# Patient Record
Sex: FEMALE | Race: WHITE | NOT HISPANIC OR LATINO | Employment: FULL TIME | URBAN - NONMETROPOLITAN AREA
[De-identification: names, ages, dates, MRNs, and addresses within clinical notes are randomized per-mention and may not be internally consistent; named-entity substitution may affect disease eponyms.]

---

## 2017-01-13 ENCOUNTER — OFFICE VISIT (OUTPATIENT)
Dept: FAMILY MEDICINE CLINIC | Facility: CLINIC | Age: 52
End: 2017-01-13

## 2017-01-13 VITALS
HEIGHT: 64 IN | WEIGHT: 165 LBS | SYSTOLIC BLOOD PRESSURE: 122 MMHG | TEMPERATURE: 98.5 F | OXYGEN SATURATION: 100 % | BODY MASS INDEX: 28.17 KG/M2 | DIASTOLIC BLOOD PRESSURE: 66 MMHG | HEART RATE: 67 BPM

## 2017-01-13 DIAGNOSIS — E53.8 VITAMIN B12 DEFICIENCY (NON ANEMIC): ICD-10-CM

## 2017-01-13 DIAGNOSIS — R53.82 CHRONIC FATIGUE: ICD-10-CM

## 2017-01-13 DIAGNOSIS — F33.42 RECURRENT MAJOR DEPRESSIVE DISORDER, IN FULL REMISSION (HCC): ICD-10-CM

## 2017-01-13 DIAGNOSIS — E03.8 SUBCLINICAL HYPOTHYROIDISM: ICD-10-CM

## 2017-01-13 DIAGNOSIS — E80.6 HYPERBILIRUBINEMIA: Primary | ICD-10-CM

## 2017-01-13 DIAGNOSIS — I10 ESSENTIAL HYPERTENSION: ICD-10-CM

## 2017-01-13 PROCEDURE — 99213 OFFICE O/P EST LOW 20 MIN: CPT | Performed by: INTERNAL MEDICINE

## 2017-01-13 RX ORDER — CARVEDILOL 12.5 MG/1
12.5 TABLET ORAL 2 TIMES DAILY WITH MEALS
Qty: 180 TABLET | Refills: 3 | Status: SHIPPED | OUTPATIENT
Start: 2017-01-13 | End: 2018-01-05 | Stop reason: SDUPTHER

## 2017-01-13 RX ORDER — LOSARTAN POTASSIUM 50 MG/1
50 TABLET ORAL DAILY
Qty: 90 TABLET | Refills: 3 | Status: SHIPPED | OUTPATIENT
Start: 2017-01-13 | End: 2017-08-21

## 2017-01-13 RX ORDER — LEVOTHYROXINE SODIUM 0.05 MG/1
50 TABLET ORAL DAILY
Qty: 30 TABLET | Refills: 5 | Status: SHIPPED | OUTPATIENT
Start: 2017-01-13 | End: 2017-01-13 | Stop reason: SDUPTHER

## 2017-01-13 RX ORDER — CHLORTHALIDONE 25 MG/1
25 TABLET ORAL DAILY
Qty: 90 TABLET | Refills: 3 | Status: SHIPPED | OUTPATIENT
Start: 2017-01-13 | End: 2018-01-05 | Stop reason: SDUPTHER

## 2017-01-13 RX ORDER — LEVOTHYROXINE SODIUM 0.05 MG/1
50 TABLET ORAL DAILY
Qty: 90 TABLET | Refills: 3 | Status: SHIPPED | OUTPATIENT
Start: 2017-01-13 | End: 2018-01-05 | Stop reason: SDUPTHER

## 2017-01-13 RX ORDER — BUPROPION HYDROCHLORIDE 100 MG/1
100 TABLET ORAL 3 TIMES DAILY
Qty: 270 TABLET | Refills: 3 | Status: SHIPPED | OUTPATIENT
Start: 2017-01-13 | End: 2017-08-21

## 2017-01-13 NOTE — MR AVS SNAPSHOT
Gaby Rasheed   1/13/2017 11:00 AM   Office Visit    Dept Phone:  [phone]   Encounter #:  [id number]    Provider:  Silverio Perez MD   Department:  [facility] PRIMARY CARE                Your Full Care Plan              Today's Medication Changes          These changes are accurate as of: 1/13/17 12:02 PM.  If you have any questions, ask your nurse or doctor.               Medication(s)that have changed:     losartan 50 MG tablet   Commonly known as:  COZAAR   Take 1 tablet by mouth Daily.   What changed:  Another medication with the same name was removed. Continue taking this medication, and follow the directions you see here.   Changed by:  Silverio Perez MD         Stop taking medication(s)listed here:     lamoTRIgine 100 MG tablet   Commonly known as:  LaMICtal   Stopped by:  Silverio Perez MD           PREMPRO 0.625-2.5 MG per tablet   Generic drug:  estrogen (conjugated)-medroxyprogesterone   Stopped by:  Silverio Perez MD                Where to Get Your Medications      These medications were sent to [facility] [place] [address] - [address] [place] [address] [phone]  - [phone] [address] [address]     Phone:  [phone]     carvedilol 12.5 MG tablet    chlorthalidone 25 MG tablet    levothyroxine 50 MCG tablet    losartan 50 MG tablet         You can get these medications from any pharmacy     Bring a paper prescription for each of these medications     buPROPion 100 MG tablet                  Your Updated Medication List          This list is accurate as of: 1/13/17 12:02 PM.  Always use your most recent med list.                BIOTIN PO       buPROPion 100 MG tablet   Commonly known as:  WELLBUTRIN   Take 1 tablet by mouth 3 (Three) Times a Day.       carvedilol 12.5 MG tablet   Commonly known as:  COREG   Take 1 tablet by mouth 2 (Two) Times a Day With Meals.       CENTRUM ADULTS tablet       chlorthalidone 25 MG tablet   Commonly known as:  HYGROTON   Take 1 tablet by mouth Daily.       citalopram 40 MG tablet   Commonly known as:  CELEXA   Take 1 tablet by mouth Daily.       CloNIDine 0.1 MG tablet   Commonly known as:  CATAPRES   Take 1 tablet by mouth at night as needed for high blood pressure.       Iron tablet       levothyroxine 50 MCG tablet   Commonly known as:  SYNTHROID   Take 1 tablet by mouth Daily.       losartan 50 MG tablet   Commonly known as:  COZAAR   Take 1 tablet by mouth Daily.       Vitamin B-12 1000 MCG sublingual tablet               You Were Diagnosed With        Codes Comments    Hyperbilirubinemia    -  Primary ICD-10-CM: E80.6  ICD-9-CM: 782.4     Essential hypertension     ICD-10-CM: I10  ICD-9-CM: 401.9     Recurrent major depressive disorder, in full remission     ICD-10-CM: F33.42  ICD-9-CM: 296.36     Subclinical hypothyroidism     ICD-10-CM: E03.9  ICD-9-CM: 244.8     Chronic fatigue     ICD-10-CM: R53.82  ICD-9-CM: 780.79     Vitamin B12 deficiency (non anemic)     ICD-10-CM: E53.8  ICD-9-CM: 266.2       Medications to be Given to You by a Medical Professional     Due       Frequency    8/8/2016 cyanocobalamin injection 1,000 mcg  Every 28 Days      Instructions     None    Patient Instructions History      Upcoming Appointments     Visit Type Date Time Department    SAME DAY 1/13/2017 11:00 AM [facility]    OFFICE VISIT 7/14/2017  8:00 AM [facility] GRETTA [facility]    PAP SMEAR/PELVIC EXAM 10/16/2017  3:00 PM [facility] ONC GYN [facility]      [employer] Signup     Our records indicate that you have an active [employer] account.    You can view your After Visit Summary by going to [employer] and logging in with your [employer] username and password.  If you don't have a [employer] username and password but a parent or guardian has access to your record, the parent or guardian should login with their own [employer] username and password and access your record to  "view the After Visit Summary.    If you have questions, you can email [email or web address] or call [phone] to talk to our MyChart staff.  Remember, [employer] is NOT to be used for urgent needs.  For medical emergencies, dial 911.               Other Info from Your Visit           Your Appointments     Jul 14, 2017  8:00 AM EDT   Office Visit with Silverio Perez MD   [facility] PRIMARY CARE (--)    [address]  [facility] [address] [phone]           Arrive 15 minutes prior to appointment.            Oct 16, 2017  3:00 PM EDT   Pap Smear/Pelvic Exam with JEN Gutierrez   [facility] GYNECOLOGIC ONCOLOGY (--)    [address]  [facility] [address] [phone]              Allergies     No Known Allergies      Reason for Visit     Follow-up Lab results      Vital Signs     Blood Pressure Pulse Temperature Height Weight Oxygen Saturation    122/66 (BP Location: Left arm, Patient Position: Sitting) 67 98.5 °F (36.9 °C) 63.5\" (161.3 cm) 165 lb (74.8 kg) 100%    Body Mass Index Smoking Status                28.77 kg/m2 Never Smoker          Problems and Diagnoses Noted     Chronic fatigue    Depression    High blood pressure    Hyperbilirubinemia    -  Primary    Underactive thyroid        Vitamin B12 deficiency (non anemic)            "

## 2017-01-13 NOTE — PROGRESS NOTES
"Subjective   Patient ID: Gaby Rasheed is a 51 y.o. female Pt request medical management.     History of Present Illness   Pt request medical management.     Feels well. Breathing well. Lost job yesterday.       The following portions of the patient's history were reviewed and updated as appropriate: allergies, current medications, past family history, past medical history, past social history, past surgical history and problem list.  Review of Systems   Constitutional: Positive for fatigue.   All other systems reviewed and are negative.      Visit Vitals   • /66 (BP Location: Left arm, Patient Position: Sitting)   • Pulse 67   • Temp 98.5 °F (36.9 °C)   • Ht 63.5\" (161.3 cm)   • Wt 165 lb (74.8 kg)   • SpO2 100%   • BMI 28.77 kg/m2       Objective   Physical Exam  General Appearance:    Alert, cooperative, no distress, appears stated age   Head:    Normocephalic, without obvious abnormality, atraumatic   Eyes:    PERRL, conjunctiva/corneas clear, EOM's intact, fundi     benign, both eyes        Ears:    Normal TM's and external ear canals, both ears   Nose:   Nares normal, septum midline, mucosa normal, no drainage    or sinus tenderness   Throat:   Lips, mucosa, and tongue normal; teeth and gums normal   Neck:   Supple, symmetrical, trachea midline, no adenopathy;        thyroid:  No enlargement/tenderness/nodules; no carotid    bruit or JVD   Back:     Symmetric, no curvature, ROM normal, no CVA tenderness   Lungs:     Clear to auscultation bilaterally, respirations unlabored   Chest wall:    No tenderness or deformity   Heart:    Regular rate and rhythm, S1 and S2 normal, no murmur, rub   or gallop   Abdomen:     Soft, non-tender, bowel sounds active all four quadrants,     no masses, no organomegaly           Extremities:   Extremities normal, atraumatic, no cyanosis or edema   Pulses:   2+ and symmetric all extremities   Skin:   Skin color, texture, turgor normal, no rashes or lesions   Lymph " nodes:   Cervical, supraclavicular, and axillary nodes normal   Neurologic:   CNII-XII intact. Normal strength, sensation and reflexes       throughout     Assessment/Plan   Labs and liver u/s reviewed to day. Pt will decrease etoh intake and repeat labs.      Gaby was seen today for follow-up.    Diagnoses and all orders for this visit:    Hyperbilirubinemia  -     Comprehensive Metabolic Panel; Future    Essential hypertension  -     losartan (COZAAR) 50 MG tablet; Take 1 tablet by mouth Daily.  -     chlorthalidone (HYGROTON) 25 MG tablet; Take 1 tablet by mouth Daily.  -     Comprehensive Metabolic Panel; Future    Recurrent major depressive disorder, in full remission  -     buPROPion (WELLBUTRIN) 100 MG tablet; Take 1 tablet by mouth 3 (Three) Times a Day.    Subclinical hypothyroidism  -     Discontinue: levothyroxine (SYNTHROID) 50 MCG tablet; Take 1 tablet by mouth Daily.  -     TSH; Future  -     T4, Free; Future  -     T3, Free; Future  -     levothyroxine (SYNTHROID) 50 MCG tablet; Take 1 tablet by mouth Daily.    Chronic fatigue  -     Discontinue: levothyroxine (SYNTHROID) 50 MCG tablet; Take 1 tablet by mouth Daily.  -     TSH; Future  -     T4, Free; Future  -     T3, Free; Future  -     levothyroxine (SYNTHROID) 50 MCG tablet; Take 1 tablet by mouth Daily.    Vitamin B12 deficiency (non anemic)  -     CBC & Differential; Future  -     Vitamin B12; Future    Other orders  -     carvedilol (COREG) 12.5 MG tablet; Take 1 tablet by mouth 2 (Two) Times a Day With Meals.      Return in about 6 months (around 7/13/2017).           Immunization History   Administered Date(s) Administered   • Influenza (IM) Preservative Free 12/16/2016           There are no Patient Instructions on file for this visit.

## 2017-03-10 ENCOUNTER — OFFICE VISIT (OUTPATIENT)
Dept: FAMILY MEDICINE CLINIC | Facility: CLINIC | Age: 52
End: 2017-03-10

## 2017-03-10 VITALS
DIASTOLIC BLOOD PRESSURE: 78 MMHG | WEIGHT: 171 LBS | TEMPERATURE: 98.2 F | RESPIRATION RATE: 16 BRPM | OXYGEN SATURATION: 100 % | HEART RATE: 74 BPM | BODY MASS INDEX: 29.19 KG/M2 | SYSTOLIC BLOOD PRESSURE: 144 MMHG | HEIGHT: 64 IN

## 2017-03-10 DIAGNOSIS — J01.01 ACUTE RECURRENT MAXILLARY SINUSITIS: Primary | ICD-10-CM

## 2017-03-10 PROCEDURE — 99214 OFFICE O/P EST MOD 30 MIN: CPT | Performed by: INTERNAL MEDICINE

## 2017-03-10 RX ORDER — LEVOFLOXACIN 500 MG/1
500 TABLET, FILM COATED ORAL DAILY
Qty: 7 TABLET | Refills: 0 | Status: SHIPPED | OUTPATIENT
Start: 2017-03-10 | End: 2017-04-10

## 2017-03-10 NOTE — PROGRESS NOTES
"Subjective   Patient ID: Gaby Rasheed is a 51 y.o. female Pt is here for management of multiple medical problems.    Chief Complaint   Patient presents with   • Sinusitis     2/10/17 went to [facility], for sinus infection was given Amoxicillin for 10 days, patient still having lots of drainage   • Cough     patient now coughing   • Nasal Congestion     patient continuing to have a runny nose and a funny taste in her mouth   • Ears popping     bilateral popping of ears       History of Present Illness    Seen 2/14 and treated with abx. drainge still bad.  Nasal d/c and foul smell. Augmentin given.   Still with sinus pressure and pain.    The following portions of the patient's history were reviewed and updated as appropriate: allergies, current medications, past family history, past medical history, past social history, past surgical history and problem list.      Review of Systems   Constitutional: Positive for fatigue and fever.   HENT: Positive for congestion, postnasal drip, rhinorrhea and sinus pressure.    Respiratory: Positive for cough and shortness of breath.        Objective     Visit Vitals   • /78 (BP Location: Left arm, Patient Position: Sitting, Cuff Size: Large Adult)   • Pulse 74   • Temp 98.2 °F (36.8 °C) (Oral)   • Resp 16   • Ht 63.5\" (161.3 cm)   • Wt 171 lb (77.6 kg)   • SpO2 100%   • BMI 29.82 kg/m2     Physical Exam  General Appearance:    Alert, cooperative, no distress, appears stated age   Head:    Normocephalic, without obvious abnormality, atraumatic   Eyes:    PERRL, conjunctiva/corneas clear, EOM's intact           Ears:    Normal TM's and external ear canals, both ears   Nose:   + sinus tenderness   Throat:   Lips, mucosa, and tongue normal; teeth and gums normal   Neck:   Supple, symmetrical, trachea midline, no adenopathy;        thyroid:  No enlargement/tenderness/nodules; no carotid    bruit or JVD   Back:     Symmetric, no curvature, ROM normal, no CVA tenderness "   Lungs:     Clear to auscultation bilaterally, respirations unlabored   Chest wall:    No tenderness or deformity   Heart:    Regular rate and rhythm, S1 and S2 normal, no murmur, rub   or gallop   Abdomen:     Soft, non-tender, bowel sounds active all four quadrants,     no masses, no organomegaly           Extremities:   Extremities normal, atraumatic, no cyanosis or edema   Pulses:   2+ and symmetric all extremities   Skin:   Skin color, texture, turgor normal, no rashes or lesions   Lymph nodes:   Cervical, supraclavicular, and axillary nodes normal   Neurologic:   CNII-XII intact. Normal strength, sensation and reflexes       throughout       Assessment/Plan           Gaby was seen today for sinusitis, cough, nasal congestion and ears popping.    Diagnoses and all orders for this visit:    Acute recurrent maxillary sinusitis  -     RESPIRATORY CULTURE    Other orders  -     levoFLOXacin (LEVAQUIN) 500 MG tablet; Take 1 tablet by mouth Daily.    No Follow-up on file.                   There are no Patient Instructions on file for this visit.

## 2017-03-13 LAB
BACTERIA SPEC CULT: NORMAL
BACTERIA SPT CULT: NORMAL
Lab: NORMAL

## 2017-04-10 ENCOUNTER — OFFICE VISIT (OUTPATIENT)
Dept: FAMILY MEDICINE CLINIC | Facility: CLINIC | Age: 52
End: 2017-04-10

## 2017-04-10 VITALS
HEIGHT: 64 IN | OXYGEN SATURATION: 99 % | WEIGHT: 178 LBS | DIASTOLIC BLOOD PRESSURE: 71 MMHG | RESPIRATION RATE: 16 BRPM | BODY MASS INDEX: 30.39 KG/M2 | TEMPERATURE: 98.7 F | SYSTOLIC BLOOD PRESSURE: 151 MMHG

## 2017-04-10 DIAGNOSIS — J01.00 ACUTE NON-RECURRENT MAXILLARY SINUSITIS: Primary | ICD-10-CM

## 2017-04-10 DIAGNOSIS — J30.89 SEASONAL ALLERGIC RHINITIS DUE TO OTHER ALLERGIC TRIGGER: ICD-10-CM

## 2017-04-10 DIAGNOSIS — R06.83 PRIMARY SNORING: ICD-10-CM

## 2017-04-10 PROCEDURE — 99214 OFFICE O/P EST MOD 30 MIN: CPT | Performed by: INTERNAL MEDICINE

## 2017-04-10 RX ORDER — AZITHROMYCIN 250 MG/1
TABLET, FILM COATED ORAL
Qty: 6 TABLET | Refills: 0 | Status: SHIPPED | OUTPATIENT
Start: 2017-04-10 | End: 2017-07-18

## 2017-04-10 RX ORDER — FLUTICASONE PROPIONATE 50 MCG
2 SPRAY, SUSPENSION (ML) NASAL DAILY
COMMUNITY
End: 2017-08-21

## 2017-04-10 NOTE — PROGRESS NOTES
"Subjective   Patient ID: Gaby Rasheed is a 51 y.o. female Pt is here for management of multiple medical problems.    Chief Complaint   Patient presents with   • Sinusitis     nasal congestion/runny nose, green drainage x 2 weeks, patient states she felt better a day or two after fininishing the Levaquin , but the symptoms came back.   • bilateral Ear pain     x 2 weeks   • Cough     coughing x 2 weeks, worse at night       History of Present Illness     2 weeks of increased nasal. Clear till 3 days ago. [place].   Has allergies and is on on advil allergy ans sinus. Not helping currently.   Now with sinus pressure and pain.  No fever.  Chills.       The following portions of the patient's history were reviewed and updated as appropriate: allergies, current medications, past family history, past medical history, past social history, past surgical history and problem list.      Review of Systems   Constitutional: Positive for chills and fatigue. Negative for fever.   HENT: Positive for postnasal drip, rhinorrhea, sinus pressure, sneezing, sore throat and voice change.    Respiratory: Negative for shortness of breath.    Psychiatric/Behavioral: Positive for sleep disturbance.       Objective     /71 (BP Location: Left arm, Patient Position: Sitting, Cuff Size: Adult)  Temp 98.7 °F (37.1 °C) (Oral)   Resp 16  Ht 63.5\" (161.3 cm)  Wt 178 lb (80.7 kg)  SpO2 99%  BMI 31.04 kg/m2  Physical Exam  General Appearance:    Alert, cooperative, no distress, appears stated age   Head:    Normocephalic, without obvious abnormality, atraumatic   Eyes:    PERRL, conjunctiva/corneas clear, EOM's intact           Ears:    Normal TM's and external ear canals, both ears   Nose:   Nares normal, septum midline, mucosa normal, no drainage    or sinus tenderness   Throat:   Lips, mucosa, and tongue normal; teeth and gums normal   Neck:   Supple, symmetrical, trachea midline, no adenopathy;        " thyroid:  No enlargement/tenderness/nodules; no carotid    bruit or JVD   Back:     Symmetric, no curvature, ROM normal, no CVA tenderness   Lungs:     Clear to auscultation bilaterally, respirations unlabored   Chest wall:    No tenderness or deformity   Heart:    Regular rate and rhythm, S1 and S2 normal, no murmur, rub   or gallop   Abdomen:     Soft, non-tender, bowel sounds active all four quadrants,     no masses, no organomegaly           Extremities:   Extremities normal, atraumatic, no cyanosis or edema   Pulses:   2+ and symmetric all extremities   Skin:   Skin color, texture, turgor normal, no rashes or lesions   Lymph nodes:   Cervical, supraclavicular, and axillary nodes normal   Neurologic:   CNII-XII intact. Normal strength, sensation and reflexes       throughout       Assessment/Plan     Get dental appliance for snoring.        Gaby was seen today for sinusitis, bilateral ear pain and cough.    Diagnoses and all orders for this visit:    Acute non-recurrent maxillary sinusitis  -     azithromycin (ZITHROMAX) 250 MG tablet; Take 2 tablets the first day, then 1 tablet daily for 4 days.    Seasonal allergic rhinitis due to other allergic trigger    Primary snoring      Return if symptoms worsen or fail to improve.                   Patient Instructions   [email or web address]

## 2017-07-14 ENCOUNTER — OFFICE VISIT (OUTPATIENT)
Dept: FAMILY MEDICINE CLINIC | Facility: CLINIC | Age: 52
End: 2017-07-14

## 2017-07-14 VITALS
DIASTOLIC BLOOD PRESSURE: 64 MMHG | RESPIRATION RATE: 16 BRPM | HEART RATE: 58 BPM | BODY MASS INDEX: 28.68 KG/M2 | HEIGHT: 64 IN | SYSTOLIC BLOOD PRESSURE: 120 MMHG | TEMPERATURE: 98 F | OXYGEN SATURATION: 100 % | WEIGHT: 168 LBS

## 2017-07-14 DIAGNOSIS — K92.2 GASTROINTESTINAL HEMORRHAGE, UNSPECIFIED GASTROINTESTINAL HEMORRHAGE TYPE: Primary | ICD-10-CM

## 2017-07-14 DIAGNOSIS — E87.1 HYPONATREMIA: ICD-10-CM

## 2017-07-14 DIAGNOSIS — I10 ESSENTIAL HYPERTENSION: ICD-10-CM

## 2017-07-14 DIAGNOSIS — R17 ELEVATED BILIRUBIN: Primary | ICD-10-CM

## 2017-07-14 DIAGNOSIS — E87.1 HYPONATREMIA: Primary | ICD-10-CM

## 2017-07-14 DIAGNOSIS — Z78.0 MENOPAUSE: ICD-10-CM

## 2017-07-14 DIAGNOSIS — E87.6 HYPOKALEMIA: ICD-10-CM

## 2017-07-14 DIAGNOSIS — F32.89 OTHER DEPRESSION: ICD-10-CM

## 2017-07-14 DIAGNOSIS — R53.82 CHRONIC FATIGUE: Primary | ICD-10-CM

## 2017-07-14 PROCEDURE — 99214 OFFICE O/P EST MOD 30 MIN: CPT | Performed by: INTERNAL MEDICINE

## 2017-07-14 RX ORDER — POTASSIUM CHLORIDE 1500 MG/1
20 TABLET, FILM COATED, EXTENDED RELEASE ORAL 2 TIMES DAILY
Qty: 60 TABLET | Refills: 0 | Status: SHIPPED | OUTPATIENT
Start: 2017-07-14 | End: 2017-11-08

## 2017-07-14 NOTE — PROGRESS NOTES
"Subjective   Patient ID: Gaby Rasheed is a 52 y.o. female Pt is here for management of multiple medical problems.    Chief Complaint   Patient presents with   • Hypertension     6 month follow-up   • Hypothyroidism     6 month follow-up   • Hyperlipidemia     6 month follow-up   • Fatigue     6month follow-up, patient states she is feeling really tired   • Rectal Bleeding     patient states she had rectal bleeding 2 months ago x 2 days, on 7/12/17 she developed rectal bleeding again, bright red blood, patient states she is bleeding even without having a bowel movement.        History of Present Illness    Bleeding bright red. Rectal. Anytime using bathroom. X 2 days. A lot ot a little every time uisng BR if urinating only or having bm. Hx of this in the past and has been hemmorroid. Was painful in past. Now no pain.  Some diarrhea. 2-3 bms daily and this is her normal.         Dr Abreu colonoscopy 3-4 years ago. Hx of gastric bypass.  Lower back pain . Burning in lower back x 2 weeks. Waxes and wanes.   The following portions of the patient's history were reviewed and updated as appropriate: allergies, current medications, past family history, past medical history, past social history, past surgical history and problem list.      Review of Systems   Constitutional: Positive for fatigue.   Gastrointestinal: Positive for blood in stool and diarrhea. Negative for abdominal distention, abdominal pain and constipation.   Genitourinary: Negative for difficulty urinating.   All other systems reviewed and are negative.      Objective     /64 (BP Location: Left arm, Patient Position: Sitting, Cuff Size: Adult)  Pulse 58  Temp 98 °F (36.7 °C) (Oral)   Resp 16  Ht 63.5\" (161.3 cm)  Wt 168 lb (76.2 kg)  SpO2 100%  BMI 29.29 kg/m2  Physical Exam  General Appearance:    Alert, cooperative, no distress, appears stated age   Head:    Normocephalic, without obvious abnormality, atraumatic   Eyes:    PERRL, " conjunctiva/corneas clear, EOM's intact           Ears:    Normal TM's and external ear canals, both ears   Nose:   Nares normal, septum midline, mucosa normal, no drainage    or sinus tenderness   Throat:   Lips, mucosa, and tongue normal; teeth and gums normal   Neck:   Supple, symmetrical, trachea midline, no adenopathy;        thyroid:  No enlargement/tenderness/nodules; no carotid    bruit or JVD   Back:     Symmetric, no curvature, ROM normal, no CVA tenderness   Lungs:     Clear to auscultation bilaterally, respirations unlabored   Chest wall:    No tenderness or deformity   Heart:    Regular rate and rhythm, S1 and S2 normal, no murmur, rub   or gallop   Abdomen:     Soft, non-tender, bowel sounds active all four quadrants,     no masses, no organomegaly           Extremities:   Extremities normal, atraumatic, no cyanosis or edema   Pulses:   2+ and symmetric all extremities   Skin:   Skin color, texture, turgor normal, no rashes or lesions   Lymph nodes:   Cervical, supraclavicular, and axillary nodes normal   Neurologic:   CNII-XII intact. Normal strength, sensation and reflexes       throughout       Assessment/Plan           Gaby was seen today for hypertension, hypothyroidism, hyperlipidemia, fatigue and rectal bleeding.    Diagnoses and all orders for this visit:    Gastrointestinal hemorrhage, unspecified gastrointestinal hemorrhage type  -     Ambulatory Referral to General Surgery      Return in about 4 weeks (around 8/11/2017).                   There are no Patient Instructions on file for this visit.

## 2017-07-14 NOTE — PROGRESS NOTES
Please let them know the liver is not doing well. Sodium and potassium not good. Decrease chlorthalidone to 1/2 a pill a day.  Get the potasium filled at the pharmacy. Take 3 pill now and then one 2 x a day. Do labs on Monday and set up rtc on Tuesday in am.   Pt will also be set up for live us. If pain starts go to er.

## 2017-07-15 LAB
ALBUMIN SERPL-MCNC: 3.2 G/DL (ref 3.5–5)
ALBUMIN/GLOB SERPL: 1.2 G/DL (ref 1–2)
ALP SERPL-CCNC: 157 U/L (ref 38–126)
ALT SERPL-CCNC: 79 U/L (ref 13–69)
AST SERPL-CCNC: 149 U/L (ref 15–46)
BASOPHILS # BLD AUTO: 0.06 10*3/MM3 (ref 0–0.2)
BASOPHILS NFR BLD AUTO: 0.6 % (ref 0–2.5)
BILIRUB SERPL-MCNC: 3.1 MG/DL (ref 0.2–1.3)
BUN SERPL-MCNC: 12 MG/DL (ref 7–20)
BUN/CREAT SERPL: 15 (ref 7.1–23.5)
CALCIUM SERPL-MCNC: 9.1 MG/DL (ref 8.4–10.2)
CHLORIDE SERPL-SCNC: 91 MMOL/L (ref 98–107)
CO2 SERPL-SCNC: 31 MMOL/L (ref 26–30)
CREAT SERPL-MCNC: 0.8 MG/DL (ref 0.6–1.3)
DIFFERENTIAL COMMENT: NORMAL
EOSINOPHIL # BLD AUTO: 0.09 10*3/MM3 (ref 0–0.7)
EOSINOPHIL NFR BLD AUTO: 0.9 % (ref 0–7)
ERYTHROCYTE [DISTWIDTH] IN BLOOD BY AUTOMATED COUNT: 13.8 % (ref 11.5–14.5)
GLOBULIN SER CALC-MCNC: 2.7 GM/DL
GLUCOSE SERPL-MCNC: 89 MG/DL (ref 74–98)
HCT VFR BLD AUTO: 38.3 % (ref 37–47)
HGB BLD-MCNC: 13.2 G/DL (ref 12–16)
IMM GRANULOCYTES # BLD: 0.11 10*3/MM3 (ref 0–0.06)
IMM GRANULOCYTES NFR BLD: 1.1 % (ref 0–0.6)
LYMPHOCYTES # BLD AUTO: 2.39 10*3/MM3 (ref 0.6–3.4)
LYMPHOCYTES NFR BLD AUTO: 23.5 % (ref 10–50)
MCH RBC QN AUTO: 36.4 PG (ref 27–31)
MCHC RBC AUTO-ENTMCNC: 34.5 G/DL (ref 30–37)
MCV RBC AUTO: 105.5 FL (ref 81–99)
MONOCYTES # BLD AUTO: 0.61 10*3/MM3 (ref 0–0.9)
MONOCYTES NFR BLD AUTO: 6 % (ref 0–12)
NEUTROPHILS # BLD AUTO: 6.89 10*3/MM3 (ref 2–6.9)
NEUTROPHILS NFR BLD AUTO: 67.9 % (ref 37–80)
NRBC BLD AUTO-RTO: 0 /100 WBC (ref 0–0)
PLATELET # BLD AUTO: 174 10*3/MM3 (ref 130–400)
PLATELET BLD QL SMEAR: NORMAL
POTASSIUM SERPL-SCNC: 2.9 MMOL/L (ref 3.5–5.1)
PROT SERPL-MCNC: 5.9 G/DL (ref 6.3–8.2)
RBC # BLD AUTO: 3.63 10*6/MM3 (ref 4.2–5.4)
RBC MORPH BLD: NORMAL
SODIUM SERPL-SCNC: 131 MMOL/L (ref 137–145)
T3FREE SERPL-MCNC: 3.1 PG/ML (ref 2–4.4)
T4 FREE SERPL-MCNC: 1.61 NG/DL (ref 0.78–2.19)
TSH SERPL DL<=0.005 MIU/L-ACNC: 2.54 MIU/ML (ref 0.47–4.68)
VIT B12 SERPL-MCNC: >1000 PG/ML (ref 239–931)
WBC # BLD AUTO: 10.15 10*3/MM3 (ref 4.8–10.8)

## 2017-07-16 ENCOUNTER — APPOINTMENT (OUTPATIENT)
Dept: ULTRASOUND IMAGING | Facility: HOSPITAL | Age: 52
End: 2017-07-16

## 2017-07-16 ENCOUNTER — HOSPITAL ENCOUNTER (EMERGENCY)
Facility: HOSPITAL | Age: 52
Discharge: HOME | End: 2017-07-16
Attending: EMERGENCY MEDICINE | Admitting: EMERGENCY MEDICINE

## 2017-07-16 ENCOUNTER — APPOINTMENT (OUTPATIENT)
Dept: CT IMAGING | Facility: HOSPITAL | Age: 52
End: 2017-07-16

## 2017-07-16 VITALS
DIASTOLIC BLOOD PRESSURE: 68 MMHG | BODY MASS INDEX: 28.34 KG/M2 | SYSTOLIC BLOOD PRESSURE: 122 MMHG | HEART RATE: 80 BPM | WEIGHT: 166 LBS | TEMPERATURE: 98.2 F | OXYGEN SATURATION: 99 % | HEIGHT: 64 IN | RESPIRATION RATE: 19 BRPM

## 2017-07-16 DIAGNOSIS — K62.5 RECTAL BLEEDING: Primary | ICD-10-CM

## 2017-07-16 LAB
ALBUMIN SERPL-MCNC: 3.3 G/DL (ref 3.5–5)
ALBUMIN/GLOB SERPL: 1.1 G/DL (ref 1–2)
ALP SERPL-CCNC: 147 U/L (ref 38–126)
ALT SERPL W P-5'-P-CCNC: 63 U/L (ref 13–69)
ANION GAP SERPL CALCULATED.3IONS-SCNC: 12.7 MMOL/L
APTT PPP: 24.7 SECONDS (ref 25–36)
AST SERPL-CCNC: 65 U/L (ref 15–46)
BACTERIA UR QL AUTO: ABNORMAL /HPF
BASOPHILS # BLD AUTO: 0.06 10*3/MM3 (ref 0–0.2)
BASOPHILS NFR BLD AUTO: 0.8 % (ref 0–2.5)
BILIRUB SERPL-MCNC: 2.5 MG/DL (ref 0.2–1.3)
BILIRUB UR QL STRIP: ABNORMAL
BUN BLD-MCNC: 13 MG/DL (ref 7–20)
BUN/CREAT SERPL: 14.4 (ref 7.1–23.5)
CALCIUM SPEC-SCNC: 9 MG/DL (ref 8.4–10.2)
CHLORIDE SERPL-SCNC: 101 MMOL/L (ref 98–107)
CLARITY UR: ABNORMAL
CO2 SERPL-SCNC: 26 MMOL/L (ref 26–30)
COLOR UR: ABNORMAL
CREAT BLD-MCNC: 0.9 MG/DL (ref 0.6–1.3)
DEPRECATED RDW RBC AUTO: 52.9 FL (ref 37–54)
EOSINOPHIL # BLD AUTO: 0.12 10*3/MM3 (ref 0–0.7)
EOSINOPHIL NFR BLD AUTO: 1.6 % (ref 0–7)
ERYTHROCYTE [DISTWIDTH] IN BLOOD BY AUTOMATED COUNT: 13.8 % (ref 11.5–14.5)
GFR SERPL CREATININE-BSD FRML MDRD: 65 ML/MIN/1.73
GLOBULIN UR ELPH-MCNC: 2.9 GM/DL
GLUCOSE BLD-MCNC: 115 MG/DL (ref 74–98)
GLUCOSE UR STRIP-MCNC: NEGATIVE MG/DL
HCT VFR BLD AUTO: 38.9 % (ref 37–47)
HEMOCCULT STL QL: NEGATIVE
HGB BLD-MCNC: 13.6 G/DL (ref 12–16)
HGB UR QL STRIP.AUTO: NEGATIVE
HOLD SPECIMEN: NORMAL
HOLD SPECIMEN: NORMAL
HYALINE CASTS UR QL AUTO: ABNORMAL /LPF
IMM GRANULOCYTES # BLD: 0.16 10*3/MM3 (ref 0–0.06)
IMM GRANULOCYTES NFR BLD: 2.1 % (ref 0–0.6)
INR PPP: 1.11 (ref 0.9–1.1)
KETONES UR QL STRIP: ABNORMAL
LEUKOCYTE ESTERASE UR QL STRIP.AUTO: ABNORMAL
LYMPHOCYTES # BLD AUTO: 2.11 10*3/MM3 (ref 0.6–3.4)
LYMPHOCYTES NFR BLD AUTO: 27.6 % (ref 10–50)
MAGNESIUM SERPL-MCNC: 1.9 MG/DL (ref 1.6–2.3)
MCH RBC QN AUTO: 36.5 PG (ref 27–31)
MCHC RBC AUTO-ENTMCNC: 35 G/DL (ref 30–37)
MCV RBC AUTO: 104.3 FL (ref 81–99)
MONOCYTES # BLD AUTO: 0.67 10*3/MM3 (ref 0–0.9)
MONOCYTES NFR BLD AUTO: 8.8 % (ref 0–12)
MUCOUS THREADS URNS QL MICRO: ABNORMAL /HPF
NEUTROPHILS # BLD AUTO: 4.52 10*3/MM3 (ref 2–6.9)
NEUTROPHILS NFR BLD AUTO: 59.1 % (ref 37–80)
NITRITE UR QL STRIP: NEGATIVE
NRBC BLD MANUAL-RTO: 0 /100 WBC (ref 0–0)
PH UR STRIP.AUTO: 6 [PH] (ref 5–8)
PLATELET # BLD AUTO: 168 10*3/MM3 (ref 130–400)
PMV BLD AUTO: 12.2 FL (ref 6–12)
POTASSIUM BLD-SCNC: 3.7 MMOL/L (ref 3.5–5.1)
PROT SERPL-MCNC: 6.2 G/DL (ref 6.3–8.2)
PROT UR QL STRIP: NEGATIVE
PROTHROMBIN TIME: 12.2 SECONDS (ref 9.3–12.1)
RBC # BLD AUTO: 3.73 10*6/MM3 (ref 4.2–5.4)
RBC # UR: ABNORMAL /HPF
REF LAB TEST METHOD: ABNORMAL
SODIUM BLD-SCNC: 136 MMOL/L (ref 137–145)
SP GR UR STRIP: 1.02 (ref 1–1.03)
SQUAMOUS #/AREA URNS HPF: ABNORMAL /HPF
TSH SERPL DL<=0.05 MIU/L-ACNC: 2.55 MIU/ML (ref 0.47–4.68)
UROBILINOGEN UR QL STRIP: ABNORMAL
WBC NRBC COR # BLD: 7.64 10*3/MM3 (ref 4.8–10.8)
WBC UR QL AUTO: ABNORMAL /HPF
WHOLE BLOOD HOLD SPECIMEN: NORMAL
WHOLE BLOOD HOLD SPECIMEN: NORMAL

## 2017-07-16 PROCEDURE — 80053 COMPREHEN METABOLIC PANEL: CPT | Performed by: PHYSICIAN ASSISTANT

## 2017-07-16 PROCEDURE — 85025 COMPLETE CBC W/AUTO DIFF WBC: CPT | Performed by: PHYSICIAN ASSISTANT

## 2017-07-16 PROCEDURE — 81001 URINALYSIS AUTO W/SCOPE: CPT | Performed by: PHYSICIAN ASSISTANT

## 2017-07-16 PROCEDURE — 85730 THROMBOPLASTIN TIME PARTIAL: CPT | Performed by: PHYSICIAN ASSISTANT

## 2017-07-16 PROCEDURE — 74176 CT ABD & PELVIS W/O CONTRAST: CPT

## 2017-07-16 PROCEDURE — 99283 EMERGENCY DEPT VISIT LOW MDM: CPT

## 2017-07-16 PROCEDURE — 82270 OCCULT BLOOD FECES: CPT | Performed by: PHYSICIAN ASSISTANT

## 2017-07-16 PROCEDURE — 83735 ASSAY OF MAGNESIUM: CPT | Performed by: PHYSICIAN ASSISTANT

## 2017-07-16 PROCEDURE — 76705 ECHO EXAM OF ABDOMEN: CPT

## 2017-07-16 PROCEDURE — 84443 ASSAY THYROID STIM HORMONE: CPT | Performed by: PHYSICIAN ASSISTANT

## 2017-07-16 PROCEDURE — 85610 PROTHROMBIN TIME: CPT | Performed by: PHYSICIAN ASSISTANT

## 2017-07-16 RX ORDER — SODIUM CHLORIDE 0.9 % (FLUSH) 0.9 %
10 SYRINGE (ML) INJECTION AS NEEDED
Status: DISCONTINUED | OUTPATIENT
Start: 2017-07-16 | End: 2017-07-16 | Stop reason: HOSPADM

## 2017-07-16 NOTE — ED PROVIDER NOTES
Subjective   HPI Comments: Patient is here with complaint of some intermittent rectal bleeding off and on for the past couple months patient has seen her PCP regarding this apparently a couple of days ago and had some lab tests done apparently she had a phone call to come to the ER to get checked out due to some abnormal labs, she denies fevers chills no chest pain shortness of air denies abdominal pain she has had colonoscopy in the past with Dr. Abreu and apparently is in the process of getting the set up again denies any other systemic complaints      Review of Systems   Constitutional: Negative.  Negative for chills and fever.   HENT: Negative.    Respiratory: Negative.  Negative for shortness of breath.    Cardiovascular: Negative.  Negative for chest pain and leg swelling.   Gastrointestinal: Positive for blood in stool. Negative for abdominal pain, nausea and vomiting.   Genitourinary: Negative.    Musculoskeletal: Negative.    Skin: Negative.    Neurological: Negative.    Psychiatric/Behavioral: The patient is nervous/anxious.    All other systems reviewed and are negative.      Past Medical History:   Diagnosis Date   • Acid reflux    • Back pain    • Depression    • Fractures    • High cholesterol    • Hypertension    • Nocturia 10/5/2016   • Positive TB test    • Sinus problem    • ELINA (stress urinary incontinence, female) 10/5/2016       No Known Allergies    Past Surgical History:   Procedure Laterality Date   •  SECTION     • GASTRIC BYPASS     • HERNIA REPAIR     • HYSTERECTOMY     • LAPAROSCOPIC TUBAL LIGATION         Family History   Problem Relation Age of Onset   • Hyperlipidemia Mother    • Arthritis Mother    • Diabetes Father    • Hypertension Father    • Arthritis Paternal Grandmother    • Breast cancer Neg Hx    • Ovarian cancer Neg Hx        Social History     Social History   • Marital status:      Spouse name: N/A   • Number of children: N/A   • Years of education: N/A      Social History Main Topics   • Smoking status: Never Smoker   • Smokeless tobacco: Never Used   • Alcohol use Yes      Comment: 1/2 pint every 2 days and wine daily   • Drug use: No   • Sexual activity: Not Asked     Other Topics Concern   • None     Social History Narrative   • None           Objective   Physical Exam   Constitutional: She is oriented to person, place, and time. She appears well-developed and well-nourished.   Afebrile vital signs stable nontoxic well-appearing no acute distress   HENT:   Head: Normocephalic.   Mouth/Throat: Oropharynx is clear and moist.   Eyes: EOM are normal. Pupils are equal, round, and reactive to light.   Neck: Neck supple.   Cardiovascular: Normal rate, regular rhythm, normal heart sounds and intact distal pulses.    Pulmonary/Chest: Effort normal and breath sounds normal.   Abdominal: Soft. She exhibits no distension and no mass. There is no tenderness.   Genitourinary:   Genitourinary Comments: Rectal exam unremarkable there is no blood per digital exam no masses no melena   Musculoskeletal: Normal range of motion.   Neurological: She is alert and oriented to person, place, and time.   Skin: Skin is warm and dry. No rash noted.   Psychiatric: She has a normal mood and affect. Her behavior is normal. Judgment and thought content normal.   Nursing note and vitals reviewed.      Procedures         ED Course  ED Course   Comment By Time   /Management reviewed with Dr. Alex Monae PA-C 07/16 1024   Patient has been seen by myself and Dr. Alex Monae PA-C 07/16 1047   Patient resting comfortably no acute distress Antonio Monae PA-C 07/16 1117   Patient has been seen by myself and Dr. Johnson we have reviewed all of her labs ultrasound of her liver and CT scan will recommend she follows up with Dr. Abreu as she has seen him in the past for another repeat colonoscopy  And follow-up with her PCP we have advised he will need repeat labs  done again this week to see if her labs continue to trend down, specifically the bilirubin today is 2.5.. Compared to 3.1  Repeat potassium is normal today liver function tests are reviewed as well.. And are trending down from the last laboratory assessment Antonio Monae PA-C 07/16 1232                  [facility]    Final diagnoses:   Rectal bleeding            Antonio Monae PA-C  07/16/17 [id number]

## 2017-07-18 ENCOUNTER — OFFICE VISIT (OUTPATIENT)
Dept: SURGERY | Facility: CLINIC | Age: 52
End: 2017-07-18

## 2017-07-18 VITALS
BODY MASS INDEX: 29.95 KG/M2 | OXYGEN SATURATION: 98 % | SYSTOLIC BLOOD PRESSURE: 108 MMHG | HEART RATE: 59 BPM | HEIGHT: 63 IN | TEMPERATURE: 96.8 F | DIASTOLIC BLOOD PRESSURE: 62 MMHG | WEIGHT: 169 LBS

## 2017-07-18 DIAGNOSIS — K62.5 RECTAL BLEED: ICD-10-CM

## 2017-07-18 DIAGNOSIS — Z12.11 SCREENING FOR COLON CANCER: Primary | ICD-10-CM

## 2017-07-18 PROCEDURE — 99244 OFF/OP CNSLTJ NEW/EST MOD 40: CPT | Performed by: SURGERY

## 2017-07-18 RX ORDER — BUPROPION HYDROCHLORIDE 75 MG/1
75 TABLET ORAL 2 TIMES DAILY
Refills: 0 | COMMUNITY
Start: 2017-07-15 | End: 2017-09-22 | Stop reason: SDUPTHER

## 2017-07-18 NOTE — PROGRESS NOTES
Patient: Gaby Rasheed    YOB: 1965    Date: 07/18/2017    Primary Care Provider: Silverio Perez MD    Reason for Consultation: Colonoscopy    Chief complaint:   Chief Complaint   Patient presents with   • Rectal Bleeding     Rectal bleeding x 1 week       Subjective .     History of present illness:  I saw the patient in the office today as a consultation for evaluation and treatment of rectal bleeding x 1 week.  This is intermittent and bleeds even without having a bowel movement.  She describes this as bright red blood.  She does also complain of diarrhea.  Denies abdominal pain, nausea, vomiting, constipation or family history of colon cancer.  She states that her last colonoscopy was 4-5 years ago. She was seen in the ER this past Sunday and had a CT of the abdomen and pelvis and ultrasound of the liver.     CT of the abdomen and pelvis were basically normal, there was some fatty infiltration of the liver.  Ultrasound showed no issues other than fatty liver.  The gallbladder was normal. Recent labs did show slight elevation of LFTs, she does have a history of ETOH usage.    Review of Systems   Constitutional: Negative for chills, fever and unexpected weight change.   HENT: Negative for hearing loss, trouble swallowing and voice change.    Eyes: Negative for visual disturbance.   Respiratory: Positive for cough. Negative for apnea, chest tightness, shortness of breath and wheezing.    Cardiovascular: Negative for chest pain, palpitations and leg swelling.   Gastrointestinal: Positive for anal bleeding and diarrhea. Negative for abdominal distention, abdominal pain, blood in stool, constipation, nausea, rectal pain and vomiting.   Endocrine: Negative for cold intolerance and heat intolerance.   Genitourinary: Negative for difficulty urinating, dysuria and flank pain.   Musculoskeletal: Positive for back pain (intermittent sharp lower back pain). Negative for gait problem.   Skin: Negative  for color change, rash and wound.   Neurological: Negative for dizziness, syncope, speech difficulty, weakness, light-headedness, numbness and headaches.   Hematological: Negative for adenopathy. Does not bruise/bleed easily.   Psychiatric/Behavioral: Negative for confusion. The patient is not nervous/anxious.        History:  Past Medical History:   Diagnosis Date   • Acid reflux    • Back pain    • Depression    • Fractures    • High cholesterol    • Hypertension    • Nocturia 10/5/2016   • Positive TB test    • Sinus problem    • ELINA (stress urinary incontinence, female) 10/5/2016       Past Surgical History:   Procedure Laterality Date   •  SECTION     • GASTRIC BYPASS     • HERNIA REPAIR     • HYSTERECTOMY     • LAPAROSCOPIC TUBAL LIGATION         Family History   Problem Relation Age of Onset   • Hyperlipidemia Mother    • Arthritis Mother    • Diabetes Father    • Hypertension Father    • Arthritis Paternal Grandmother    • Breast cancer Neg Hx    • Ovarian cancer Neg Hx        Social History   Substance Use Topics   • Smoking status: Never Smoker   • Smokeless tobacco: Never Used   • Alcohol use Yes      Comment: 1/2 pint every 2 days and wine daily       Medications:   Current Outpatient Prescriptions:   •  albuterol (PROVENTIL HFA;VENTOLIN HFA) 108 (90 BASE) MCG/ACT inhaler, Inhale 2 puffs Every 4 (Four) Hours As Needed for Wheezing (or cough) for up to 10 days., Disp: 1 inhaler, Rfl: 0  •  BIOTIN PO, Take  by mouth., Disp: , Rfl:   •  buPROPion (WELLBUTRIN) 100 MG tablet, Take 1 tablet by mouth 3 (Three) Times a Day., Disp: 270 tablet, Rfl: 3  •  chlorthalidone (HYGROTON) 25 MG tablet, Take 1 tablet by mouth Daily., Disp: 90 tablet, Rfl: 3  •  citalopram (CeleXA) 40 MG tablet, Take 1 tablet by mouth Daily., Disp: 30 tablet, Rfl: 11  •  cloNIDine (CATAPRES) 0.1 MG tablet, Take 1 tablet by mouth at night as needed for high blood pressure., Disp: 30 tablet, Rfl: 11  •  Cyanocobalamin (VITAMIN B-12)  1000 MCG sublingual tablet, Place  under the tongue., Disp: , Rfl:   •  doxycycline (MONODOX) 100 MG capsule, Take 1 capsule by mouth 2 (Two) Times a Day for 10 days., Disp: 20 capsule, Rfl: 0  •  Iron tablet, Take  by mouth., Disp: , Rfl:   •  levothyroxine (SYNTHROID) 50 MCG tablet, Take 1 tablet by mouth Daily., Disp: 90 tablet, Rfl: 3  •  Multiple Vitamins-Minerals (CENTRUM ADULTS) tablet, Take  by mouth., Disp: , Rfl:   •  potassium chloride (K-TAB) 20 MEQ tablet controlled-release ER tablet, Take 1 tablet by mouth 2 (Two) Times a Day. Start by taking 3 pills tonight then one pill 2 x a day., Disp: 60 tablet, Rfl: 0  •  brompheniramine-pseudoephedrine-DM 30-2-10 MG/5ML syrup, Take 5 mL by mouth 4 (Four) Times a Day As Needed for allergies., Disp: 118 mL, Rfl: 0  •  buPROPion (WELLBUTRIN) 75 MG tablet, , Disp: , Rfl: 0  •  carvedilol (COREG) 12.5 MG tablet, Take 1 tablet by mouth 2 (Two) Times a Day With Meals., Disp: 180 tablet, Rfl: 3  •  fluticasone (FLONASE) 50 MCG/ACT nasal spray, 2 sprays into each nostril Daily., Disp: , Rfl:   •  losartan (COZAAR) 50 MG tablet, Take 1 tablet by mouth Daily., Disp: 90 tablet, Rfl: 3  •  MethylPREDNISolone (MEDROL, GIOVANY,) 4 MG tablet, Take as directed on package instructions., Disp: 21 tablet, Rfl: 0  •  predniSONE (DELTASONE) 20 MG tablet, Take 1 tablet by mouth Daily., Disp: 5 tablet, Rfl: 0    Current Facility-Administered Medications:   •  cyanocobalamin injection 1,000 mcg, 1,000 mcg, Intramuscular, Q28 Days, Silverio Perez MD, 1,000 mcg at 07/11/16 1636       Allergies: No Known Allergies    Objective     Vital Signs:  Temp:  [96.8 °F (36 °C)] 96.8 °F (36 °C)  Heart Rate:  [59] 59  BP: (108)/(62) 108/62    Physical Exam:   General Appearance:    Alert, cooperative, in no acute distress   Head:    Normocephalic, without obvious abnormality, atraumatic   Eyes:            Lids and lashes normal, conjunctivae and sclerae normal, no   icterus, no pallor, corneas clear,  PERRL   Ears:    Ears appear intact with no abnormalities noted   Throat:   No oral lesions, no thrush, oral mucosa moist   Neck:   No adenopathy, supple, trachea midline, no thyromegaly,  no JVD   Lungs:     Clear to auscultation,respirations regular, even and                  unlabored    Heart:    Regular rhythm and normal rate, normal S1 and S2, no            murmur   Abdomen:     no masses, no organomegaly, soft non-tender, non-distended, no guarding   Extremities:   Moves all extremities well, no edema, no cyanosis, no             redness   Pulses:   Pulses palpable and equal bilaterally   Skin:   No bleeding, bruising or rash   Lymph nodes:   No palpable adenopathy   Neurologic:   Cranial nerves 2 - 12 grossly intact, sensation intact  Psychiatric: No evidence of depression or anxiety   Results Review:   I reviewed the patient's new clinical results.  I reviewed the patient's new imaging results and agree with the interpretation.  I reviewed the patient's other test results and agree with the interpretation    Assessment/Plan :    1. Screening for colon cancer        I recommend a colonoscopy for further evaluation. The procedure was explained as well as the risks which include but are not limited to bleeding, infection, perforation, abdominal pain etc. The patient understands these risks and the procedure and wishes to proceed.      Electronically signed by Moose Abreu MD  07/18/17  1:23 PM

## 2017-07-19 ENCOUNTER — RESULTS ENCOUNTER (OUTPATIENT)
Dept: FAMILY MEDICINE CLINIC | Facility: CLINIC | Age: 52
End: 2017-07-19

## 2017-07-19 DIAGNOSIS — E87.1 HYPONATREMIA: ICD-10-CM

## 2017-07-19 DIAGNOSIS — E87.6 HYPOKALEMIA: ICD-10-CM

## 2017-07-20 ENCOUNTER — TELEPHONE (OUTPATIENT)
Dept: FAMILY MEDICINE CLINIC | Facility: CLINIC | Age: 52
End: 2017-07-20

## 2017-07-20 NOTE — TELEPHONE ENCOUNTER
Patient stated that she had went to ER with pain; records are in chart.  She is also scheduled for a Colonoscopy with Dr. Abreu on 07/28/2017.  She has been taking potassium as prescribed and wants to know if she still needs to come in for labs.    Thanks

## 2017-07-21 DIAGNOSIS — K72.00 ACUTE LIVER FAILURE WITHOUT HEPATIC COMA: Primary | ICD-10-CM

## 2017-07-24 LAB
AMMONIA PLAS-MCNC: <9 UMOL/L (ref 9–30)
APTT PPP: 24.7 SECONDS (ref 25–36)
INR PPP: 1.16 (ref 0.9–1.1)
PROTHROMBIN TIME: 12.7 SECONDS (ref 9.3–12.1)

## 2017-07-26 ENCOUNTER — RESULTS ENCOUNTER (OUTPATIENT)
Dept: FAMILY MEDICINE CLINIC | Facility: CLINIC | Age: 52
End: 2017-07-26

## 2017-07-26 DIAGNOSIS — K72.00 ACUTE LIVER FAILURE WITHOUT HEPATIC COMA: ICD-10-CM

## 2017-07-28 ENCOUNTER — OUTSIDE FACILITY SERVICE (OUTPATIENT)
Dept: SURGERY | Facility: CLINIC | Age: 52
End: 2017-07-28

## 2017-07-28 PROCEDURE — 45398 COLONOSCOPY W/BAND LIGATION: CPT | Performed by: SURGERY

## 2017-07-28 PROCEDURE — G0500 MOD SEDAT ENDO SERVICE >5YRS: HCPCS | Performed by: SURGERY

## 2017-08-21 ENCOUNTER — OFFICE VISIT (OUTPATIENT)
Dept: FAMILY MEDICINE CLINIC | Facility: CLINIC | Age: 52
End: 2017-08-21

## 2017-08-21 VITALS
RESPIRATION RATE: 16 BRPM | WEIGHT: 174 LBS | OXYGEN SATURATION: 99 % | HEART RATE: 72 BPM | TEMPERATURE: 98.8 F | SYSTOLIC BLOOD PRESSURE: 125 MMHG | DIASTOLIC BLOOD PRESSURE: 74 MMHG | HEIGHT: 63 IN | BODY MASS INDEX: 30.83 KG/M2

## 2017-08-21 DIAGNOSIS — K72.00 ACUTE LIVER FAILURE WITHOUT HEPATIC COMA: Primary | ICD-10-CM

## 2017-08-21 LAB
ALBUMIN SERPL-MCNC: 3.7 G/DL (ref 3.5–5)
ALBUMIN/GLOB SERPL: 1.3 G/DL (ref 1–2)
ALP SERPL-CCNC: 155 U/L (ref 38–126)
ALT SERPL-CCNC: 53 U/L (ref 13–69)
AMMONIA PLAS-MCNC: <9 UMOL/L (ref 9–30)
AST SERPL-CCNC: 123 U/L (ref 15–46)
BASOPHILS # BLD AUTO: 0.04 10*3/MM3 (ref 0–0.2)
BASOPHILS NFR BLD AUTO: 0.8 % (ref 0–2.5)
BILIRUB SERPL-MCNC: 2.1 MG/DL (ref 0.2–1.3)
BUN SERPL-MCNC: 8 MG/DL (ref 7–20)
BUN/CREAT SERPL: 11.4 (ref 7.1–23.5)
CALCIUM SERPL-MCNC: 9.4 MG/DL (ref 8.4–10.2)
CHLORIDE SERPL-SCNC: 101 MMOL/L (ref 98–107)
CO2 SERPL-SCNC: 27 MMOL/L (ref 26–30)
CREAT SERPL-MCNC: 0.7 MG/DL (ref 0.6–1.3)
DIFFERENTIAL COMMENT: NORMAL
EOSINOPHIL # BLD AUTO: 0.23 10*3/MM3 (ref 0–0.7)
EOSINOPHIL NFR BLD AUTO: 4.3 % (ref 0–7)
ERYTHROCYTE [DISTWIDTH] IN BLOOD BY AUTOMATED COUNT: 13.8 % (ref 11.5–14.5)
GLOBULIN SER CALC-MCNC: 2.8 GM/DL
GLUCOSE SERPL-MCNC: 85 MG/DL (ref 74–98)
HCT VFR BLD AUTO: 37.1 % (ref 37–47)
HGB BLD-MCNC: 12.4 G/DL (ref 12–16)
IMM GRANULOCYTES # BLD: 0.03 10*3/MM3 (ref 0–0.06)
IMM GRANULOCYTES NFR BLD: 0.6 % (ref 0–0.6)
INR PPP: 1.07 (ref 0.9–1.1)
LYMPHOCYTES # BLD AUTO: 2.26 10*3/MM3 (ref 0.6–3.4)
LYMPHOCYTES NFR BLD AUTO: 42.5 % (ref 10–50)
MCH RBC QN AUTO: 35.8 PG (ref 27–31)
MCHC RBC AUTO-ENTMCNC: 33.4 G/DL (ref 30–37)
MCV RBC AUTO: 107.2 FL (ref 81–99)
MONOCYTES # BLD AUTO: 0.49 10*3/MM3 (ref 0–0.9)
MONOCYTES NFR BLD AUTO: 9.2 % (ref 0–12)
NEUTROPHILS # BLD AUTO: 2.27 10*3/MM3 (ref 2–6.9)
NEUTROPHILS NFR BLD AUTO: 42.6 % (ref 37–80)
NRBC BLD AUTO-RTO: 0 /100 WBC (ref 0–0)
PLATELET # BLD AUTO: 176 10*3/MM3 (ref 130–400)
PLATELET BLD QL SMEAR: NORMAL
POTASSIUM SERPL-SCNC: 4.5 MMOL/L (ref 3.5–5.1)
PROT SERPL-MCNC: 6.5 G/DL (ref 6.3–8.2)
PROTHROMBIN TIME: 11.7 SECONDS (ref 9.3–12.1)
RBC # BLD AUTO: 3.46 10*6/MM3 (ref 4.2–5.4)
RBC MORPH BLD: NORMAL
SODIUM SERPL-SCNC: 140 MMOL/L (ref 137–145)
WBC # BLD AUTO: 5.32 10*3/MM3 (ref 4.8–10.8)

## 2017-08-21 PROCEDURE — 99214 OFFICE O/P EST MOD 30 MIN: CPT | Performed by: INTERNAL MEDICINE

## 2017-08-21 RX ORDER — ACAMPROSATE CALCIUM 333 MG/1
666 TABLET, DELAYED RELEASE ORAL 3 TIMES DAILY
Qty: 180 TABLET | Refills: 3 | Status: SHIPPED | OUTPATIENT
Start: 2017-08-21 | End: 2018-02-09 | Stop reason: SDUPTHER

## 2017-08-21 NOTE — PROGRESS NOTES
"Subjective   Patient ID: Gaby Rasheed is a 52 y.o. female Pt is here for management of multiple medical problems.    Chief Complaint   Patient presents with   • Hypertension     follow-up   • medication refills     patient needs refills on Wellbutrin sent to [facility]       History of Present Illness    Had colonoscopy and banded hemorroid.   No polyps.    Feels well.      The following portions of the patient's history were reviewed and updated as appropriate: allergies, current medications, past family history, past medical history, past social history, past surgical history and problem list.      Review of Systems   Constitutional: Negative for fatigue.   All other systems reviewed and are negative.      Objective     /74 (BP Location: Left arm, Patient Position: Sitting, Cuff Size: Adult)  Pulse 72  Temp 98.8 °F (37.1 °C) (Oral)   Resp 16  Ht 63\" (160 cm)  Wt 174 lb (78.9 kg)  SpO2 99%  BMI 30.82 kg/m2  Physical Exam  General Appearance:    Alert, cooperative, no distress, appears stated age   Head:    Normocephalic, without obvious abnormality, atraumatic   Eyes:    PERRL, conjunctiva/corneas clear, EOM's intact           Ears:    Normal TM's and external ear canals, both ears   Nose:   Nares normal, septum midline, mucosa normal, no drainage    or sinus tenderness   Throat:   Lips, mucosa, and tongue normal; teeth and gums normal   Neck:   Supple, symmetrical, trachea midline, no adenopathy;        thyroid:  No enlargement/tenderness/nodules; no carotid    bruit or JVD   Back:     Symmetric, no curvature, ROM normal, no CVA tenderness   Lungs:     Clear to auscultation bilaterally, respirations unlabored   Chest wall:    No tenderness or deformity   Heart:    Regular rate and rhythm, S1 and S2 normal, no murmur, rub   or gallop   Abdomen:     Soft, non-tender, bowel sounds active all four quadrants,     no masses, no organomegaly           Extremities:   Extremities normal, atraumatic, no " cyanosis or edema   Pulses:   2+ and symmetric all extremities   Skin:   Skin color, texture, turgor normal, no rashes or lesions   Lymph nodes:   Cervical, supraclavicular, and axillary nodes normal   Neurologic:   CNII-XII intact. Normal strength, sensation and reflexes       throughout       Assessment/Plan     Pt has cut etoh from liquor 1/4 pint a day and 3-4 glasses of wine a day.         Gaby was seen today for hypertension and medication refills.    Diagnoses and all orders for this visit:    Acute liver failure without hepatic coma  -     Comprehensive Metabolic Panel  -     CBC & Differential  -     Protime-INR  -     Ammonia    Other orders  -     acamprosate (CAMPRAL) 333 MG EC tablet; Take 2 tablets by mouth 3 (Three) Times a Day.      Return in about 4 weeks (around 9/18/2017).                   There are no Patient Instructions on file for this visit.

## 2017-09-07 DIAGNOSIS — G47.09 OTHER INSOMNIA: ICD-10-CM

## 2017-09-07 RX ORDER — CLONIDINE HYDROCHLORIDE 0.1 MG/1
TABLET ORAL
Qty: 30 TABLET | Refills: 11 | Status: SHIPPED | OUTPATIENT
Start: 2017-09-07 | End: 2018-08-13 | Stop reason: SDUPTHER

## 2017-09-22 RX ORDER — BUPROPION HYDROCHLORIDE 75 MG/1
TABLET ORAL
Qty: 60 TABLET | Refills: 6 | Status: SHIPPED | OUTPATIENT
Start: 2017-09-22 | End: 2018-05-23 | Stop reason: SDUPTHER

## 2017-11-08 ENCOUNTER — OFFICE VISIT (OUTPATIENT)
Dept: SURGERY | Facility: CLINIC | Age: 52
End: 2017-11-08

## 2017-11-08 VITALS
TEMPERATURE: 98.7 F | DIASTOLIC BLOOD PRESSURE: 62 MMHG | HEART RATE: 64 BPM | SYSTOLIC BLOOD PRESSURE: 122 MMHG | WEIGHT: 178 LBS | OXYGEN SATURATION: 98 % | HEIGHT: 63 IN | BODY MASS INDEX: 31.54 KG/M2

## 2017-11-08 DIAGNOSIS — K52.9 CHRONIC DIARRHEA: ICD-10-CM

## 2017-11-08 DIAGNOSIS — R13.10 DYSPHAGIA, UNSPECIFIED TYPE: Primary | ICD-10-CM

## 2017-11-08 DIAGNOSIS — R19.7 DIARRHEA, UNSPECIFIED TYPE: ICD-10-CM

## 2017-11-08 DIAGNOSIS — D17.22 LIPOMA OF LEFT UPPER EXTREMITY: ICD-10-CM

## 2017-11-08 PROCEDURE — 99214 OFFICE O/P EST MOD 30 MIN: CPT | Performed by: SURGERY

## 2017-11-08 RX ORDER — LOSARTAN POTASSIUM 50 MG/1
TABLET ORAL
Refills: 0 | COMMUNITY
Start: 2017-11-04 | End: 2017-11-08

## 2017-11-08 NOTE — PROGRESS NOTES
Patient: Gaby Rasheed    YOB: 1965    Date: 11/08/2017    Primary Care Provider: Silverio Perez MD    Reason for Consultation: EGD    Chief complaint:   Chief Complaint   Patient presents with   • Difficulty Swallowing       Subjective .     History of present illness:  I saw the patient in the office today as a consultation for evaluation and treatment of dysphagia. She states she has had difficulty swallowing solid foods such as meat and pills for the past two months. She states it feels like the food gets stuck in her chest. She complains nausea, vomiting and diarrhea for the past two weeks, does not with diet. She denies constipation.  She has had a previous Stan En Y gastric bypass in the past.    She does have a history of diarrhea and crampy abdominal pain, she has had a colonoscopy performed in  July of this year.  She also has complaints of a left upper extremity soft tissue nodule.    Review of Systems   Constitutional: Negative for chills, fever and unexpected weight change.   HENT: Positive for trouble swallowing. Negative for hearing loss and voice change.    Eyes: Negative for visual disturbance.   Respiratory: Negative for apnea, cough, chest tightness, shortness of breath and wheezing.    Cardiovascular: Negative for chest pain, palpitations and leg swelling.   Gastrointestinal: Positive for diarrhea, nausea and vomiting. Negative for abdominal distention, abdominal pain, anal bleeding, blood in stool, constipation and rectal pain.   Endocrine: Negative for cold intolerance and heat intolerance.   Genitourinary: Negative for difficulty urinating, dysuria and flank pain.   Musculoskeletal: Negative for back pain and gait problem.   Skin: Negative for color change, rash and wound.   Neurological: Negative for dizziness, syncope, speech difficulty, weakness, light-headedness, numbness and headaches.   Hematological: Negative for adenopathy. Does not bruise/bleed easily.    Psychiatric/Behavioral: Negative for confusion. The patient is not nervous/anxious.        History:  Past Medical History:   Diagnosis Date   • Acid reflux    • Back pain    • Depression    • Fractures    • High cholesterol    • Hypertension    • Nocturia 10/5/2016   • Positive TB test    • Sinus problem    • ELINA (stress urinary incontinence, female) 10/5/2016       Past Surgical History:   Procedure Laterality Date   •  SECTION     • COLONOSCOPY     • GASTRIC BYPASS     • HERNIA REPAIR     • HYSTERECTOMY     • LAPAROSCOPIC TUBAL LIGATION         Family History   Problem Relation Age of Onset   • Hyperlipidemia Mother    • Arthritis Mother    • Diabetes Father    • Hypertension Father    • Arthritis Paternal Grandmother    • Breast cancer Neg Hx    • Ovarian cancer Neg Hx        Social History   Substance Use Topics   • Smoking status: Never Smoker   • Smokeless tobacco: Never Used   • Alcohol use Yes      Comment: 1/2 pint every 2 days and wine daily       Medications:    Current Outpatient Prescriptions:   •  acamprosate (CAMPRAL) 333 MG EC tablet, Take 2 tablets by mouth 3 (Three) Times a Day., Disp: 180 tablet, Rfl: 3  •  buPROPion (WELLBUTRIN) 75 MG tablet, TAKE 1 TABLET BY MOUTH TWICE DAILY, Disp: 60 tablet, Rfl: 6  •  carvedilol (COREG) 12.5 MG tablet, Take 1 tablet by mouth 2 (Two) Times a Day With Meals., Disp: 180 tablet, Rfl: 3  •  chlorthalidone (HYGROTON) 25 MG tablet, Take 1 tablet by mouth Daily., Disp: 90 tablet, Rfl: 3  •  CloNIDine (CATAPRES) 0.1 MG tablet, take 1 tablet by mouth at bedtime if needed for high blood pressure, Disp: 30 tablet, Rfl: 11  •  Cyanocobalamin (VITAMIN B-12) 1000 MCG sublingual tablet, Place  under the tongue., Disp: , Rfl:   •  levothyroxine (SYNTHROID) 50 MCG tablet, Take 1 tablet by mouth Daily., Disp: 90 tablet, Rfl: 3  •  Multiple Vitamins-Minerals (CENTRUM ADULTS) tablet, Take  by mouth., Disp: , Rfl:   No current facility-administered medications for  this visit.      Allergies:  No Known Allergies    Objective     Vital Signs:   Temp:  [98.7 °F (37.1 °C)] 98.7 °F (37.1 °C)  Heart Rate:  [64] 64  BP: (122)/(62) 122/62    Physical Exam:   General Appearance:    Alert, cooperative, in no acute distress   Head:    Normocephalic, without obvious abnormality, atraumatic   Eyes:            Lids and lashes normal, conjunctivae and sclerae normal, no   icterus, no pallor, corneas clear, PERRL   Ears:    Ears appear intact with no abnormalities noted   Throat:   No oral lesions, no thrush, oral mucosa moist   Neck:   No adenopathy, supple, trachea midline, no thyromegaly,  no JVD   Lungs/respiratory:     Clear to auscultation,respirations regular, even and                  unlabored    Heart/cardiovascular:    Regular rhythm and normal rate, normal S1 and S2, no            murmur   Abdomen:     no masses, no organomegaly, soft non-tender, non-distended, no guarding   Extremities:   Moves all extremities well, no edema, no cyanosis, no             redness   Pulses:   Pulses palpable and equal bilaterally   Skin:   No bleeding, bruising or rash, lipoma present left upper extremity   Lymph nodes:   No palpable adenopathy   Neurologic:   Cranial nerves 2 - 12 grossly intact, sensation intact   Psychiatric: No evidence of depression or anxiety      Results Review:   I reviewed the patient's new clinical results.  I reviewed the patient's new imaging results and agree with the interpretation.    Review of Systems was reviewed and confirmed as accurate today.    Assessment/Plan :    1. Dysphagia, unspecified type    2. Diarrhea, unspecified type    3. Lipoma of left upper extremity    4. Chronic diarrhea        I recommend a EGD for further evaluation.  The procedure as well as the risks were explained which include but are not limited to bleeding, infection, intestinal perforation, Aspiration etc. were explained and the patient understood all of the above and wishes to proceed  with an EGD. She may need dilation also.    She may need future GB workup, and she also may need to have elective excision of the left upper extremity lipoma.    Electronically signed by Moose Abreu MD  11/08/17  1:31 PM    Scribed for Moose Abreu MD by Hannah Jain. 11/8/2017  2:07 PM

## 2017-11-17 ENCOUNTER — OUTSIDE FACILITY SERVICE (OUTPATIENT)
Dept: SURGERY | Facility: CLINIC | Age: 52
End: 2017-11-17

## 2017-11-17 PROCEDURE — 43249 ESOPH EGD DILATION <30 MM: CPT | Performed by: SURGERY

## 2017-11-17 PROCEDURE — 43239 EGD BIOPSY SINGLE/MULTIPLE: CPT | Performed by: SURGERY

## 2017-11-17 PROCEDURE — G0500 MOD SEDAT ENDO SERVICE >5YRS: HCPCS | Performed by: SURGERY

## 2017-12-05 ENCOUNTER — OFFICE VISIT (OUTPATIENT)
Dept: SURGERY | Facility: CLINIC | Age: 52
End: 2017-12-05

## 2017-12-05 VITALS
DIASTOLIC BLOOD PRESSURE: 72 MMHG | HEART RATE: 66 BPM | SYSTOLIC BLOOD PRESSURE: 122 MMHG | TEMPERATURE: 97.6 F | WEIGHT: 177.91 LBS | BODY MASS INDEX: 31.52 KG/M2 | OXYGEN SATURATION: 96 % | HEIGHT: 63 IN

## 2017-12-05 DIAGNOSIS — IMO0002 MASS: ICD-10-CM

## 2017-12-05 DIAGNOSIS — R19.7 DIARRHEA, UNSPECIFIED TYPE: ICD-10-CM

## 2017-12-05 DIAGNOSIS — R11.2 NAUSEA AND VOMITING, INTRACTABILITY OF VOMITING NOT SPECIFIED, UNSPECIFIED VOMITING TYPE: Primary | ICD-10-CM

## 2017-12-05 PROCEDURE — 99213 OFFICE O/P EST LOW 20 MIN: CPT | Performed by: SURGERY

## 2017-12-05 NOTE — PROGRESS NOTES
Patient: Gaby Rasheed    YOB: 1965    Date: 12/05/2017    Primary Care Provider: Silverio Perez MD    Reason for Consultation: Follow-up EGD    Chief Complaint:   Chief Complaint   Patient presents with   • Follow-up     F/U EGD       History of present illness:  I saw the patient in the office today as a followup from their recent EGD with biopsy, the pathology report did show fragments of benign oxyntic gastric mucosa with minimal chronic gastritis.  They state that they have done well, however still have trouble difficulty swallowing pills. She complains of nausea, vomiting and diarrhea for 2-3 months, does not change with diet, but has improved since her EGD. She has had a a previous gastric bypass. She states she has a mass on her right shoulder for the past 6 months, with no change, drainage or related pain.    The following portions of the patient's history were reviewed and updated as appropriate: allergies, current medications, past family history, past medical history, past social history, past surgical history and problem list.      Review of Systems   Constitutional: Negative for chills, fever and unexpected weight change.   HENT: Negative for hearing loss, trouble swallowing and voice change.    Eyes: Negative for visual disturbance.   Respiratory: Negative for apnea, cough, chest tightness, shortness of breath and wheezing.    Cardiovascular: Negative for chest pain, palpitations and leg swelling.   Gastrointestinal: Positive for diarrhea, nausea and vomiting. Negative for abdominal distention, abdominal pain, anal bleeding, blood in stool, constipation and rectal pain.   Endocrine: Negative for cold intolerance and heat intolerance.   Genitourinary: Negative for difficulty urinating, dysuria and flank pain.   Musculoskeletal: Negative for back pain and gait problem.   Skin: Negative for color change, rash and wound.   Neurological: Negative for dizziness, syncope, speech  difficulty, weakness, light-headedness, numbness and headaches.   Hematological: Negative for adenopathy. Does not bruise/bleed easily.   Psychiatric/Behavioral: Negative for confusion. The patient is not nervous/anxious.        Vital Signs:        Allergies:  No Known Allergies    Medications:    Current Outpatient Prescriptions:   •  acamprosate (CAMPRAL) 333 MG EC tablet, Take 2 tablets by mouth 3 (Three) Times a Day., Disp: 180 tablet, Rfl: 3  •  buPROPion (WELLBUTRIN) 75 MG tablet, TAKE 1 TABLET BY MOUTH TWICE DAILY, Disp: 60 tablet, Rfl: 6  •  carvedilol (COREG) 12.5 MG tablet, Take 1 tablet by mouth 2 (Two) Times a Day With Meals., Disp: 180 tablet, Rfl: 3  •  chlorthalidone (HYGROTON) 25 MG tablet, Take 1 tablet by mouth Daily., Disp: 90 tablet, Rfl: 3  •  CloNIDine (CATAPRES) 0.1 MG tablet, take 1 tablet by mouth at bedtime if needed for high blood pressure, Disp: 30 tablet, Rfl: 11  •  Cyanocobalamin (VITAMIN B-12) 1000 MCG sublingual tablet, Place  under the tongue., Disp: , Rfl:   •  levothyroxine (SYNTHROID) 50 MCG tablet, Take 1 tablet by mouth Daily., Disp: 90 tablet, Rfl: 3  •  Multiple Vitamins-Minerals (CENTRUM ADULTS) tablet, Take  by mouth., Disp: , Rfl:     Physical Exam:   General Appearance:    Alert, cooperative, in no acute distress   Abdomen:     no masses, no organomegaly, soft non-tender, non-distended, no guarding, wounds are well healed, no evidence of recurrent hernia   Chest:      Clear toausculation            Cor:  Regular rate and rhythm      Results Review:   I reviewed the patient's new clinical results.  I reviewed the patient's new imaging results and agree with the interpretation.    Assessment / Plan:    1. Nausea and vomiting, intractability of vomiting not specified, unspecified vomiting type    2. Diarrhea, unspecified type    3. Mass        I did discuss the situation with the patient today in the office and they have done well from their recent EGD with biopsy. I have  told the patient that we did dilate her distal esophagus, if she worsens she is going to need to see her Bariatric surgeon for further evaluation s/p previous Stan En Y bypass.    Electronically signed by Moose Abreu MD  12/13/17      Scribed for Moose Abreu MD by Hannah Jain. 12/13/2017  1:39 PM

## 2017-12-13 ENCOUNTER — PROCEDURE VISIT (OUTPATIENT)
Dept: SURGERY | Facility: CLINIC | Age: 52
End: 2017-12-13

## 2017-12-13 VITALS
SYSTOLIC BLOOD PRESSURE: 124 MMHG | WEIGHT: 175.04 LBS | DIASTOLIC BLOOD PRESSURE: 78 MMHG | OXYGEN SATURATION: 99 % | HEIGHT: 64 IN | HEART RATE: 69 BPM | BODY MASS INDEX: 29.88 KG/M2 | TEMPERATURE: 98.9 F

## 2017-12-13 DIAGNOSIS — R22.32 MASS OF SKIN OF SHOULDER, LEFT: Primary | ICD-10-CM

## 2017-12-13 PROCEDURE — 12032 INTMD RPR S/A/T/EXT 2.6-7.5: CPT | Performed by: SURGERY

## 2017-12-13 PROCEDURE — 11404 EXC TR-EXT B9+MARG 3.1-4 CM: CPT | Performed by: SURGERY

## 2017-12-13 NOTE — PROGRESS NOTES
Procedure: Excision of mass  Location: Left shoulder    I recommend excision. Procedure and the risks and benefits were explained including bleeding and infection. The patient understands these and wishes to proceed.     The patient was brought to the procedure room. Consent and time out were performed. The area was prepped and draped in the usual fashion. 1% lidocaine with epinephrine was infused locally. An ellyptical incision was made around the lesion. Full thickness excision was performed. The lesion size was 3 cm. The wound was closed in layers with interrupted simple vicryl and Nylon for the skin. Wound closure size was 4 cm. There were no complications and the patient tolerated the procedure well. Hemostasis was well controlled with pressure and there was minimal blood loss. Wound instructions were given.

## 2017-12-20 ENCOUNTER — OFFICE VISIT (OUTPATIENT)
Dept: SURGERY | Facility: CLINIC | Age: 52
End: 2017-12-20

## 2017-12-20 VITALS
HEIGHT: 64 IN | HEART RATE: 80 BPM | BODY MASS INDEX: 28.87 KG/M2 | TEMPERATURE: 98.2 F | DIASTOLIC BLOOD PRESSURE: 84 MMHG | SYSTOLIC BLOOD PRESSURE: 136 MMHG | WEIGHT: 169.09 LBS

## 2017-12-20 DIAGNOSIS — Z98.890 STATUS POST EXCISIONAL BIOPSY: Primary | ICD-10-CM

## 2017-12-20 PROCEDURE — 99024 POSTOP FOLLOW-UP VISIT: CPT | Performed by: SURGERY

## 2017-12-20 NOTE — PROGRESS NOTES
Patient: Gaby Rasheed    YOB: 1965    Date: 12/20/2017    Primary Care Provider: Silverio Perez MD    Reason for Consultation: Follow-up lesion excision    Chief Complaint:   Chief Complaint   Patient presents with   • Post-op     post op excision        History of present illness:  I saw the patient in the office today as a followup from their recent lesion excision, the pathology report did show fat necrosis with dystrophic calcifications.  They state that they have done well and are having no problems.      Vital Signs   Temp:  [98.2 °F (36.8 °C)-99.2 °F (37.3 °C)] 98.2 °F (36.8 °C)  Heart Rate:  [78-80] 80  BP: (136-138)/(84) 136/84    Physical Exam:   General Appearance:    Alert, cooperative, in no acute distress, wound clean dry without infection   Abdomen:     no masses, no organomegaly, soft non-tender, non-distended, no guarding, wounds are well healed   Chest:      Clear to ausculation     Results Review:   I reviewed the patient's new clinical results.  I reviewed the patient's new imaging results and agree with the interpretation.    Assessment / Plan:    1. Status post excisional biopsy        I did discuss the situation with the patient today in the office and they have done well from their recent lesion excision, I don't think that the patient needs any further intervention and I need to see them back only if they have further problems. Pathology report was reviewed with the patient in the office.    Electronically signed by Moose Abreu MD  12/20/17    Scribed for Moose Abreu MD by Hannah Jain. 12/20/2017  4:31 PM

## 2017-12-26 DIAGNOSIS — F32.A DEPRESSION, UNSPECIFIED DEPRESSION TYPE: ICD-10-CM

## 2017-12-26 RX ORDER — CITALOPRAM 40 MG/1
TABLET ORAL
Qty: 30 TABLET | Refills: 11 | OUTPATIENT
Start: 2017-12-26

## 2017-12-28 DIAGNOSIS — F32.A DEPRESSION, UNSPECIFIED DEPRESSION TYPE: ICD-10-CM

## 2017-12-29 DIAGNOSIS — I10 ESSENTIAL HYPERTENSION: ICD-10-CM

## 2017-12-29 RX ORDER — LOSARTAN POTASSIUM 50 MG/1
TABLET ORAL
Qty: 90 TABLET | Refills: 3 | OUTPATIENT
Start: 2017-12-29

## 2017-12-29 RX ORDER — CITALOPRAM 40 MG/1
TABLET ORAL
Qty: 30 TABLET | Refills: 11 | OUTPATIENT
Start: 2017-12-29

## 2018-01-05 ENCOUNTER — OFFICE VISIT (OUTPATIENT)
Dept: INTERNAL MEDICINE | Facility: CLINIC | Age: 53
End: 2018-01-05

## 2018-01-05 VITALS
BODY MASS INDEX: 29.53 KG/M2 | HEART RATE: 68 BPM | HEIGHT: 64 IN | WEIGHT: 173 LBS | DIASTOLIC BLOOD PRESSURE: 70 MMHG | TEMPERATURE: 98.3 F | RESPIRATION RATE: 16 BRPM | OXYGEN SATURATION: 99 % | SYSTOLIC BLOOD PRESSURE: 129 MMHG

## 2018-01-05 DIAGNOSIS — E03.8 SUBCLINICAL HYPOTHYROIDISM: ICD-10-CM

## 2018-01-05 DIAGNOSIS — R53.82 CHRONIC FATIGUE: ICD-10-CM

## 2018-01-05 DIAGNOSIS — I10 ESSENTIAL HYPERTENSION: ICD-10-CM

## 2018-01-05 LAB
ALBUMIN SERPL-MCNC: 3.1 G/DL (ref 3.5–5)
ALBUMIN/GLOB SERPL: 1.1 G/DL (ref 1–2)
ALP SERPL-CCNC: 156 U/L (ref 38–126)
ALT SERPL-CCNC: 50 U/L (ref 13–69)
AST SERPL-CCNC: 80 U/L (ref 15–46)
BASOPHILS # BLD AUTO: 0.05 10*3/MM3 (ref 0–0.2)
BASOPHILS NFR BLD AUTO: 0.8 % (ref 0–2.5)
BILIRUB SERPL-MCNC: 2 MG/DL (ref 0.2–1.3)
BUN SERPL-MCNC: 9 MG/DL (ref 7–20)
BUN/CREAT SERPL: 15 (ref 7.1–23.5)
CALCIUM SERPL-MCNC: 9 MG/DL (ref 8.4–10.2)
CHLORIDE SERPL-SCNC: 100 MMOL/L (ref 98–107)
CO2 SERPL-SCNC: 29 MMOL/L (ref 26–30)
CREAT SERPL-MCNC: 0.6 MG/DL (ref 0.6–1.3)
DIFFERENTIAL COMMENT: NORMAL
EOSINOPHIL # BLD AUTO: 0.09 10*3/MM3 (ref 0–0.7)
EOSINOPHIL NFR BLD AUTO: 1.4 % (ref 0–7)
ERYTHROCYTE [DISTWIDTH] IN BLOOD BY AUTOMATED COUNT: 13.1 % (ref 11.5–14.5)
GLOBULIN SER CALC-MCNC: 2.7 GM/DL
GLUCOSE SERPL-MCNC: 89 MG/DL (ref 74–98)
HCT VFR BLD AUTO: 37.8 % (ref 37–47)
HGB BLD-MCNC: 12.5 G/DL (ref 12–16)
IMM GRANULOCYTES # BLD: 0.02 10*3/MM3 (ref 0–0.06)
IMM GRANULOCYTES NFR BLD: 0.3 % (ref 0–0.6)
LYMPHOCYTES # BLD AUTO: 1.46 10*3/MM3 (ref 0.6–3.4)
LYMPHOCYTES NFR BLD AUTO: 22.9 % (ref 10–50)
MCH RBC QN AUTO: 36.1 PG (ref 27–31)
MCHC RBC AUTO-ENTMCNC: 33.1 G/DL (ref 30–37)
MCV RBC AUTO: 109.2 FL (ref 81–99)
MONOCYTES # BLD AUTO: 0.55 10*3/MM3 (ref 0–0.9)
MONOCYTES NFR BLD AUTO: 8.6 % (ref 0–12)
NEUTROPHILS # BLD AUTO: 4.2 10*3/MM3 (ref 2–6.9)
NEUTROPHILS NFR BLD AUTO: 66 % (ref 37–80)
NRBC BLD AUTO-RTO: 0 /100 WBC (ref 0–0)
PLATELET # BLD AUTO: 86 10*3/MM3 (ref 130–400)
PLATELET BLD QL SMEAR: NORMAL
POTASSIUM SERPL-SCNC: 3.8 MMOL/L (ref 3.5–5.1)
PROT SERPL-MCNC: 5.8 G/DL (ref 6.3–8.2)
RBC # BLD AUTO: 3.46 10*6/MM3 (ref 4.2–5.4)
RBC MORPH BLD: NORMAL
SODIUM SERPL-SCNC: 136 MMOL/L (ref 137–145)
T4 FREE SERPL-MCNC: 1.33 NG/DL (ref 0.78–2.19)
TSH SERPL DL<=0.005 MIU/L-ACNC: 0.8 MIU/ML (ref 0.47–4.68)
VIT B12 SERPL-MCNC: 965 PG/ML (ref 239–931)
WBC # BLD AUTO: 6.37 10*3/MM3 (ref 4.8–10.8)

## 2018-01-05 PROCEDURE — 99214 OFFICE O/P EST MOD 30 MIN: CPT | Performed by: INTERNAL MEDICINE

## 2018-01-05 RX ORDER — LEVOTHYROXINE SODIUM 0.05 MG/1
50 TABLET ORAL DAILY
Qty: 90 TABLET | Refills: 3 | Status: SHIPPED | OUTPATIENT
Start: 2018-01-05 | End: 2019-01-12 | Stop reason: SDUPTHER

## 2018-01-05 RX ORDER — CITALOPRAM 40 MG/1
40 TABLET ORAL DAILY
Qty: 90 TABLET | Refills: 3 | Status: SHIPPED | OUTPATIENT
Start: 2018-01-05 | End: 2019-01-04 | Stop reason: SDUPTHER

## 2018-01-05 RX ORDER — CARVEDILOL 12.5 MG/1
12.5 TABLET ORAL 2 TIMES DAILY WITH MEALS
Qty: 180 TABLET | Refills: 3 | Status: SHIPPED | OUTPATIENT
Start: 2018-01-05 | End: 2019-01-12 | Stop reason: SDUPTHER

## 2018-01-05 RX ORDER — CHLORTHALIDONE 25 MG/1
25 TABLET ORAL DAILY
Qty: 90 TABLET | Refills: 3 | Status: SHIPPED | OUTPATIENT
Start: 2018-01-05 | End: 2019-01-12 | Stop reason: SDUPTHER

## 2018-01-05 NOTE — PROGRESS NOTES
Subjective     Patient ID: Gaby Rasheed is a 52 y.o. female. Patient is here for management of multiple medical problems.     Chief Complaint   Patient presents with   • Hypertension     follow-up   • medication refills     patient need refills on Levothyroxine, Citalopram, Carvedilol and Chlorthalidone sent to [facility]     Hypertension   This is a chronic problem. The current episode started more than 1 year ago. The problem is unchanged. The problem is controlled. Pertinent negatives include no anxiety, blurred vision, chest pain, headaches or shortness of breath. There are no associated agents to hypertension. Past treatments include nothing. The current treatment provides no improvement. There is no history of angina, kidney disease or CAD/MI. There is no history of chronic renal disease or coarctation of the aorta.      BP to low on losartan. Pt has stopped.    Seen in [facility] a few weeks ago. Got abx.     campral helping with curbing etoh.  Down to one glass of wine every 3 days.   Acid refflux and diarrhea better.      The following portions of the patient's history were reviewed and updated as appropriate: allergies, current medications, past family history, past medical history, past social history, past surgical history and problem list.    Review of Systems   Constitutional: Negative for fatigue.   Eyes: Negative for blurred vision.   Respiratory: Negative for cough and shortness of breath.    Cardiovascular: Negative for chest pain.   Neurological: Negative for headaches.   All other systems reviewed and are negative.      Current Outpatient Prescriptions:   •  acamprosate (CAMPRAL) 333 MG EC tablet, Take 2 tablets by mouth 3 (Three) Times a Day., Disp: 180 tablet, Rfl: 3  •  buPROPion (WELLBUTRIN) 75 MG tablet, TAKE 1 TABLET BY MOUTH TWICE DAILY, Disp: 60 tablet, Rfl: 6  •  carvedilol (COREG) 12.5 MG tablet, Take 1 tablet by mouth 2 (Two) Times a Day With Meals., Disp: 180 tablet, Rfl: 3  •   "chlorthalidone (HYGROTON) 25 MG tablet, Take 1 tablet by mouth Daily., Disp: 90 tablet, Rfl: 3  •  CloNIDine (CATAPRES) 0.1 MG tablet, take 1 tablet by mouth at bedtime if needed for high blood pressure, Disp: 30 tablet, Rfl: 11  •  Cyanocobalamin (VITAMIN B-12) 1000 MCG sublingual tablet, Place  under the tongue., Disp: , Rfl:   •  levothyroxine (SYNTHROID) 50 MCG tablet, Take 1 tablet by mouth Daily., Disp: 90 tablet, Rfl: 3  •  Multiple Vitamins-Minerals (CENTRUM ADULTS) tablet, Take  by mouth., Disp: , Rfl:   •  citalopram (CELEXA) 40 MG tablet, Take 1 tablet by mouth Daily., Disp: 90 tablet, Rfl: 3    Objective      Blood pressure 129/70, pulse 68, temperature 98.3 °F (36.8 °C), resp. rate 16, height 162.6 cm (64\"), weight 78.5 kg (173 lb), SpO2 99 %.    Physical Exam     General Appearance:    Alert, cooperative, no distress, appears stated age   Head:    Normocephalic, without obvious abnormality, atraumatic   Eyes:    PERRL, conjunctiva/corneas clear, EOM's intact   Ears:    Normal TM's and external ear canals, both ears   Nose:   Nares normal, septum midline, mucosa normal, no drainage   or sinus tenderness   Throat:   Lips, mucosa, and tongue normal; teeth and gums normal   Neck:   Supple, symmetrical, trachea midline, no adenopathy;        thyroid:  No enlargement/tenderness/nodules; no carotid    bruit or JVD   Back:     Symmetric, no curvature, ROM normal, no CVA tenderness   Lungs:     Clear to auscultation bilaterally, respirations unlabored   Chest wall:    No tenderness or deformity   Heart:    Regular rate and rhythm, S1 and S2 normal, no murmur,        rub or gallop   Abdomen:     Soft, non-tender, bowel sounds active all four quadrants,     no masses, no organomegaly   Extremities:   Extremities normal, atraumatic, no cyanosis or edema   Pulses:   2+ and symmetric all extremities   Skin:   Skin color, texture, turgor normal, no rashes or lesions   Lymph nodes:   Cervical, supraclavicular, and " axillary nodes normal   Neurologic:   CNII-XII intact. Normal strength, sensation and reflexes       throughout      Results for orders placed or performed in visit on 08/21/17   Comprehensive Metabolic Panel   Result Value Ref Range    Glucose 85 74 - 98 mg/dL    BUN 8 7 - 20 mg/dL    Creatinine 0.70 0.60 - 1.30 mg/dL    eGFR Non African Am 88 >60 mL/min/1.73    eGFR African Am 106 >60 mL/min/1.73    BUN/Creatinine Ratio 11.4 7.1 - 23.5    Sodium 140 137 - 145 mmol/L    Potassium 4.5 3.5 - 5.1 mmol/L    Chloride 101 98 - 107 mmol/L    Total CO2 27.0 26.0 - 30.0 mmol/L    Calcium 9.4 8.4 - 10.2 mg/dL    Total Protein 6.5 6.3 - 8.2 g/dL    Albumin 3.70 3.50 - 5.00 g/dL    Globulin 2.8 gm/dL    A/G Ratio 1.3 1.0 - 2.0 g/dL    Total Bilirubin 2.1 (H) 0.2 - 1.3 mg/dL    Alkaline Phosphatase 155 (H) 38 - 126 U/L    AST (SGOT) 123 (H) 15 - 46 U/L    ALT (SGPT) 53 13 - 69 U/L   Protime-INR   Result Value Ref Range    INR 1.07 0.90 - 1.10    Protime 11.7 9.3 - 12.1 Seconds   Ammonia   Result Value Ref Range    Ammonia <9 (L) 9 - 30 umol/L   CBC & Differential   Result Value Ref Range    WBC 5.32 4.80 - 10.80 10*3/mm3    RBC 3.46 (L) 4.20 - 5.40 10*6/mm3    Hemoglobin 12.4 12.0 - 16.0 g/dL    Hematocrit 37.1 37.0 - 47.0 %    .2 (H) 81.0 - 99.0 fL    MCH 35.8 (H) 27.0 - 31.0 pg    MCHC 33.4 30.0 - 37.0 g/dL    RDW 13.8 11.5 - 14.5 %    Platelets 176 130 - 400 10*3/mm3    Neutrophil Rel % 42.6 37.0 - 80.0 %    Lymphocyte Rel % 42.5 10.0 - 50.0 %    Monocyte Rel % 9.2 0.0 - 12.0 %    Eosinophil Rel % 4.3 0.0 - 7.0 %    Basophil Rel % 0.8 0.0 - 2.5 %    Neutrophils Absolute 2.27 2.00 - 6.90 10*3/mm3    Lymphocytes Absolute 2.26 0.60 - 3.40 10*3/mm3    Monocytes Absolute 0.49 0.00 - 0.90 10*3/mm3    Eosinophils Absolute 0.23 0.00 - 0.70 10*3/mm3    Basophils Absolute 0.04 0.00 - 0.20 10*3/mm3    Immature Granulocyte Rel % 0.6 0.0 - 0.6 %    Immature Grans Absolute 0.03 0.00 - 0.06 10*3/mm3    nRBC 0.0 0.0 - 0.0 /100 WBC    Manual Differential   Result Value Ref Range    Differential Comment Comment     Comment Comment     Plt Comment Comment          Assessment/Plan   Get labs and adjust meds over the phone.    Gaby was seen today for hypertension and medication refills.    Diagnoses and all orders for this visit:    Subclinical hypothyroidism  -     levothyroxine (SYNTHROID) 50 MCG tablet; Take 1 tablet by mouth Daily.  -     Comprehensive Metabolic Panel  -     CBC & Differential  -     TSH  -     T4, Free  -     Vitamin B12    Chronic fatigue  -     levothyroxine (SYNTHROID) 50 MCG tablet; Take 1 tablet by mouth Daily.  -     Comprehensive Metabolic Panel  -     CBC & Differential  -     TSH  -     T4, Free  -     Vitamin B12    Essential hypertension  -     chlorthalidone (HYGROTON) 25 MG tablet; Take 1 tablet by mouth Daily.  -     Comprehensive Metabolic Panel  -     CBC & Differential  -     TSH  -     T4, Free  -     Vitamin B12    Other orders  -     carvedilol (COREG) 12.5 MG tablet; Take 1 tablet by mouth 2 (Two) Times a Day With Meals.  -     citalopram (CELEXA) 40 MG tablet; Take 1 tablet by mouth Daily.      Return in about 4 months (around 5/5/2018).          There are no Patient Instructions on file for this visit.     Silverio Perez MD    Assessment/Plan

## 2018-01-08 DIAGNOSIS — R17 ELEVATED BILIRUBIN: Primary | ICD-10-CM

## 2018-01-26 ENCOUNTER — OFFICE VISIT (OUTPATIENT)
Dept: GASTROENTEROLOGY | Facility: CLINIC | Age: 53
End: 2018-01-26

## 2018-01-26 VITALS
BODY MASS INDEX: 31.43 KG/M2 | SYSTOLIC BLOOD PRESSURE: 128 MMHG | HEART RATE: 61 BPM | DIASTOLIC BLOOD PRESSURE: 84 MMHG | WEIGHT: 177.4 LBS | HEIGHT: 63 IN | TEMPERATURE: 97.2 F | OXYGEN SATURATION: 99 %

## 2018-01-26 DIAGNOSIS — R79.89 ABNORMAL LIVER FUNCTION TESTS: ICD-10-CM

## 2018-01-26 DIAGNOSIS — K70.0 ALCOHOLIC FATTY LIVER: Primary | ICD-10-CM

## 2018-01-26 PROCEDURE — 99244 OFF/OP CNSLTJ NEW/EST MOD 40: CPT | Performed by: INTERNAL MEDICINE

## 2018-01-26 NOTE — PROGRESS NOTES
PCP: Silverio Perez MD    Chief Complaint   Patient presents with   • Abnormal Lab     Elevated Bilirubin       History of Present Illness:   HPI  I appreciate the consultation for elevated bilirubin.  Ms. Rasheed is a 52-year-old with a history of hypertension, hyperlipidemia and depression.  The patient admits to a long-standing history of alcohol use.  She drank heavy for about 5 years and would consume a fifth of [employer] over a few days.  The patient currently admits to drinking two 10 ounce glasses of wine on a daily basis.  The patient also admits to  drinking some margaritas over the course of a weekend at times.  She denies any signs of gastrointestinal bleeding.  No history of abdominal swelling.  The patient denies any issues with confusion.  Ms. Rasheed is unaware of any change in the color of her eyes or skin.  There is no family history of liver disease.  She had a gastric bypass surgery about 7 years ago but does not recall a liver biopsy being performed at that time.  The patient denies any illicit drug use.  There is no history of travel outside the [place] recently.  She denies any night sweats, fever or chills.  Ms. Rasheed recently had a colonoscopy that was unremarkable.  She also had an upper endoscopy and no ulcers were found.  The patient denies any easy bruising.  She has not experienced any issues with nosebleeds.  Past Medical History:   Diagnosis Date   • Acid reflux    • Back pain    • Depression    • Fractures    • High cholesterol    • Hypertension    • Nocturia 10/5/2016   • Positive TB test    • Sinus problem    • ELINA (stress urinary incontinence, female) 10/5/2016       Past Surgical History:   Procedure Laterality Date   •  SECTION     • COLONOSCOPY     • GASTRIC BYPASS     • HERNIA REPAIR     • HYSTERECTOMY     • LAPAROSCOPIC TUBAL LIGATION           Current Outpatient Prescriptions:   •  acamprosate (CAMPRAL) 333 MG EC tablet, Take 2 tablets by mouth 3  (Three) Times a Day. (Patient taking differently: Take 666 mg by mouth 3 (Three) Times a Day. Patient doesn't take medication regularly.), Disp: 180 tablet, Rfl: 3  •  buPROPion (WELLBUTRIN) 75 MG tablet, TAKE 1 TABLET BY MOUTH TWICE DAILY, Disp: 60 tablet, Rfl: 6  •  carvedilol (COREG) 12.5 MG tablet, Take 1 tablet by mouth 2 (Two) Times a Day With Meals., Disp: 180 tablet, Rfl: 3  •  chlorthalidone (HYGROTON) 25 MG tablet, Take 1 tablet by mouth Daily., Disp: 90 tablet, Rfl: 3  •  citalopram (CELEXA) 40 MG tablet, Take 1 tablet by mouth Daily., Disp: 90 tablet, Rfl: 3  •  CloNIDine (CATAPRES) 0.1 MG tablet, take 1 tablet by mouth at bedtime if needed for high blood pressure, Disp: 30 tablet, Rfl: 11  •  levothyroxine (SYNTHROID) 50 MCG tablet, Take 1 tablet by mouth Daily., Disp: 90 tablet, Rfl: 3    No Known Allergies    Family History   Problem Relation Age of Onset   • Hyperlipidemia Mother    • Arthritis Mother    • Diabetes Father    • Hypertension Father    • Arthritis Paternal Grandmother    • Breast cancer Neg Hx    • Ovarian cancer Neg Hx        Social History     Social History   • Marital status:      Spouse name: N/A   • Number of children: N/A   • Years of education: N/A     Occupational History   • Not on file.     Social History Main Topics   • Smoking status: Never Smoker   • Smokeless tobacco: Never Used   • Alcohol use Yes      Comment: 2 glasses of wine daily   • Drug use: No   • Sexual activity: Defer     Other Topics Concern   • Not on file     Social History Narrative       Review of Systems   Constitutional: Positive for fatigue. Negative for activity change, appetite change, fever and unexpected weight change.   HENT: Negative for dental problem, hearing loss, mouth sores, postnasal drip, sneezing, trouble swallowing and voice change.    Eyes: Negative for pain, redness, itching and visual disturbance.   Respiratory: Negative for cough, choking, chest tightness, shortness of breath  and wheezing.    Cardiovascular: Negative for chest pain, palpitations and leg swelling.   Gastrointestinal: Positive for abdominal distention (bloating). Negative for abdominal pain, anal bleeding, blood in stool, constipation, diarrhea, nausea, rectal pain and vomiting.        Heartburn   Endocrine: Negative for cold intolerance, heat intolerance, polydipsia, polyphagia and polyuria.   Genitourinary: Negative.  Negative for dysuria, enuresis, flank pain, hematuria and urgency.   Musculoskeletal: Negative for arthralgias, back pain, gait problem, joint swelling and myalgias.   Skin: Positive for rash. Negative for color change and pallor.   Allergic/Immunologic: Negative for environmental allergies, food allergies and immunocompromised state.   Neurological: Negative for dizziness, tremors, seizures, facial asymmetry, speech difficulty, numbness and headaches.   Hematological: Negative for adenopathy.   Psychiatric/Behavioral: Negative for behavioral problems, confusion, dysphoric mood, hallucinations and self-injury.       Vitals:    01/26/18 0821   BP: 128/84   Pulse: 61   Temp: 97.2 °F (36.2 °C)   SpO2: 99%       Physical Exam   Constitutional: She is oriented to person, place, and time. She appears well-nourished. No distress.   HENT:   Head: Normocephalic and atraumatic.   Mouth/Throat: Oropharynx is clear and moist. No oropharyngeal exudate.   Eyes: Conjunctivae and EOM are normal. No scleral icterus.   Neck: Normal range of motion. Neck supple. No thyromegaly present.   Cardiovascular: Normal rate, regular rhythm and normal heart sounds.  Exam reveals no gallop.    No murmur heard.  Pulmonary/Chest: Effort normal and breath sounds normal. She has no wheezes. She has no rales.   Abdominal: Soft. Bowel sounds are normal. There is no tenderness. There is no guarding.   Well healed midline surgical incision, no ascites   Musculoskeletal: Normal range of motion. She exhibits no tenderness. Edema: pretibial.    Lymphadenopathy:     She has no cervical adenopathy.   Neurological: She is alert and oriented to person, place, and time. She exhibits normal muscle tone.   Skin: Skin is dry. No rash noted.   No spider nevi, no palmar erythema   Psychiatric: She has a normal mood and affect. Her behavior is normal. Thought content normal.   Vitals reviewed.      Gaby was seen today for abnormal lab.    Diagnoses and all orders for this visit:    Alcoholic fatty liver  -     US Liver; Future    Abnormal liver function tests  -     US Liver; Future  The patient has a history that is most consistent with alcohol related fatty liver.  The liver panel tests also are most consistent with alcohol related injury.  She has a slight elevation of the bilirubin and albumin that is just over 3.  There is also thrombocytopenia on the last lab tests.  This could be from alcohol affect to the bone marrow but cannot exclude the possibility of portal hypertension.  She does not have any ascites based on imaging studies from the past.  She did not have any evidence of varices on recent endoscopy.      Plan: Will schedule ultrasound of the liver to evaluate for ascites.            Discussed the need to stop alcohol use at this time.  I explained that she is at increased risk for alcohol-related cirrhosis and the complications from that disease state.            Follow-up in 3 months.      I spent over 50% of the office visit counseling and answering questions from the patient.

## 2018-02-01 ENCOUNTER — HOSPITAL ENCOUNTER (OUTPATIENT)
Dept: ULTRASOUND IMAGING | Facility: HOSPITAL | Age: 53
Discharge: HOME | End: 2018-02-01
Attending: INTERNAL MEDICINE | Admitting: INTERNAL MEDICINE

## 2018-02-01 DIAGNOSIS — K70.0 ALCOHOLIC FATTY LIVER: ICD-10-CM

## 2018-02-01 DIAGNOSIS — R79.89 ABNORMAL LIVER FUNCTION TESTS: ICD-10-CM

## 2018-02-01 PROCEDURE — 76705 ECHO EXAM OF ABDOMEN: CPT

## 2018-02-05 ENCOUNTER — TELEPHONE (OUTPATIENT)
Dept: GASTROENTEROLOGY | Facility: CLINIC | Age: 53
End: 2018-02-05

## 2018-02-05 NOTE — TELEPHONE ENCOUNTER
----- Message from Harlan Claudio MD sent at 2/1/2018  6:02 PM EST -----  Let Ms. Rasheed know there was no evidence for gallstones.  She did have fatty infiltration of the liver as expected from alcohol use.  She did not have any fluid around the liver.  She should follow up in the office in 3 months as scheduled.  Thank you,  MARY

## 2018-02-12 ENCOUNTER — TELEPHONE (OUTPATIENT)
Dept: GASTROENTEROLOGY | Facility: CLINIC | Age: 53
End: 2018-02-12

## 2018-02-14 RX ORDER — ACAMPROSATE CALCIUM 333 MG/1
TABLET, DELAYED RELEASE ORAL
Qty: 180 TABLET | Refills: 1 | Status: SHIPPED | OUTPATIENT
Start: 2018-02-14 | End: 2018-05-23

## 2018-05-23 ENCOUNTER — OFFICE VISIT (OUTPATIENT)
Dept: INTERNAL MEDICINE | Facility: CLINIC | Age: 53
End: 2018-05-23

## 2018-05-23 VITALS
HEIGHT: 64 IN | SYSTOLIC BLOOD PRESSURE: 141 MMHG | HEART RATE: 60 BPM | WEIGHT: 166 LBS | TEMPERATURE: 98.6 F | DIASTOLIC BLOOD PRESSURE: 67 MMHG | RESPIRATION RATE: 16 BRPM | BODY MASS INDEX: 28.34 KG/M2 | OXYGEN SATURATION: 99 %

## 2018-05-23 DIAGNOSIS — F32.5 MAJOR DEPRESSIVE DISORDER WITH SINGLE EPISODE, IN FULL REMISSION (HCC): Primary | ICD-10-CM

## 2018-05-23 PROCEDURE — 99213 OFFICE O/P EST LOW 20 MIN: CPT | Performed by: INTERNAL MEDICINE

## 2018-05-23 RX ORDER — BUPROPION HYDROCHLORIDE 75 MG/1
75 TABLET ORAL 2 TIMES DAILY
Qty: 60 TABLET | Refills: 6 | Status: SHIPPED | OUTPATIENT
Start: 2018-05-23 | End: 2018-12-31 | Stop reason: SDUPTHER

## 2018-05-23 NOTE — PROGRESS NOTES
Subjective     Patient ID: Gaby Rasheed is a 53 y.o. female. Patient is here for management of multiple medical problems.     Chief Complaint   Patient presents with   • Letter for School/Work     patient applying for a new job at the [employer], they are requiring a letter stating that she doesn't have any restrictions while taking the Celexa and Wellbutrin and what her diagnosis is for taking the Levothyroxine    • Med Refill     patient needs refill on Wellbutrin sent to [facility]     History of Present Illness     Pt now with full time job at [employer].   Pt had recent bronchitis.  Pt needs to complete breathing test.  Pt with hx of depression and anxiety induced from unemployment in the past.  Pt with anxiety prior to unemployment.  Pt has been very stable on meds and questions.     Last ETOH last Sunday 3 days ago. Overall much reduction.   No daily usage.          The following portions of the patient's history were reviewed and updated as appropriate: allergies, current medications, past family history, past medical history, past social history, past surgical history and problem list.    Review of Systems   Constitutional: Negative for fatigue.   Psychiatric/Behavioral: Negative for dysphoric mood and sleep disturbance.   All other systems reviewed and are negative.      Current Outpatient Prescriptions:   •  buPROPion (WELLBUTRIN) 75 MG tablet, Take 1 tablet by mouth 2 (Two) Times a Day., Disp: 60 tablet, Rfl: 6  •  carvedilol (COREG) 12.5 MG tablet, Take 1 tablet by mouth 2 (Two) Times a Day With Meals., Disp: 180 tablet, Rfl: 3  •  chlorthalidone (HYGROTON) 25 MG tablet, Take 1 tablet by mouth Daily., Disp: 90 tablet, Rfl: 3  •  citalopram (CELEXA) 40 MG tablet, Take 1 tablet by mouth Daily., Disp: 90 tablet, Rfl: 3  •  CloNIDine (CATAPRES) 0.1 MG tablet, take 1 tablet by mouth at bedtime if needed for high blood pressure, Disp: 30 tablet, Rfl: 11  •  levothyroxine (SYNTHROID) 50 MCG tablet, Take 1  "tablet by mouth Daily., Disp: 90 tablet, Rfl: 3    Objective      Blood pressure 141/67, pulse 60, temperature 98.6 °F (37 °C), temperature source Oral, resp. rate 16, height 162.6 cm (64\"), weight 75.3 kg (166 lb), SpO2 99 %.    Physical Exam     General Appearance:    Alert, cooperative, no distress, appears stated age   Head:    Normocephalic, without obvious abnormality, atraumatic   Eyes:    PERRL, conjunctiva/corneas clear, EOM's intact   Ears:    Normal TM's and external ear canals, both ears   Nose:   Nares normal, septum midline, mucosa normal, no drainage   or sinus tenderness   Throat:   Lips, mucosa, and tongue normal; teeth and gums normal   Neck:   Supple, symmetrical, trachea midline, no adenopathy;        thyroid:  No enlargement/tenderness/nodules; no carotid    bruit or JVD   Back:     Symmetric, no curvature, ROM normal, no CVA tenderness   Lungs:     Clear to auscultation bilaterally, respirations unlabored   Chest wall:    No tenderness or deformity   Heart:    Regular rate and rhythm, S1 and S2 normal, no murmur,        rub or gallop   Abdomen:     Soft, non-tender, bowel sounds active all four quadrants,     no masses, no organomegaly   Extremities:   Extremities normal, atraumatic, no cyanosis or edema   Pulses:   2+ and symmetric all extremities   Skin:   Skin color, texture, turgor normal, no rashes or lesions   Lymph nodes:   Cervical, supraclavicular, and axillary nodes normal   Neurologic:   CNII-XII intact. Normal strength, sensation and reflexes       throughout      Results for orders placed or performed in visit on 01/05/18   Comprehensive Metabolic Panel   Result Value Ref Range    Glucose 89 74 - 98 mg/dL    BUN 9 7 - 20 mg/dL    Creatinine 0.60 0.60 - 1.30 mg/dL    eGFR Non African Am 105 >60 mL/min/1.73    eGFR African Am 127 >60 mL/min/1.73    BUN/Creatinine Ratio 15.0 7.1 - 23.5    Sodium 136 (L) 137 - 145 mmol/L    Potassium 3.8 3.5 - 5.1 mmol/L    Chloride 100 98 - 107 mmol/L "    Total CO2 29.0 26.0 - 30.0 mmol/L    Calcium 9.0 8.4 - 10.2 mg/dL    Total Protein 5.8 (L) 6.3 - 8.2 g/dL    Albumin 3.10 (L) 3.50 - 5.00 g/dL    Globulin 2.7 gm/dL    A/G Ratio 1.1 1.0 - 2.0 g/dL    Total Bilirubin 2.0 (H) 0.2 - 1.3 mg/dL    Alkaline Phosphatase 156 (H) 38 - 126 U/L    AST (SGOT) 80 (H) 15 - 46 U/L    ALT (SGPT) 50 13 - 69 U/L   TSH   Result Value Ref Range    TSH 0.795 0.470 - 4.680 mIU/mL   T4, Free   Result Value Ref Range    Free T4 1.33 0.78 - 2.19 ng/dL   Vitamin B12   Result Value Ref Range    Vitamin B-12 965 (H) 239 - 931 pg/mL   CBC & Differential   Result Value Ref Range    WBC 6.37 4.80 - 10.80 10*3/mm3    RBC 3.46 (L) 4.20 - 5.40 10*6/mm3    Hemoglobin 12.5 12.0 - 16.0 g/dL    Hematocrit 37.8 37.0 - 47.0 %    .2 (H) 81.0 - 99.0 fL    MCH 36.1 (H) 27.0 - 31.0 pg    MCHC 33.1 30.0 - 37.0 g/dL    RDW 13.1 11.5 - 14.5 %    Platelets 86 (L) 130 - 400 10*3/mm3    Neutrophil Rel % 66.0 37.0 - 80.0 %    Lymphocyte Rel % 22.9 10.0 - 50.0 %    Monocyte Rel % 8.6 0.0 - 12.0 %    Eosinophil Rel % 1.4 0.0 - 7.0 %    Basophil Rel % 0.8 0.0 - 2.5 %    Neutrophils Absolute 4.20 2.00 - 6.90 10*3/mm3    Lymphocytes Absolute 1.46 0.60 - 3.40 10*3/mm3    Monocytes Absolute 0.55 0.00 - 0.90 10*3/mm3    Eosinophils Absolute 0.09 0.00 - 0.70 10*3/mm3    Basophils Absolute 0.05 0.00 - 0.20 10*3/mm3    Immature Granulocyte Rel % 0.3 0.0 - 0.6 %    Immature Grans Absolute 0.02 0.00 - 0.06 10*3/mm3    nRBC 0.0 0.0 - 0.0 /100 WBC   Manual Differential   Result Value Ref Range    Differential Comment Comment     Comment Comment     Plt Comment Comment          Assessment/Plan     Pt stable on current meds for depression and anxiety.   Pt has a base line anxiety that is controlled. Self medicated in the past with wine. Now pt has cut back to 2-3 days a week.   One glass of wine Sunday. Max 2 glasses at night.  Had whiskey on vacation 2 weeks ago.    Pt has improved a lot with life style modification.   I  would like to ween her depression anxiety meds as tolerated, as her social situation continues to improve.  I will consider her a good candidate for work at the [employer].           Gaby was seen today for letter for school/work and med refill.    Diagnoses and all orders for this visit:    Major depressive disorder with single episode, in full remission  -     buPROPion (WELLBUTRIN) 75 MG tablet; Take 1 tablet by mouth 2 (Two) Times a Day.      No Follow-up on file.          There are no Patient Instructions on file for this visit.     Silverio Perez MD    Assessment/Plan

## 2018-08-13 DIAGNOSIS — G47.09 OTHER INSOMNIA: ICD-10-CM

## 2018-08-13 RX ORDER — CLONIDINE HYDROCHLORIDE 0.1 MG/1
TABLET ORAL
Qty: 30 TABLET | Refills: 11 | Status: SHIPPED | OUTPATIENT
Start: 2018-08-13 | End: 2019-07-19

## 2018-12-31 DIAGNOSIS — F32.5 MAJOR DEPRESSIVE DISORDER WITH SINGLE EPISODE, IN FULL REMISSION (HCC): ICD-10-CM

## 2018-12-31 RX ORDER — BUPROPION HYDROCHLORIDE 75 MG/1
TABLET ORAL
Qty: 60 TABLET | Refills: 1 | Status: SHIPPED | OUTPATIENT
Start: 2018-12-31 | End: 2019-02-27 | Stop reason: SDUPTHER

## 2019-01-04 RX ORDER — CITALOPRAM 40 MG/1
TABLET ORAL
Qty: 90 TABLET | Refills: 3 | Status: SHIPPED | OUTPATIENT
Start: 2019-01-04 | End: 2019-07-19

## 2019-01-12 DIAGNOSIS — I10 ESSENTIAL HYPERTENSION: ICD-10-CM

## 2019-01-12 DIAGNOSIS — E03.8 SUBCLINICAL HYPOTHYROIDISM: ICD-10-CM

## 2019-01-12 DIAGNOSIS — R53.82 CHRONIC FATIGUE: ICD-10-CM

## 2019-01-14 RX ORDER — LEVOTHYROXINE SODIUM 0.05 MG/1
TABLET ORAL
Qty: 90 TABLET | Refills: 3 | Status: SHIPPED | OUTPATIENT
Start: 2019-01-14 | End: 2019-12-16 | Stop reason: SDUPTHER

## 2019-01-14 RX ORDER — CARVEDILOL 12.5 MG/1
TABLET ORAL
Qty: 180 TABLET | Refills: 3 | Status: SHIPPED | OUTPATIENT
Start: 2019-01-14 | End: 2019-12-16 | Stop reason: SDUPTHER

## 2019-01-14 RX ORDER — CHLORTHALIDONE 25 MG/1
TABLET ORAL
Qty: 90 TABLET | Refills: 3 | Status: SHIPPED | OUTPATIENT
Start: 2019-01-14 | End: 2019-12-16 | Stop reason: SDUPTHER

## 2019-01-23 ENCOUNTER — OFFICE VISIT (OUTPATIENT)
Dept: OBSTETRICS AND GYNECOLOGY | Facility: CLINIC | Age: 54
End: 2019-01-23

## 2019-01-23 VITALS
BODY MASS INDEX: 28.17 KG/M2 | DIASTOLIC BLOOD PRESSURE: 70 MMHG | SYSTOLIC BLOOD PRESSURE: 122 MMHG | WEIGHT: 159 LBS | HEIGHT: 63 IN

## 2019-01-23 DIAGNOSIS — N39.3 SUI (STRESS URINARY INCONTINENCE, FEMALE): ICD-10-CM

## 2019-01-23 DIAGNOSIS — D25.2 SUBSEROUS LEIOMYOMA OF UTERUS: ICD-10-CM

## 2019-01-23 DIAGNOSIS — N90.7 LABIAL CYST: ICD-10-CM

## 2019-01-23 DIAGNOSIS — N94.10 DYSPAREUNIA, FEMALE: ICD-10-CM

## 2019-01-23 DIAGNOSIS — Z01.419 ENCOUNTER FOR WELL WOMAN EXAM WITH ROUTINE GYNECOLOGICAL EXAM: Primary | ICD-10-CM

## 2019-01-23 DIAGNOSIS — R31.9 HEMATURIA, UNDIAGNOSED CAUSE: ICD-10-CM

## 2019-01-23 PROBLEM — Z98.890 H/O UMBILICAL HERNIA REPAIR: Status: ACTIVE | Noted: 2019-01-23

## 2019-01-23 PROBLEM — Z87.19 H/O UMBILICAL HERNIA REPAIR: Status: ACTIVE | Noted: 2019-01-23

## 2019-01-23 PROBLEM — Z98.84 HX OF GASTRIC BYPASS: Status: ACTIVE | Noted: 2019-01-23

## 2019-01-23 PROBLEM — Z98.891 H/O CESAREAN SECTION: Status: ACTIVE | Noted: 2019-01-23

## 2019-01-23 PROBLEM — Z98.891 H/O CESAREAN SECTION: Status: RESOLVED | Noted: 2019-01-23 | Resolved: 2019-01-23

## 2019-01-23 PROBLEM — Z90.710 HX OF HYSTERECTOMY: Status: ACTIVE | Noted: 2019-01-23

## 2019-01-23 PROCEDURE — 99202 OFFICE O/P NEW SF 15 MIN: CPT | Performed by: OBSTETRICS & GYNECOLOGY

## 2019-01-23 NOTE — PROGRESS NOTES
Subjective   Chief Complaint   Patient presents with   • Annual Exam     labial cyst   • Blood in Urine     Gaby Rasheed is a 53 y.o. year old .  No LMP recorded. Patient has had a hysterectomy.  She presents to be seen because of a few problems.  She thinks she needs a yearly Pap test however she is status post hysterectomy  for prolapse.  She reports she told Dr. Pereira she wanted her bladder tack but he did not do that.  I told her may have declined thinking the hysterectomy may help issues.    Currently she is having problems with stress urinary incontinence when ever she coughs laughs or sneezes.  She is wearing a daily pad and has to change 2-3 times a day.  Kegel exercises may help some.    She also is having a recurrent vulvar labial cyst.  This is the third time this has recurred.  She reports it was removed once by Dr. Edmonds and then Sindhu piña and may be Kenyatta Hansen combined to try to remove this under local in the office.  In the very office where again so that may have been  or .  It has recurred and over the last 8 months has really been problematic for her.  Causing pain during intercourse.  Otherwise she does not have any real dyspareunia although she does complain of some vaginal dryness during sexual activity.    She has been having some blood in her urine off and on since .  Sounds like once or is a like clotted blood.  We will need to check urinalysis to follow this up and make sure there is nothing vaginally causing this bleeding.    Review of records shows that in 2017 she was diagnosed on CT scan with fatty liver.  Drinks about 4 glasses of wine per week.                      The following portions of the patient's history were reviewed and updated as appropriate:problem list, current medications and allergies   GYN system review positive for history of uterine fibroids otherwise negative.  Medical history positive for hypertension negative  "x7  Family history negative for breast, ovarian, colon cancer.  Positive for osteoporosis in the patient's mother    Review of Systems negative with exception of bruising and urinary leakage.  Medical history positive for hypertension and hypothyroidism.  However she is also on bupropion and citalopram which I would assume would be for depression/anxiety.     Objective   /70   Ht 160 cm (63\")   Wt 72.1 kg (159 lb)   Breastfeeding? No   BMI 28.17 kg/m²     General:  well developed; well nourished  no acute distress  appears stated age   Skin:  No suspicious lesions seen   Thyroid: not examined   Lungs:  breathing is unlabored   Heart:  Not performed.   Abdomen: soft, non-tender; no masses  no umbilical or inguinal hernias are present  Multiple incisions   Pelvis: Clinical staff was present for exam  External genitalia:  The external genitalia appear normal with exception of approximately 2 x 3 cm swelling at the upper vaginal introitus just to the right of midline.  This involves the labia menorah.  :  urethral meatus normal; urethra hypermobile; With a Q-tip in the bladder there is a 30 degree angle change with Valsalva.  There is also demonstrated urinary leakage.  She is able to do a moderate Keagle exercise upon request.  Vaginal:  atrophic mucosal changes are present; she is able to perform a Kegel contraction upon request;  Cervix:  absent.  Uterus:  absent.  Adnexa:  non palpable bilaterally.   On speculum examination there is no blood in the vagina.  She has fairly good anterior and posterior support with no significant cystocele or rectocele     Lab Review   CBC, CMP and TSH    Imaging   Mammogram report        Assessment   1. Is menopausal post hysterectomy patient with recurrent labial polyp 2-3 cm in diameter/dimension.  She desires this to be removed if she is having dyspareunia and daily discomfort.  2. Reported hematuria since January 9.  Will need to check urinalysis I think it clean-catch " would be sufficient as there is no vaginal blood.  Bladder was nontender on examination and relatively empty  3. Stress urinary incontinence with daily pad wearing.  Options discussed.  Given information regarding mid urethral sling.  Discussed potential side effect or short-term need for self-catheterization.  Approximately 2-3% risk for mesh erosion slight risk for removal of mesh if she has problems with urinary retention.  Suggested that she consider this option and then she can decide whether she wants to combine the labial cyst removal with a tension-free vaginal taping procedure.  4. Family history of osteoporosis in the mother.  Patient's calcium intake is not adequate although she has been started on vitamin D and does walk for exercise.  Will give some calcium information to include in her diet.  May need a supplement.  5. Hypertension, hypothyroidism question anxiety versus depression per medications above     Plan                  1.  Check urinalysis; continue Kegel exercises and regular timed voiding avoid liquids at night.                     Will give information regarding mid urethral sling for daily stress incontinence interfering with activities of daily living.                      May combine this with removal of vulvar cyst at the same time.  This previously has been  removed once as an outpatient and questionably once in the office under local.                 2.  Order mammogram; encouraged self breast exam; last normal mammogram November 2016        Medications Rx this encounter:  No orders of the defined types were placed in this encounter.         This note was electronically signed.    Vasyl Nicolas MD  January 23, 2019

## 2019-01-25 ENCOUNTER — APPOINTMENT (OUTPATIENT)
Dept: LAB | Facility: HOSPITAL | Age: 54
End: 2019-01-25

## 2019-01-25 LAB
BACTERIA UR QL AUTO: ABNORMAL /HPF
BILIRUB UR QL STRIP: NEGATIVE
CLARITY UR: CLEAR
COLOR UR: YELLOW
GLUCOSE UR STRIP-MCNC: NEGATIVE MG/DL
HGB UR QL STRIP.AUTO: NEGATIVE
HYALINE CASTS UR QL AUTO: ABNORMAL /LPF
KETONES UR QL STRIP: NEGATIVE
LEUKOCYTE ESTERASE UR QL STRIP.AUTO: NEGATIVE
NITRITE UR QL STRIP: NEGATIVE
PH UR STRIP.AUTO: <=5 [PH] (ref 5–8)
PROT UR QL STRIP: NEGATIVE
RBC # UR: ABNORMAL /HPF
REF LAB TEST METHOD: ABNORMAL
SP GR UR STRIP: 1.02 (ref 1–1.03)
SQUAMOUS #/AREA URNS HPF: ABNORMAL /HPF
UROBILINOGEN UR QL STRIP: NORMAL
WBC UR QL AUTO: ABNORMAL /HPF

## 2019-01-25 PROCEDURE — 81001 URINALYSIS AUTO W/SCOPE: CPT | Performed by: OBSTETRICS & GYNECOLOGY

## 2019-01-25 RX ORDER — NITROFURANTOIN 25; 75 MG/1; MG/1
100 CAPSULE ORAL 2 TIMES DAILY
Qty: 14 CAPSULE | Refills: 0 | Status: SHIPPED | OUTPATIENT
Start: 2019-01-25 | End: 2019-07-19

## 2019-01-25 NOTE — PROGRESS NOTES
Phone call Friday [phone] inform her that her urinalysis looks like she is got 3+ bacteria so I called Macrobid into her pharmacy voicemail was left.

## 2019-02-05 ENCOUNTER — TELEPHONE (OUTPATIENT)
Dept: OBSTETRICS AND GYNECOLOGY | Facility: CLINIC | Age: 54
End: 2019-02-05

## 2019-02-05 NOTE — TELEPHONE ENCOUNTER
Michelle called back wanting to plan when to schedule Mid Urethral Sling + removal of vulvar cyst. Informed that she should plan to be off work for at least 10 days - 2 weeks. She wants to sched procedure on 3/21. Says she has finished her Macrobid and feels much better.

## 2019-02-06 ENCOUNTER — PREP FOR SURGERY (OUTPATIENT)
Dept: OTHER | Facility: HOSPITAL | Age: 54
End: 2019-02-06

## 2019-02-06 DIAGNOSIS — N90.7 VULVAR CYST: ICD-10-CM

## 2019-02-06 DIAGNOSIS — N39.3 STRESS INCONTINENCE: Primary | ICD-10-CM

## 2019-02-06 RX ORDER — CEFAZOLIN SODIUM 2 G/100ML
2 INJECTION, SOLUTION INTRAVENOUS
Status: CANCELLED | OUTPATIENT
Start: 2019-02-06

## 2019-02-06 RX ORDER — SODIUM CHLORIDE 0.9 % (FLUSH) 0.9 %
3 SYRINGE (ML) INJECTION EVERY 12 HOURS SCHEDULED
Status: CANCELLED | OUTPATIENT
Start: 2019-02-06

## 2019-02-06 RX ORDER — SODIUM CHLORIDE 0.9 % (FLUSH) 0.9 %
1-10 SYRINGE (ML) INJECTION AS NEEDED
Status: CANCELLED | OUTPATIENT
Start: 2019-02-06

## 2019-02-06 NOTE — TELEPHONE ENCOUNTER
Prep for surgery orders placed.  I did not indicate laterality of the vulvar cyst but it is just to the right of midline.  Thank you

## 2019-02-27 DIAGNOSIS — F32.5 MAJOR DEPRESSIVE DISORDER WITH SINGLE EPISODE, IN FULL REMISSION (HCC): ICD-10-CM

## 2019-02-27 RX ORDER — BUPROPION HYDROCHLORIDE 75 MG/1
TABLET ORAL
Qty: 60 TABLET | Refills: 1 | Status: SHIPPED | OUTPATIENT
Start: 2019-02-27 | End: 2019-07-19 | Stop reason: SDUPTHER

## 2019-03-05 ENCOUNTER — TELEPHONE (OUTPATIENT)
Dept: OBSTETRICS AND GYNECOLOGY | Facility: CLINIC | Age: 54
End: 2019-03-05

## 2019-03-05 NOTE — TELEPHONE ENCOUNTER
Pt calling to say she has to cancel her upcoming surgery with Dr Nicolas on 3/21/19. States the hospital called saying she would owe 3700 and she can not afford it at this time. Says her deductible = 2700 + was told she will owe 10% of the rest of the cost. States she hates to cancel.

## 2019-03-06 NOTE — TELEPHONE ENCOUNTER
Please forward this to the deras that be with the new protocol for surgery scheduling.  I think we need to document when surgeries are canceled with this new protocol.  I am not sure if the financial counselors at the hospital could have done something different but I but they may have set her up on a payment plan so that her surgery could have proceeded.  Thank you

## 2019-03-18 ENCOUNTER — HOSPITAL ENCOUNTER (EMERGENCY)
Facility: HOSPITAL | Age: 54
Discharge: HOME | End: 2019-03-18
Attending: EMERGENCY MEDICINE | Admitting: EMERGENCY MEDICINE

## 2019-03-18 ENCOUNTER — TELEPHONE (OUTPATIENT)
Dept: SURGERY | Facility: CLINIC | Age: 54
End: 2019-03-18

## 2019-03-18 ENCOUNTER — APPOINTMENT (OUTPATIENT)
Dept: PREADMISSION TESTING | Facility: HOSPITAL | Age: 54
End: 2019-03-18

## 2019-03-18 VITALS
WEIGHT: 160.2 LBS | RESPIRATION RATE: 16 BRPM | HEIGHT: 63 IN | TEMPERATURE: 98.1 F | SYSTOLIC BLOOD PRESSURE: 118 MMHG | DIASTOLIC BLOOD PRESSURE: 75 MMHG | HEART RATE: 60 BPM | BODY MASS INDEX: 28.39 KG/M2 | OXYGEN SATURATION: 97 %

## 2019-03-18 DIAGNOSIS — N76.4 VULVAR ABSCESS: Primary | ICD-10-CM

## 2019-03-18 PROCEDURE — 99282 EMERGENCY DEPT VISIT SF MDM: CPT

## 2019-03-18 RX ORDER — CLINDAMYCIN HYDROCHLORIDE 300 MG/1
300 CAPSULE ORAL 3 TIMES DAILY
Qty: 30 CAPSULE | Refills: 0 | Status: SHIPPED | OUTPATIENT
Start: 2019-03-18 | End: 2019-07-19

## 2019-03-18 NOTE — ED PROVIDER NOTES
Subjective   History of Present Illness  This is a 53-year-old female comes in today complaining of a vulvar abscess.  She reports that she has had this particular abscess for the past year.  She states that she has had intermittent abscesses to that area since  and has had them removed 3 times.  This 1 she has had for the past year and she had scheduled to have this removed but was unable to afford the deductible for surgery.  So she went home and picked at it herself for the last 4-5 days.  She does have a draining.  She denies any trouble with urination.  She denies any fever nausea or vomiting.  Review of Systems   Constitutional: Negative.    HENT: Negative.    Eyes: Negative.    Respiratory: Negative.    Cardiovascular: Negative.    Gastrointestinal: Negative.    Endocrine: Negative.    Genitourinary: Negative.    Musculoskeletal: Negative.    Skin: Positive for wound.   Allergic/Immunologic: Negative.    Neurological: Negative.    Hematological: Negative.    Psychiatric/Behavioral: Negative.    All other systems reviewed and are negative.      Past Medical History:   Diagnosis Date   • Acid reflux    • Back pain    • Depression    • Fractures    • High cholesterol    • Hypertension    • Nocturia 10/5/2016   • Positive TB test    • Sinus problem    • ELINA (stress urinary incontinence, female) 10/5/2016       No Known Allergies    Past Surgical History:   Procedure Laterality Date   •  SECTION     • COLONOSCOPY     • GASTRIC BYPASS     • HERNIA REPAIR     • HYSTERECTOMY     • LAPAROSCOPIC TUBAL LIGATION     • TOTAL ABDOMINAL HYSTERECTOMY WITH SALPINGO OOPHORECTOMY      uterine prolapse       Family History   Problem Relation Age of Onset   • Hyperlipidemia Mother    • Arthritis Mother    • Diabetes Father    • Hypertension Father    • Arthritis Paternal Grandmother    • Breast cancer Neg Hx    • Ovarian cancer Neg Hx        Social History     Socioeconomic History   • Marital status:       Spouse name: Not on file   • Number of children: Not on file   • Years of education: Not on file   • Highest education level: Not on file   Tobacco Use   • Smoking status: Never Smoker   • Smokeless tobacco: Never Used   Substance and Sexual Activity   • Alcohol use: Yes     Comment: 2 glasses of wine daily   • Drug use: No   • Sexual activity: Defer           Objective   Physical Exam   Constitutional: She appears well-developed and well-nourished.   Genitourinary:         Nursing note and vitals reviewed.  GEN: No acute distress  Head: Normocephalic, atraumatic  Eyes: Pupils equal round reactive to light  ENT: Posterior pharynx normal in appearance, oral mucosa is moist  Chest: Nontender to palpation  Cardiovascular: Regular rate  Lungs: Clear to auscultation bilaterally  Abdomen: Soft, nontender, nondistended, no peritoneal signs  Extremities: No edema, normal appearance  Neuro: GCS 15  Psych: Mood and affect are appropriate      Procedures           ED Course                  MDM  Number of Diagnoses or Management Options  Diagnosis management comments: We will start her on antibiotics today and refer her to GYN for further evaluation.       Amount and/or Complexity of Data Reviewed  Review and summarize past medical records: yes  Discuss the patient with other providers: yes    Risk of Complications, Morbidity, and/or Mortality  Presenting problems: low  Diagnostic procedures: minimal  Management options: low          Final diagnoses:   Vulvar abscess            Margaret Gomez, APRN  03/18/19 [id number]

## 2019-03-18 NOTE — TELEPHONE ENCOUNTER
Patient called asking if Dr Abreu could see her for an abscess @ vulva.Per Dr Abreu she should see her OB GYN for the problem he did not treat that area.

## 2019-03-19 ENCOUNTER — OFFICE VISIT (OUTPATIENT)
Dept: OBSTETRICS AND GYNECOLOGY | Facility: CLINIC | Age: 54
End: 2019-03-19

## 2019-03-19 VITALS
HEIGHT: 63 IN | WEIGHT: 160 LBS | BODY MASS INDEX: 28.35 KG/M2 | DIASTOLIC BLOOD PRESSURE: 70 MMHG | SYSTOLIC BLOOD PRESSURE: 124 MMHG

## 2019-03-19 DIAGNOSIS — N90.89 CLITORAL IRRITATION: Primary | ICD-10-CM

## 2019-03-19 PROCEDURE — 99203 OFFICE O/P NEW LOW 30 MIN: CPT | Performed by: OBSTETRICS & GYNECOLOGY

## 2019-03-19 NOTE — PROGRESS NOTES
Subjective   Chief Complaint   Patient presents with   • Vulvar Cyst     Pt was seen in  ER for Vaginal Cyst and was referred here. Pt was started on Clindamycin yesterday.     Gaby Rasheed is a 53 y.o. year old .  No LMP recorded. Patient has had a hysterectomy.  She presents to be seen because of recurrent right sided vuvlar/labial bump--states has had what sounds like I&D x 2 in the past. This current episode lasting over one year    OTHER COMPLAINTS:  Nothing else    The following portions of the patient's history were reviewed and updated as appropriate:  She  has a past medical history of Acid reflux, Back pain, Depression, Fractures, High cholesterol, Hypertension, Nocturia (10/5/2016), Positive TB test, Sinus problem, and ELINA (stress urinary incontinence, female) (10/5/2016).  She does not have any pertinent problems on file.  She  has a past surgical history that includes  section; Laparoscopic tubal ligation; Hernia repair; Gastric bypass; Hysterectomy; Colonoscopy (); and Total abdominal hysterectomy w/ bilateral salpingoophorectomy.  Her family history includes Arthritis in her mother and paternal grandmother; Diabetes in her father; Hyperlipidemia in her mother; Hypertension in her father.  She  reports that  has never smoked. she has never used smokeless tobacco. She reports that she drinks alcohol. She reports that she does not use drugs.  Current Outpatient Medications   Medication Sig Dispense Refill   • buPROPion (WELLBUTRIN) 75 MG tablet take 1 tablet by mouth twice a day 60 tablet 1   • carvedilol (COREG) 12.5 MG tablet take 1 tablet by mouth twice a day with meals 180 tablet 3   • chlorthalidone (HYGROTON) 25 MG tablet take 1 tablet by mouth once daily 90 tablet 3   • citalopram (CeleXA) 40 MG tablet take 1 tablet by mouth once daily 90 tablet 3   • clindamycin (CLEOCIN) 300 MG capsule Take 1 capsule by mouth 3 (Three) Times a Day. 30 capsule 0   • CloNIDine  (CATAPRES) 0.1 MG tablet take 1 tablet by mouth at bedtime if needed for high blood pressure 30 tablet 11   • levothyroxine (SYNTHROID, LEVOTHROID) 50 MCG tablet take 1 tablet by mouth once daily 90 tablet 3   • nitrofurantoin, macrocrystal-monohydrate, (MACROBID) 100 MG capsule Take 1 capsule by mouth 2 (Two) Times a Day. 14 capsule 0     No current facility-administered medications for this visit.      Current Outpatient Medications on File Prior to Visit   Medication Sig   • buPROPion (WELLBUTRIN) 75 MG tablet take 1 tablet by mouth twice a day   • carvedilol (COREG) 12.5 MG tablet take 1 tablet by mouth twice a day with meals   • chlorthalidone (HYGROTON) 25 MG tablet take 1 tablet by mouth once daily   • citalopram (CeleXA) 40 MG tablet take 1 tablet by mouth once daily   • clindamycin (CLEOCIN) 300 MG capsule Take 1 capsule by mouth 3 (Three) Times a Day.   • CloNIDine (CATAPRES) 0.1 MG tablet take 1 tablet by mouth at bedtime if needed for high blood pressure   • levothyroxine (SYNTHROID, LEVOTHROID) 50 MCG tablet take 1 tablet by mouth once daily   • nitrofurantoin, macrocrystal-monohydrate, (MACROBID) 100 MG capsule Take 1 capsule by mouth 2 (Two) Times a Day.     No current facility-administered medications on file prior to visit.      She has No Known Allergies.    Social History    Tobacco Use      Smoking status: Never Smoker      Smokeless tobacco: Never Used    Review of Systems  Consitutional POS: nothing reported    NEG: anorexia or night sweats   Gastointestinal POS: nothing reported    NEG: bloating, change in bowel habits, melena or reflux symptoms   Genitourinary POS: nothing reported    NEG: dysuria or hematuria   Integument POS: nothing reported    NEG: moles that are changing in size, shape, color or rashes   Breast POS: nothing reported    NEG: persistent breast lump, skin dimpling or nipple discharge         Respiratory: negative  Cardiovascular: negative          Objective   /70    "Ht 160 cm (63\")   Wt 72.6 kg (160 lb)   BMI 28.34 kg/m²     General:  well developed; well nourished  no acute distress   Skin:  No suspicious lesions seen   Thyroid: not examined   Lungs:  breathing is unlabored   Heart:  Not performed.   Breasts:  Not performed.   Abdomen: soft, non-tender; no masses  no umbilical or inguinal hernias are present  no hepato-splenomegaly   Pelvis: Clinical staff was present for exam  External genitalia:  normal appearance of the external genitalia including Bartholin's and Oval's glands. Clitoral hypertrophy and enlargement with drainage of purulent material-minimal.  No lymphadenopathy.  No overt skin changes.  No significant fluctuance or erythema     Psychiatric: Alert and oriented ×3, mood and affect appropriate  HEENT: Atraumatic, normocephalic, normal scleral icterus  Extremities: 2+ pulses bilaterally, no edema      Lab Review   No data reviewed    Imaging   No data reviewed        Assessment   1. Recurrent clitoral uribe infection/boil     Plan   1. Hot compresses twice daily to 3 times daily.  Continue clindamycin 300 mg 3 times daily times 10 days.  Lidocaine 3% cream called in for as needed pain.  Follow-up in 2 weeks.  2.     No orders of the defined types were placed in this encounter.         This note was electronically signed.      March 19, 2019      "

## 2019-05-27 DIAGNOSIS — F32.5 MAJOR DEPRESSIVE DISORDER WITH SINGLE EPISODE, IN FULL REMISSION (HCC): ICD-10-CM

## 2019-05-28 RX ORDER — BUPROPION HYDROCHLORIDE 75 MG/1
TABLET ORAL
Qty: 60 TABLET | Refills: 1 | Status: SHIPPED | OUTPATIENT
Start: 2019-05-28 | End: 2019-07-19

## 2019-06-07 ENCOUNTER — TELEPHONE (OUTPATIENT)
Dept: INTERNAL MEDICINE | Facility: CLINIC | Age: 54
End: 2019-06-07

## 2019-06-07 NOTE — TELEPHONE ENCOUNTER
Patient called asking to change her pcp from dr stinson to dr verma. She states she would prefer female physician. She is aware dr stinson is out until mid next week.

## 2019-07-19 ENCOUNTER — OFFICE VISIT (OUTPATIENT)
Dept: INTERNAL MEDICINE | Facility: CLINIC | Age: 54
End: 2019-07-19

## 2019-07-19 VITALS
DIASTOLIC BLOOD PRESSURE: 58 MMHG | OXYGEN SATURATION: 97 % | WEIGHT: 170.38 LBS | BODY MASS INDEX: 30.19 KG/M2 | SYSTOLIC BLOOD PRESSURE: 100 MMHG | HEART RATE: 67 BPM | RESPIRATION RATE: 16 BRPM | TEMPERATURE: 97.3 F | HEIGHT: 63 IN

## 2019-07-19 DIAGNOSIS — R17 ELEVATED BILIRUBIN: ICD-10-CM

## 2019-07-19 DIAGNOSIS — E03.8 SUBCLINICAL HYPOTHYROIDISM: ICD-10-CM

## 2019-07-19 DIAGNOSIS — G47.26 SHIFT WORK SLEEP DISORDER: ICD-10-CM

## 2019-07-19 DIAGNOSIS — L65.9 HAIR LOSS: ICD-10-CM

## 2019-07-19 DIAGNOSIS — R74.01 TRANSAMINITIS: ICD-10-CM

## 2019-07-19 DIAGNOSIS — L21.9 SEBORRHEIC DERMATITIS: ICD-10-CM

## 2019-07-19 DIAGNOSIS — F41.9 ANXIETY: ICD-10-CM

## 2019-07-19 DIAGNOSIS — Z98.84 HISTORY OF ROUX-EN-Y GASTRIC BYPASS: ICD-10-CM

## 2019-07-19 DIAGNOSIS — Z12.39 BREAST CANCER SCREENING: ICD-10-CM

## 2019-07-19 DIAGNOSIS — K13.0 ANGULAR CHEILITIS: ICD-10-CM

## 2019-07-19 DIAGNOSIS — F33.1 MODERATE EPISODE OF RECURRENT MAJOR DEPRESSIVE DISORDER (HCC): Primary | ICD-10-CM

## 2019-07-19 DIAGNOSIS — I10 ESSENTIAL HYPERTENSION: ICD-10-CM

## 2019-07-19 DIAGNOSIS — N39.3 STRESS INCONTINENCE: ICD-10-CM

## 2019-07-19 DIAGNOSIS — R53.82 CHRONIC FATIGUE: ICD-10-CM

## 2019-07-19 DIAGNOSIS — Z13.220 SCREENING, LIPID: ICD-10-CM

## 2019-07-19 PROCEDURE — 99215 OFFICE O/P EST HI 40 MIN: CPT | Performed by: FAMILY MEDICINE

## 2019-07-19 RX ORDER — FLUOXETINE HYDROCHLORIDE 40 MG/1
40 CAPSULE ORAL DAILY
Qty: 90 CAPSULE | Refills: 3 | Status: SHIPPED | OUTPATIENT
Start: 2019-07-19 | End: 2019-12-16 | Stop reason: DRUGHIGH

## 2019-07-19 RX ORDER — SOLIFENACIN SUCCINATE 5 MG/1
5 TABLET, FILM COATED ORAL DAILY
Qty: 90 TABLET | Refills: 3 | Status: SHIPPED | OUTPATIENT
Start: 2019-07-19 | End: 2019-11-04 | Stop reason: CLARIF

## 2019-07-19 RX ORDER — LORAZEPAM 0.5 MG/1
0.5 TABLET ORAL DAILY PRN
Qty: 30 TABLET | Refills: 0 | Status: SHIPPED | OUTPATIENT
Start: 2019-07-19 | End: 2019-12-16 | Stop reason: SDUPTHER

## 2019-07-19 RX ORDER — BIOTIN 10 MG
TABLET ORAL
COMMUNITY
End: 2020-06-01

## 2019-07-19 RX ORDER — ZOLPIDEM TARTRATE 5 MG/1
5 TABLET ORAL NIGHTLY PRN
Qty: 30 TABLET | Refills: 2 | Status: SHIPPED | OUTPATIENT
Start: 2019-07-19 | End: 2019-10-21 | Stop reason: SDUPTHER

## 2019-07-19 NOTE — PROGRESS NOTES
Subjective    Gaby Rasheed is a 54 y.o. female here for:    Chief Complaint   Patient presents with   • Facial Swelling     she feels like this is due to stress from work but would like to discuss        Insomnia   This is a recurrent problem. The current episode started more than 1 year ago. The problem occurs every several days. The problem has been waxing and waning. Associated symptoms include fatigue. Pertinent negatives include no abdominal pain. Treatments tried: melatonin, trazodone (nightmares), Remeron; ambien worked well.   Hypothyroidism   This is a chronic problem. The problem occurs daily. Associated symptoms include fatigue. Pertinent negatives include no abdominal pain. Exacerbated by: biotin--may affect labs. Treatments tried: levothyroxine. The treatment provided significant relief.   Hypertension   This is a chronic problem. The current episode started more than 1 year ago. The problem is controlled. Associated symptoms include anxiety and malaise/fatigue. Agents associated with hypertension include thyroid hormones and NSAIDs. Risk factors for coronary artery disease include stress and obesity. Current antihypertension treatment includes beta blockers. The current treatment provides significant improvement. There is no history of CAD/MI or CVA. Identifiable causes of hypertension include a thyroid problem.      Face breaking out. No changes to make up. She's fine when she's off work but it worsens with returning to work, stress. Face is dry. There's cracks at corners of lips. She uses abreva and it does not help. Uses vaseline with aloe and gold bond eczema, etc, face stays dry. Going to see a dermatologist. Waxing and waning x 8 months. Saw dermatologist and was given mometasone (within last two years, for breaking out on lips). Plans to return to [employer].    Also has spots on arm, hits something very tenderly and she will get red spots. She takes Celexa and takes aleve often due to back  "pain.     She was put on citalopram, Wellbutrin immediate release bid by Dr. Perez. Another doctor had her on those and Lamictal. She is not sure current medicines are working well for her. Patient used ativan in the past, she still has some issues with panic at times. She's taken Zoloft, Remeron (nightmares). She's not tried Prozac, Paxil.     Urinary incontinence, stress and urge, recurrent issue not controlled. Would like to try vesicare.    The following portions of the patient's history were reviewed and updated as appropriate: allergies, current medications, past family history, past medical history, past social history, past surgical history and problem list.    Health Maintenance   Topic Date Due   • ANNUAL PHYSICAL  05/07/1967   • TDAP/TD VACCINES (1 - Tdap) 05/07/1983   • MAMMOGRAM  08/01/2019 (Originally 11/18/2018)   • LIPID PANEL  08/01/2019 (Originally 12/16/2017)   • ZOSTER VACCINE (2 of 3) 07/27/2020 (Originally 7/12/2017)   • INFLUENZA VACCINE  08/01/2019   • PAP SMEAR  10/12/2019   • COLONOSCOPY  07/14/2027   • HEPATITIS C SCREENING  Completed       Review of Systems   Constitutional: Positive for fatigue and malaise/fatigue.   Gastrointestinal: Negative for abdominal pain, anal bleeding and constipation.        No melena   Musculoskeletal: Positive for back pain.   Psychiatric/Behavioral: Positive for sleep disturbance and stress. The patient is nervous/anxious and has insomnia.        Vitals:    07/19/19 0842   BP: 100/58   Pulse: 67   Resp: 16   Temp: 97.3 °F (36.3 °C)   TempSrc: Temporal   SpO2: 97%   Weight: 77.3 kg (170 lb 6 oz)   Height: 160 cm (62.99\")         Objective   Physical Exam   Constitutional: She is oriented to person, place, and time. Vital signs are normal. She appears well-developed and well-nourished. She is active.  Non-toxic appearance. She does not have a sickly appearance. She does not appear ill. No distress.   Looks tired She is obese (mildly).  HENT:   Head: " Normocephalic and atraumatic.       Right Ear: Hearing, tympanic membrane, external ear and ear canal normal. cerumen impaction is not present.  Left Ear: Hearing, tympanic membrane, external ear and ear canal normal. An impacted cerumen is not present.  Nose: Nose normal.   Mouth/Throat: Mucous membranes are not dry. No trismus in the jaw.       Eyes: Conjunctivae, EOM and lids are normal. Pupils are equal, round, and reactive to light. No scleral icterus.   Neck: Phonation normal. Neck supple. No tracheal deviation present.   Cardiovascular: Normal rate, regular rhythm and normal heart sounds.   No murmur heard.  Pulmonary/Chest: Effort normal and breath sounds normal.   Abdominal: Soft. Bowel sounds are normal. She exhibits no distension, no ascites and no mass. There is no tenderness.   Musculoskeletal: She exhibits no edema or deformity.   Neurological: She is alert and oriented to person, place, and time. She displays no tremor. No cranial nerve deficit. She exhibits normal muscle tone. Gait normal.   Skin: Skin is warm. Turgor is normal. Ecchymosis (right arm) noted. No rash noted. She is not diaphoretic. No cyanosis. No pallor. Nails show no clubbing.   Psychiatric: She has a normal mood and affect. Her speech is normal and behavior is normal. Judgment and thought content normal. Cognition and memory are normal.   Nursing note and vitals reviewed.      Lab Results   Component Value Date    TSH 0.795 01/05/2018     Lab Results   Component Value Date    FREET4 1.33 01/05/2018     Reviewed previous imaging studies, fatty liver noted multiple occasions.  Reviewed last GI note (jeffrey)    Assessment/Plan     Problem List Items Addressed This Visit        Cardiovascular and Mediastinum    Essential hypertension    Relevant Orders    CBC & Differential (Completed)       Genitourinary    Stress incontinence    Overview     Added automatically from request for surgery [id number]         Relevant Medications     solifenacin (VESICARE) 5 MG tablet       Other    Moderate episode of recurrent major depressive disorder (CMS/HCC) - Primary    Relevant Medications    FLUoxetine (PROZAC) 40 MG capsule    LORazepam (ATIVAN) 0.5 MG tablet    zolpidem (AMBIEN) 5 MG tablet    Chronic fatigue    Relevant Orders    Vitamin D 25 Hydroxy (Completed)    Vitamin B12 (Completed)    Folate (Completed)    CBC & Differential (Completed)    Comprehensive Metabolic Panel (Completed)    Ferritin (Completed)    History of Stan-en-Y gastric bypass    Relevant Orders    Iron Profile (Completed)    Vitamin D 25 Hydroxy (Completed)    Vitamin B12 (Completed)    Anxiety    Relevant Medications    FLUoxetine (PROZAC) 40 MG capsule    LORazepam (ATIVAN) 0.5 MG tablet    Other Relevant Orders    TSH+Free T4 (Completed)    Shift work sleep disorder    Relevant Medications    zolpidem (AMBIEN) 5 MG tablet      Other Visit Diagnoses     Transaminitis        Relevant Orders    CBC & Differential (Completed)    Comprehensive Metabolic Panel (Completed)    Gamma GT (Completed)    Bilirubin, Total & Direct (Completed)    CB (Completed)    Anti-microsomal Antibody (Completed)    Anti-Smooth Muscle Antibody Titer (Completed)    Ferritin (Completed)    Elevated bilirubin        Relevant Orders    Comprehensive Metabolic Panel (Completed)    Gamma GT (Completed)    Bilirubin, Total & Direct (Completed)    CB (Completed)    Anti-microsomal Antibody (Completed)    Anti-Smooth Muscle Antibody Titer (Completed)    Ferritin (Completed)    Protime-INR (Completed)    Angular cheilitis        Relevant Medications    Clotrimazole 1 % ointment    Seborrheic dermatitis        Relevant Medications    Clotrimazole 1 % ointment    Hair loss        Relevant Medications    Biotin 10 MG tablet    Multiple Vitamins-Minerals (MULTIVITAMIN ADULT PO)    Other Relevant Orders    Iron Profile (Completed)    TSH+Free T4 (Completed)    CBC & Differential (Completed)    Ferritin  (Completed)    Subclinical hypothyroidism        Relevant Orders    TSH+Free T4 (Completed)    CBC & Differential (Completed)    Comprehensive Metabolic Panel (Completed)    Breast cancer screening        Relevant Orders    Mammo Screening Digital Tomosynthesis Bilateral With CAD    Screening, lipid        Relevant Orders    Lipid Panel (Completed)          · Use benzo sparingly, ideal to have as changing SSRI therapy.     Return in about 4 weeks (around 8/16/2019) for Follow up on current issues. or sooner if needed.    Arabella Loving MD

## 2019-07-19 NOTE — PATIENT INSTRUCTIONS
· Thank you for coming in today and it was a pleasure to help you. Please feel free to contact us if you have any questions or concerns. I also ask that you complete any surveys regarding today's visit. We strive to provide the highest quality care and patient input is vital for this task. Did we do something well? Was there something we could have done better? Please let us know!    Dr. Loving

## 2019-07-22 LAB
25(OH)D3+25(OH)D2 SERPL-MCNC: 36 NG/ML
ACTIN IGG SERPL-ACNC: 4 UNITS (ref 0–19)
ALBUMIN SERPL-MCNC: 3.3 G/DL (ref 3.5–5)
ALBUMIN/GLOB SERPL: 1.1 G/DL (ref 1–2)
ALP SERPL-CCNC: 130 U/L (ref 38–126)
ALT SERPL-CCNC: 41 U/L (ref 13–69)
ANA SER QL: NEGATIVE
AST SERPL-CCNC: 70 U/L (ref 15–46)
BASOPHILS # BLD AUTO: 0.06 10*3/MM3 (ref 0–0.2)
BASOPHILS NFR BLD AUTO: 0.8 % (ref 0–1.5)
BILIRUB DIRECT SERPL-MCNC: 0.3 MG/DL (ref 0–0.4)
BILIRUB INDIRECT SERPL-MCNC: 1.2 MG/DL
BILIRUB SERPL-MCNC: 1.5 MG/DL (ref 0.2–1.3)
BUN SERPL-MCNC: 9 MG/DL (ref 7–20)
BUN/CREAT SERPL: 15 (ref 7.1–23.5)
CALCIUM SERPL-MCNC: 8.5 MG/DL (ref 8.4–10.2)
CHLORIDE SERPL-SCNC: 84 MMOL/L (ref 98–107)
CHOLEST SERPL-MCNC: 184 MG/DL (ref 0–199)
CO2 SERPL-SCNC: 32 MMOL/L (ref 26–30)
CREAT SERPL-MCNC: 0.6 MG/DL (ref 0.6–1.3)
EOSINOPHIL # BLD AUTO: 0.21 10*3/MM3 (ref 0–0.4)
EOSINOPHIL NFR BLD AUTO: 2.7 % (ref 0.3–6.2)
ERYTHROCYTE [DISTWIDTH] IN BLOOD BY AUTOMATED COUNT: 13.8 % (ref 12.3–15.4)
FERRITIN SERPL-MCNC: 40.4 NG/ML (ref 11.1–264)
FOLATE SERPL-MCNC: 7.37 NG/ML
GGT SERPL-CCNC: 224 U/L (ref 12–58)
GLOBULIN SER CALC-MCNC: 3 GM/DL
GLUCOSE SERPL-MCNC: 89 MG/DL (ref 74–98)
HCT VFR BLD AUTO: 34.6 % (ref 34–46.6)
HDLC SERPL-MCNC: 48 MG/DL (ref 40–60)
HGB BLD-MCNC: 11.3 G/DL (ref 12–15.9)
IMM GRANULOCYTES # BLD AUTO: 0.04 10*3/MM3 (ref 0–0.05)
IMM GRANULOCYTES NFR BLD AUTO: 0.5 % (ref 0–0.5)
INR PPP: 1.07 (ref 0.9–1.1)
IRON SATN MFR SERPL: 26 % (ref 11–46)
IRON SERPL-MCNC: 89 MCG/DL (ref 37–181)
LDLC SERPL CALC-MCNC: 108 MG/DL (ref 0–99)
LKM-1 AB SER-ACNC: <1 UNITS (ref 0–20)
LYMPHOCYTES # BLD AUTO: 1.93 10*3/MM3 (ref 0.7–3.1)
LYMPHOCYTES NFR BLD AUTO: 25 % (ref 19.6–45.3)
MCH RBC QN AUTO: 32.9 PG (ref 26.6–33)
MCHC RBC AUTO-ENTMCNC: 32.7 G/DL (ref 31.5–35.7)
MCV RBC AUTO: 100.9 FL (ref 79–97)
MONOCYTES # BLD AUTO: 0.63 10*3/MM3 (ref 0.1–0.9)
MONOCYTES NFR BLD AUTO: 8.2 % (ref 5–12)
NEUTROPHILS # BLD AUTO: 4.86 10*3/MM3 (ref 1.7–7)
NEUTROPHILS NFR BLD AUTO: 62.8 % (ref 42.7–76)
NRBC BLD AUTO-RTO: 0 /100 WBC (ref 0–0.2)
PLATELET # BLD AUTO: 146 10*3/MM3 (ref 140–450)
POTASSIUM SERPL-SCNC: 2.8 MMOL/L (ref 3.5–5.1)
PROT SERPL-MCNC: 6.3 G/DL (ref 6.3–8.2)
PROTHROMBIN TIME: 14.3 SECONDS (ref 12–15.1)
RBC # BLD AUTO: 3.43 10*6/MM3 (ref 3.77–5.28)
SODIUM SERPL-SCNC: 128 MMOL/L (ref 137–145)
T4 FREE SERPL-MCNC: 1.46 NG/DL (ref 0.78–2.19)
TIBC SERPL-MCNC: 349 MCG/DL (ref 261–497)
TRIGL SERPL-MCNC: 140 MG/DL
TSH SERPL DL<=0.005 MIU/L-ACNC: 1.9 MIU/ML (ref 0.47–4.68)
UIBC SERPL-MCNC: 260 MCG/DL (ref 112–346)
VIT B12 SERPL-MCNC: 450 PG/ML (ref 239–931)
VLDLC SERPL CALC-MCNC: 28 MG/DL
WBC # BLD AUTO: 7.73 10*3/MM3 (ref 3.4–10.8)

## 2019-07-29 ENCOUNTER — TELEPHONE (OUTPATIENT)
Dept: INTERNAL MEDICINE | Facility: CLINIC | Age: 54
End: 2019-07-29

## 2019-07-29 DIAGNOSIS — N39.3 STRESS INCONTINENCE: Primary | ICD-10-CM

## 2019-07-29 RX ORDER — OXYBUTYNIN CHLORIDE 5 MG/1
5 TABLET, EXTENDED RELEASE ORAL DAILY
Qty: 90 TABLET | Refills: 3 | Status: SHIPPED | OUTPATIENT
Start: 2019-07-29 | End: 2019-11-04 | Stop reason: CLARIF

## 2019-08-29 DIAGNOSIS — I10 ESSENTIAL HYPERTENSION: ICD-10-CM

## 2019-08-29 RX ORDER — CHLORTHALIDONE 25 MG/1
25 TABLET ORAL DAILY
Qty: 90 TABLET | Refills: 3 | OUTPATIENT
Start: 2019-08-29

## 2019-09-04 ENCOUNTER — HOSPITAL ENCOUNTER (OUTPATIENT)
Dept: MAMMOGRAPHY | Facility: HOSPITAL | Age: 54
Discharge: HOME | End: 2019-09-04
Admitting: FAMILY MEDICINE

## 2019-09-04 PROCEDURE — 77063 BREAST TOMOSYNTHESIS BI: CPT | Performed by: RADIOLOGY

## 2019-09-04 PROCEDURE — 77067 SCR MAMMO BI INCL CAD: CPT

## 2019-09-04 PROCEDURE — 77067 SCR MAMMO BI INCL CAD: CPT | Performed by: RADIOLOGY

## 2019-09-04 PROCEDURE — 77063 BREAST TOMOSYNTHESIS BI: CPT

## 2019-09-06 ENCOUNTER — TRANSCRIBE ORDERS (OUTPATIENT)
Dept: GENERAL RADIOLOGY | Facility: HOSPITAL | Age: 54
End: 2019-09-06

## 2019-09-06 ENCOUNTER — HOSPITAL ENCOUNTER (OUTPATIENT)
Dept: GENERAL RADIOLOGY | Facility: HOSPITAL | Age: 54
Discharge: HOME | End: 2019-09-06
Admitting: ORTHOPAEDIC SURGERY

## 2019-09-06 DIAGNOSIS — M79.672 LEFT FOOT PAIN: ICD-10-CM

## 2019-09-06 DIAGNOSIS — M79.672 LEFT FOOT PAIN: Primary | ICD-10-CM

## 2019-09-06 PROCEDURE — 73630 X-RAY EXAM OF FOOT: CPT

## 2019-10-21 DIAGNOSIS — G47.26 SHIFT WORK SLEEP DISORDER: ICD-10-CM

## 2019-10-21 RX ORDER — ZOLPIDEM TARTRATE 5 MG/1
TABLET ORAL
Qty: 30 TABLET | Refills: 0 | Status: SHIPPED | OUTPATIENT
Start: 2019-10-21 | End: 2019-12-16

## 2019-11-04 DIAGNOSIS — N39.3 STRESS INCONTINENCE: Primary | ICD-10-CM

## 2019-11-04 RX ORDER — FESOTERODINE FUMARATE 4 MG/1
4 TABLET, EXTENDED RELEASE ORAL
Qty: 90 TABLET | Refills: 3 | Status: SHIPPED | OUTPATIENT
Start: 2019-11-04 | End: 2019-12-16

## 2019-12-02 ENCOUNTER — TELEPHONE (OUTPATIENT)
Dept: INTERNAL MEDICINE | Facility: CLINIC | Age: 54
End: 2019-12-02

## 2019-12-02 NOTE — TELEPHONE ENCOUNTER
Patient states she needs an appt to come in - per Dr. Loving to discuss and refill her medications.    Works 12 hours shifts.  The following are the days she is available coming up.  (Epic did not offer any of these dates for an office visit)    12/6/2019 12/12/2019 12/16/2019 12/17/2019 12/18/2019    Pt hoping that Dr. Loving will approve a time for her to come on one of these dates.      Please call pt. [phone]

## 2019-12-16 ENCOUNTER — OFFICE VISIT (OUTPATIENT)
Dept: INTERNAL MEDICINE | Facility: CLINIC | Age: 54
End: 2019-12-16

## 2019-12-16 VITALS
WEIGHT: 161.5 LBS | HEIGHT: 63 IN | OXYGEN SATURATION: 97 % | BODY MASS INDEX: 28.62 KG/M2 | SYSTOLIC BLOOD PRESSURE: 122 MMHG | TEMPERATURE: 98.1 F | HEART RATE: 56 BPM | RESPIRATION RATE: 18 BRPM | DIASTOLIC BLOOD PRESSURE: 70 MMHG

## 2019-12-16 DIAGNOSIS — E03.8 SUBCLINICAL HYPOTHYROIDISM: ICD-10-CM

## 2019-12-16 DIAGNOSIS — G47.26 SHIFT WORK SLEEP DISORDER: Primary | ICD-10-CM

## 2019-12-16 DIAGNOSIS — Z23 NEED FOR INFLUENZA VACCINATION: ICD-10-CM

## 2019-12-16 DIAGNOSIS — R53.82 CHRONIC FATIGUE: ICD-10-CM

## 2019-12-16 DIAGNOSIS — I10 ESSENTIAL HYPERTENSION: ICD-10-CM

## 2019-12-16 DIAGNOSIS — F33.1 MODERATE EPISODE OF RECURRENT MAJOR DEPRESSIVE DISORDER (HCC): ICD-10-CM

## 2019-12-16 DIAGNOSIS — F41.9 ANXIETY: ICD-10-CM

## 2019-12-16 PROCEDURE — 99214 OFFICE O/P EST MOD 30 MIN: CPT | Performed by: FAMILY MEDICINE

## 2019-12-16 PROCEDURE — 90471 IMMUNIZATION ADMIN: CPT | Performed by: FAMILY MEDICINE

## 2019-12-16 PROCEDURE — 90686 IIV4 VACC NO PRSV 0.5 ML IM: CPT | Performed by: FAMILY MEDICINE

## 2019-12-16 RX ORDER — CARVEDILOL 12.5 MG/1
12.5 TABLET ORAL 2 TIMES DAILY WITH MEALS
Qty: 180 TABLET | Refills: 3 | Status: SHIPPED | OUTPATIENT
Start: 2019-12-16 | End: 2020-01-01

## 2019-12-16 RX ORDER — LEVOTHYROXINE SODIUM 0.05 MG/1
50 TABLET ORAL DAILY
Qty: 90 TABLET | Refills: 3 | Status: SHIPPED | OUTPATIENT
Start: 2019-12-16 | End: 2020-06-01 | Stop reason: SDUPTHER

## 2019-12-16 RX ORDER — ZOLPIDEM TARTRATE 6.25 MG/1
6.25 TABLET, FILM COATED, EXTENDED RELEASE ORAL NIGHTLY PRN
Qty: 30 TABLET | Refills: 2 | Status: SHIPPED | OUTPATIENT
Start: 2019-12-16 | End: 2019-12-19 | Stop reason: CLARIF

## 2019-12-16 RX ORDER — CHLORTHALIDONE 25 MG/1
25 TABLET ORAL DAILY
Qty: 90 TABLET | Refills: 3 | Status: SHIPPED | OUTPATIENT
Start: 2019-12-16 | End: 2020-07-17 | Stop reason: HOSPADM

## 2019-12-16 RX ORDER — FLUOXETINE HYDROCHLORIDE 20 MG/1
60 CAPSULE ORAL DAILY
Qty: 270 CAPSULE | Refills: 3 | Status: SHIPPED | OUTPATIENT
Start: 2019-12-16 | End: 2020-06-18

## 2019-12-16 RX ORDER — LORAZEPAM 0.5 MG/1
0.5 TABLET ORAL DAILY PRN
Qty: 30 TABLET | Refills: 0 | Status: SHIPPED | OUTPATIENT
Start: 2019-12-16 | End: 2020-06-01 | Stop reason: SDUPTHER

## 2019-12-16 NOTE — PROGRESS NOTES
Subjective    Gaby Rasheed is a 54 y.o. female here for:    Chief Complaint   Patient presents with   • Depression     Would like her dosage of Prozac increased. Prozac in combination with the Ativan helps.    • Immunizations     Would like the Flu and shingles vaccines today if her insurance will cover them.    • Insomnia     Ambien 5mg only lets her sleep about 3 hours. Would like to go back to 10mg.        Depression: chronic daily issue x years. On Prozac 40 mg and feels more down, asks if she can go up further. No SI.     Insomnia   This is a chronic problem. The current episode started more than 1 year ago. The problem occurs every several days. The problem has been waxing and waning. Associated symptoms include fatigue. The symptoms are aggravated by stress. Treatments tried: melatonin, trazodone (nightmares), Remeron; ambien worked well. Improvement on treatment: ambien helps her fall asleep but she's not staying asleep.   Hypothyroidism   This is a chronic problem. The problem occurs daily. Associated symptoms include fatigue. Exacerbated by: biotin--may affect labs. Treatments tried: levothyroxine. The treatment provided significant relief.   Hypertension   This is a chronic problem. The current episode started more than 1 year ago. The problem is controlled. Associated symptoms include anxiety and malaise/fatigue. Agents associated with hypertension include thyroid hormones and NSAIDs. Risk factors for coronary artery disease include stress and obesity. Current antihypertension treatment includes beta blockers and diuretics. The current treatment provides significant improvement. There is no history of CAD/MI or CVA. Identifiable causes of hypertension include a thyroid problem.        Continues to work swing shift. Previously used Ambien 10 mg and it worked better than 5 mg. Has trouble staying asleep. Failed clonidine qhs.     The following portions of the patient's history were reviewed and  "updated as appropriate: allergies, current medications, past family history, past medical history, past social history, past surgical history and problem list.    Review of Systems   Constitutional: Positive for fatigue and malaise/fatigue.   Gastrointestinal:        Bowel control issues, scheduled to see Dr. Abreu   Psychiatric/Behavioral: Positive for sleep disturbance and stress. The patient has insomnia.        Visit Vitals  /70   Pulse 56   Temp 98.1 °F (36.7 °C) (Temporal)   Resp 18   Ht 160 cm (62.99\")   Wt 73.3 kg (161 lb 8 oz)   SpO2 97%   BMI 28.62 kg/m²         Objective   Physical Exam   Constitutional: She is oriented to person, place, and time. Vital signs are normal. She appears well-developed and well-nourished. She is active.  Non-toxic appearance. She does not have a sickly appearance. She does not appear ill. No distress.   Looks tired. She appears overweight.   HENT:   Head: Normocephalic and atraumatic. Hair is normal.   Right Ear: Hearing and external ear normal.   Left Ear: Hearing and external ear normal.   Nose: Nose normal.   Mouth/Throat: Mucous membranes are not dry. No trismus in the jaw.   Eyes: Pupils are equal, round, and reactive to light. Conjunctivae, EOM and lids are normal. No scleral icterus.   Neck: Phonation normal. Neck supple. No tracheal deviation present.   Cardiovascular: Normal rate, regular rhythm and normal heart sounds.   No murmur heard.  Pulmonary/Chest: Effort normal and breath sounds normal.   Musculoskeletal: She exhibits no edema or deformity.   Neurological: She is alert and oriented to person, place, and time. She displays no tremor. No cranial nerve deficit. She exhibits normal muscle tone. Gait normal.   Skin: Skin is warm. Turgor is normal. No rash noted. She is not diaphoretic. No cyanosis. No pallor. Nails show no clubbing.   Psychiatric: She has a normal mood and affect. Her speech is normal and behavior is normal. Judgment and thought content " normal. Cognition and memory are normal.   Nursing note and vitals reviewed.        Assessment/Plan     Problem List Items Addressed This Visit        Cardiovascular and Mediastinum    Essential hypertension    Relevant Medications    chlorthalidone (HYGROTON) 25 MG tablet    carvedilol (COREG) 12.5 MG tablet       Other    Moderate episode of recurrent major depressive disorder (CMS/HCC)    Relevant Medications    zolpidem CR (AMBIEN CR) 6.25 MG CR tablet    FLUoxetine (PROZAC) 20 MG capsule    LORazepam (ATIVAN) 0.5 MG tablet    Chronic fatigue    Relevant Medications    levothyroxine (SYNTHROID, LEVOTHROID) 50 MCG tablet    Anxiety    Relevant Medications    FLUoxetine (PROZAC) 20 MG capsule    LORazepam (ATIVAN) 0.5 MG tablet    Shift work sleep disorder - Primary    Relevant Medications    zolpidem CR (AMBIEN CR) 6.25 MG CR tablet      Other Visit Diagnoses     Need for influenza vaccination        Relevant Orders    Fluarix/Fluzone/Afluria Quad/FluLaval Quad (Completed)    Subclinical hypothyroidism        Relevant Medications    levothyroxine (SYNTHROID, LEVOTHROID) 50 MCG tablet    carvedilol (COREG) 12.5 MG tablet          · Shingrix not available here, must get from pharmacy.  · Cannot mix benzo with alcohol.  · Increased Prozac to 60 mg.    Arabella Loving MD

## 2019-12-18 ENCOUNTER — OFFICE VISIT (OUTPATIENT)
Dept: SURGERY | Facility: CLINIC | Age: 54
End: 2019-12-18

## 2019-12-18 ENCOUNTER — TELEPHONE (OUTPATIENT)
Dept: INTERNAL MEDICINE | Facility: CLINIC | Age: 54
End: 2019-12-18

## 2019-12-18 VITALS
BODY MASS INDEX: 28.53 KG/M2 | DIASTOLIC BLOOD PRESSURE: 74 MMHG | WEIGHT: 161 LBS | HEIGHT: 63 IN | TEMPERATURE: 97.8 F | OXYGEN SATURATION: 98 % | SYSTOLIC BLOOD PRESSURE: 124 MMHG | HEART RATE: 74 BPM

## 2019-12-18 DIAGNOSIS — K52.9 CHRONIC DIARRHEA: Primary | ICD-10-CM

## 2019-12-18 DIAGNOSIS — G47.26 SHIFT WORK SLEEP DISORDER: Primary | ICD-10-CM

## 2019-12-18 PROCEDURE — 99203 OFFICE O/P NEW LOW 30 MIN: CPT | Performed by: SURGERY

## 2019-12-18 RX ORDER — BISACODYL 5 MG/1
TABLET, DELAYED RELEASE ORAL
Qty: 4 TABLET | Refills: 0 | Status: SHIPPED | OUTPATIENT
Start: 2019-12-18 | End: 2020-05-11

## 2019-12-18 RX ORDER — POLYETHYLENE GLYCOL 3350 17 G/17G
238 POWDER, FOR SOLUTION ORAL ONCE
Qty: 1 EACH | Refills: 1 | Status: SHIPPED | OUTPATIENT
Start: 2019-12-18 | End: 2019-12-18

## 2019-12-18 NOTE — PROGRESS NOTES
Patient: Gaby Rasheed    YOB: 1965    Date: 12/18/2019    Primary Care Provider: Arabella Loving MD    Chief Complaint   Patient presents with   • Hernia   • Diarrhea       SUBJECTIVE:    History of present illness:  I saw the patient in the office today as a consultation for evaluation and treatment of diarrhea and questionable hiatal hernia. She states uncontrollable diarrhea for 2 months. She she states imodium daily.  She has had a previous laparoscopic gastric bypass performed years ago, she does complain of epigastric discomfort between the epigastrium and the umbilical region at the area of old incision.    The following portions of the patient's history were reviewed and updated as appropriate: allergies, current medications, past family history, past medical history, past social history, past surgical history and problem list.    Review of Systems   Constitutional: Negative for chills, diaphoresis, fatigue and fever.   HENT: Negative for dental problem, drooling, ear discharge and hearing loss.    Respiratory: Negative for cough, choking, chest tightness and shortness of breath.    Cardiovascular: Negative for chest pain, palpitations and leg swelling.   Gastrointestinal: Positive for abdominal pain and diarrhea. Negative for blood in stool.   Endocrine: Negative for cold intolerance and heat intolerance.   Genitourinary: Negative for flank pain, frequency and hematuria.   Neurological: Negative for seizures, light-headedness, numbness and headaches.   Psychiatric/Behavioral: Negative for agitation, behavioral problems and confusion.       History:  Past Medical History:   Diagnosis Date   • Acid reflux    • Back pain    • Depression    • Fractures    • Hernia of abdominal cavity    • High cholesterol    • Hypertension    • Nocturia 10/5/2016   • Positive TB test    • Sinus problem    • ELINA (stress urinary incontinence, female) 10/5/2016       Past Surgical History:   Procedure  Laterality Date   •  SECTION     • COLONOSCOPY     • GASTRIC BYPASS     • HERNIA REPAIR     • HYSTERECTOMY     • LAPAROSCOPIC TUBAL LIGATION     • TOTAL ABDOMINAL HYSTERECTOMY WITH SALPINGO OOPHORECTOMY      uterine prolapse       Family History   Problem Relation Age of Onset   • Hyperlipidemia Mother    • Arthritis Mother    • Diabetes Father    • Hypertension Father    • Arthritis Paternal Grandmother    • Breast cancer Neg Hx    • Ovarian cancer Neg Hx        Social History     Tobacco Use   • Smoking status: Never Smoker   • Smokeless tobacco: Never Used   Substance Use Topics   • Alcohol use: Yes     Alcohol/week: 14.0 standard drinks     Types: 14 Glasses of wine per week   • Drug use: No       Medications:   Current Outpatient Medications:   •  Biotin 10 MG tablet, Take  by mouth., Disp: , Rfl:   •  bisacodyl (DULCOLAX) 5 MG EC tablet, Take 2 @ 3pm, 2 @ 7 pm day prior to colonoscopy, Disp: 4 tablet, Rfl: 0  •  carvedilol (COREG) 12.5 MG tablet, Take 1 tablet by mouth 2 (Two) Times a Day With Meals., Disp: 180 tablet, Rfl: 3  •  chlorthalidone (HYGROTON) 25 MG tablet, Take 1 tablet by mouth Daily., Disp: 90 tablet, Rfl: 3  •  Clotrimazole 1 % ointment, Apply 1 application topically 2 (Two) Times a Day., Disp: 45 g, Rfl: 1  •  FLUoxetine (PROZAC) 20 MG capsule, Take 3 capsules by mouth Daily., Disp: 270 capsule, Rfl: 3  •  levothyroxine (SYNTHROID, LEVOTHROID) 50 MCG tablet, Take 1 tablet by mouth Daily., Disp: 90 tablet, Rfl: 3  •  LORazepam (ATIVAN) 0.5 MG tablet, Take 1 tablet by mouth Daily As Needed for Anxiety (panic attack)., Disp: 30 tablet, Rfl: 0  •  Multiple Vitamins-Minerals (MULTIVITAMIN ADULT PO), Take  by mouth., Disp: , Rfl:   •  polyethylene glycol (GLYCOLAX) powder, Take 238 g by mouth 1 (One) Time for 1 dose. Mix with 64 ounces of clear liquid for bowel prep., Disp: 1 each, Rfl: 1  •  zolpidem CR (AMBIEN CR) 6.25 MG CR tablet, Take 1 tablet by mouth At Night As Needed for  "Sleep., Disp: 30 tablet, Rfl: 2       Allergies: No Known Allergies    OBJECTIVE:    Vital Signs:  Vitals:    12/18/19 1536   BP: 124/74   Pulse: 74   Temp: 97.8 °F (36.6 °C)   SpO2: 98%   Weight: 73 kg (161 lb)   Height: 160 cm (62.99\")       Physical Exam:   General Appearance:    Alert, cooperative, in no acute distress   Head:    Normocephalic, without obvious abnormality, atraumatic   Eyes:            Lids and lashes normal, conjunctivae and sclerae normal, no   icterus, no pallor, corneas clear, PERRL   Ears:    Ears appear intact with no abnormalities noted   Throat:   No oral lesions, no thrush, oral mucosa moist   Neck:   No adenopathy, supple, trachea midline, no thyromegaly,  no JVD   Lungs:     Clear to auscultation,respirations regular, even and                  unlabored    Heart:    Regular rhythm and normal rate, normal S1 and S2, no            murmur   Abdomen:     no masses, no organomegaly, soft non-tender, non-distended, no guarding, There is evidence of an incarcerated incisional hernia at her previous midline gastric bypass incision   Extremities:   Moves all extremities well, no edema, no cyanosis, no             redness   Pulses:   Pulses palpable and equal bilaterally   Skin:   No bleeding, bruising or rash   Lymph nodes:   No palpable adenopathy   Neurologic:   Cranial nerves 2 - 12 grossly intact, sensation intact  Psychiatric: No evidence of depression or anxiety   Results Review:   I reviewed the patient's new clinical results.  I reviewed the patient's new imaging results and agree with the interpretation.  I reviewed the patient's other test results and agree with the interpretation    Review of Systems was reviewed and confirmed as accurate.    ASSESSMENT/PLAN:    1. Chronic diarrhea        I recommend a colonoscopy for further evaluation. The procedure was explained as well as the risks which include but are not limited to bleeding, infection, perforation, abdominal pain etc. The " patient understands these risks and the procedure and wishes to proceed.  She also will need to undergo eventual incisional hernia repair with mesh implantation.         Electronically signed by Moose Abreu MD  12/18/19  3:37 PM

## 2019-12-19 RX ORDER — ZOLPIDEM TARTRATE 10 MG/1
10 TABLET ORAL NIGHTLY PRN
Qty: 30 TABLET | Refills: 2 | Status: SHIPPED | OUTPATIENT
Start: 2019-12-19 | End: 2020-03-30

## 2019-12-26 ENCOUNTER — TELEPHONE (OUTPATIENT)
Dept: SURGERY | Facility: CLINIC | Age: 54
End: 2019-12-26

## 2019-12-26 NOTE — TELEPHONE ENCOUNTER
Left message for pt to confirm procedure scheduled on 12/27/19 at [facility] [place]

## 2019-12-27 ENCOUNTER — OUTSIDE FACILITY SERVICE (OUTPATIENT)
Dept: SURGERY | Facility: CLINIC | Age: 54
End: 2019-12-27

## 2019-12-27 PROCEDURE — 45380 COLONOSCOPY AND BIOPSY: CPT | Performed by: SURGERY

## 2020-01-01 ENCOUNTER — OFFICE VISIT (OUTPATIENT)
Dept: INTERNAL MEDICINE | Facility: CLINIC | Age: 55
End: 2020-01-01

## 2020-01-01 VITALS
OXYGEN SATURATION: 100 % | RESPIRATION RATE: 16 BRPM | DIASTOLIC BLOOD PRESSURE: 75 MMHG | BODY MASS INDEX: 20.77 KG/M2 | HEART RATE: 103 BPM | HEIGHT: 61 IN | SYSTOLIC BLOOD PRESSURE: 162 MMHG | WEIGHT: 110 LBS

## 2020-01-01 DIAGNOSIS — E87.6 HYPOKALEMIA: ICD-10-CM

## 2020-01-01 DIAGNOSIS — R53.82 CHRONIC FATIGUE: Primary | ICD-10-CM

## 2020-01-01 DIAGNOSIS — E87.1 HYPONATREMIA: ICD-10-CM

## 2020-01-01 DIAGNOSIS — E83.42 HYPOMAGNESEMIA: Primary | ICD-10-CM

## 2020-01-01 DIAGNOSIS — K74.60 CHRONIC LIVER DISEASE AND CIRRHOSIS (HCC): ICD-10-CM

## 2020-01-01 DIAGNOSIS — K76.9 CHRONIC LIVER DISEASE AND CIRRHOSIS (HCC): ICD-10-CM

## 2020-01-01 DIAGNOSIS — E03.8 SUBCLINICAL HYPOTHYROIDISM: ICD-10-CM

## 2020-01-01 DIAGNOSIS — I10 ESSENTIAL HYPERTENSION: ICD-10-CM

## 2020-01-01 LAB
ALBUMIN SERPL-MCNC: 3.5 G/DL (ref 3.8–4.9)
ALBUMIN/GLOB SERPL: 0.9 {RATIO} (ref 1.2–2.2)
ALP SERPL-CCNC: 227 IU/L (ref 39–117)
ALT SERPL-CCNC: 16 IU/L (ref 0–32)
AST SERPL-CCNC: 90 IU/L (ref 0–40)
BASOPHILS # BLD AUTO: 0.1 X10E3/UL (ref 0–0.2)
BASOPHILS NFR BLD AUTO: 1 %
BILIRUB SERPL-MCNC: 4.3 MG/DL (ref 0–1.2)
BUN SERPL-MCNC: 7 MG/DL (ref 6–24)
BUN/CREAT SERPL: 9 (ref 9–23)
CALCIUM SERPL-MCNC: 9.2 MG/DL (ref 8.7–10.2)
CHLORIDE SERPL-SCNC: 97 MMOL/L (ref 96–106)
CO2 SERPL-SCNC: 25 MMOL/L (ref 20–29)
CREAT SERPL-MCNC: 0.77 MG/DL (ref 0.57–1)
EOSINOPHIL # BLD AUTO: 0 X10E3/UL (ref 0–0.4)
EOSINOPHIL NFR BLD AUTO: 1 %
ERYTHROCYTE [DISTWIDTH] IN BLOOD BY AUTOMATED COUNT: 14 % (ref 11.7–15.4)
ESTRADIOL SERPL-MCNC: 16.9 PG/ML
FERRITIN SERPL-MCNC: 115 NG/ML (ref 15–150)
GGT SERPL-CCNC: 297 IU/L (ref 0–60)
GLOBULIN SER CALC-MCNC: 4 G/DL (ref 1.5–4.5)
GLUCOSE SERPL-MCNC: 102 MG/DL (ref 65–99)
HCT VFR BLD AUTO: 26.3 % (ref 34–46.6)
HGB BLD-MCNC: 9.2 G/DL (ref 11.1–15.9)
IMM GRANULOCYTES # BLD AUTO: 0 X10E3/UL (ref 0–0.1)
IMM GRANULOCYTES NFR BLD AUTO: 1 %
INR PPP: 1.4 (ref 0.9–1.2)
IRON SATN MFR SERPL: 40 % (ref 15–55)
IRON SERPL-MCNC: 109 UG/DL (ref 27–159)
LYMPHOCYTES # BLD AUTO: 0.7 X10E3/UL (ref 0.7–3.1)
LYMPHOCYTES NFR BLD AUTO: 11 %
MAGNESIUM SERPL-MCNC: 1.5 MG/DL (ref 1.6–2.3)
MCH RBC QN AUTO: 32.5 PG (ref 26.6–33)
MCHC RBC AUTO-ENTMCNC: 35 G/DL (ref 31.5–35.7)
MCV RBC AUTO: 93 FL (ref 79–97)
MONOCYTES # BLD AUTO: 0.5 X10E3/UL (ref 0.1–0.9)
MONOCYTES NFR BLD AUTO: 8 %
MORPHOLOGY BLD-IMP: ABNORMAL
NEUTROPHILS # BLD AUTO: 4.8 X10E3/UL (ref 1.4–7)
NEUTROPHILS NFR BLD AUTO: 78 %
PHOSPHATE SERPL-MCNC: 3.8 MG/DL (ref 3–4.3)
PLATELET # BLD AUTO: 80 X10E3/UL (ref 150–450)
POTASSIUM SERPL-SCNC: 3.6 MMOL/L (ref 3.5–5.2)
PROT SERPL-MCNC: 7.5 G/DL (ref 6–8.5)
PROTHROMBIN TIME: 14.7 SEC (ref 9.1–12)
RBC # BLD AUTO: 2.83 X10E6/UL (ref 3.77–5.28)
SODIUM SERPL-SCNC: 140 MMOL/L (ref 134–144)
T4 FREE SERPL-MCNC: 1.54 NG/DL (ref 0.82–1.77)
TESTOST FREE SERPL-MCNC: 2.3 PG/ML (ref 0–4.2)
TESTOST SERPL-MCNC: 39.8 NG/DL
TIBC SERPL-MCNC: 270 UG/DL (ref 250–450)
TSH SERPL DL<=0.005 MIU/L-ACNC: 2.58 UIU/ML (ref 0.45–4.5)
UIBC SERPL-MCNC: 161 UG/DL (ref 131–425)
VIT B12 SERPL-MCNC: 827 PG/ML (ref 232–1245)
WBC # BLD AUTO: 6 X10E3/UL (ref 3.4–10.8)

## 2020-01-01 PROCEDURE — 99214 OFFICE O/P EST MOD 30 MIN: CPT | Performed by: NURSE PRACTITIONER

## 2020-01-01 RX ORDER — ZOLPIDEM TARTRATE 5 MG/1
5 TABLET ORAL NIGHTLY PRN
Qty: 30 TABLET | Refills: 3 | Status: SHIPPED | OUTPATIENT
Start: 2020-01-01 | End: 2021-01-01 | Stop reason: SDUPTHER

## 2020-01-01 RX ORDER — MAGNESIUM OXIDE 400 MG/1
400 TABLET ORAL DAILY
Qty: 30 TABLET | Refills: 1 | Status: SHIPPED | OUTPATIENT
Start: 2020-01-01

## 2020-01-03 NOTE — PROGRESS NOTES
Patient: Gaby Rasheed    YOB: 1965    Date: 01/07/2020    Primary Care Provider: Arabella Loving MD    Reason for Consultation: Follow-up colonoscopy    Chief Complaint   Patient presents with   • Follow-up     colonoscopy        History of present illness:  I saw the patient in the office today as a followup from their recent colonoscopy with biopsies which was done on 12/27/2019, the pathology report did show unremarkable colonic mucosa.  They state that they have done well and are having no complaints.    She has had a previous Stan-en-Y gastric bypass in 2010, she also has an incisional hernia superior to the umbilicus, she also has symptomatic external hemorrhoids.    The following portions of the patient's history were reviewed and updated as appropriate: allergies, current medications, past family history, past medical history, past social history, past surgical history and problem list.    Review of Systems   Constitutional: Negative for chills, fatigue and fever.   Respiratory: Negative for cough.    Cardiovascular: Negative for chest pain.   Gastrointestinal: Negative for abdominal pain, diarrhea, nausea and vomiting.       Allergies:  No Known Allergies    Medications:    Current Outpatient Medications:   •  Biotin 10 MG tablet, Take  by mouth., Disp: , Rfl:   •  bisacodyl (DULCOLAX) 5 MG EC tablet, Take 2 @ 3pm, 2 @ 7 pm day prior to colonoscopy, Disp: 4 tablet, Rfl: 0  •  carvedilol (COREG) 12.5 MG tablet, Take 1 tablet by mouth 2 (Two) Times a Day With Meals., Disp: 180 tablet, Rfl: 3  •  chlorthalidone (HYGROTON) 25 MG tablet, Take 1 tablet by mouth Daily., Disp: 90 tablet, Rfl: 3  •  Clotrimazole 1 % ointment, Apply 1 application topically 2 (Two) Times a Day., Disp: 45 g, Rfl: 1  •  FLUoxetine (PROZAC) 20 MG capsule, Take 3 capsules by mouth Daily., Disp: 270 capsule, Rfl: 3  •  levothyroxine (SYNTHROID, LEVOTHROID) 50 MCG tablet, Take 1 tablet by mouth Daily., Disp: 90  "tablet, Rfl: 3  •  LORazepam (ATIVAN) 0.5 MG tablet, Take 1 tablet by mouth Daily As Needed for Anxiety (panic attack)., Disp: 30 tablet, Rfl: 0  •  Multiple Vitamins-Minerals (MULTIVITAMIN ADULT PO), Take  by mouth., Disp: , Rfl:   •  zolpidem (AMBIEN) 10 MG tablet, Take 1 tablet by mouth At Night As Needed for Sleep., Disp: 30 tablet, Rfl: 2    Vital Signs:  Vitals:    01/07/20 1458   BP: 132/80   Pulse: 81   Temp: 98.3 °F (36.8 °C)   TempSrc: Temporal   SpO2: 98%   Weight: 73 kg (161 lb)   Height: 160 cm (62.99\")       Physical Exam:   General Appearance:    Alert, cooperative, in no acute distress   Abdomen:     no masses, no organomegaly, soft non-tender, non-distended, no guarding, wounds are well healed, no evidence of recurrent hernia   Chest:      Clear to ausculation            Cor:     Regular rate and rhythm    Results Review:   I reviewed the patient's new clinical results.  I reviewed the patient's new imaging results and agree with the interpretation.  I reviewed the patient's other test results and agree with the interpretation    Assessment / Plan:    1. History of Stan-en-Y gastric bypass    2. Chronic diarrhea    3. Incarcerated hernia    4. Incisional hernia with obstruction but no gangrene    5. Hemorrhoids, unspecified hemorrhoid type        I did discuss the situation with the patient today in the office and they have done well from their recent colonoscopy biopsies taken at the time of endoscopy showed no evidence of colitis..    As far as her external hemorrhoids are concerned I would not recommend surgical intervention until we can get her diarrhea under control.  I will start her on some fiber supplementation, ask her to avoid breads and see if this helps her symptoms, she is to avoid dairy products also and see if this helps her symptomatology, I am also going to ask her to start on some probiotics.    She will need future incisional hernia.  I will see her back in the office in 2 weeks " after upper GI and small bowel series.      Electronically signed by Moose Abreu MD  01/07/20      Portions of this note has been scribed for Moose Abreu MD by Hannah Jain. 1/7/2020  3:38 PM

## 2020-01-07 ENCOUNTER — OFFICE VISIT (OUTPATIENT)
Dept: SURGERY | Facility: CLINIC | Age: 55
End: 2020-01-07

## 2020-01-07 VITALS
TEMPERATURE: 98.3 F | HEART RATE: 81 BPM | SYSTOLIC BLOOD PRESSURE: 132 MMHG | HEIGHT: 63 IN | DIASTOLIC BLOOD PRESSURE: 80 MMHG | OXYGEN SATURATION: 98 % | BODY MASS INDEX: 28.53 KG/M2 | WEIGHT: 161 LBS

## 2020-01-07 DIAGNOSIS — K46.0 INCARCERATED HERNIA: ICD-10-CM

## 2020-01-07 DIAGNOSIS — K43.0 INCISIONAL HERNIA WITH OBSTRUCTION BUT NO GANGRENE: ICD-10-CM

## 2020-01-07 DIAGNOSIS — K52.9 CHRONIC DIARRHEA: ICD-10-CM

## 2020-01-07 DIAGNOSIS — Z98.84 HISTORY OF ROUX-EN-Y GASTRIC BYPASS: Primary | ICD-10-CM

## 2020-01-07 DIAGNOSIS — K64.9 HEMORRHOIDS, UNSPECIFIED HEMORRHOID TYPE: ICD-10-CM

## 2020-01-07 PROCEDURE — 99213 OFFICE O/P EST LOW 20 MIN: CPT | Performed by: SURGERY

## 2020-01-08 DIAGNOSIS — K21.9 GASTROESOPHAGEAL REFLUX DISEASE, ESOPHAGITIS PRESENCE NOT SPECIFIED: ICD-10-CM

## 2020-01-08 DIAGNOSIS — Z98.84 HISTORY OF ROUX-EN-Y GASTRIC BYPASS: Primary | ICD-10-CM

## 2020-01-14 ENCOUNTER — HOSPITAL ENCOUNTER (OUTPATIENT)
Dept: GENERAL RADIOLOGY | Facility: HOSPITAL | Age: 55
Discharge: HOME | End: 2020-01-14
Admitting: SURGERY

## 2020-01-14 DIAGNOSIS — Z98.84 HISTORY OF ROUX-EN-Y GASTRIC BYPASS: ICD-10-CM

## 2020-01-14 DIAGNOSIS — K21.9 GASTROESOPHAGEAL REFLUX DISEASE, ESOPHAGITIS PRESENCE NOT SPECIFIED: ICD-10-CM

## 2020-01-14 PROCEDURE — 74246 X-RAY XM UPR GI TRC 2CNTRST: CPT

## 2020-01-14 PROCEDURE — 74248 X-RAY SM INT F-THRU STD: CPT

## 2020-01-17 NOTE — PROGRESS NOTES
Patient: Gaby Rasheed    YOB: 1965    Date: 01/21/2020    Primary Care Provider: Arabella Loving MD    Chief Complaint   Patient presents with   • Follow-up       SUBJECTIVE:    History of present illness:  I saw the patient in the office today as a consultation for evaluation and treatment of an incisional hernia.  Patient is also here for follow-up upper gi with air contrast and small bowel follow through which was done on 01/14/2020.  The report showed status post Stan-en-Y gastric bypass with GERD to the thoracic inlet and mild esophageal dysmotility, it also showed rapid transit through the small bowel with the colon visualized by 15 minutes.  There is no evidence of a small bowel mass or inflammatory change. She states no changes in symptoms.     The patient has had a previous Stan-en-Y gastric bypass, upper GI is mentioned as above that shows evidence of fairly severe reflux to the thoracic inlet with a rapid transit time through the small bowel into the colon.  This certainly would account for the patient's history of diarrhea.  She also has a history significant for sharp abdominal pain 5 cm superior to the umbilicus at an old incision.  This is worse with heavy lifting, worse with prolonged standing, relieved by lying down, sharp in nature, nonradiating, associated with a palpable mass.    The following portions of the patient's history were reviewed and updated as appropriate: allergies, current medications, past family history, past medical history, past social history, past surgical history and problem list.    Review of Systems   Constitutional: Negative for chills, fever and unexpected weight change.   HENT: Negative for hearing loss, trouble swallowing and voice change.    Eyes: Negative for visual disturbance.   Respiratory: Negative for apnea, cough, chest tightness, shortness of breath and wheezing.    Cardiovascular: Negative for chest pain, palpitations and leg  swelling.   Gastrointestinal: Positive for diarrhea. Negative for abdominal distention, abdominal pain, anal bleeding, blood in stool, constipation, nausea, rectal pain and vomiting.   Endocrine: Negative for cold intolerance and heat intolerance.   Genitourinary: Negative for difficulty urinating, dysuria and flank pain.   Musculoskeletal: Negative for back pain and gait problem.   Skin: Negative for color change, rash and wound.   Neurological: Negative for dizziness, syncope, speech difficulty, weakness, light-headedness, numbness and headaches.   Hematological: Negative for adenopathy. Does not bruise/bleed easily.   Psychiatric/Behavioral: Negative for confusion. The patient is not nervous/anxious.        History:  Past Medical History:   Diagnosis Date   • Acid reflux    • Back pain    • Depression    • Fractures    • Hernia of abdominal cavity    • High cholesterol    • Hypertension    • Nocturia 10/5/2016   • Positive TB test    • Sinus problem    • ELINA (stress urinary incontinence, female) 10/5/2016       Past Surgical History:   Procedure Laterality Date   •  SECTION     • COLONOSCOPY     • GASTRIC BYPASS     • HERNIA REPAIR     • HYSTERECTOMY     • LAPAROSCOPIC TUBAL LIGATION     • TOTAL ABDOMINAL HYSTERECTOMY WITH SALPINGO OOPHORECTOMY      uterine prolapse       Family History   Problem Relation Age of Onset   • Hyperlipidemia Mother    • Arthritis Mother    • Diabetes Father    • Hypertension Father    • Arthritis Paternal Grandmother    • Breast cancer Neg Hx    • Ovarian cancer Neg Hx        Social History     Tobacco Use   • Smoking status: Never Smoker   • Smokeless tobacco: Never Used   Substance Use Topics   • Alcohol use: Yes     Alcohol/week: 14.0 standard drinks     Types: 14 Glasses of wine per week   • Drug use: No       Allergies:  No Known Allergies    Medications:    Current Outpatient Medications:   •  Biotin 10 MG tablet, Take  by mouth., Disp: , Rfl:   •  bisacodyl  "(DULCOLAX) 5 MG EC tablet, Take 2 @ 3pm, 2 @ 7 pm day prior to colonoscopy, Disp: 4 tablet, Rfl: 0  •  carvedilol (COREG) 12.5 MG tablet, Take 1 tablet by mouth 2 (Two) Times a Day With Meals., Disp: 180 tablet, Rfl: 3  •  chlorthalidone (HYGROTON) 25 MG tablet, Take 1 tablet by mouth Daily., Disp: 90 tablet, Rfl: 3  •  Clotrimazole 1 % ointment, Apply 1 application topically 2 (Two) Times a Day., Disp: 45 g, Rfl: 1  •  esomeprazole (nexIUM) 40 MG capsule, Take 1 capsule by mouth Daily., Disp: 30 capsule, Rfl: 1  •  FLUoxetine (PROZAC) 20 MG capsule, Take 3 capsules by mouth Daily., Disp: 270 capsule, Rfl: 3  •  levothyroxine (SYNTHROID, LEVOTHROID) 50 MCG tablet, Take 1 tablet by mouth Daily., Disp: 90 tablet, Rfl: 3  •  LORazepam (ATIVAN) 0.5 MG tablet, Take 1 tablet by mouth Daily As Needed for Anxiety (panic attack)., Disp: 30 tablet, Rfl: 0  •  Multiple Vitamins-Minerals (MULTIVITAMIN ADULT PO), Take  by mouth., Disp: , Rfl:   •  zolpidem (AMBIEN) 10 MG tablet, Take 1 tablet by mouth At Night As Needed for Sleep., Disp: 30 tablet, Rfl: 2    OBJECTIVE:    Vital Signs:   Vitals:    01/21/20 1258   BP: 132/76   Pulse: 68   Temp: 97.6 °F (36.4 °C)   SpO2: 96%   Weight: 73.9 kg (163 lb)   Height: 160 cm (62.99\")       Physical Exam:   General Appearance:    Alert, cooperative, in no acute distress   Head:    Normocephalic, without obvious abnormality, atraumatic   Eyes:            Lids and lashes normal, conjunctivae and sclerae normal, no   icterus, no pallor, corneas clear, PERRLA   Ears:    Ears appear intact with no abnormalities noted   Throat:   No oral lesions, no thrush, oral mucosa moist   Neck:   No adenopathy, supple, trachea midline, no thyromegaly, no   carotid bruit, no JVD   Lungs:     Clear to auscultation,respirations regular, even and                  unlabored    Heart:    Regular rhythm and normal rate, normal S1 and S2, no            murmur   Abdomen:     no masses, no organomegaly, soft " non-tender, non-distended, no guarding, there is evidence of a incarcerated incisional hernia 5 cm superior to the umbilicus   Extremities:   Moves all extremities well, no edema, no cyanosis, no             redness   Pulses:   Pulses palpable and equal bilaterally   Skin:   No bleeding, bruising or rash   Lymph nodes:   No palpable adenopathy   Neurologic:   Cranial nerves 2 - 12 grossly intact, sensation intact        Results Review:   I reviewed the patient's new clinical results.  I reviewed the patient's new imaging results and agree with the interpretation.  I reviewed the patient's other test results and agree with the interpretation    Review of Systems was reviewed and confirmed as accurate as documented by the MA.    ASSESSMENT/PLAN:    1. Incarcerated hernia    2. Chronic diarrhea    3. History of Stan-en-Y gastric bypass    4. Gastroesophageal reflux disease with esophagitis        I had a detailed and extensive discussion with the patient in the office and they understand that they need to undergo hernia repair with mesh.  Full risks and benefits of operative versus nonoperative intervention were discussed with the patient and these included things such as nonresolution of symptoms and possible worsening of symptoms without surgical intervention versus infection, bleeding, possible recurrent hernia, possible postoperative neuralgia from nerve damage or involvement with scar tissue, etc.  The patient understands, agrees, and had no questions for me at the end of the office visit.     I discussed the patients findings and my recommendations with patient.  As far as her reflux is concerned and her chronic diarrhea I wonder if this does not have something to do with her previous Stan-en-Y gastric bypass.  She may need future referral back to Maged Joyner MD of the bariatric service at [facility].        Electronically signed by Moose Abreu MD  01/21/20

## 2020-01-21 ENCOUNTER — OFFICE VISIT (OUTPATIENT)
Dept: SURGERY | Facility: CLINIC | Age: 55
End: 2020-01-21

## 2020-01-21 VITALS
WEIGHT: 163 LBS | BODY MASS INDEX: 28.88 KG/M2 | OXYGEN SATURATION: 96 % | HEIGHT: 63 IN | DIASTOLIC BLOOD PRESSURE: 76 MMHG | HEART RATE: 68 BPM | SYSTOLIC BLOOD PRESSURE: 132 MMHG | TEMPERATURE: 97.6 F

## 2020-01-21 DIAGNOSIS — Z98.84 HISTORY OF ROUX-EN-Y GASTRIC BYPASS: ICD-10-CM

## 2020-01-21 DIAGNOSIS — K52.9 CHRONIC DIARRHEA: ICD-10-CM

## 2020-01-21 DIAGNOSIS — K46.0 INCARCERATED HERNIA: Primary | ICD-10-CM

## 2020-01-21 DIAGNOSIS — K21.00 GASTROESOPHAGEAL REFLUX DISEASE WITH ESOPHAGITIS: ICD-10-CM

## 2020-01-21 PROCEDURE — 99213 OFFICE O/P EST LOW 20 MIN: CPT | Performed by: SURGERY

## 2020-01-21 RX ORDER — ESOMEPRAZOLE MAGNESIUM 40 MG/1
40 CAPSULE, DELAYED RELEASE ORAL DAILY
Qty: 30 CAPSULE | Refills: 1 | Status: SHIPPED | OUTPATIENT
Start: 2020-01-21 | End: 2021-01-01

## 2020-01-31 ENCOUNTER — OUTSIDE FACILITY SERVICE (OUTPATIENT)
Dept: SURGERY | Facility: CLINIC | Age: 55
End: 2020-01-31

## 2020-01-31 PROCEDURE — 47563 LAPARO CHOLECYSTECTOMY/GRAPH: CPT | Performed by: SURGERY

## 2020-02-05 ENCOUNTER — OFFICE VISIT (OUTPATIENT)
Dept: SURGERY | Facility: CLINIC | Age: 55
End: 2020-02-05

## 2020-02-05 VITALS
HEART RATE: 71 BPM | SYSTOLIC BLOOD PRESSURE: 120 MMHG | TEMPERATURE: 99 F | WEIGHT: 163 LBS | OXYGEN SATURATION: 99 % | HEIGHT: 63 IN | DIASTOLIC BLOOD PRESSURE: 74 MMHG | BODY MASS INDEX: 28.88 KG/M2

## 2020-02-05 DIAGNOSIS — Z09 POSTOPERATIVE FOLLOW-UP: Primary | ICD-10-CM

## 2020-02-05 PROCEDURE — 99024 POSTOP FOLLOW-UP VISIT: CPT | Performed by: SURGERY

## 2020-02-05 RX ORDER — HYDROCODONE BITARTRATE AND ACETAMINOPHEN 7.5; 325 MG/1; MG/1
TABLET ORAL
COMMUNITY
Start: 2020-01-31 | End: 2020-05-11

## 2020-02-05 NOTE — PROGRESS NOTES
"Patient: Gaby Rasheed    YOB: 1965    Date: 02/05/2020    Primary Care Provider: Arabella Loving MD    Chief Complaint   Patient presents with   • Post-op Follow-up     hernia repair       History of present illness:  I saw the patient in the office today as a followup from their recent hernia repair.  They state that they are having bleeding from incision site. She also states redness.     Vital Signs:   Vitals:    02/05/20 1338   BP: 120/74   Pulse: 71   Temp: 99 °F (37.2 °C)   SpO2: 99%   Weight: 73.9 kg (163 lb)   Height: 160 cm (62.99\")       Physical Exam:   General Appearance:    Alert, cooperative, in no acute distress   Abdomen:     no masses, no organomegaly, soft non-tender, non-distended, no guarding, wounds are well healed, no evidence of recurrent hernia     Assessment / Plan:    1. Postoperative follow-up        I did discuss the situation with the patient today in the office and they have done well from their recent herniorraphy.  I have released the patient back to normal activity, they understand that they need to be careful about heavy lifting.  I need to see the patient back in the office only if they are having further problems, they know to call me if they are.    Electronically signed by Moose Abreu MD  02/06/20              "

## 2020-02-06 NOTE — PROGRESS NOTES
"   HPI  Gaby Rasheed presents for postoperative follow up s/p ***. {postop course:28212::\"The patient has had a relatively normal postoperative course. \",\"The patient has had no current complaints.\",\"The patient has had improving normal postoperative pain. \",\"The patient has had no issues with the wound.\"}         Physical Exam  General:  {gen appearance:58594::\"alert\",\"appears stated age\",\"cooperative\"}  Incision:   {INCISION:27978::\"incision well approximated\",\"healing well\",\"no drainage\",\"no seroma\",\"no swelling\"}     Assessment/Plan   Assessment:    1. Postoperative follow-up        Plan:   Continue postoperative wound care as instructed        No follow-ups on file.     Moose Abreu MD  2/6/2020  "

## 2020-02-14 NOTE — PROGRESS NOTES
"Patient: Gaby Rasheed    YOB: 1965    Date: 02/17/2020    Primary Care Provider: Arabella Loving MD    Chief Complaint   Patient presents with   • Post-op Hernia       History of present illness:  I saw the patient in the office today as a followup from their recent incisional hernia repair with mesh which was done on 01/31/2020. She complains of a mass above her incision that has continued to get larger.     Vital Signs:   Vitals:    02/17/20 0936   BP: 140/80   Pulse: 80   Temp: 97.8 °F (36.6 °C)   Weight: 73.9 kg (162 lb 14.7 oz)   Height: 160 cm (62.99\")       Physical Exam:   General Appearance:    Alert, cooperative, in no acute distress   Abdomen:     no masses, no organomegaly, soft non-tender, non-distended, no guarding, wounds are well healed, no evidence of recurrent hernia, there is evidence of a seroma with no erythema of the wound     Assessment / Plan:    1. Swelling abdomen        I did discuss the situation with the patient today in the office and they have done well from their recent herniorraphy.  I have released the patient back to normal activity, they understand that they need to be careful about heavy lifting.  I need to see the patient back in the office only if they are having further problems, they know to call me if they are.    She did have evidence of a seroma and this was aspirated today under ultrasound guidance, we did get 50 cc of straw-colored fluid, there was no evidence of infection, I have released the patient back to work-related activities on March 17.    Electronically signed by Moose Abreu MD  02/17/20      Portions of this note has been scribed for Moose Abreu MD by Hannah Jain. 2/17/2020  10:11 AM            "

## 2020-02-17 ENCOUNTER — OFFICE VISIT (OUTPATIENT)
Dept: SURGERY | Facility: CLINIC | Age: 55
End: 2020-02-17

## 2020-02-17 VITALS
TEMPERATURE: 97.8 F | HEART RATE: 80 BPM | BODY MASS INDEX: 28.87 KG/M2 | WEIGHT: 162.92 LBS | SYSTOLIC BLOOD PRESSURE: 140 MMHG | HEIGHT: 63 IN | DIASTOLIC BLOOD PRESSURE: 80 MMHG

## 2020-02-17 DIAGNOSIS — S30.1XXD ABDOMINAL WALL SEROMA, SUBSEQUENT ENCOUNTER: ICD-10-CM

## 2020-02-17 DIAGNOSIS — R19.00 SWELLING ABDOMEN: Primary | ICD-10-CM

## 2020-02-17 PROCEDURE — 99024 POSTOP FOLLOW-UP VISIT: CPT | Performed by: SURGERY

## 2020-03-05 ENCOUNTER — TELEPHONE (OUTPATIENT)
Dept: SURGERY | Facility: CLINIC | Age: 55
End: 2020-03-05

## 2020-03-05 NOTE — TELEPHONE ENCOUNTER
I obtained prior authorization for Nexium 40 mg, #[id number] good from 03/05/2020-03/05/2021.  Pt informed.

## 2020-03-12 ENCOUNTER — TELEPHONE (OUTPATIENT)
Dept: SURGERY | Facility: CLINIC | Age: 55
End: 2020-03-12

## 2020-03-12 NOTE — TELEPHONE ENCOUNTER
Patient was released to go back to work at the [employer]. 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 She states that she needs the release to be made with no restrictions and faxed to [phone] and call her as well at [phone]

## 2020-03-12 NOTE — TELEPHONE ENCOUNTER
I left a message asking Dr. Abreu to return my call to discuss return to work with no restrictions.  I also called pt, I left a message on her voice mail asking her to return my call.

## 2020-03-28 DIAGNOSIS — G47.26 SHIFT WORK SLEEP DISORDER: ICD-10-CM

## 2020-03-30 RX ORDER — ZOLPIDEM TARTRATE 10 MG/1
TABLET ORAL
Qty: 30 TABLET | Refills: 2 | Status: SHIPPED | OUTPATIENT
Start: 2020-03-30 | End: 2020-07-20

## 2020-04-29 ENCOUNTER — TELEPHONE (OUTPATIENT)
Dept: INTERNAL MEDICINE | Facility: CLINIC | Age: 55
End: 2020-04-29

## 2020-04-29 DIAGNOSIS — R63.4 UNEXPLAINED WEIGHT LOSS: Primary | ICD-10-CM

## 2020-04-29 DIAGNOSIS — R19.4 CHANGE IN BOWEL HABIT: ICD-10-CM

## 2020-04-29 DIAGNOSIS — R19.7 DIARRHEA, UNSPECIFIED TYPE: ICD-10-CM

## 2020-05-11 ENCOUNTER — OFFICE VISIT (OUTPATIENT)
Dept: GASTROENTEROLOGY | Facility: CLINIC | Age: 55
End: 2020-05-11

## 2020-05-11 ENCOUNTER — LAB (OUTPATIENT)
Dept: LAB | Facility: HOSPITAL | Age: 55
End: 2020-05-11

## 2020-05-11 VITALS
BODY MASS INDEX: 27.28 KG/M2 | SYSTOLIC BLOOD PRESSURE: 118 MMHG | WEIGHT: 154 LBS | TEMPERATURE: 98.8 F | DIASTOLIC BLOOD PRESSURE: 68 MMHG | OXYGEN SATURATION: 98 % | HEART RATE: 61 BPM

## 2020-05-11 DIAGNOSIS — K58.0 IRRITABLE BOWEL SYNDROME WITH DIARRHEA: ICD-10-CM

## 2020-05-11 DIAGNOSIS — K58.0 IRRITABLE BOWEL SYNDROME WITH DIARRHEA: Primary | ICD-10-CM

## 2020-05-11 DIAGNOSIS — K70.0 ALCOHOLIC FATTY LIVER: ICD-10-CM

## 2020-05-11 LAB
ALBUMIN SERPL-MCNC: 3.1 G/DL (ref 3.5–5.2)
ALBUMIN/GLOB SERPL: 1.1 G/DL
ALP SERPL-CCNC: 144 U/L (ref 39–117)
ALT SERPL W P-5'-P-CCNC: 30 U/L (ref 1–33)
ANION GAP SERPL CALCULATED.3IONS-SCNC: 10.9 MMOL/L (ref 5–15)
AST SERPL-CCNC: 59 U/L (ref 1–32)
BILIRUB SERPL-MCNC: 1.6 MG/DL (ref 0.2–1.2)
BUN BLD-MCNC: 10 MG/DL (ref 6–20)
BUN/CREAT SERPL: 14.3 (ref 7–25)
CALCIUM SPEC-SCNC: 8.7 MG/DL (ref 8.6–10.5)
CHLORIDE SERPL-SCNC: 103 MMOL/L (ref 98–107)
CO2 SERPL-SCNC: 25.1 MMOL/L (ref 22–29)
CREAT BLD-MCNC: 0.7 MG/DL (ref 0.57–1)
GFR SERPL CREATININE-BSD FRML MDRD: 87 ML/MIN/1.73
GLOBULIN UR ELPH-MCNC: 2.8 GM/DL
GLUCOSE BLD-MCNC: 83 MG/DL (ref 65–99)
INR PPP: 1.23 (ref 0.85–1.16)
POTASSIUM BLD-SCNC: 3.8 MMOL/L (ref 3.5–5.2)
PROT SERPL-MCNC: 5.9 G/DL (ref 6–8.5)
PROTHROMBIN TIME: 15.3 SECONDS (ref 11.5–14)
SODIUM BLD-SCNC: 139 MMOL/L (ref 136–145)
T-UPTAKE NFR SERPL: 0.93 TBI (ref 0.8–1.3)
T4 SERPL-MCNC: 10.1 MCG/DL (ref 4.5–11.7)
TSH SERPL DL<=0.05 MIU/L-ACNC: 4.2 UIU/ML (ref 0.27–4.2)

## 2020-05-11 PROCEDURE — 82784 ASSAY IGA/IGD/IGG/IGM EACH: CPT

## 2020-05-11 PROCEDURE — 83516 IMMUNOASSAY NONANTIBODY: CPT

## 2020-05-11 PROCEDURE — 99214 OFFICE O/P EST MOD 30 MIN: CPT | Performed by: INTERNAL MEDICINE

## 2020-05-11 PROCEDURE — 84443 ASSAY THYROID STIM HORMONE: CPT

## 2020-05-11 PROCEDURE — 36415 COLL VENOUS BLD VENIPUNCTURE: CPT | Performed by: INTERNAL MEDICINE

## 2020-05-11 PROCEDURE — 84436 ASSAY OF TOTAL THYROXINE: CPT

## 2020-05-11 PROCEDURE — 84479 ASSAY OF THYROID (T3 OR T4): CPT

## 2020-05-11 PROCEDURE — 85610 PROTHROMBIN TIME: CPT | Performed by: INTERNAL MEDICINE

## 2020-05-11 PROCEDURE — 80053 COMPREHEN METABOLIC PANEL: CPT | Performed by: INTERNAL MEDICINE

## 2020-05-11 NOTE — PROGRESS NOTES
PCP: Arabella Loving MD    Chief Complaint   Patient presents with   • UNEXPLAINED WEIGHT LOSS   • Diarrhea   • Vomiting   Urgency with diarrhea.    History of Present Illness:   HPI   Mrs. Rasheed returns to the office.  I last evaluated this patient in 2018 for alcohol-related liver disease.  She has recently been followed by Dr. Abreu who performed surgery for incarcerated hernia in January.  The patient also had a colonoscopy in December with biopsies negative for colitis.  Her main complaint is issues with urgency to have a bowel movement for meals. She has experienced this problem for about 8-9 months.  The patient denies any diarrhea that will wake her up from a deep sleep.  She is working long hour shifts at the [employer].  The job is very stressful at this time.  Mrs. Rasheed denies any gerri blood in the stool.  She did have weight loss of about 10 pounds but has gained some of the weight back.  She denies any nausea or emesis.  Mrs. Rasheed denies any localized abdominal pain except before going to the restroom with the discomfort is usually in the lower abdomen.  The patient denies any breakthrough heartburn.  She does admit to still consuming some alcohol usually 2 glasses of wine with meals when she is not working.  The patient does not drink any alcohol during the course of her work week.  Past Medical History:   Diagnosis Date   • Acid reflux    • Back pain    • Depression    • Fractures    • Hernia of abdominal cavity    • High cholesterol    • Hypertension    • Nocturia 10/5/2016   • Positive TB test    • Sinus problem    • ELINA (stress urinary incontinence, female) 10/5/2016       Past Surgical History:   Procedure Laterality Date   •  SECTION     • COLONOSCOPY     • GASTRIC BYPASS     • HERNIA REPAIR     • HYSTERECTOMY     • LAPAROSCOPIC TUBAL LIGATION     • TOTAL ABDOMINAL HYSTERECTOMY WITH SALPINGO OOPHORECTOMY      uterine prolapse         Current  Outpatient Medications:   •  Biotin 10 MG tablet, Take  by mouth., Disp: , Rfl:   •  carvedilol (COREG) 12.5 MG tablet, Take 1 tablet by mouth 2 (Two) Times a Day With Meals., Disp: 180 tablet, Rfl: 3  •  chlorthalidone (HYGROTON) 25 MG tablet, Take 1 tablet by mouth Daily., Disp: 90 tablet, Rfl: 3  •  esomeprazole (nexIUM) 40 MG capsule, Take 1 capsule by mouth Daily., Disp: 30 capsule, Rfl: 1  •  FLUoxetine (PROZAC) 20 MG capsule, Take 3 capsules by mouth Daily., Disp: 270 capsule, Rfl: 3  •  levothyroxine (SYNTHROID, LEVOTHROID) 50 MCG tablet, Take 1 tablet by mouth Daily., Disp: 90 tablet, Rfl: 3  •  Multiple Vitamins-Minerals (MULTIVITAMIN ADULT PO), Take  by mouth., Disp: , Rfl:   •  zolpidem (AMBIEN) 10 MG tablet, TAKE 1 TABLET BY MOUTH AT BEDTIME AS NEEDED FOR SLEEP, Disp: 30 tablet, Rfl: 2  •  LORazepam (ATIVAN) 0.5 MG tablet, Take 1 tablet by mouth Daily As Needed for Anxiety (panic attack)., Disp: 30 tablet, Rfl: 0  •  riFAXIMin (Xifaxan) 550 MG tablet, Take 1 tablet by mouth Every 8 (Eight) Hours., Disp: 42 tablet, Rfl: 0    No Known Allergies    Family History   Problem Relation Age of Onset   • Hyperlipidemia Mother    • Arthritis Mother    • Diabetes Father    • Hypertension Father    • Arthritis Paternal Grandmother    • Breast cancer Neg Hx    • Ovarian cancer Neg Hx        Social History     Socioeconomic History   • Marital status:      Spouse name: Not on file   • Number of children: Not on file   • Years of education: Not on file   • Highest education level: Not on file   Tobacco Use   • Smoking status: Never Smoker   • Smokeless tobacco: Never Used   Substance and Sexual Activity   • Alcohol use: Yes     Alcohol/week: 14.0 standard drinks     Types: 14 Glasses of wine per week   • Drug use: No   • Sexual activity: Defer       Review of Systems   Constitutional: Negative.  Negative for appetite change, fatigue, fever and unexpected weight change.   HENT: Negative.  Negative for dental  problem, mouth sores, postnasal drip, sneezing, trouble swallowing and voice change.    Eyes: Negative.  Negative for pain, redness and itching.   Respiratory: Negative.  Negative for cough, shortness of breath and wheezing.    Cardiovascular: Negative.  Negative for chest pain, palpitations and leg swelling.   Gastrointestinal: Positive for abdominal pain and diarrhea. Negative for abdominal distention, anal bleeding, blood in stool, constipation, nausea, rectal pain and vomiting.   Endocrine: Negative.  Negative for cold intolerance, heat intolerance, polydipsia and polyuria.   Genitourinary: Negative.  Negative for dysuria, enuresis, flank pain, hematuria and urgency.   Musculoskeletal: Negative.  Negative for arthralgias, back pain, joint swelling and myalgias.   Skin: Negative.  Negative for color change, pallor and rash.   Allergic/Immunologic: Negative.  Negative for environmental allergies, food allergies and immunocompromised state.   Neurological: Negative.  Negative for dizziness, tremors, seizures, facial asymmetry, numbness and headaches.   Hematological: Negative.    Psychiatric/Behavioral: Negative.  Negative for behavioral problems, dysphoric mood, hallucinations and self-injury.       Vitals:    05/11/20 0917   BP: 118/68   Pulse: 61   Temp: 98.8 °F (37.1 °C)   SpO2: 98%       Physical Exam   Constitutional: She is oriented to person, place, and time. She appears well-nourished. No distress.   HENT:   Head: Atraumatic.   Mouth/Throat: Oropharynx is clear and moist. No oropharyngeal exudate.   Eyes: EOM are normal. No scleral icterus.   Neck: Neck supple. No thyromegaly present.   Cardiovascular: Normal rate, regular rhythm and normal heart sounds. Exam reveals no gallop.   No murmur heard.  Pulmonary/Chest: Effort normal and breath sounds normal. She has no wheezes. She has no rales.   Abdominal: Soft. Bowel sounds are normal. There is no tenderness. There is no guarding.   Right lobe of liver felt  below costal margin, well healed surgical incision   Musculoskeletal: Normal range of motion. She exhibits edema (minimal pretibial).   Lymphadenopathy:     She has no cervical adenopathy.   Neurological: She is alert and oriented to person, place, and time. She exhibits normal muscle tone.   Skin: Skin is dry. No erythema.   No spider nevi   Psychiatric: She has a normal mood and affect. Her behavior is normal. Thought content normal.   Vitals reviewed.      Gaby was seen today for unexplained weight loss, diarrhea and vomiting.    Diagnoses and all orders for this visit:    Irritable bowel syndrome with diarrhea  -     riFAXIMin (Xifaxan) 550 MG tablet; Take 1 tablet by mouth Every 8 (Eight) Hours.  -     Thyroid Panel With TSH; Future  -     Celiac Ab tTG DGP TIgA; Future    Alcoholic fatty liver  -     Comprehensive Metabolic Panel  -     Protime-INR    The patient has Medina criteria for irritable bowel syndrome diarrhea predominance.  The colonoscopy exam from December performed with Dr. Abreu did not demonstrate any evidence for microscopic colitis.  She has taken Imodium with minimal relief.  Will also consider in the differential celiac sprue.    The patient does have a history of alcohol-related fatty liver disease.  She does continue to drink and has risk  for progressing to alcohol-related cirrhosis.        Plan: Will describe Xifaxan 550 mg tablet 1 by mouth 3 times a day for 14 days.           Will check CMP and pro time.           Will check celiac panel and thyroid panel.           Encouraged the patient to stop alcohol intake.           The patient will follow up in 4 months.      Answers for HPI/ROS submitted by the patient on 5/9/2020   What is the primary reason for your visit?: Other  Please describe your symptoms.: Approx. 8 months I've not been able to eat or drink without  the food and liquid going straight through me with watery diarrhea.  I can't control my diarrhea I take up to 3  Imodium a day to keep from using the bathroom on myself.  I had a barium swallow on January 14, 20 and the barium reached my colon in 15 minutes.  The Radiologist told me my gastric bypass pouch and surgery looked okay.  Have you had these symptoms before?: No  How long have you been having these symptoms?: Other  Please list any medications you are currently taking for this condition.: I am only taking probotics Ana and Nexum 40 mg  other than what is on my chart for my stomach.  Please describe any probable cause for these symptoms. : I thought it could be from my gastric bypass surgery I had back in 2010 giving me problems, however after a barium swallow in Jan. 20 and colonoscopy in December 2019 everything came back okay except severe acid reflex from my barium swallow., And that is why I'm here to see you today.

## 2020-05-12 ENCOUNTER — TELEPHONE (OUTPATIENT)
Dept: GASTROENTEROLOGY | Facility: CLINIC | Age: 55
End: 2020-05-12

## 2020-05-12 LAB
GLIADIN PEPTIDE IGA SER-ACNC: 8 UNITS (ref 0–19)
GLIADIN PEPTIDE IGG SER-ACNC: 2 UNITS (ref 0–19)
IGA SERPL-MCNC: 513 MG/DL (ref 87–352)
TTG IGA SER-ACNC: <2 U/ML (ref 0–3)
TTG IGG SER-ACNC: <2 U/ML (ref 0–5)

## 2020-05-12 NOTE — TELEPHONE ENCOUNTER
I spoke with the patient about the labs.  The celiac panel was unremarkable.  The thyroid panel was within normal limits.  I did state that the meld score is 11.  I strongly encouraged the patient to quit alcohol intake at this time.

## 2020-05-12 NOTE — TELEPHONE ENCOUNTER
CALLED PT TO ADVISE THE PRIOR AUTH FOR HER XIFAXAN WAS APPROVED. ADVISED SHE WOULD NEED TO CONTACT PHARMACY IN REFERENCE TO A COPAY , IF SHE OWES ONE. PT VOICED UNDERSTANDING WITH NO FURTHER QUESTIONS OR CONCERNS

## 2020-06-01 ENCOUNTER — OFFICE VISIT (OUTPATIENT)
Dept: SURGERY | Facility: CLINIC | Age: 55
End: 2020-06-01

## 2020-06-01 ENCOUNTER — OFFICE VISIT (OUTPATIENT)
Dept: INTERNAL MEDICINE | Facility: CLINIC | Age: 55
End: 2020-06-01

## 2020-06-01 VITALS
OXYGEN SATURATION: 98 % | HEIGHT: 63 IN | SYSTOLIC BLOOD PRESSURE: 95 MMHG | DIASTOLIC BLOOD PRESSURE: 55 MMHG | BODY MASS INDEX: 25.34 KG/M2 | RESPIRATION RATE: 18 BRPM | HEART RATE: 70 BPM | TEMPERATURE: 97.1 F | WEIGHT: 143 LBS

## 2020-06-01 VITALS
OXYGEN SATURATION: 99 % | WEIGHT: 142 LBS | HEART RATE: 73 BPM | TEMPERATURE: 98 F | BODY MASS INDEX: 25.16 KG/M2 | HEIGHT: 63 IN | SYSTOLIC BLOOD PRESSURE: 122 MMHG | DIASTOLIC BLOOD PRESSURE: 84 MMHG

## 2020-06-01 DIAGNOSIS — T14.8XXA DISCHARGE FROM WOUND: ICD-10-CM

## 2020-06-01 DIAGNOSIS — Z00.00 ANNUAL PHYSICAL EXAM: Primary | ICD-10-CM

## 2020-06-01 DIAGNOSIS — E03.8 SUBCLINICAL HYPOTHYROIDISM: ICD-10-CM

## 2020-06-01 DIAGNOSIS — R10.13 EPIGASTRIC PAIN: Primary | ICD-10-CM

## 2020-06-01 DIAGNOSIS — F10.29 ALCOHOL DEPENDENCE WITH UNSPECIFIED ALCOHOL-INDUCED DISORDER (HCC): ICD-10-CM

## 2020-06-01 DIAGNOSIS — F33.1 MODERATE EPISODE OF RECURRENT MAJOR DEPRESSIVE DISORDER (HCC): ICD-10-CM

## 2020-06-01 DIAGNOSIS — F41.9 ANXIETY: ICD-10-CM

## 2020-06-01 DIAGNOSIS — R19.7 DIARRHEA, UNSPECIFIED TYPE: ICD-10-CM

## 2020-06-01 PROCEDURE — 99213 OFFICE O/P EST LOW 20 MIN: CPT | Performed by: SURGERY

## 2020-06-01 PROCEDURE — 99396 PREV VISIT EST AGE 40-64: CPT | Performed by: FAMILY MEDICINE

## 2020-06-01 RX ORDER — FLUOXETINE HYDROCHLORIDE 40 MG/1
60 CAPSULE ORAL DAILY
COMMUNITY
Start: 2020-04-12 | End: 2020-06-18

## 2020-06-01 RX ORDER — LEVOTHYROXINE SODIUM 0.05 MG/1
50 TABLET ORAL DAILY
Qty: 90 TABLET | Refills: 3 | Status: SHIPPED | OUTPATIENT
Start: 2020-06-01 | End: 2021-01-01

## 2020-06-01 RX ORDER — ACAMPROSATE CALCIUM 333 MG/1
666 TABLET, DELAYED RELEASE ORAL 3 TIMES DAILY
Qty: 90 TABLET | Refills: 2 | Status: SHIPPED | OUTPATIENT
Start: 2020-06-01 | End: 2020-07-17 | Stop reason: HOSPADM

## 2020-06-01 RX ORDER — LORAZEPAM 0.5 MG/1
0.5 TABLET ORAL DAILY PRN
Qty: 30 TABLET | Refills: 0 | Status: SHIPPED | OUTPATIENT
Start: 2020-06-01 | End: 2020-07-22

## 2020-06-01 NOTE — PROGRESS NOTES
Patient: Gaby Rasheed  YOB: 1965    Date: 06/01/2020    Primary Care Provider: Arabella Loving MD    Chief Complaint   Patient presents with   • Follow-up     incisional hernia repair, problem at incision        History: Patient is here for an evaluation following her incisional hernia repair performed in January. She complains of drainage coming from her incision for the past week. She denies fever and chills. She states she is otherwise doing well.     Apparently this is been present at the superior aspect of the umbilicus.  This does cause her some discomfort, sharp and dull in nature, worse with pressure, not associated with erythema, associated with slight drainage, nonradiating in nature, present over the past week.    The following portions of the patient's history were reviewed and updated as appropriate: allergies, current medications, past family history, past medical history, past social history, past surgical history and problem list.    Review of Systems   Constitutional: Negative for chills, fever and unexpected weight change.   HENT: Negative for hearing loss, trouble swallowing and voice change.    Eyes: Negative for visual disturbance.   Respiratory: Negative for apnea, cough, chest tightness, shortness of breath and wheezing.    Cardiovascular: Negative for chest pain, palpitations and leg swelling.   Gastrointestinal: Negative for abdominal distention, abdominal pain, anal bleeding, blood in stool, constipation, diarrhea, nausea, rectal pain and vomiting.   Endocrine: Negative for cold intolerance and heat intolerance.   Genitourinary: Negative for difficulty urinating, dysuria and flank pain.   Musculoskeletal: Negative for back pain and gait problem.   Skin: Negative for color change, rash and wound.   Neurological: Negative for dizziness, syncope, speech difficulty, weakness, light-headedness, numbness and headaches.   Hematological: Negative for adenopathy. Does  "not bruise/bleed easily.   Psychiatric/Behavioral: Negative for confusion. The patient is not nervous/anxious.        Vital Signs  Vitals:    06/01/20 1039   BP: 122/84   Pulse: 73   Temp: 98 °F (36.7 °C)   SpO2: 99%   Weight: 64.4 kg (142 lb)   Height: 160 cm (62.99\")       Allergies:  No Known Allergies    Medications:    Current Outpatient Medications:   •  acamprosate (CAMPRAL) 333 MG EC tablet, Take 2 tablets by mouth 3 (Three) Times a Day., Disp: 90 tablet, Rfl: 2  •  carvedilol (COREG) 12.5 MG tablet, Take 1 tablet by mouth 2 (Two) Times a Day With Meals., Disp: 180 tablet, Rfl: 3  •  chlorthalidone (HYGROTON) 25 MG tablet, Take 1 tablet by mouth Daily., Disp: 90 tablet, Rfl: 3  •  esomeprazole (nexIUM) 40 MG capsule, Take 1 capsule by mouth Daily., Disp: 30 capsule, Rfl: 1  •  FLUoxetine (PROZAC) 20 MG capsule, Take 3 capsules by mouth Daily., Disp: 270 capsule, Rfl: 3  •  FLUoxetine (PROzac) 40 MG capsule, Take 60 mg by mouth Daily., Disp: , Rfl:   •  levothyroxine (SYNTHROID, LEVOTHROID) 50 MCG tablet, Take 1 tablet by mouth Daily., Disp: 90 tablet, Rfl: 3  •  LORazepam (Ativan) 0.5 MG tablet, Take 1 tablet by mouth Daily As Needed for Anxiety (panic attack)., Disp: 30 tablet, Rfl: 0  •  Multiple Vitamins-Minerals (MULTIVITAMIN ADULT PO), Take  by mouth., Disp: , Rfl:   •  zolpidem (AMBIEN) 10 MG tablet, TAKE 1 TABLET BY MOUTH AT BEDTIME AS NEEDED FOR SLEEP, Disp: 30 tablet, Rfl: 2    Physical Exam:   General Appearance:    Alert, cooperative, in no acute distress   Head:    Normocephalic, without obvious abnormality, atraumatic   Lungs:     Clear to auscultation,respirations regular, even and                  unlabored    Heart:    Regular rhythm and normal rate, normal S1 and S2, no            murmur, no gallop, no rub, no click   Abdomen:     Normal bowel sounds, no masses, no organomegaly, soft        non-tender, non-distended, no guarding, no rebound                tenderness, wound is clean and dry, " there is a small opening but no evidence of purulent fluid, no erythema   Extremities:   Moves all extremities well, no edema, no cyanosis, no             redness   Pulses:   Pulses palpable and equal bilaterally   Skin:   No bleeding, bruising or rash     Results Review:   I reviewed the patient's new clinical results.  I reviewed the patient's new imaging results and agree with the interpretation.  I reviewed the patient's other test results and agree with the interpretation     Review of Systems was reviewed and confirmed as accurate as documented by the MA.    ASSESSMENT/PLAN:    1. Epigastric pain       I did have a detailed and extensive discussion with the patient in the office today I do not think there is any further intervention that needs to be performed, I have given her wound care instructions, she knows to come back and see me if she has any further problems.    Electronically signed by Moose Abreu MD  06/07/20    Portions of this note has been scribed for Moose Abreu MD by Hannah Jain. 6/7/2020  16:39

## 2020-06-01 NOTE — PROGRESS NOTES
06/01/2020  Chief Complaint   Patient presents with   • Annual Exam     Physical   • GI Problem     Saw Dr. Priest 2 weeks, given Xifaxin but has not had any relief. She has been told by others that bentyl may help. She has never taken bentyl before. She is having lots of diarrhea, if she feels it she has to run immediately run to the bathroom.    • Depression     Pt does not think the Prozac is helping. She is still feeling very down. She would like to have a refill of Ativan today, seems to help a little. Onyl given 30 days on last rx.        Gaby Rasheed is here for her annual preventive exam. History per MA reviewed.     Has taken course of xifaxan and it has not helped, made her nauseated at first. Asks about bentyl for irritable bowel syndrome-d. Will follow up with Dr. Claudio.    Notes still having some drainage from hernia repair incision, grey fluid, upon squeezing.     Anxiety, depression not well controlled on Prozac 60 mg. Has taken Cymbalta, Wellbutrin, etc in the past, was previously followed by psych. She admits to alcohol intake after work, admits if she was not working she'd drink more. Stress worsens her mood, alcohol intake. Has taken acamprosate in the past and felt it helped with alcohol abuse.     Ambien continues to help with insomnia, taken as needed. Has run out of ativan which she takes as needed for anxiety, panic.    Continues levothyroxine for underactive thyroid.    Reports last tetanus booster less than 10 years ago.      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]   • Stress incontinence [N39.3]   • Vulvar cyst [N90.7]   • Hx of hysterectomy 2013 [Z90.710]   • History of Stan-en-Y gastric bypass [Z98.84]   • H/O umbilical hernia repair 2012 [Z98.890, Z87.19]   • Chronic fatigue [R53.82]   • Menopause [Z78.0]   • Sebaceous cyst of labia [N90.7]   • Moderate  "episode of recurrent major depressive disorder (CMS/HCC) [F33.1]   • Nocturia [R35.1]   • Essential hypertension [I10]       Health Maintenance   Topic Date Due   • ZOSTER VACCINE (2 of 3) 07/27/2020 (Originally 7/12/2017)   • TDAP/TD VACCINES (1 - Tdap) 05/30/2021 (Originally 5/7/1975)   • LIPID PANEL  07/19/2020   • INFLUENZA VACCINE  08/01/2020   • ANNUAL PHYSICAL  06/02/2021   • MAMMOGRAM  09/04/2021   • PAP SMEAR  01/07/2022   • COLONOSCOPY  07/14/2027   • HEPATITIS C SCREENING  Completed       Immunization History   Administered Date(s) Administered   • FLUARIX/FLUZONE/AFLURIA/FLULAVAL QUAD 12/16/2019   • Flu Vaccine Quad PF >18YRS 12/16/2016   • Hepatitis A 09/07/2018   • Zostavax 05/17/2017       Review of Systems   Constitutional: Positive for fatigue. Negative for fever.   Respiratory: Negative for cough and shortness of breath.    Cardiovascular: Negative for chest pain.   Gastrointestinal: Positive for abdominal distention and diarrhea.   Psychiatric/Behavioral: Positive for dysphoric mood, depressed mood and stress. The patient is nervous/anxious.    All other systems reviewed and are negative.      The following portions of the patient's history were reviewed and updated as appropriate: allergies, current medications, past family history, past medical history, past social history, past surgical history and problem list.    Objective   Visit Vitals  BP 95/55   Pulse 70   Temp 97.1 °F (36.2 °C) (Temporal)   Resp 18   Ht 160 cm (62.99\")   Wt 64.9 kg (143 lb)   SpO2 98%   BMI 25.34 kg/m²        Physical Exam   Constitutional: She is oriented to person, place, and time. Vital signs are normal. She appears well-developed and well-nourished. She is active.  Non-toxic appearance. She does not have a sickly appearance. She does not appear ill. No distress. Face mask in place. She appears overweight.   HENT:   Head: Normocephalic and atraumatic. Hair is normal.   Right Ear: Hearing, tympanic membrane, external " ear and ear canal normal.   Left Ear: Hearing, tympanic membrane, external ear and ear canal normal.   Eyes: Pupils are equal, round, and reactive to light. Conjunctivae, EOM and lids are normal. No scleral icterus.   Neck: Phonation normal. Neck supple. No tracheal deviation present. No thyromegaly present.   Cardiovascular: Normal rate, regular rhythm and normal heart sounds. Exam reveals no gallop and no friction rub.   No murmur heard.  Pulmonary/Chest: Effort normal and breath sounds normal.   Abdominal: Soft. Bowel sounds are normal. She exhibits no distension and no mass. There is no tenderness.       Several surgical scars across abdomen. No telangiectasias noted.   Musculoskeletal: She exhibits no edema or deformity.   Neurological: She is alert and oriented to person, place, and time. She displays no tremor. No cranial nerve deficit. She exhibits normal muscle tone. Gait normal.   Skin: Skin is warm. Turgor is normal. No rash noted. She is not diaphoretic. No cyanosis. No pallor. Nails show no clubbing.   Psychiatric: Her speech is normal and behavior is normal. Judgment and thought content normal. Her mood appears anxious. Cognition and memory are normal.   Nursing note and vitals reviewed.      Lab Results   Component Value Date    CHOL 246 (H) 12/16/2016    CHLPL 184 07/19/2019    TRIG 140 07/19/2019    HDL 48 07/19/2019     (H) 07/19/2019     Lab Results   Component Value Date    TSH 4.200 05/11/2020     Lab Results   Component Value Date    FREET4 1.46 07/19/2019     No results found for: HGBA1C     Reviewed chart, telephone message from GI 5/12/20 regarding MELD score 11, need to cut back alcohol. Reviewed GI note 5/11/20. Colonoscopy 2019 showed no microscopic colitis (path report). Celiac panel neg 5/11/20. TSH  as above.    Assessment     Problem List Items Addressed This Visit        Other    Moderate episode of recurrent major depressive disorder (CMS/HCC)    Relevant Medications     FLUoxetine (PROzac) 40 MG capsule    LORazepam (Ativan) 0.5 MG tablet    acamprosate (CAMPRAL) 333 MG EC tablet    Other Relevant Orders    Ambulatory Referral to Psychiatry    Anxiety    Relevant Medications    LORazepam (Ativan) 0.5 MG tablet    Other Relevant Orders    Ambulatory Referral to Psychiatry      Other Visit Diagnoses     Annual physical exam    -  Primary    Subclinical hypothyroidism        Relevant Medications    levothyroxine (SYNTHROID, LEVOTHROID) 50 MCG tablet    Alcohol dependence with unspecified alcohol-induced disorder (CMS/HCC)        Relevant Medications    FLUoxetine (PROzac) 40 MG capsule    LORazepam (Ativan) 0.5 MG tablet    acamprosate (CAMPRAL) 333 MG EC tablet    Other Relevant Orders    Ambulatory Referral to Psychiatry    Discharge from wound        Diarrhea, unspecified type            CONTROLLED SUBSTANCE TRACKING 10/21/2019 6/1/2020   Last Andrew 10/21/2019 6/1/2020   Report Number [id number]   --last ativan rx 12/16/19 for 30 pills  --last Ambien rx filled 5/5/20       · Health maintenance information provided with patient plan.   · Counseled on age appropriate health screenings.  · Encourage healthy habits such as exercise, healthy diet.  Patient's Body mass index is 25.34 kg/m². BMI is above normal parameters. Recommendations include: nutrition counseling.  · Stressed she cannot take benzo, Ambien in conjunction with alcohol   · Follow up gastroenterology  · Follow up with surgeon regarding wound drainage   · Trial of acamprosate to help with alcohol abuse.    Return in about 2 months (around 8/1/2020) for follow up.  Referring to Hannah Otero for further evaluation/treatment of mood disorder    Arabella Loving MD

## 2020-06-01 NOTE — PATIENT INSTRUCTIONS
Alcoholic Liver Disease    Alcoholic liver disease is liver damage that is caused by drinking a lot of alcohol for a long time. If you have this disease, you must stop drinking alcohol.  Follow these instructions at home:    · Do not drink alcohol. Follow your treatment plan. Work with your doctor if you need help.  · Think about joining an alcohol support group.  · Take over-the-counter and prescription medicines only as told by your doctor. These include vitamins.  · Do not use medicines or eat foods that have alcohol in them, unless your doctor says that it is safe.  · Follow instructions from your doctor about eating a healthy diet.  · Keep all follow-up visits as told by your doctor. This is important.  Contact a doctor if:  · You get a fever.  · Your skin starts to look more yellow, pale, or dark.  · You get headaches.  Get help right away if:  · You throw up (vomit) blood.  · You have bright red blood in your poop (stool).  · Your poop looks black or looks like tar.  · You have trouble:  ? Thinking.  ? Walking.  ? Balancing.  ? Breathing.  Summary  · Alcoholic liver disease is liver damage that is caused by drinking a lot of alcohol for a long time.  · If you have this disease, you must stop drinking alcohol. Follow your treatment plan, and work with your doctor as needed.  · Think about joining an alcohol support group.  This information is not intended to replace advice given to you by your health care provider. Make sure you discuss any questions you have with your health care provider.  Document Released: 10/15/2010 Document Revised: 04/07/2020 Document Reviewed: 09/07/2018  Elsevier Patient Education © 2020 [employer]    Health Maintenance, Female  Adopting a healthy lifestyle and getting preventive care can go a long way to promote health and wellness. Talk with your health care provider about what schedule of regular examinations is right for you. This is a good chance for you to check in with your  provider about disease prevention and staying healthy.  In between checkups, there are plenty of things you can do on your own. Experts have done a lot of research about which lifestyle changes and preventive measures are most likely to keep you healthy. Ask your health care provider for more information.  Weight and diet  Eat a healthy diet  · Be sure to include plenty of vegetables, fruits, low-fat dairy products, and lean protein.  · Do not eat a lot of foods high in solid fats, added sugars, or salt.  · Get regular exercise. This is one of the most important things you can do for your health.  ? Most adults should exercise for at least 150 minutes each week. The exercise should increase your heart rate and make you sweat (moderate-intensity exercise).  ? Most adults should also do strengthening exercises at least twice a week. This is in addition to the moderate-intensity exercise.     Maintain a healthy weight  · Body mass index (BMI) is a measurement that can be used to identify possible weight problems. It estimates body fat based on height and weight. Your health care provider can help determine your BMI and help you achieve or maintain a healthy weight.  · For females 20 years of age and older:  ? A BMI below 18.5 is considered underweight.  ? A BMI of 18.5 to 24.9 is normal.  ? A BMI of 25 to 29.9 is considered overweight.  ? A BMI of 30 and above is considered obese.     Watch levels of cholesterol and blood lipids  · You should start having your blood tested for lipids and cholesterol at 20 years of age, then have this test every 5 years.  · You may need to have your cholesterol levels checked more often if:  ? Your lipid or cholesterol levels are high.  ? You are older than 50 years of age.  ? You are at high risk for heart disease.     Cancer screening  Lung Cancer  · Lung cancer screening is recommended for adults 55-80 years old who are at high risk for lung cancer because of a history of  smoking.  · A yearly low-dose CT scan of the lungs is recommended for people who:  ? Currently smoke.  ? Have quit within the past 15 years.  ? Have at least a 30-pack-year history of smoking. A pack year is smoking an average of one pack of cigarettes a day for 1 year.  · Yearly screening should continue until it has been 15 years since you quit.  · Yearly screening should stop if you develop a health problem that would prevent you from having lung cancer treatment.     Breast Cancer  · Practice breast self-awareness. This means understanding how your breasts normally appear and feel.  · It also means doing regular breast self-exams. Let your health care provider know about any changes, no matter how small.  · If you are in your 20s or 30s, you should have a clinical breast exam (CBE) by a health care provider every 1-3 years as part of a regular health exam.  · If you are 40 or older, have a CBE every year. Also consider having a breast X-ray (mammogram) every year.  · If you have a family history of breast cancer, talk to your health care provider about genetic screening.  · If you are at high risk for breast cancer, talk to your health care provider about having an MRI and a mammogram every year.  · Breast cancer gene (BRCA) assessment is recommended for women who have family members with BRCA-related cancers. BRCA-related cancers include:  ? Breast.  ? Ovarian.  ? Tubal.  ? Peritoneal cancers.  · Results of the assessment will determine the need for genetic counseling and BRCA1 and BRCA2 testing.     Cervical Cancer  Your health care provider may recommend that you be screened regularly for cancer of the pelvic organs (ovaries, uterus, and vagina). This screening involves a pelvic examination, including checking for microscopic changes to the surface of your cervix (Pap test). You may be encouraged to have this screening done every 3 years, beginning at age 21.  · For women ages 30-65, health care providers may  recommend pelvic exams and Pap testing every 3 years, or they may recommend the Pap and pelvic exam, combined with testing for human papilloma virus (HPV), every 5 years. Some types of HPV increase your risk of cervical cancer. Testing for HPV may also be done on women of any age with unclear Pap test results.  · Other health care providers may not recommend any screening for nonpregnant women who are considered low risk for pelvic cancer and who do not have symptoms. Ask your health care provider if a screening pelvic exam is right for you.  · If you have had past treatment for cervical cancer or a condition that could lead to cancer, you need Pap tests and screening for cancer for at least 20 years after your treatment. If Pap tests have been discontinued, your risk factors (such as having a new sexual partner) need to be reassessed to determine if screening should resume. Some women have medical problems that increase the chance of getting cervical cancer. In these cases, your health care provider may recommend more frequent screening and Pap tests.     Colorectal Cancer  · This type of cancer can be detected and often prevented.  · Routine colorectal cancer screening usually begins at 50 years of age and continues through 75 years of age.  · Your health care provider may recommend screening at an earlier age if you have risk factors for colon cancer.  · Your health care provider may also recommend using home test kits to check for hidden blood in the stool.  · A small camera at the end of a tube can be used to examine your colon directly (sigmoidoscopy or colonoscopy). This is done to check for the earliest forms of colorectal cancer.  · Routine screening usually begins at age 50.  · Direct examination of the colon should be repeated every 5-10 years through 75 years of age. However, you may need to be screened more often if early forms of precancerous polyps or small growths are found.     Skin Cancer  · Check  your skin from head to toe regularly.  · Tell your health care provider about any new moles or changes in moles, especially if there is a change in a mole's shape or color.  · Also tell your health care provider if you have a mole that is larger than the size of a pencil eraser.  · Always use sunscreen. Apply sunscreen liberally and repeatedly throughout the day.  · Protect yourself by wearing long sleeves, pants, a wide-brimmed hat, and sunglasses whenever you are outside.     Heart disease, diabetes, and high blood pressure  · High blood pressure causes heart disease and increases the risk of stroke. High blood pressure is more likely to develop in:  ? People who have blood pressure in the high end of the normal range (130-139/85-89 mm Hg).  ? People who are overweight or obese.  ? People who are .  · If you are 18-39 years of age, have your blood pressure checked every 3-5 years. If you are 40 years of age or older, have your blood pressure checked every year. You should have your blood pressure measured twice--once when you are at a hospital or clinic, and once when you are not at a hospital or clinic. Record the average of the two measurements. To check your blood pressure when you are not at a hospital or clinic, you can use:  ? An automated blood pressure machine at a pharmacy.  ? A home blood pressure monitor.  · If you are between 55 years and 79 years old, ask your health care provider if you should take aspirin to prevent strokes.  · Have regular diabetes screenings. This involves taking a blood sample to check your fasting blood sugar level.  ? If you are at a normal weight and have a low risk for diabetes, have this test once every three years after 45 years of age.  ? If you are overweight and have a high risk for diabetes, consider being tested at a younger age or more often.  Preventing infection  Hepatitis B  · If you have a higher risk for hepatitis B, you should be screened for  this virus. You are considered at high risk for hepatitis B if:  ? You were born in a country where hepatitis B is common. Ask your health care provider which countries are considered high risk.  ? Your parents were born in a high-risk country, and you have not been immunized against hepatitis B (hepatitis B vaccine).  ? You have HIV or AIDS.  ? You use needles to inject street drugs.  ? You live with someone who has hepatitis B.  ? You have had sex with someone who has hepatitis B.  ? You get hemodialysis treatment.  ? You take certain medicines for conditions, including cancer, organ transplantation, and autoimmune conditions.     Hepatitis C  · Blood testing is recommended for:  ? Everyone born from 1945 through 1965.  ? Anyone with known risk factors for hepatitis C.     Sexually transmitted infections (STIs)  · You should be screened for sexually transmitted infections (STIs) including gonorrhea and chlamydia if:  ? You are sexually active and are younger than 24 years of age.  ? You are older than 24 years of age and your health care provider tells you that you are at risk for this type of infection.  ? Your sexual activity has changed since you were last screened and you are at an increased risk for chlamydia or gonorrhea. Ask your health care provider if you are at risk.  · If you do not have HIV, but are at risk, it may be recommended that you take a prescription medicine daily to prevent HIV infection. This is called pre-exposure prophylaxis (PrEP). You are considered at risk if:  ? You are sexually active and do not regularly use condoms or know the HIV status of your partner(s).  ? You take drugs by injection.  ? You are sexually active with a partner who has HIV.     Talk with your health care provider about whether you are at high risk of being infected with HIV. If you choose to begin PrEP, you should first be tested for HIV. You should then be tested every 3 months for as long as you are taking  PrEP.  Pregnancy  · If you are premenopausal and you may become pregnant, ask your health care provider about preconception counseling.  · If you may become pregnant, take 400 to 800 micrograms (mcg) of folic acid every day.  · If you want to prevent pregnancy, talk to your health care provider about birth control (contraception).  Osteoporosis and menopause  · Osteoporosis is a disease in which the bones lose minerals and strength with aging. This can result in serious bone fractures. Your risk for osteoporosis can be identified using a bone density scan.  · If you are 65 years of age or older, or if you are at risk for osteoporosis and fractures, ask your health care provider if you should be screened.  · Ask your health care provider whether you should take a calcium or vitamin D supplement to lower your risk for osteoporosis.  · Menopause may have certain physical symptoms and risks.  · Hormone replacement therapy may reduce some of these symptoms and risks.  Talk to your health care provider about whether hormone replacement therapy is right for you.  Follow these instructions at home:  · Schedule regular health, dental, and eye exams.  · Stay current with your immunizations.  · Do not use any tobacco products including cigarettes, chewing tobacco, or electronic cigarettes.  · If you are pregnant, do not drink alcohol.  · If you are breastfeeding, limit how much and how often you drink alcohol.  · Limit alcohol intake to no more than 1 drink per day for nonpregnant women. One drink equals 12 ounces of beer, 5 ounces of wine, or 1½ ounces of hard liquor.  · Do not use street drugs.  · Do not share needles.  · Ask your health care provider for help if you need support or information about quitting drugs.  · Tell your health care provider if you often feel depressed.  · Tell your health care provider if you have ever been abused or do not feel safe at home.  This information is not intended to replace advice given  to you by your health care provider. Make sure you discuss any questions you have with your health care provider.  Document Released: 07/02/2012 Document Revised: 05/25/2017 Document Reviewed: 09/20/2016  [employer]vier Interactive Patient Education © 2018 [employer]

## 2020-06-04 ENCOUNTER — TELEPHONE (OUTPATIENT)
Dept: GASTROENTEROLOGY | Facility: CLINIC | Age: 55
End: 2020-06-04

## 2020-06-04 NOTE — TELEPHONE ENCOUNTER
called to inform you that she is not feeling any better since completing the xifaxan. She is still having issues with diarrhea, mulitiple times a day and n/v. She denies any fever or abdominal pain. Please Advise.  Thank you  Marquita

## 2020-06-05 ENCOUNTER — TRANSCRIBE ORDERS (OUTPATIENT)
Dept: LAB | Facility: HOSPITAL | Age: 55
End: 2020-06-05

## 2020-06-05 ENCOUNTER — LAB (OUTPATIENT)
Dept: LAB | Facility: HOSPITAL | Age: 55
End: 2020-06-05

## 2020-06-05 DIAGNOSIS — Z01.818 PRE-OP TESTING: ICD-10-CM

## 2020-06-05 DIAGNOSIS — Z01.818 PRE-OP TESTING: Primary | ICD-10-CM

## 2020-06-05 PROCEDURE — U0002 COVID-19 LAB TEST NON-CDC: HCPCS

## 2020-06-05 PROCEDURE — U0004 COV-19 TEST NON-CDC HGH THRU: HCPCS

## 2020-06-05 PROCEDURE — C9803 HOPD COVID-19 SPEC COLLECT: HCPCS

## 2020-06-06 LAB
REF LAB TEST METHOD: NORMAL
SARS-COV-2 RNA RESP QL NAA+PROBE: NOT DETECTED

## 2020-06-08 ENCOUNTER — LAB REQUISITION (OUTPATIENT)
Dept: LAB | Facility: HOSPITAL | Age: 55
End: 2020-06-08

## 2020-06-08 ENCOUNTER — OUTSIDE FACILITY SERVICE (OUTPATIENT)
Dept: GASTROENTEROLOGY | Facility: CLINIC | Age: 55
End: 2020-06-08

## 2020-06-08 DIAGNOSIS — R19.7 DIARRHEA, UNSPECIFIED: ICD-10-CM

## 2020-06-08 DIAGNOSIS — R11.0 NAUSEA: ICD-10-CM

## 2020-06-08 PROCEDURE — 88305 TISSUE EXAM BY PATHOLOGIST: CPT | Performed by: INTERNAL MEDICINE

## 2020-06-08 PROCEDURE — 43239 EGD BIOPSY SINGLE/MULTIPLE: CPT | Performed by: INTERNAL MEDICINE

## 2020-06-09 LAB
CYTO UR: NORMAL
LAB AP CASE REPORT: NORMAL
LAB AP CLINICAL INFORMATION: NORMAL
PATH REPORT.FINAL DX SPEC: NORMAL
PATH REPORT.GROSS SPEC: NORMAL

## 2020-06-12 ENCOUNTER — TELEPHONE (OUTPATIENT)
Dept: GASTROENTEROLOGY | Facility: CLINIC | Age: 55
End: 2020-06-12

## 2020-06-12 NOTE — TELEPHONE ENCOUNTER
----- Message from Harlan Claudio MD sent at 6/11/2020  9:12 AM EDT -----  Let Ms. Rasheed know that the biopsies were negative for any enteritis or celiac disease.  If she is still experiencing some issues with diarrhea can consider a course of IBgard 2 by mouth twice daily which is available over-the-counter or Lotronex which is a prescription medication.  Thank you,  MARY

## 2020-06-18 ENCOUNTER — OFFICE VISIT (OUTPATIENT)
Dept: BEHAVIORAL HEALTH | Facility: CLINIC | Age: 55
End: 2020-06-18

## 2020-06-18 VITALS
BODY MASS INDEX: 25.69 KG/M2 | DIASTOLIC BLOOD PRESSURE: 60 MMHG | SYSTOLIC BLOOD PRESSURE: 100 MMHG | OXYGEN SATURATION: 98 % | HEIGHT: 63 IN | TEMPERATURE: 97.7 F | RESPIRATION RATE: 16 BRPM | WEIGHT: 145 LBS | HEART RATE: 82 BPM

## 2020-06-18 DIAGNOSIS — F41.9 ANXIETY: Primary | ICD-10-CM

## 2020-06-18 DIAGNOSIS — F33.0 MAJOR DEPRESSIVE DISORDER, RECURRENT EPISODE, MILD (HCC): ICD-10-CM

## 2020-06-18 PROCEDURE — 90792 PSYCH DIAG EVAL W/MED SRVCS: CPT | Performed by: NURSE PRACTITIONER

## 2020-06-18 RX ORDER — DESVENLAFAXINE 50 MG/1
50 TABLET, EXTENDED RELEASE ORAL DAILY
Qty: 30 TABLET | Refills: 2 | Status: SHIPPED | OUTPATIENT
Start: 2020-06-18 | End: 2020-07-23 | Stop reason: DRUGHIGH

## 2020-06-18 NOTE — PROGRESS NOTES
Patient Name: Gaby Rasheed  MRN: [id number]   :  1965     Referring Physician: Arabella Loving MD    Chief Complaint:     ICD-10-CM ICD-9-CM   1. Anxiety F41.9 300.00   2. Major depressive disorder, recurrent episode, mild (CMS/HCC) F33.0 296.31       HPI:   Gaby Rasheed is a 55 y.o. female who is here today for initial evaluation of Anxiety  and Depression.  Patient states she feels Prozac is not working.  Patient has been on other medications as well including Lamictal, Celexa, Cymbalta, and Zoloft.  Patient does not feel any of the medications have helped.  States she feels anxiety is the primary concern at this time.  Patient states drinking has decreased considerably.  States she only drinks when she is off and it is less than 2-3 drinks a day.      Past Medical History:   Past Medical History:   Diagnosis Date   • Acid reflux    • Anxiety    • Back pain    • Depression    • Fractures    • Hernia of abdominal cavity    • High cholesterol    • Hypertension    • Irritable bowel syndrome    • Nocturia 10/5/2016   • Positive TB test    • Sinus problem    • ELINA (stress urinary incontinence, female) 10/5/2016       Past Surgical History:   Past Surgical History:   Procedure Laterality Date   •  SECTION     • COLONOSCOPY     • GASTRIC BYPASS     • HERNIA REPAIR     • HYSTERECTOMY     • LAPAROSCOPIC TUBAL LIGATION     • TOTAL ABDOMINAL HYSTERECTOMY WITH SALPINGO OOPHORECTOMY      uterine prolapse       Social History:   Social History     Socioeconomic History   • Marital status:      Spouse name: Not on file   • Number of children: Not on file   • Years of education: Not on file   • Highest education level: Not on file   Tobacco Use   • Smoking status: Never Smoker   • Smokeless tobacco: Never Used   Substance and Sexual Activity   • Alcohol use: Yes     Alcohol/week: 14.0 standard drinks     Types: 14 Glasses of wine per week   • Drug use: No   • Sexual activity:  Defer       Family History:  Family History   Problem Relation Age of Onset   • Hyperlipidemia Mother    • Arthritis Mother    • Diabetes Father    • Hypertension Father    • Arthritis Paternal Grandmother    • Breast cancer Neg Hx    • Ovarian cancer Neg Hx        Allergy:  No Known Allergies    Current Medications:   Current Outpatient Medications   Medication Sig Dispense Refill   • acamprosate (CAMPRAL) 333 MG EC tablet Take 2 tablets by mouth 3 (Three) Times a Day. 90 tablet 2   • carvedilol (COREG) 12.5 MG tablet Take 1 tablet by mouth 2 (Two) Times a Day With Meals. 180 tablet 3   • chlorthalidone (HYGROTON) 25 MG tablet Take 1 tablet by mouth Daily. 90 tablet 3   • esomeprazole (nexIUM) 40 MG capsule Take 1 capsule by mouth Daily. 30 capsule 1   • levothyroxine (SYNTHROID, LEVOTHROID) 50 MCG tablet Take 1 tablet by mouth Daily. 90 tablet 3   • LORazepam (Ativan) 0.5 MG tablet Take 1 tablet by mouth Daily As Needed for Anxiety (panic attack). 30 tablet 0   • Multiple Vitamins-Minerals (MULTIVITAMIN ADULT PO) Take  by mouth.     • zolpidem (AMBIEN) 10 MG tablet TAKE 1 TABLET BY MOUTH AT BEDTIME AS NEEDED FOR SLEEP 30 tablet 2   • desvenlafaxine ER (KHEDEZLA) 50 MG tablet sustained-release 24 hour 24 hr tablet Take 1 tablet by mouth Daily. 30 tablet 2     No current facility-administered medications for this visit.        Lab Results:   Lab Requisition on 06/08/2020   Component Date Value Ref Range Status   • Case Report 06/08/2020    Final                    Value:Surgical Pathology Report                         Case: [id number]                                  Authorizing Provider:  Harlan Claudio MD    Collected:           06/08/2020 09:20 AM          Ordering Location:     [facility]   Received:            06/08/2020 11:31 AM                                 LABORATORY                                                                   Pathologist:           Alexis Ha MD                                                            Specimens:   1) - Small Intestine, Jejunum                                                                       2) - Gastric, pouch                                                                                 3) - Esophagus, Distal                                                                    • Clinical Information 06/08/2020    Final                    Value:This result contains rich text formatting which cannot be displayed here.   • Final Diagnosis 06/08/2020    Final                    Value:This result contains rich text formatting which cannot be displayed here.   • Gross Description 06/08/2020    Final                    Value:This result contains rich text formatting which cannot be displayed here.   • Microscopic Description 06/08/2020    Final                    Value:This result contains rich text formatting which cannot be displayed here.   Lab on 06/05/2020   Component Date Value Ref Range Status   • Reference Lab Report 06/05/2020 See Scanned Report   Final   • COVID19 06/05/2020 Not Detected  Not Detected - Ref. Range Final   Lab on 05/11/2020   Component Date Value Ref Range Status   • TSH 05/11/2020 4.200  0.270 - 4.200 uIU/mL Final    TSH results may be falsely decreased if patient taking Biotin.   • T Uptake 05/11/2020 0.93  0.80 - 1.30 TBI Final   • T4, Total 05/11/2020 10.10  4.50 - 11.70 mcg/dL Final    T4 results may be falsely increased if patient taking Biotin.   • IgA 05/11/2020 513* 87 - 352 mg/dL Final   • Gliadin Deamidated Peptide Ab, IgA 05/11/2020 8  0 - 19 units Final                       Negative                   0 - 19                     Weak Positive             20 - 30                     Moderate to Strong Positive   >30   • Deaminated Gliadin Ab IgG 05/11/2020 2  0 - 19 units Final                       Negative                   0 - 19                     Weak Positive             20 - 30                     Moderate to  Strong Positive   >30   • Tissue Transglutaminase IgA 05/11/2020 <2  0 - 3 U/mL Final                                  Negative        0 -  3                                Weak Positive   4 - 10                                Positive           >10   Tissue Transglutaminase (tTG) has been identified   as the endomysial antigen.  Studies have demonstr-   ated that endomysial IgA antibodies have over 99%   specificity for gluten sensitive enteropathy.   • Tissue Transglutaminase IgG 05/11/2020 <2  0 - 5 U/mL Final                                  Negative        0 - 5                                Weak Positive   6 - 9                                Positive           >9   Office Visit on 05/11/2020   Component Date Value Ref Range Status   • Glucose 05/11/2020 83  65 - 99 mg/dL Final   • BUN 05/11/2020 10  6 - 20 mg/dL Final   • Creatinine 05/11/2020 0.70  0.57 - 1.00 mg/dL Final   • Sodium 05/11/2020 139  136 - 145 mmol/L Final   • Potassium 05/11/2020 3.8  3.5 - 5.2 mmol/L Final   • Chloride 05/11/2020 103  98 - 107 mmol/L Final   • CO2 05/11/2020 25.1  22.0 - 29.0 mmol/L Final   • Calcium 05/11/2020 8.7  8.6 - 10.5 mg/dL Final   • Total Protein 05/11/2020 5.9* 6.0 - 8.5 g/dL Final   • Albumin 05/11/2020 3.10* 3.50 - 5.20 g/dL Final   • ALT (SGPT) 05/11/2020 30  1 - 33 U/L Final   • AST (SGOT) 05/11/2020 59* 1 - 32 U/L Final   • Alkaline Phosphatase 05/11/2020 144* 39 - 117 U/L Final   • Total Bilirubin 05/11/2020 1.6* 0.2 - 1.2 mg/dL Final   • eGFR Non African Amer 05/11/2020 87  >60 mL/min/1.73 Final   • Globulin 05/11/2020 2.8  gm/dL Final   • A/G Ratio 05/11/2020 1.1  g/dL Final   • BUN/Creatinine Ratio 05/11/2020 14.3  7.0 - 25.0 Final   • Anion Gap 05/11/2020 10.9  5.0 - 15.0 mmol/L Final   • Protime 05/11/2020 15.3* 11.5 - 14.0 Seconds Final   • INR 05/11/2020 1.23* 0.85 - 1.16 Final       Review of Symptoms:   Review of Systems   Constitutional: Positive for activity change. Negative for appetite change,  fatigue, unexpected weight gain and unexpected weight loss.   Respiratory: Negative for shortness of breath and wheezing.    Gastrointestinal: Positive for constipation and diarrhea. Negative for nausea and vomiting.   Musculoskeletal: Negative for gait problem.   Skin: Negative for dry skin and rash.   Neurological: Negative for dizziness, speech difficulty, weakness, light-headedness, headache, memory problem and confusion.   Psychiatric/Behavioral: Positive for decreased concentration, dysphoric mood, sleep disturbance, depressed mood and stress. Negative for agitation, behavioral problems, hallucinations, self-injury, suicidal ideas and negative for hyperactivity. The patient is nervous/anxious.        Physical Exam:   Physical Exam   Constitutional: She is oriented to person, place, and time. She appears well-developed and well-nourished. She is cooperative. No distress.   HENT:   Head: Normocephalic and atraumatic.   Eyes: Conjunctivae are normal.   Neck: Normal range of motion and full passive range of motion without pain.   Cardiovascular: Normal rate.   Pulmonary/Chest: Effort normal. No respiratory distress.   Musculoskeletal: Normal range of motion.   Neurological: She is alert and oriented to person, place, and time.   Skin: Skin is warm, dry and intact. She is not diaphoretic.   Psychiatric: Her speech is normal and behavior is normal. Judgment and thought content normal. Her mood appears anxious. Her affect is not angry, not blunt, not labile and not inappropriate. She is not agitated, not aggressive, not hyperactive, not slowed, not withdrawn and not combative. Thought content is not paranoid and not delusional. Cognition and memory are normal. She exhibits a depressed mood. She expresses no homicidal and no suicidal ideation. She expresses no suicidal plans and no homicidal plans.   Nursing note and vitals reviewed.    Blood pressure 100/60, pulse 82, temperature 97.7 °F (36.5 °C), resp. rate 16,  "height 160 cm (63\"), weight 65.8 kg (145 lb), SpO2 98 %, not currently breastfeeding.  Body mass index is 25.69 kg/m².     Mental Status Exam:   Appearance: appropriate  Hygiene:   good  Cooperation:  Cooperative  Eye Contact:  Good  Psychomotor Behavior:  Appropriate  Mood:depressed  Affect:  Full range  Hopelessness: 3  Speech:  Normal  Thought Process:  Goal directed  Thought Content:  Normal  Suicidal:  None  Homicidal:  None  Hallucinations:  None  Delusion:  None  Memory:  Intact  Orientation:  Person, Place and Time  Reliability:  good  Insight:  Good  Judgement:  Good  Impulse Control:  Good  Physical/Medical Issues:  No     PHQ-9 Total Score: 15     Assessment/Plan:   Diagnoses and all orders for this visit:    Anxiety  -     desvenlafaxine ER (KHEDEZLA) 50 MG tablet sustained-release 24 hour 24 hr tablet; Take 1 tablet by mouth Daily.    Major depressive disorder, recurrent episode, mild (CMS/HCC)  -     desvenlafaxine ER (KHEDEZLA) 50 MG tablet sustained-release 24 hour 24 hr tablet; Take 1 tablet by mouth Daily.        A psychological evaluation was conducted in order to assess past and current level of functioning. Areas assessed included, but were not limited to: perception of social support, perception of ability to face and deal with challenges in life (positive functioning), anxiety symptoms, depressive symptoms, perspective on beliefs/belief system, coping skills for stress, intelligence level,  Therapeutic rapport was established. Interventions conducted today were geared towards incorporating medication management along with support for continued therapy. Education was also provided as to the med management with this provider and what to expect in subsequent sessions.    We discussed risks, benefits,goals and side effects of the above medication and the patient was agreeable with the plan.Patient was educated on the importance of compliance with treatment and follow-up appointments. Patient is " aware to contact the [facility] with any worsening of symptoms. To call for questions or concerns and return early if necessary. Patent is agreeable to go to the Emergency Department or call 911 should they begin SI/HI.     Treatment Plan:   Discussed risks, benefits, and alternatives of medication. Encouraged healthy habits (eating, exercise and sleep). Call if any questions or problems arise. Medication reconciled. Controlled substance monitoring report reviewed. Provided psychoeducation.. Discussed coping strategies and current stressors. Set appropriate boundaries and limits for patient's well-being. Use distraction techniques to improve symptoms. Access support networks.      Return in about 4 weeks (around 7/16/2020) for Medication recheck.    Hannah Otero, APRN

## 2020-06-24 ENCOUNTER — TELEPHONE (OUTPATIENT)
Dept: GASTROENTEROLOGY | Facility: CLINIC | Age: 55
End: 2020-06-24

## 2020-06-24 DIAGNOSIS — K58.0 IRRITABLE BOWEL SYNDROME WITH DIARRHEA: Primary | ICD-10-CM

## 2020-06-24 RX ORDER — ALOSETRON HYDROCHLORIDE 0.5 MG/1
TABLET, FILM COATED ORAL
Qty: 30 TABLET | Refills: 1 | Status: SHIPPED | OUTPATIENT
Start: 2020-06-24 | End: 2020-07-17 | Stop reason: HOSPADM

## 2020-06-24 NOTE — TELEPHONE ENCOUNTER
Ms. Rasheed called to inform you she has tried the IBGard and is not working and would like the RX you suggested for the Lotronex please.  Thank you  Marquita

## 2020-07-13 ENCOUNTER — TELEPHONE (OUTPATIENT)
Dept: INTERNAL MEDICINE | Facility: CLINIC | Age: 55
End: 2020-07-13

## 2020-07-13 NOTE — TELEPHONE ENCOUNTER
Patient called and stated she understood she would have to call her other provider for her gallbladder imaging. The patient would like to know if it is possible if when she comes in the office for appointment on 07/14/2020 at 8:15 if she could have an ultrasound of her legs scheduled. She states they are swollen like there is clot or something. Please advise.     Patient call back [phone]

## 2020-07-13 NOTE — TELEPHONE ENCOUNTER
Left message for pt to contact Dr. Claudio's office for the US since he is her regular GI doctor and cares for her other GI symptoms.

## 2020-07-13 NOTE — TELEPHONE ENCOUNTER
Patient states that she would like to have an US of her Gallbladder for her IBS.  She can be reached at [phone]

## 2020-07-14 ENCOUNTER — LAB (OUTPATIENT)
Dept: LAB | Facility: HOSPITAL | Age: 55
End: 2020-07-14

## 2020-07-14 ENCOUNTER — OFFICE VISIT (OUTPATIENT)
Dept: INTERNAL MEDICINE | Facility: CLINIC | Age: 55
End: 2020-07-14

## 2020-07-14 ENCOUNTER — HOSPITAL ENCOUNTER (OUTPATIENT)
Dept: ULTRASOUND IMAGING | Facility: HOSPITAL | Age: 55
Discharge: HOME | End: 2020-07-14
Admitting: NURSE PRACTITIONER

## 2020-07-14 VITALS
OXYGEN SATURATION: 100 % | HEIGHT: 63 IN | TEMPERATURE: 97.7 F | SYSTOLIC BLOOD PRESSURE: 127 MMHG | BODY MASS INDEX: 26.22 KG/M2 | HEART RATE: 70 BPM | WEIGHT: 148 LBS | DIASTOLIC BLOOD PRESSURE: 56 MMHG

## 2020-07-14 DIAGNOSIS — K74.60 CIRRHOSIS OF LIVER WITH ASCITES, UNSPECIFIED HEPATIC CIRRHOSIS TYPE (HCC): ICD-10-CM

## 2020-07-14 DIAGNOSIS — K70.9 ALCOHOL LIVER DAMAGE (HCC): ICD-10-CM

## 2020-07-14 DIAGNOSIS — I10 ESSENTIAL HYPERTENSION: ICD-10-CM
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
## 2020-07-14 DIAGNOSIS — I83.893 VARICOSE VEINS OF LOWER EXTREMITY WITH EDEMA, BILATERAL: ICD-10-CM

## 2020-07-14 DIAGNOSIS — F33.1 MODERATE EPISODE OF RECURRENT MAJOR DEPRESSIVE DISORDER (HCC): ICD-10-CM

## 2020-07-14 DIAGNOSIS — M79.662 PAIN AND SWELLING OF LEFT LOWER LEG: Primary | ICD-10-CM

## 2020-07-14 DIAGNOSIS — R18.8 CIRRHOSIS OF LIVER WITH ASCITES, UNSPECIFIED HEPATIC CIRRHOSIS TYPE (HCC): ICD-10-CM

## 2020-07-14 DIAGNOSIS — M79.89 PAIN AND SWELLING OF LEFT LOWER LEG: Primary | ICD-10-CM

## 2020-07-14 LAB
ALBUMIN SERPL-MCNC: 2.7 G/DL (ref 3.5–5.2)
ALBUMIN/GLOB SERPL: 0.8 G/DL
ALP SERPL-CCNC: 292 U/L (ref 39–117)
ALT SERPL W P-5'-P-CCNC: 28 U/L (ref 1–33)
ANION GAP SERPL CALCULATED.3IONS-SCNC: 11.2 MMOL/L (ref 5–15)
AST SERPL-CCNC: 92 U/L (ref 1–32)
BILIRUB SERPL-MCNC: 5 MG/DL (ref 0–1.2)
BUN SERPL-MCNC: 7 MG/DL (ref 6–20)
BUN/CREAT SERPL: 9 (ref 7–25)
CALCIUM SPEC-SCNC: 8.4 MG/DL (ref 8.6–10.5)
CHLORIDE SERPL-SCNC: 75 MMOL/L (ref 98–107)
CO2 SERPL-SCNC: 33.8 MMOL/L (ref 22–29)
CREAT SERPL-MCNC: 0.78 MG/DL (ref 0.57–1)
GFR SERPL CREATININE-BSD FRML MDRD: 76 ML/MIN/1.73
GLOBULIN UR ELPH-MCNC: 3.4 GM/DL
GLUCOSE SERPL-MCNC: 88 MG/DL (ref 65–99)
NT-PROBNP SERPL-MCNC: 1093 PG/ML (ref 0–900)
POTASSIUM SERPL-SCNC: 2.8 MMOL/L (ref 3.5–5.2)
PROT SERPL-MCNC: 6.1 G/DL (ref 6–8.5)
SODIUM SERPL-SCNC: 120 MMOL/L (ref 136–145)

## 2020-07-14 PROCEDURE — 80053 COMPREHEN METABOLIC PANEL: CPT | Performed by: NURSE PRACTITIONER

## 2020-07-14 PROCEDURE — 36415 COLL VENOUS BLD VENIPUNCTURE: CPT | Performed by: NURSE PRACTITIONER

## 2020-07-14 PROCEDURE — 83880 ASSAY OF NATRIURETIC PEPTIDE: CPT | Performed by: NURSE PRACTITIONER

## 2020-07-14 PROCEDURE — 99214 OFFICE O/P EST MOD 30 MIN: CPT | Performed by: NURSE PRACTITIONER

## 2020-07-14 PROCEDURE — 93971 EXTREMITY STUDY: CPT

## 2020-07-14 RX ORDER — DESVENLAFAXINE SUCCINATE 50 MG/1
TABLET, EXTENDED RELEASE ORAL DAILY
Status: ON HOLD | COMMUNITY
Start: 2020-06-18 | End: 2020-07-15

## 2020-07-14 NOTE — PROGRESS NOTES
Date: 2020    Name: Gaby Rasheed  : 1965    Chief Complaint:   Chief Complaint   Patient presents with   • Leg Pain     wanting a US pt states that she has knots in both legs and swelling.        HPI:  Gaby Rasheed is a 55 y.o. female presents with bilateral leg edema that has been present for 2 weeks.  Describes an area on her left inner thigh that was hot to touch, was very red, tender.  Pain and redness have improved, however combined with lower extremity edema she is very concerned about a blood clot.  She has a history of varicosities in bilateral lower extremities.  Has worn compression hose in the past, is not currently wearing them during the day.  No change in diet or exercise.  She is drinking plenty of water, reports she drinks 1-2 Gatorade's every day; her appetite is poor & she has frequent diarrhea, she feels she needs to ensure her electrolytes are replenished.  She sees Dr. Claudio in [place] for IBS, states he is aware of her decreased appetite since she began taking alosetron 0.5 mg 2 months ago.  Drinks 1-2 alcoholic beverages after work; h/o alcohol damage of liver.  Denies shortness of breath, palpitations, headaches, chest pain, orthopnea, swelling of abdomen, decreased urine output.  She has stopped taking Prozac, is taking desvenlafaxine 50 mg as prescribed for depression.  When taking both Prozac and desvenlafaxine together, states she felt like a zombie; stopped taking it after weaning for 3 to 4 days.  Currently denies SI, HI, decreased concentration, increased fatigue, difficulty sleeping.    She has noted a mosquito bite on her right ankle for the past week or longer.  States she is allergic to mosquitos.  Tongue of her shoe rubs against the site, it remains open and itches.  She has been applying neosporin once a day.  Denies fever, chills, drainage from site, bleeding.    History:  The following portions of the patient's history were reviewed and  "updated as appropriate: allergies, current medications, past medical history, family history, surgical history, social history and problem list.     ROS:  Review of Systems   Constitutional: Negative.    Respiratory: Negative.    Musculoskeletal: Negative for myalgias.   Neurological: Negative for dizziness.       VS:  Vitals:    07/14/20 0811   BP: 127/56   Pulse: 70   Temp: 97.7 °F (36.5 °C)   TempSrc: Temporal   SpO2: 100%   Weight: 67.1 kg (148 lb)   Height: 160 cm (62.99\")     Body mass index is 26.23 kg/m².    PE:  Physical Exam   Constitutional: She is oriented to person, place, and time. She appears well-developed and well-nourished.   HENT:   Head: Normocephalic.   Right Ear: External ear normal.   Left Ear: External ear normal.   Eyes: Pupils are equal, round, and reactive to light. Conjunctivae are normal.   Neck: Normal range of motion. Neck supple.   Cardiovascular: Normal rate, regular rhythm, normal heart sounds and intact distal pulses.   +3 edema bilateral lower extremities.  No palpable mass/knot on left upper thigh at this time.  Slight rubor of bilateral ankles.   Pulmonary/Chest: Effort normal and breath sounds normal.   Neurological: She is alert and oriented to person, place, and time.   Skin: Skin is warm. Capillary refill takes less than 2 seconds.   Excoriated insect bite on right posterior ankle.   Psychiatric: She has a normal mood and affect. Her behavior is normal.       Assessment/Plan:  Gaby was seen today for leg pain.    Diagnoses and all orders for this visit:    Pain and swelling of left lower leg  -     US Venous Doppler Lower Extremity Left (duplex)    Varicose veins of lower extremity with edema, bilateral  -     Comprehensive Metabolic Panel  -     BNP ([facility] Only)  - Wear compression stockings daily    Mosquito bite, initial encounter  -     mupirocin (Bactroban) 2 % ointment; Apply  topically to the appropriate area as directed 2 (Two) Times a Day for 5 days. (Patient " taking differently: Apply  topically to the appropriate area as directed 2 (Two) Times a Day. Per pharmacy, patient has not picked up yet, but it is ready for her. Patient reports having not started taking this medicat)    Moderate episode of recurrent major depressive disorder (CMS/HCC)         - Continue taking medications as prescribed         - Referred to PM[facility]NP previously. Appointment scheduled        - Encouraged to take part in daily physical exercise.          - Eat healthy, well balanced diet; avoid sugary foods or beverages        - Limit alcohol intake        - Ensure good night's sleep by creating calm space in bedroom, avoiding screen time 1-2 hours before bed, no caffeine after 5 pm        - Talk to supportive family and friends, as needed  Essential hypertension        - Follow heart healthy diet.  Advised to reduce daily sodium intake to < 1500 mg per day.  Avoid processed & fast foods.        - Exercise as tolerated, with a goal of 30 minutes of moderate exercise most days.         - Take medications as prescribed.  Alcohol liver damage (CMS/HCC)  Cirrhosis of liver with ascites, unspecified hepatic cirrhosis type (CMS/HCC)        - Abstain from alcohol.  Avoid medications that are hepatotoxic, such as acetaminophen      Return for Next scheduled follow up on 8/3/20 w/Dr Loving

## 2020-07-15 ENCOUNTER — HOSPITAL ENCOUNTER (INPATIENT)
Facility: HOSPITAL | Age: 55
LOS: 2 days | Discharge: HOME | End: 2020-07-17
Attending: EMERGENCY MEDICINE | Admitting: EMERGENCY MEDICINE

## 2020-07-15 ENCOUNTER — APPOINTMENT (OUTPATIENT)
Dept: CT IMAGING | Facility: HOSPITAL | Age: 55
End: 2020-07-15

## 2020-07-15 ENCOUNTER — TELEPHONE (OUTPATIENT)
Dept: INTERNAL MEDICINE | Facility: CLINIC | Age: 55
End: 2020-07-15

## 2020-07-15 ENCOUNTER — APPOINTMENT (OUTPATIENT)
Dept: GENERAL RADIOLOGY | Facility: HOSPITAL | Age: 55
End: 2020-07-15

## 2020-07-15 DIAGNOSIS — E87.6 HYPOKALEMIA: ICD-10-CM

## 2020-07-15 DIAGNOSIS — R18.8 CIRRHOSIS OF LIVER WITH ASCITES, UNSPECIFIED HEPATIC CIRRHOSIS TYPE (HCC): ICD-10-CM

## 2020-07-15 DIAGNOSIS — E87.1 HYPONATREMIA: Primary | ICD-10-CM

## 2020-07-15 DIAGNOSIS — K74.60 CIRRHOSIS OF LIVER WITH ASCITES, UNSPECIFIED HEPATIC CIRRHOSIS TYPE (HCC): ICD-10-CM

## 2020-07-15 LAB
ADV 40+41 DNA STL QL NAA+NON-PROBE: NOT DETECTED
ALBUMIN SERPL-MCNC: 2.9 G/DL (ref 3.5–5.2)
ALBUMIN/GLOB SERPL: 0.9 G/DL
ALP SERPL-CCNC: 294 U/L (ref 39–117)
ALT SERPL W P-5'-P-CCNC: 26 U/L (ref 1–33)
AMPHET+METHAMPHET UR QL: NEGATIVE
AMPHETAMINES UR QL: NEGATIVE
ANION GAP SERPL CALCULATED.3IONS-SCNC: 10.5 MMOL/L (ref 5–15)
ANION GAP SERPL CALCULATED.3IONS-SCNC: 11.6 MMOL/L (ref 5–15)
ANION GAP SERPL CALCULATED.3IONS-SCNC: 12.9 MMOL/L (ref 5–15)
AST SERPL-CCNC: 87 U/L (ref 1–32)
ASTRO TYP 1-8 RNA STL QL NAA+NON-PROBE: NOT DETECTED
BACTERIA UR QL AUTO: ABNORMAL /HPF
BARBITURATES UR QL SCN: NEGATIVE
BASOPHILS # BLD AUTO: 0.06 10*3/MM3 (ref 0–0.2)
BASOPHILS NFR BLD AUTO: 0.5 % (ref 0–1.5)
BENZODIAZ UR QL SCN: POSITIVE
BILIRUB SERPL-MCNC: 5.1 MG/DL (ref 0–1.2)
BILIRUB UR QL STRIP: NEGATIVE
BUN SERPL-MCNC: 6 MG/DL (ref 6–20)
BUN SERPL-MCNC: 7 MG/DL (ref 6–20)
BUN SERPL-MCNC: 7 MG/DL (ref 6–20)
BUN/CREAT SERPL: 9 (ref 7–25)
BUN/CREAT SERPL: 9.2 (ref 7–25)
BUN/CREAT SERPL: 9.9 (ref 7–25)
BUPRENORPHINE SERPL-MCNC: NEGATIVE NG/ML
C CAYETANENSIS DNA STL QL NAA+NON-PROBE: NOT DETECTED
CALCIUM SPEC-SCNC: 7.5 MG/DL (ref 8.6–10.5)
CALCIUM SPEC-SCNC: 7.7 MG/DL (ref 8.6–10.5)
CALCIUM SPEC-SCNC: 8.1 MG/DL (ref 8.6–10.5)
CAMPY SP DNA.DIARRHEA STL QL NAA+PROBE: NOT DETECTED
CANNABINOIDS SERPL QL: NEGATIVE
CHLORIDE SERPL-SCNC: 76 MMOL/L (ref 98–107)
CHLORIDE SERPL-SCNC: 81 MMOL/L (ref 98–107)
CHLORIDE SERPL-SCNC: 83 MMOL/L (ref 98–107)
CLARITY UR: ABNORMAL
CO2 SERPL-SCNC: 30.4 MMOL/L (ref 22–29)
CO2 SERPL-SCNC: 30.5 MMOL/L (ref 22–29)
CO2 SERPL-SCNC: 32.1 MMOL/L (ref 22–29)
COCAINE UR QL: NEGATIVE
COLOR UR: YELLOW
CREAT SERPL-MCNC: 0.67 MG/DL (ref 0.57–1)
CREAT SERPL-MCNC: 0.71 MG/DL (ref 0.57–1)
CREAT SERPL-MCNC: 0.76 MG/DL (ref 0.57–1)
CRYPTOSP STL CULT: NOT DETECTED
DEPRECATED RDW RBC AUTO: 59 FL (ref 37–54)
E COLI DNA SPEC QL NAA+PROBE: NOT DETECTED
E HISTOLYT AG STL-ACNC: NOT DETECTED
EAEC PAA PLAS AGGR+AATA ST NAA+NON-PRB: NOT DETECTED
EC STX1 + STX2 GENES STL NAA+PROBE: NOT DETECTED
EOSINOPHIL # BLD AUTO: 0.04 10*3/MM3 (ref 0–0.4)
EOSINOPHIL NFR BLD AUTO: 0.3 % (ref 0.3–6.2)
EPEC EAE GENE STL QL NAA+NON-PROBE: NOT DETECTED
ERYTHROCYTE [DISTWIDTH] IN BLOOD BY AUTOMATED COUNT: 16.1 % (ref 12.3–15.4)
ETEC LTA+ST1A+ST1B TOX ST NAA+NON-PROBE: NOT DETECTED
ETHANOL BLD-MCNC: 85 MG/DL (ref 0–10)
ETHANOL UR QL: 0.09 %
G LAMBLIA DNA SPEC QL NAA+PROBE: NOT DETECTED
GFR SERPL CREATININE-BSD FRML MDRD: 79 ML/MIN/1.73
GFR SERPL CREATININE-BSD FRML MDRD: 85 ML/MIN/1.73
GFR SERPL CREATININE-BSD FRML MDRD: 91 ML/MIN/1.73
GLOBULIN UR ELPH-MCNC: 3.4 GM/DL
GLUCOSE SERPL-MCNC: 107 MG/DL (ref 65–99)
GLUCOSE SERPL-MCNC: 79 MG/DL (ref 65–99)
GLUCOSE SERPL-MCNC: 81 MG/DL (ref 65–99)
GLUCOSE UR STRIP-MCNC: NEGATIVE MG/DL
HCT VFR BLD AUTO: 29.4 % (ref 34–46.6)
HEMOCCULT STL QL: NEGATIVE
HGB BLD-MCNC: 10.5 G/DL (ref 12–15.9)
HGB UR QL STRIP.AUTO: NEGATIVE
HYALINE CASTS UR QL AUTO: ABNORMAL /LPF
IMM GRANULOCYTES # BLD AUTO: 0.08 10*3/MM3 (ref 0–0.05)
IMM GRANULOCYTES NFR BLD AUTO: 0.6 % (ref 0–0.5)
KETONES UR QL STRIP: NEGATIVE
LEUKOCYTE ESTERASE UR QL STRIP.AUTO: ABNORMAL
LIPASE SERPL-CCNC: 91 U/L (ref 13–60)
LYMPHOCYTES # BLD AUTO: 1.86 10*3/MM3 (ref 0.7–3.1)
LYMPHOCYTES NFR BLD AUTO: 14.8 % (ref 19.6–45.3)
MAGNESIUM SERPL-MCNC: 1.6 MG/DL (ref 1.6–2.6)
MCH RBC QN AUTO: 35.6 PG (ref 26.6–33)
MCHC RBC AUTO-ENTMCNC: 35.7 G/DL (ref 31.5–35.7)
MCV RBC AUTO: 99.7 FL (ref 79–97)
METHADONE UR QL SCN: NEGATIVE
MONOCYTES # BLD AUTO: 1 10*3/MM3 (ref 0.1–0.9)
MONOCYTES NFR BLD AUTO: 7.9 % (ref 5–12)
NEUTROPHILS NFR BLD AUTO: 75.9 % (ref 42.7–76)
NEUTROPHILS NFR BLD AUTO: 9.57 10*3/MM3 (ref 1.7–7)
NITRITE UR QL STRIP: NEGATIVE
NOROVIRUS GI+II RNA STL QL NAA+NON-PROBE: NOT DETECTED
NRBC BLD AUTO-RTO: 0 /100 WBC (ref 0–0.2)
NT-PROBNP SERPL-MCNC: 589 PG/ML (ref 0–900)
NT-PROBNP SERPL-MCNC: 613.7 PG/ML (ref 0–900)
OPIATES UR QL: NEGATIVE
OSMOLALITY SERPL: 262 MOSM/KG (ref 275–300)
OXYCODONE UR QL SCN: NEGATIVE
P SHIGELLOIDES DNA STL QL NAA+PROBE: NOT DETECTED
PCP UR QL SCN: NEGATIVE
PH UR STRIP.AUTO: 5.5 [PH] (ref 5–8)
PHOSPHATE SERPL-MCNC: 2.7 MG/DL (ref 2.5–4.5)
PLATELET # BLD AUTO: 129 10*3/MM3 (ref 140–450)
PMV BLD AUTO: 12.5 FL (ref 6–12)
POTASSIUM SERPL-SCNC: 2.6 MMOL/L (ref 3.5–5.2)
POTASSIUM SERPL-SCNC: 2.7 MMOL/L (ref 3.5–5.2)
POTASSIUM SERPL-SCNC: 2.9 MMOL/L (ref 3.5–5.2)
PROPOXYPH UR QL: NEGATIVE
PROT SERPL-MCNC: 6.3 G/DL (ref 6–8.5)
PROT UR QL STRIP: NEGATIVE
RBC # BLD AUTO: 2.95 10*6/MM3 (ref 3.77–5.28)
RBC # UR: ABNORMAL /HPF
REF LAB TEST METHOD: ABNORMAL
RV RNA STL NAA+PROBE: NOT DETECTED
SALMONELLA DNA SPEC QL NAA+PROBE: NOT DETECTED
SAPO I+II+IV+V RNA STL QL NAA+NON-PROBE: NOT DETECTED
SHIGELLA SP+EIEC IPAH STL QL NAA+PROBE: NOT DETECTED
SODIUM SERPL-SCNC: 121 MMOL/L (ref 136–145)
SODIUM SERPL-SCNC: 123 MMOL/L (ref 136–145)
SODIUM SERPL-SCNC: 124 MMOL/L (ref 136–145)
SODIUM UR-SCNC: <20 MMOL/L
SP GR UR STRIP: 1.01 (ref 1–1.03)
SQUAMOUS #/AREA URNS HPF: ABNORMAL /HPF
TRICYCLICS UR QL SCN: NEGATIVE
TROPONIN T SERPL-MCNC: <0.01 NG/ML (ref 0–0.03)
UROBILINOGEN UR QL STRIP: ABNORMAL
V CHOLERAE DNA SPEC QL NAA+PROBE: NOT DETECTED
VIBRIO DNA SPEC NAA+PROBE: NOT DETECTED
WBC # BLD AUTO: 12.61 10*3/MM3 (ref 3.4–10.8)
WBC UR QL AUTO: ABNORMAL /HPF
YERSINIA STL CULT: NOT DETECTED

## 2020-07-15 PROCEDURE — 84100 ASSAY OF PHOSPHORUS: CPT | Performed by: EMERGENCY MEDICINE

## 2020-07-15 PROCEDURE — 84484 ASSAY OF TROPONIN QUANT: CPT | Performed by: PHYSICIAN ASSISTANT

## 2020-07-15 PROCEDURE — 80048 BASIC METABOLIC PNL TOTAL CA: CPT | Performed by: EMERGENCY MEDICINE

## 2020-07-15 PROCEDURE — 87077 CULTURE AEROBIC IDENTIFY: CPT | Performed by: PHYSICIAN ASSISTANT

## 2020-07-15 PROCEDURE — 93005 ELECTROCARDIOGRAM TRACING: CPT | Performed by: PHYSICIAN ASSISTANT

## 2020-07-15 PROCEDURE — 25010000002 CEFTRIAXONE SODIUM-DEXTROSE 1-3.74 GM-%(50ML) RECONSTITUTED SOLUTION: Performed by: PHYSICIAN ASSISTANT

## 2020-07-15 PROCEDURE — 80053 COMPREHEN METABOLIC PANEL: CPT | Performed by: PHYSICIAN ASSISTANT

## 2020-07-15 PROCEDURE — 99284 EMERGENCY DEPT VISIT MOD MDM: CPT

## 2020-07-15 PROCEDURE — 83690 ASSAY OF LIPASE: CPT | Performed by: PHYSICIAN ASSISTANT

## 2020-07-15 PROCEDURE — 81001 URINALYSIS AUTO W/SCOPE: CPT | Performed by: PHYSICIAN ASSISTANT

## 2020-07-15 PROCEDURE — 71045 X-RAY EXAM CHEST 1 VIEW: CPT

## 2020-07-15 PROCEDURE — 80307 DRUG TEST PRSMV CHEM ANLYZR: CPT | Performed by: PHYSICIAN ASSISTANT

## 2020-07-15 PROCEDURE — 87086 URINE CULTURE/COLONY COUNT: CPT | Performed by: PHYSICIAN ASSISTANT

## 2020-07-15 PROCEDURE — 83935 ASSAY OF URINE OSMOLALITY: CPT | Performed by: EMERGENCY MEDICINE

## 2020-07-15 PROCEDURE — 87186 SC STD MICRODIL/AGAR DIL: CPT | Performed by: PHYSICIAN ASSISTANT

## 2020-07-15 PROCEDURE — 83880 ASSAY OF NATRIURETIC PEPTIDE: CPT | Performed by: EMERGENCY MEDICINE

## 2020-07-15 PROCEDURE — 0097U HC BIOFIRE FILMARRAY GI PANEL: CPT | Performed by: PHYSICIAN ASSISTANT

## 2020-07-15 PROCEDURE — 25010000002 ONDANSETRON PER 1 MG: Performed by: PHYSICIAN ASSISTANT

## 2020-07-15 PROCEDURE — 99222 1ST HOSP IP/OBS MODERATE 55: CPT | Performed by: EMERGENCY MEDICINE

## 2020-07-15 PROCEDURE — 85025 COMPLETE CBC W/AUTO DIFF WBC: CPT | Performed by: PHYSICIAN ASSISTANT

## 2020-07-15 PROCEDURE — 25010000003 POTASSIUM CHLORIDE 10 MEQ/100ML SOLUTION: Performed by: PHYSICIAN ASSISTANT

## 2020-07-15 PROCEDURE — 83630 LACTOFERRIN FECAL (QUAL): CPT | Performed by: PHYSICIAN ASSISTANT

## 2020-07-15 PROCEDURE — 83735 ASSAY OF MAGNESIUM: CPT | Performed by: EMERGENCY MEDICINE

## 2020-07-15 PROCEDURE — 82272 OCCULT BLD FECES 1-3 TESTS: CPT | Performed by: PHYSICIAN ASSISTANT

## 2020-07-15 PROCEDURE — 25010000002 ENOXAPARIN PER 10 MG: Performed by: EMERGENCY MEDICINE

## 2020-07-15 PROCEDURE — 87425 ROTAVIRUS AG IA: CPT | Performed by: PHYSICIAN ASSISTANT

## 2020-07-15 PROCEDURE — 84300 ASSAY OF URINE SODIUM: CPT | Performed by: EMERGENCY MEDICINE

## 2020-07-15 PROCEDURE — 83930 ASSAY OF BLOOD OSMOLALITY: CPT | Performed by: EMERGENCY MEDICINE

## 2020-07-15 PROCEDURE — 25010000002 MAGNESIUM SULFATE 2 GM/50ML SOLUTION: Performed by: EMERGENCY MEDICINE

## 2020-07-15 PROCEDURE — 87329 GIARDIA AG IA: CPT | Performed by: PHYSICIAN ASSISTANT

## 2020-07-15 PROCEDURE — 74176 CT ABD & PELVIS W/O CONTRAST: CPT

## 2020-07-15 PROCEDURE — 87328 CRYPTOSPORIDIUM AG IA: CPT | Performed by: PHYSICIAN ASSISTANT

## 2020-07-15 PROCEDURE — 83880 ASSAY OF NATRIURETIC PEPTIDE: CPT | Performed by: PHYSICIAN ASSISTANT

## 2020-07-15 RX ORDER — POTASSIUM CHLORIDE 750 MG/1
20 CAPSULE, EXTENDED RELEASE ORAL ONCE
Status: COMPLETED | OUTPATIENT
Start: 2020-07-15 | End: 2020-07-15

## 2020-07-15 RX ORDER — CARVEDILOL 12.5 MG/1
12.5 TABLET ORAL 2 TIMES DAILY WITH MEALS
Status: DISCONTINUED | OUTPATIENT
Start: 2020-07-15 | End: 2020-07-16

## 2020-07-15 RX ORDER — POTASSIUM CHLORIDE 750 MG/1
40 CAPSULE, EXTENDED RELEASE ORAL
Status: DISPENSED | OUTPATIENT
Start: 2020-07-15 | End: 2020-07-16

## 2020-07-15 RX ORDER — ZOLPIDEM TARTRATE 5 MG/1
10 TABLET ORAL NIGHTLY PRN
Status: DISCONTINUED | OUTPATIENT
Start: 2020-07-15 | End: 2020-07-17 | Stop reason: HOSPADM

## 2020-07-15 RX ORDER — LORAZEPAM 0.5 MG/1
0.5 TABLET ORAL DAILY PRN
Status: DISCONTINUED | OUTPATIENT
Start: 2020-07-15 | End: 2020-07-17 | Stop reason: HOSPADM

## 2020-07-15 RX ORDER — CEFTRIAXONE 1 G/50ML
1 INJECTION, SOLUTION INTRAVENOUS ONCE
Status: COMPLETED | OUTPATIENT
Start: 2020-07-15 | End: 2020-07-15

## 2020-07-15 RX ORDER — ACETAMINOPHEN 650 MG/1
650 SUPPOSITORY RECTAL EVERY 4 HOURS PRN
Status: DISCONTINUED | OUTPATIENT
Start: 2020-07-15 | End: 2020-07-17 | Stop reason: HOSPADM

## 2020-07-15 RX ORDER — ONDANSETRON 2 MG/ML
4 INJECTION INTRAMUSCULAR; INTRAVENOUS EVERY 6 HOURS PRN
Status: DISCONTINUED | OUTPATIENT
Start: 2020-07-15 | End: 2020-07-17 | Stop reason: HOSPADM

## 2020-07-15 RX ORDER — SODIUM CHLORIDE 0.9 % (FLUSH) 0.9 %
10 SYRINGE (ML) INJECTION EVERY 12 HOURS SCHEDULED
Status: DISCONTINUED | OUTPATIENT
Start: 2020-07-15 | End: 2020-07-17 | Stop reason: HOSPADM

## 2020-07-15 RX ORDER — POTASSIUM CHLORIDE 7.45 MG/ML
10 INJECTION INTRAVENOUS ONCE
Status: COMPLETED | OUTPATIENT
Start: 2020-07-15 | End: 2020-07-15

## 2020-07-15 RX ORDER — ACETAMINOPHEN 160 MG/5ML
650 SOLUTION ORAL EVERY 4 HOURS PRN
Status: DISCONTINUED | OUTPATIENT
Start: 2020-07-15 | End: 2020-07-17 | Stop reason: HOSPADM

## 2020-07-15 RX ORDER — PANTOPRAZOLE SODIUM 40 MG/1
40 TABLET, DELAYED RELEASE ORAL EVERY MORNING
Status: DISCONTINUED | OUTPATIENT
Start: 2020-07-16 | End: 2020-07-15

## 2020-07-15 RX ORDER — MAGNESIUM SULFATE HEPTAHYDRATE 40 MG/ML
2 INJECTION, SOLUTION INTRAVENOUS ONCE
Status: COMPLETED | OUTPATIENT
Start: 2020-07-15 | End: 2020-07-15

## 2020-07-15 RX ORDER — ACETAMINOPHEN 325 MG/1
650 TABLET ORAL EVERY 4 HOURS PRN
Status: DISCONTINUED | OUTPATIENT
Start: 2020-07-15 | End: 2020-07-17 | Stop reason: HOSPADM

## 2020-07-15 RX ORDER — FLUOXETINE HYDROCHLORIDE 20 MG/1
60 CAPSULE ORAL DAILY
Status: ON HOLD | COMMUNITY
End: 2020-07-16

## 2020-07-15 RX ORDER — ONDANSETRON 2 MG/ML
2 INJECTION INTRAMUSCULAR; INTRAVENOUS ONCE
Status: COMPLETED | OUTPATIENT
Start: 2020-07-15 | End: 2020-07-15

## 2020-07-15 RX ORDER — LEVOTHYROXINE SODIUM 0.05 MG/1
50 TABLET ORAL DAILY
Status: DISCONTINUED | OUTPATIENT
Start: 2020-07-15 | End: 2020-07-17 | Stop reason: HOSPADM

## 2020-07-15 RX ADMIN — CARVEDILOL 12.5 MG: 12.5 TABLET, FILM COATED ORAL at 17:01

## 2020-07-15 RX ADMIN — POTASSIUM CHLORIDE 40 MEQ: 10 CAPSULE, COATED, EXTENDED RELEASE ORAL at 16:58

## 2020-07-15 RX ADMIN — POTASSIUM CHLORIDE 40 MEQ: 10 CAPSULE, COATED, EXTENDED RELEASE ORAL at 19:21

## 2020-07-15 RX ADMIN — CEFTRIAXONE 1 G: 1 INJECTION, SOLUTION INTRAVENOUS at 13:18

## 2020-07-15 RX ADMIN — MAGNESIUM SULFATE IN WATER 2 G: 40 INJECTION, SOLUTION INTRAVENOUS at 16:59

## 2020-07-15 RX ADMIN — SODIUM CHLORIDE 1000 ML: 9 INJECTION, SOLUTION INTRAVENOUS at 12:34

## 2020-07-15 RX ADMIN — ZOLPIDEM TARTRATE 10 MG: 5 TABLET ORAL at 20:38

## 2020-07-15 RX ADMIN — POTASSIUM CHLORIDE 20 MEQ: 10 CAPSULE, COATED, EXTENDED RELEASE ORAL at 13:18

## 2020-07-15 RX ADMIN — ENOXAPARIN SODIUM 40 MG: 40 INJECTION SUBCUTANEOUS at 16:59

## 2020-07-15 RX ADMIN — POTASSIUM CHLORIDE 10 MEQ: 7.46 INJECTION, SOLUTION INTRAVENOUS at 14:42

## 2020-07-15 RX ADMIN — LORAZEPAM 0.5 MG: 0.5 TABLET ORAL at 20:38

## 2020-07-15 RX ADMIN — ONDANSETRON 2 MG: 2 INJECTION INTRAMUSCULAR; INTRAVENOUS at 12:34

## 2020-07-15 NOTE — ED PROVIDER NOTES
Subjective   Patient presents with complaint of some diarrhea bloating symptoms over the past couple of weeks patient states she was diagnosed with irritable bowel syndrome last month.  She has had some daily diarrhea, no chest pain shortness of air no fever chills reported patient apparently was at her PCPs office yesterday and had some labs drawn told to come here for evaluation      History provided by:  Patient      Review of Systems   Constitutional: Positive for appetite change. Negative for fever.   HENT: Negative.    Eyes: Negative.    Respiratory: Negative.  Negative for shortness of breath.    Cardiovascular: Negative.  Negative for chest pain.   Gastrointestinal: Positive for diarrhea. Negative for vomiting.   Genitourinary: Negative.    Musculoskeletal: Positive for arthralgias.   Skin: Negative.    Neurological: Negative.    Psychiatric/Behavioral: The patient is nervous/anxious.    All other systems reviewed and are negative.      Past Medical History:   Diagnosis Date   • Acid reflux    • Anxiety    • Back pain    • Depression    • Fractures    • Hernia of abdominal cavity    • High cholesterol    • Hypertension    • Irritable bowel syndrome    • Nocturia 10/5/2016   • Positive TB test    • Sinus problem    • ELINA (stress urinary incontinence, female) 10/5/2016       No Known Allergies    Past Surgical History:   Procedure Laterality Date   •  SECTION     • COLONOSCOPY     • GASTRIC BYPASS     • HERNIA REPAIR     • HYSTERECTOMY     • LAPAROSCOPIC TUBAL LIGATION     • TOTAL ABDOMINAL HYSTERECTOMY WITH SALPINGO OOPHORECTOMY      uterine prolapse       Family History   Problem Relation Age of Onset   • Hyperlipidemia Mother    • Arthritis Mother    • Diabetes Father    • Hypertension Father    • Arthritis Paternal Grandmother    • Breast cancer Neg Hx    • Ovarian cancer Neg Hx        Social History     Socioeconomic History   • Marital status:      Spouse name: Not on file   •  Number of children: Not on file   • Years of education: Not on file   • Highest education level: Not on file   Tobacco Use   • Smoking status: Never Smoker   • Smokeless tobacco: Never Used   Substance and Sexual Activity   • Alcohol use: Yes     Alcohol/week: 14.0 standard drinks     Types: 14 Glasses of wine per week   • Drug use: No   • Sexual activity: Defer           Objective   Physical Exam   Constitutional: She is oriented to person, place, and time. She appears well-developed and well-nourished. No distress.   Afebrile nontoxic no acute distress   HENT:   Head: Normocephalic and atraumatic.   Mouth/Throat: No oropharyngeal exudate.   Eyes: Pupils are equal, round, and reactive to light. Conjunctivae and EOM are normal. No scleral icterus.   Neck: Normal range of motion. Neck supple.   Cardiovascular: Normal rate, regular rhythm and intact distal pulses.   Pulmonary/Chest: Effort normal and breath sounds normal.   Abdominal: Soft. Bowel sounds are normal. There is tenderness.   Mild tenderness lower abdomen, some slight distention   Musculoskeletal: Normal range of motion. She exhibits no edema.   Neurological: She is alert and oriented to person, place, and time. No cranial nerve deficit or sensory deficit. She exhibits normal muscle tone. Coordination normal.   Skin: Skin is warm. Capillary refill takes less than 2 seconds. No rash noted. She is not diaphoretic. No erythema.   Psychiatric: She has a normal mood and affect. Her behavior is normal. Judgment and thought content normal.   Nursing note and vitals reviewed.      Procedures           ED Course  ED Course as of Jul 15 1410   Wed Jul 15, 2020   1246 EKG interpreted by me: Sinus rhythm, normal rate, no acute ST changes, long QT, this is an abnormal EKG    [MP]   1358 Patient case and management all reviewed with Dr. Nuno    [SC]   1356 We will plan on talking with hospitalist for admission    [SC]   1357 Discussed with ... For admission     [SC]   1404 Will admit to telemetry inpatient per Dr Hernandez    [SC]      ED Course User Index  [MP] Silverio Nuno MD  [SC] Antonio Moane, LORI                                           MDM  Number of Diagnoses or Management Options     Amount and/or Complexity of Data Reviewed  Review and summarize past medical records: yes  Discuss the patient with other providers: yes    Risk of Complications, Morbidity, and/or Mortality  Presenting problems: moderate  Diagnostic procedures: low  Management options: low        Final diagnoses:   Hyponatremia   Hypokalemia            Antonio Monae, LORI  07/15/20 1411

## 2020-07-15 NOTE — H&P
"    [facility]      Name:  Gaby Rasheed   Age:  55 y.o.  Sex:  female  :  1965  MRN:  [id number]   Visit Number:  [id number]  Admission Date:  7/15/2020  Date Of Service:  07/15/20  Primary Care Physician:  Arabella Loving MD    Chief Complaint:   \" They called and told me my sodium was low\"    History Of Presenting Illness:    56 yo F with history of irritable bowel syndrome on alosetron with multiple episodes of watery diarrhea daily, anxiety/depression recently started on desvenlafaxine who presented to the ED after she was told her sodium was low on a routine lab draw.  Patient reports that she has been having about 6 episodes of watery diarrhea daily since February.  She denies any water intake from streams, travel outside of the country, or laxative usage.  Her IBS has been evaluated by Dr. Abreu and she has had endoscopies with biopsies performed, but no clear etiology.  She also has anxiety and depression for which she was taking Prozac up until 2 weeks ago when she started taking desvenlafaxine.  She was also told by her PCP to consume water instead of Gatorade or Powerade to counteract the diarrhea episodes.  Patient is aware that she has fatty liver disease.  In the ER, she was given normal saline bolus and Rocephin for suspected UTI.  She denies dysuria, chest pain, abdominal pain, nausea, vomiting, fever, or other acute issues.    Review Of Systems:     A full 10 point review of systems was performed and all pertinent positives and negatives are noted in the HPI.     Past Medical History:    Past Medical History:   Diagnosis Date   • Acid reflux    • Anxiety    • Back pain    • Depression    • Fractures    • Hernia of abdominal cavity    • High cholesterol    • Hypertension    • Irritable bowel syndrome    • Nocturia 10/5/2016   • Positive TB test    • Sinus problem    • ELINA (stress urinary incontinence, female) 10/5/2016       "       Past Surgical history:    Past Surgical History:   Procedure Laterality Date   •  SECTION     • COLONOSCOPY     • GASTRIC BYPASS     • HERNIA REPAIR     • HYSTERECTOMY     • LAPAROSCOPIC TUBAL LIGATION     • TOTAL ABDOMINAL HYSTERECTOMY WITH SALPINGO OOPHORECTOMY      uterine prolapse       Social History:    Social History     Socioeconomic History   • Marital status:      Spouse name: Not on file   • Number of children: Not on file   • Years of education: Not on file   • Highest education level: Not on file   Tobacco Use   • Smoking status: Never Smoker   • Smokeless tobacco: Never Used   Substance and Sexual Activity   • Alcohol use: Yes     Alcohol/week: 2.0 standard drinks     Types: 2 Glasses of wine per week   • Drug use: No   • Sexual activity: Defer       Family History:    Family History   Problem Relation Age of Onset   • Hyperlipidemia Mother    • Arthritis Mother    • Diabetes Father    • Hypertension Father    • Arthritis Paternal Grandmother    • Breast cancer Neg Hx    • Ovarian cancer Neg Hx        Allergies:      Patient has no known allergies.    Home Medications:    Prior to Admission Medications     Prescriptions Last Dose Informant Patient Reported? Taking?    acamprosate (CAMPRAL) 333 MG EC tablet   No No    Take 2 tablets by mouth 3 (Three) Times a Day.    alosetron (Lotronex) 0.5 MG tablet   No No    Take 1 tablet by mouth daily.    carvedilol (COREG) 12.5 MG tablet   No No    Take 1 tablet by mouth 2 (Two) Times a Day With Meals.    chlorthalidone (HYGROTON) 25 MG tablet   No No    Take 1 tablet by mouth Daily.    desvenlafaxine (PRISTIQ) 50 MG 24 hr tablet   Yes No    Take  by mouth Daily.    desvenlafaxine ER (KHEDEZLA) 50 MG tablet sustained-release 24 hour 24 hr tablet   No No    Take 1 tablet by mouth Daily.    esomeprazole (nexIUM) 40 MG capsule   No No    Take 1 capsule by mouth Daily.    levothyroxine (SYNTHROID, LEVOTHROID) 50 MCG tablet   No No    Take  1 tablet by mouth Daily.    LORazepam (Ativan) 0.5 MG tablet   No No    Take 1 tablet by mouth Daily As Needed for Anxiety (panic attack).    Multiple Vitamins-Minerals (MULTIVITAMIN ADULT PO)   Yes No    Take  by mouth.    mupirocin (Bactroban) 2 % ointment   No No    Apply  topically to the appropriate area as directed 2 (Two) Times a Day for 5 days.    zolpidem (AMBIEN) 10 MG tablet   No No    TAKE 1 TABLET BY MOUTH AT BEDTIME AS NEEDED FOR SLEEP             ED Medications:    Medications   potassium chloride (MICRO-K) CR capsule 40 mEq (has no administration in time range)   carvedilol (COREG) tablet 12.5 mg (has no administration in time range)   pantoprazole (PROTONIX) EC tablet 40 mg (has no administration in time range)   levothyroxine (SYNTHROID, LEVOTHROID) tablet 50 mcg (has no administration in time range)   LORazepam (ATIVAN) tablet 0.5 mg (has no administration in time range)   zolpidem (AMBIEN) tablet 10 mg (has no administration in time range)   sodium chloride 0.9 % flush 10 mL (has no administration in time range)   acetaminophen (TYLENOL) tablet 650 mg (has no administration in time range)     Or   acetaminophen (TYLENOL) 160 MG/5ML solution 650 mg (has no administration in time range)     Or   acetaminophen (TYLENOL) suppository 650 mg (has no administration in time range)   ondansetron (ZOFRAN) injection 4 mg (has no administration in time range)   enoxaparin (LOVENOX) syringe 40 mg (has no administration in time range)   sodium chloride 0.9 % bolus 1,000 mL (0 mL Intravenous Stopped 7/15/20 1434)   ondansetron (ZOFRAN) injection 2 mg (2 mg Intravenous Given 7/15/20 1234)   potassium chloride 10 mEq in 100 mL IVPB (10 mEq Intravenous New Bag 7/15/20 1442)   potassium chloride (MICRO-K) CR capsule 20 mEq (20 mEq Oral Given 7/15/20 1318)   cefTRIAXone (ROCEPHIN) IVPB 1 g/50ml dextrose (premix) (0 g Intravenous Stopped 7/15/20 1434)       Vital Signs:    Temp:  [97.6 °F (36.4 °C)-98 °F (36.7 °C)]  98 °F (36.7 °C)  Heart Rate:  [66-70] 70  Resp:  [16] 16  BP: (107-132)/(53-71) 109/53        07/15/20  1110 07/15/20  1556   Weight: 67.4 kg (148 lb 9.6 oz) 67.1 kg (148 lb)       Body mass index is 26.22 kg/m².     Physical Exam:  General: No acute distress, resting in bed  HEENT: EOMI, NC/AT, MMM  Heart: RRR  Lungs: CTAB  Abdomen: Soft, nondistended, nontender, palpable liver  Extremities: Severe pitting edema in the lower extremities  Neurological: A&O x3, moves all extremities, cranial nerves grossly intact  Psychological: Mood and affect appropriate, speech is coherent  Skin: Warm, dry, good turgor    Laboratory data:    I have reviewed the labs done in the emergency room.    Results from last 7 days   Lab Units 07/15/20  1234 07/14/20  0943   SODIUM mmol/L 121* 120*   POTASSIUM mmol/L 2.6* 2.8*   CHLORIDE mmol/L 76* 75*   CO2 mmol/L 32.1* 33.8*   BUN mg/dL 7 7   CREATININE mg/dL 0.76 0.78   CALCIUM mg/dL 8.1* 8.4*   BILIRUBIN mg/dL 5.1* 5.0*   ALK PHOS U/L 294* 292*   ALT (SGPT) U/L 26 28   AST (SGOT) U/L 87* 92*   GLUCOSE mg/dL 107* 88     Results from last 7 days   Lab Units 07/15/20  1235   WBC 10*3/mm3 12.61*   HEMOGLOBIN g/dL 10.5*   HEMATOCRIT % 29.4*   PLATELETS 10*3/mm3 129*         Results from last 7 days   Lab Units 07/15/20  1234   TROPONIN T ng/mL <0.010     Results from last 7 days   Lab Units 07/15/20  1234   PROBNP pg/mL 613.7         Results from last 7 days   Lab Units 07/15/20  1234   LIPASE U/L 91*         Results from last 7 days   Lab Units 07/15/20  1235   COLOR UA  Yellow   GLUCOSE UA  Negative   KETONES UA  Negative   LEUKOCYTES UA  Moderate (2+)*   PH, URINE  5.5   BILIRUBIN UA  Negative   UROBILINOGEN UA  1.0 E.U./dL     Pain Management Panel     Pain Management Panel Latest Ref Rng & Units 7/15/2020    AMPHETAMINES SCREEN, URINE Negative Negative    BARBITURATES SCREEN Negative Negative    BENZODIAZEPINE SCREEN, URINE Negative Positive(A)    BUPRENORPHINEUR Negative Negative     COCAINE SCREEN, URINE Negative Negative    METHADONE SCREEN, URINE Negative Negative    METHAMPHETAMINEUR Negative Negative              EKG:    Ordered and pending    Radiology:    Imaging Results (Last 72 Hours)     Procedure Component Value Units Date/Time    CT Abdomen Pelvis Without Contrast [[id number]] Collected:  07/15/20 1219     Updated:  07/15/20 1225    Narrative:       PROCEDURE: CT ABDOMEN PELVIS WO CONTRAST-     HISTORY: bloating/diarrhea     COMPARISON: None .     PROCEDURE: Axial images were obtained from the lung bases through the  pubic symphysis without intravenous contrast. .      FINDINGS:      ABDOMEN: The lung bases are clear. The heart size is normal. The limited  noncontrast images of the liver demonstrates diffuse hypodensity  consistent with fatty infiltration. No focal liver lesions are  identified. The spleen is normal. No adrenal masses are seen.  The  pancreas has an unremarkable unenhanced appearance.. The aorta is normal  in caliber. There is no significant free fluid or adenopathy.  There is  no nephrolithiasis. There is no hydronephrosis. There are postsurgical  changes from gastric bypass surgery. There is no evidence of an internal  hernia or volvulus.     PELVIS: The appendix is not identified. The urinary bladder is  unremarkable. The patient is status post hysterectomy. There is a small  amount of free fluid in the pelvis which is nonspecific. There is  diffuse body wall anasarca.       Impression:       1. Fatty infiltration of the liver with a small amount of ascites within  the pelvis.  2. Diffuse body wall anasarca.              628.60 mGy.cm.  13.29 mGy     This study was performed with techniques to keep radiation doses as low  as reasonably achievable (ALARA). Individualized dose reduction  techniques using automated exposure control or adjustment of mA and/or  kV according to the patient size were employed.      This report was finalized on 7/15/2020 12:23 PM by  Tania Strong M.D..    XR Chest 1 View [[id number]] Collected:  07/15/20 1219     Updated:  07/15/20 1223    Narrative:       PROCEDURE: XR CHEST 1 VW-     HISTORY: nausea     COMPARISON: None.     FINDINGS: The heart is normal in size. The mediastinum is unremarkable.  The lungs are clear. There is no pneumothorax.  There are no acute  osseous abnormalities.       Impression:       No acute cardiopulmonary process.     Continued followup is recommended.     This report was finalized on 7/15/2020 12:19 PM by Tania Strong M.D..            Hyponatremia    Assessment and Plan:  Severe hypoosmolar hyponatremia, euvolemic  Severe hypokalemia  Hypomagnesemia  Fatty liver disease with history anasarca  Anxiety/depression  IBS  Diarrhea  Asymptomatic bacteriuria  Chronic: HTN, hypothyroidism    -Check urine sodium, osmolarity, serum osmolarity  -Hold fluids, home SNRI, and alosetron  -Picture is complicated given longstanding diarrhea which would lead to hypovolemic hyponatremia, however she is euvolemic on exam and I favor a component of SIADH from desvenlafaxine and alosetron usage  -She is also on chlorthalidone for HTN which is always a winner for hyponatremia and her hypoK  -Every 6 hours BMPs; if she corrects by more than 8 mEq of sodium in a 24-hour period,  administer D5W or depression  -Awaiting studies and will reevaluate tomorrow  -3% NaCl 50 cc bolus to be used if she develops any epileptic activity or confusion  -Nephrology consulted    Advance Care Planning   ACP discussion was held with the patient during this visit. Patient does not have an advance directive, information provided.      Juwan Hernandez DO  07/15/20  16:10    Dictated utilizing Dragon dictation.

## 2020-07-15 NOTE — TELEPHONE ENCOUNTER
After consultation with Dr Loving, called patient to advise her to go the ED for further evaluation of abnormal labs drawn yesterday.  She did not answer her phone.  Checked Verbal Release, awais Jaquez is listed as contact.  Called his cell phone.  He states Gaby worked 12 hour shift last night, and is most likely asleep at home.  He works 2 miles from her home, will go wake her up and ask her to call clinic.  Will refer to ED when she calls.

## 2020-07-16 LAB
ANION GAP SERPL CALCULATED.3IONS-SCNC: 7 MMOL/L (ref 5–15)
ANION GAP SERPL CALCULATED.3IONS-SCNC: 7.1 MMOL/L (ref 5–15)
ANION GAP SERPL CALCULATED.3IONS-SCNC: 7.5 MMOL/L (ref 5–15)
ANION GAP SERPL CALCULATED.3IONS-SCNC: 7.9 MMOL/L (ref 5–15)
BASOPHILS # BLD AUTO: 0.05 10*3/MM3 (ref 0–0.2)
BASOPHILS NFR BLD AUTO: 0.5 % (ref 0–1.5)
BUN SERPL-MCNC: 5 MG/DL (ref 6–20)
BUN SERPL-MCNC: 6 MG/DL (ref 6–20)
BUN SERPL-MCNC: 6 MG/DL (ref 6–20)
BUN SERPL-MCNC: 7 MG/DL (ref 6–20)
BUN/CREAT SERPL: 10.3 (ref 7–25)
BUN/CREAT SERPL: 6.9 (ref 7–25)
BUN/CREAT SERPL: 8.6 (ref 7–25)
BUN/CREAT SERPL: 9.8 (ref 7–25)
CALCIUM SPEC-SCNC: 7.8 MG/DL (ref 8.6–10.5)
CALCIUM SPEC-SCNC: 7.8 MG/DL (ref 8.6–10.5)
CALCIUM SPEC-SCNC: 7.9 MG/DL (ref 8.6–10.5)
CALCIUM SPEC-SCNC: 8 MG/DL (ref 8.6–10.5)
CHLORIDE SERPL-SCNC: 86 MMOL/L (ref 98–107)
CHLORIDE SERPL-SCNC: 88 MMOL/L (ref 98–107)
CHLORIDE SERPL-SCNC: 89 MMOL/L (ref 98–107)
CHLORIDE SERPL-SCNC: 90 MMOL/L (ref 98–107)
CO2 SERPL-SCNC: 30 MMOL/L (ref 22–29)
CO2 SERPL-SCNC: 30.9 MMOL/L (ref 22–29)
CO2 SERPL-SCNC: 31.1 MMOL/L (ref 22–29)
CO2 SERPL-SCNC: 31.5 MMOL/L (ref 22–29)
CREAT SERPL-MCNC: 0.61 MG/DL (ref 0.57–1)
CREAT SERPL-MCNC: 0.68 MG/DL (ref 0.57–1)
CREAT SERPL-MCNC: 0.7 MG/DL (ref 0.57–1)
CREAT SERPL-MCNC: 0.72 MG/DL (ref 0.57–1)
CRYPTOSP AG STL QL IA: NEGATIVE
DEPRECATED RDW RBC AUTO: 62.3 FL (ref 37–54)
EOSINOPHIL # BLD AUTO: 0.04 10*3/MM3 (ref 0–0.4)
EOSINOPHIL NFR BLD AUTO: 0.4 % (ref 0.3–6.2)
ERYTHROCYTE [DISTWIDTH] IN BLOOD BY AUTOMATED COUNT: 16.9 % (ref 12.3–15.4)
G LAMBLIA AG STL QL IA: NEGATIVE
GFR SERPL CREATININE-BSD FRML MDRD: 101 ML/MIN/1.73
GFR SERPL CREATININE-BSD FRML MDRD: 84 ML/MIN/1.73
GFR SERPL CREATININE-BSD FRML MDRD: 87 ML/MIN/1.73
GFR SERPL CREATININE-BSD FRML MDRD: 90 ML/MIN/1.73
GLUCOSE SERPL-MCNC: 100 MG/DL (ref 65–99)
GLUCOSE SERPL-MCNC: 104 MG/DL (ref 65–99)
GLUCOSE SERPL-MCNC: 78 MG/DL (ref 65–99)
GLUCOSE SERPL-MCNC: 99 MG/DL (ref 65–99)
HCT VFR BLD AUTO: 27.5 % (ref 34–46.6)
HGB BLD-MCNC: 9.7 G/DL (ref 12–15.9)
IMM GRANULOCYTES # BLD AUTO: 0.06 10*3/MM3 (ref 0–0.05)
IMM GRANULOCYTES NFR BLD AUTO: 0.7 % (ref 0–0.5)
LACTOFERRIN STL QL LA: POSITIVE
LYMPHOCYTES # BLD AUTO: 1.34 10*3/MM3 (ref 0.7–3.1)
LYMPHOCYTES NFR BLD AUTO: 14.7 % (ref 19.6–45.3)
MAGNESIUM SERPL-MCNC: 2.2 MG/DL (ref 1.6–2.6)
MCH RBC QN AUTO: 35.7 PG (ref 26.6–33)
MCHC RBC AUTO-ENTMCNC: 35.3 G/DL (ref 31.5–35.7)
MCV RBC AUTO: 101.1 FL (ref 79–97)
MONOCYTES # BLD AUTO: 0.86 10*3/MM3 (ref 0.1–0.9)
MONOCYTES NFR BLD AUTO: 9.4 % (ref 5–12)
NEUTROPHILS NFR BLD AUTO: 6.76 10*3/MM3 (ref 1.7–7)
NEUTROPHILS NFR BLD AUTO: 74.3 % (ref 42.7–76)
NRBC BLD AUTO-RTO: 0 /100 WBC (ref 0–0.2)
OSMOLALITY UR: 168 MOSM/KG
PLATELET # BLD AUTO: 116 10*3/MM3 (ref 140–450)
PMV BLD AUTO: 13.1 FL (ref 6–12)
POTASSIUM SERPL-SCNC: 3 MMOL/L (ref 3.5–5.2)
POTASSIUM SERPL-SCNC: 3.5 MMOL/L (ref 3.5–5.2)
POTASSIUM SERPL-SCNC: 3.7 MMOL/L (ref 3.5–5.2)
POTASSIUM SERPL-SCNC: 3.9 MMOL/L (ref 3.5–5.2)
RBC # BLD AUTO: 2.72 10*6/MM3 (ref 3.77–5.28)
SODIUM SERPL-SCNC: 123 MMOL/L (ref 136–145)
SODIUM SERPL-SCNC: 127 MMOL/L (ref 136–145)
SODIUM SERPL-SCNC: 128 MMOL/L (ref 136–145)
SODIUM SERPL-SCNC: 128 MMOL/L (ref 136–145)
SODIUM UR-SCNC: <20 MMOL/L
TSH SERPL DL<=0.05 MIU/L-ACNC: 6.6 UIU/ML (ref 0.27–4.2)
WBC # BLD AUTO: 9.11 10*3/MM3 (ref 3.4–10.8)

## 2020-07-16 PROCEDURE — 84443 ASSAY THYROID STIM HORMONE: CPT | Performed by: INTERNAL MEDICINE

## 2020-07-16 PROCEDURE — 25010000002 FUROSEMIDE PER 20 MG: Performed by: EMERGENCY MEDICINE

## 2020-07-16 PROCEDURE — 80048 BASIC METABOLIC PNL TOTAL CA: CPT | Performed by: EMERGENCY MEDICINE

## 2020-07-16 PROCEDURE — 84300 ASSAY OF URINE SODIUM: CPT | Performed by: INTERNAL MEDICINE

## 2020-07-16 PROCEDURE — 99232 SBSQ HOSP IP/OBS MODERATE 35: CPT | Performed by: EMERGENCY MEDICINE

## 2020-07-16 PROCEDURE — 25010000002 POTASSIUM CHLORIDE PER 2 MEQ OF POTASSIUM: Performed by: INTERNAL MEDICINE

## 2020-07-16 PROCEDURE — 85025 COMPLETE CBC W/AUTO DIFF WBC: CPT | Performed by: EMERGENCY MEDICINE

## 2020-07-16 PROCEDURE — 83735 ASSAY OF MAGNESIUM: CPT | Performed by: EMERGENCY MEDICINE

## 2020-07-16 PROCEDURE — 25010000002 ENOXAPARIN PER 10 MG: Performed by: EMERGENCY MEDICINE

## 2020-07-16 RX ORDER — SODIUM CHLORIDE AND POTASSIUM CHLORIDE 300; 900 MG/100ML; MG/100ML
100 INJECTION, SOLUTION INTRAVENOUS CONTINUOUS
Status: DISCONTINUED | OUTPATIENT
Start: 2020-07-16 | End: 2020-07-16

## 2020-07-16 RX ORDER — DIPHENOXYLATE HYDROCHLORIDE AND ATROPINE SULFATE 2.5; .025 MG/1; MG/1
1 TABLET ORAL 2 TIMES DAILY
Status: DISCONTINUED | OUTPATIENT
Start: 2020-07-16 | End: 2020-07-17 | Stop reason: HOSPADM

## 2020-07-16 RX ORDER — SPIRONOLACTONE 25 MG/1
50 TABLET ORAL DAILY
Status: DISCONTINUED | OUTPATIENT
Start: 2020-07-16 | End: 2020-07-17 | Stop reason: HOSPADM

## 2020-07-16 RX ORDER — FUROSEMIDE 10 MG/ML
40 INJECTION INTRAMUSCULAR; INTRAVENOUS EVERY 6 HOURS
Status: DISCONTINUED | OUTPATIENT
Start: 2020-07-16 | End: 2020-07-17 | Stop reason: HOSPADM

## 2020-07-16 RX ORDER — POTASSIUM CHLORIDE 750 MG/1
40 CAPSULE, EXTENDED RELEASE ORAL EVERY 4 HOURS
Status: DISCONTINUED | OUTPATIENT
Start: 2020-07-16 | End: 2020-07-16

## 2020-07-16 RX ORDER — POTASSIUM CHLORIDE 750 MG/1
40 CAPSULE, EXTENDED RELEASE ORAL
Status: COMPLETED | OUTPATIENT
Start: 2020-07-16 | End: 2020-07-16

## 2020-07-16 RX ADMIN — DIPHENOXYLATE HYDROCHLORIDE AND ATROPINE SULFATE 1 TABLET: 2.5; .025 TABLET ORAL at 20:30

## 2020-07-16 RX ADMIN — ACETAMINOPHEN 650 MG: 325 TABLET, FILM COATED ORAL at 20:30

## 2020-07-16 RX ADMIN — POTASSIUM CHLORIDE 100 ML/HR: 2 INJECTION, SOLUTION, CONCENTRATE INTRAVENOUS at 01:27

## 2020-07-16 RX ADMIN — DIPHENOXYLATE HYDROCHLORIDE AND ATROPINE SULFATE 1 TABLET: 2.5; .025 TABLET ORAL at 11:12

## 2020-07-16 RX ADMIN — POTASSIUM CHLORIDE 40 MEQ: 10 CAPSULE, COATED, EXTENDED RELEASE ORAL at 00:04

## 2020-07-16 RX ADMIN — POTASSIUM CHLORIDE 40 MEQ: 10 CAPSULE, COATED, EXTENDED RELEASE ORAL at 11:11

## 2020-07-16 RX ADMIN — SPIRONOLACTONE 50 MG: 25 TABLET, FILM COATED ORAL at 15:01

## 2020-07-16 RX ADMIN — FUROSEMIDE 40 MG: 10 INJECTION, SOLUTION INTRAMUSCULAR; INTRAVENOUS at 20:31

## 2020-07-16 RX ADMIN — ENOXAPARIN SODIUM 40 MG: 40 INJECTION SUBCUTANEOUS at 17:45

## 2020-07-16 RX ADMIN — POTASSIUM CHLORIDE 40 MEQ: 10 CAPSULE, COATED, EXTENDED RELEASE ORAL at 21:06

## 2020-07-16 RX ADMIN — LORAZEPAM 0.5 MG: 0.5 TABLET ORAL at 20:30

## 2020-07-16 RX ADMIN — FUROSEMIDE 40 MG: 10 INJECTION, SOLUTION INTRAMUSCULAR; INTRAVENOUS at 15:00

## 2020-07-16 RX ADMIN — SODIUM CHLORIDE, PRESERVATIVE FREE 10 ML: 5 INJECTION INTRAVENOUS at 08:59

## 2020-07-16 RX ADMIN — POTASSIUM CHLORIDE 40 MEQ: 10 CAPSULE, COATED, EXTENDED RELEASE ORAL at 15:01

## 2020-07-16 RX ADMIN — POTASSIUM CHLORIDE 40 MEQ: 10 CAPSULE, COATED, EXTENDED RELEASE ORAL at 08:58

## 2020-07-16 RX ADMIN — ZOLPIDEM TARTRATE 10 MG: 5 TABLET ORAL at 20:30

## 2020-07-16 RX ADMIN — SODIUM CHLORIDE, PRESERVATIVE FREE 10 ML: 5 INJECTION INTRAVENOUS at 20:57

## 2020-07-16 RX ADMIN — LEVOTHYROXINE SODIUM 50 MCG: 50 TABLET ORAL at 06:05

## 2020-07-16 RX ADMIN — POTASSIUM CHLORIDE 40 MEQ: 10 CAPSULE, COATED, EXTENDED RELEASE ORAL at 17:45

## 2020-07-16 NOTE — PROGRESS NOTES
[facility]    Name:  Gaby Rasheed   Age:  55 y.o.  Sex:  female  :  1965  MRN:  [id number]   Visit Number:  [id number]  Admission Date:  7/15/2020  Date Of Service:  20  Primary Care Physician:  Arabella Loving MD     LOS: 1 day :  Patient Care Team:  Arabella Loving MD as PCP - General (Family Medicine)  Moose Abreu MD as Consulting Physician (General Surgery):    Subjective / Interval History:   Patient seen in follow-up for hyponatremia.    She denies taking alosetron and has not taken it for the past few weeks.  She has questions about cirrhosis and wonders if she has it.  She admits to drinking excessive alcohol in the past.    Review of Systems:   A full 10 point review of systems was performed and all pertinent positives and negatives are noted in the HPI.    Vital Signs:    Temp:  [98 °F (36.7 °C)-98.4 °F (36.9 °C)] 98 °F (36.7 °C)  Heart Rate:  [62-70] 66  Resp:  [16-20] 20  BP: ()/(44-66) 100/49    Intake and output:    No intake/output data recorded.  I/O this shift:  In: 240 [P.O.:240]  Out: -     Physical Examination:  General: No acute distress, resting in bed  HEENT: EOMI, NC/AT, MMM  Heart: RRR  Lungs: CTAB  Abdomen: Soft, nondistended, nontender, palpable liver  Extremities: Severe pitting edema in the lower extremities  Neurological: A&O x3, moves all extremities, cranial nerves grossly intact  Psychological: Mood and affect appropriate, speech is coherent  Skin: Warm, dry, good turgor    Laboratory results:    Results from last 7 days   Lab Units 20  0521 20  0002 07/15/20  1806  07/15/20  1234 20  0943   SODIUM mmol/L 127* 123* 124*   < > 121* 120*   POTASSIUM mmol/L 3.0* 3.5 2.9*   < > 2.6* 2.8*   CHLORIDE mmol/L 88* 86* 83*   < > 76* 75*   CO2 mmol/L 31.1* 30.0* 30.5*   < > 32.1* 33.8*   BUN mg/dL 7 6 6   < > 7 7   CREATININE mg/dL 0.68 0.61 0.67   < > 0.76 0.78   CALCIUM mg/dL 7.8*  7.8* 7.5*   < > 8.1* 8.4*   BILIRUBIN mg/dL  --   --   --   --  5.1* 5.0*   ALK PHOS U/L  --   --   --   --  294* 292*   ALT (SGPT) U/L  --   --   --   --  26 28   AST (SGOT) U/L  --   --   --   --  87* 92*   GLUCOSE mg/dL 100* 78 79   < > 107* 88    < > = values in this interval not displayed.     Results from last 7 days   Lab Units 07/16/20  0521 07/15/20  1235   WBC 10*3/mm3 9.11 12.61*   HEMOGLOBIN g/dL 9.7* 10.5*   HEMATOCRIT % 27.5* 29.4*   PLATELETS 10*3/mm3 116* 129*         Results from last 7 days   Lab Units 07/15/20  1234   TROPONIN T ng/mL <0.010               I have reviewed the patient's laboratory results.    Radiology results:    Imaging Results (Last 24 Hours)     Procedure Component Value Units Date/Time    CT Abdomen Pelvis Without Contrast [[id number]] Collected:  07/15/20 1219     Updated:  07/15/20 1225    Narrative:       PROCEDURE: CT ABDOMEN PELVIS WO CONTRAST-     HISTORY: bloating/diarrhea     COMPARISON: None .     PROCEDURE: Axial images were obtained from the lung bases through the  pubic symphysis without intravenous contrast. .      FINDINGS:      ABDOMEN: The lung bases are clear. The heart size is normal. The limited  noncontrast images of the liver demonstrates diffuse hypodensity  consistent with fatty infiltration. No focal liver lesions are  identified. The spleen is normal. No adrenal masses are seen.  The  pancreas has an unremarkable unenhanced appearance.. The aorta is normal  in caliber. There is no significant free fluid or adenopathy.  There is  no nephrolithiasis. There is no hydronephrosis. There are postsurgical  changes from gastric bypass surgery. There is no evidence of an internal  hernia or volvulus.     PELVIS: The appendix is not identified. The urinary bladder is  unremarkable. The patient is status post hysterectomy. There is a small  amount of free fluid in the pelvis which is nonspecific. There is  diffuse body wall anasarca.       Impression:       1.  Fatty infiltration of the liver with a small amount of ascites within  the pelvis.  2. Diffuse body wall anasarca.              628.60 mGy.cm.  13.29 mGy     This study was performed with techniques to keep radiation doses as low  as reasonably achievable (ALARA). Individualized dose reduction  techniques using automated exposure control or adjustment of mA and/or  kV according to the patient size were employed.      This report was finalized on 7/15/2020 12:23 PM by Tania Strong M.D..    XR Chest 1 View [[id number]] Collected:  07/15/20 1219     Updated:  07/15/20 1223    Narrative:       PROCEDURE: XR CHEST 1 VW-     HISTORY: nausea     COMPARISON: None.     FINDINGS: The heart is normal in size. The mediastinum is unremarkable.  The lungs are clear. There is no pneumothorax.  There are no acute  osseous abnormalities.       Impression:       No acute cardiopulmonary process.     Continued followup is recommended.     This report was finalized on 7/15/2020 12:19 PM by Tania Strong M.D..          I have reviewed the patient's radiology reports.    Medication Review:     I have reviewed the patients active and prn medications.       Hyponatremia      Assessment and Plan:  Severe hypoosmolar hyponatremia, euvolemic  Severe hypokalemia  Hypomagnesemia  Fatty liver disease with anasarca  H/O alcoholism  Acute on chronic macrocytic anemia  Thrombocytopenia, chronic  Anxiety/depression  IBS  Diarrhea  Asymptomatic bacteriuria  Chronic: HTN, hypothyroidism     -urine sodium, osmolarity, serum osmolarity appear consistent with cirrhosis and volume overload  -Hold fluids, home SNRI, and d/c alosetron  -Picture is complicated given longstanding diarrhea which would lead to hypovolemic hyponatremia, however she is hypervolemic on exam, but SIADH from desvenlafaxine is not ruled out  -She is also on chlorthalidone for HTN which is always a winner for hyponatremia and her hypoK  -Goal is to correct the hypokalemia,  allow the sodium to correct itself  -We will diurese her and start spironolactone  -q6h BMPs; if she corrects by more than 8 mEq of sodium in a 24-hour period,  administer D5W or depression    Juwan Hernandez DO  07/16/20  12:23    Dictated utilizing Dragon dictation.

## 2020-07-16 NOTE — PROGRESS NOTES
Adult Nutrition  Assessment/PES    Patient Name:  Gaby Rasheed  YOB: 1965  MRN: [id number]  Admit Date:  7/15/2020    Assessment Date:  7/16/2020    Comments:  Rec. #1: Consider adding Probiotic daily. Rec. #2: Continue with diet as ordered: Regular, Cardiac, Consistent Carbohydrate, Renal. Rec. #3: Continue to monitor/replace electrolytes PRN. RD to follow pt. Consult RD PRN.     Reason for Assessment     Row Name 07/16/20 0843          Reason for Assessment    Reason For Assessment  diagnosis/disease state     Diagnosis  gastrointestinal disease;metabolic state;liver disease;cardiac disease hyponatremia, hypokalemia, IBS, Diarrhea, Fatty Liver disease, HTN     Identified At Risk by Screening Criteria  MST SCORE 2+             Labs/Tests/Procedures/Meds     Row Name 07/16/20 0844          Labs/Procedures/Meds    Lab Results Reviewed  reviewed, pertinent     Lab Results Comments  High: Gluc   Low: Na, K+, Cl, Plt. Count, Albumin        Medications    Pertinent Medications Reviewed  reviewed, pertinent           Estimated/Assessed Needs     Row Name 07/16/20 0844          Calculation Measurements    Weight Used For Calculations  52.7 kg (116 lb 3.6 oz)        Estimated/Assessed Needs    Additional Documentation  Bailey-St. Jeor Equation (Group);Calorie Requirements (Group);Fluid Requirements (Group);Protein Requirements (Group)        Calorie Requirements    Estimated Calorie Requirement Comment  8529-2981        Bailey-St. Jeor Equation    RMR (Bailey-St. Jeor Equation)  1086.325        Protein Requirements    Weight Used For Protein Calculations  52.7 kg (116 lb 3.6 oz)     Est Protein Requirement Amount (gms/kg)  1.2 gm protein 53-63 gm/day     Estimated Protein Requirements (gms/day)  63.26        Fluid Requirements    Estimated Fluid Requirement Method  [place]-Segar Formula     [place]-Segar Method (over 20 kg)  2554.4         Nutrition Prescription Ordered     Row Name 07/16/20  0845          Nutrition Prescription PO    Current PO Diet  Regular     Common Modifiers  Cardiac;Consistent Carbohydrate;Renal         Evaluation of Received Nutrient/Fluid Intake     Row Name 07/16/20 0845          PO Evaluation    Number of Days PO Intake Evaluated  Insufficient Data               Problem/Interventions:  Problem 1     Row Name 07/16/20 0845          Nutrition Diagnoses Problem 1    Problem 1  Altered GI Function     Etiology (related to)  Medical Diagnosis     Gastrointestinal  Diarrhea;IBS     Signs/Symptoms (evidenced by)  Other (comment) altered bowel function         Problem 2     Row Name 07/16/20 0846          Nutrition Diagnoses Problem 2    Problem 2  Impaired Nutrient Utilization     Etiology (related to)  Medical Diagnosis     Gastrointestinal  Diarrhea;IBS     Signs/Symptoms (evidenced by)  Biochemical     Specific Labs Noted  Na+;K+             Intervention Goal     Row Name 07/16/20 0847          Intervention Goal    General  Meet nutritional needs for age/condition     PO  PO intake (%)     PO Intake %  -- 50-75     Weight  No significant weight loss         Nutrition Intervention     Row Name 07/16/20 0847          Nutrition Intervention    RD/Tech Action  Follow Tx progress         Nutrition Prescription     Row Name 07/16/20 0847          Nutrition Prescription PO    PO Prescription  -- continue with diet as ordered        Other Orders    Other  continue to monitor/replace electrolytes PRN         Education/Evaluation     Row Name 07/16/20 0847          Education    Education  Will Instruct as appropriate        Monitor/Evaluation    Monitor  Per protocol;PO intake;Pertinent labs;Weight;Skin status           Electronically signed by:  Bethany Gonzales RD  07/16/20 08:48

## 2020-07-16 NOTE — CONSULTS
[facility]      Nephrology Consultation    Referring Provider:   No ref. provider found    Reason for Consultation:  Hyponatremia.    Subjective:  Chief complaint   Chief Complaint   Patient presents with   • Abnormal Lab     History of present illness:    Patient is a 55-year-old  female, with medical history including alcoholism, irritable bowel syndrome, anxiety, depression, hypertension, and hypothyroidism.  She has been evaluated for her irritable bowel by Dr. Abreu and a gastroenterologist in [place].  They have started her on multiple medications without any relief.  She complains that she has multiple diarrhea episodes throughout the day and has been taking up to 6 Imodium per day with no relief.  She was told to consume water by her PCP instead of Gatorade and reports that she has been confused about what she should and should not drink.  She was seen by her PCP on Monday and labs were obtained.  She was notified by the PCP that her sodium was low and was directed to go to the ED for evaluation.  She denies any alcohol use in the last 2 months.  Upon further questioning she did admit to drinking some wine here and there.  Ethanol level was noted to be elevated at 85 upon arrival.  In the ED she was treated with normal saline bolus and Rocephin for suspected UTI.  Labs revealed sodium was 121, potassium 2.6, serum bicarb 32, and creatinine of 0.76.  Hemoglobin was also noted to be decreased at 10.5.  She states that she has lost around 30 pounds in the last 30 days due to diarrhea.  She reports that as soon as she eats something it goes straight through her.  She admits to taking chlorthalidone daily.  We have been consulted for electrolyte imbalances.  I have reviewed labs/imaging/records from this hospitalization, including ER staff and admitting/attending physicians H/P's and progress notes to establish a comprehensive understanding of this patient's clinical hospital  course, as well as to establish plan of care appropriately.   Past Medical History:   Diagnosis Date   • Acid reflux    • Anxiety    • Back pain    • Depression    • Fractures    • Hernia of abdominal cavity    • High cholesterol    • Hypertension    • Irritable bowel syndrome    • Nocturia 10/5/2016   • Positive TB test    • Sinus problem    • ELINA (stress urinary incontinence, female) 10/5/2016       Past Surgical History:   Procedure Laterality Date   •  SECTION     • COLONOSCOPY     • GASTRIC BYPASS     • HERNIA REPAIR     • HYSTERECTOMY     • LAPAROSCOPIC TUBAL LIGATION     • TOTAL ABDOMINAL HYSTERECTOMY WITH SALPINGO OOPHORECTOMY      uterine prolapse     Family History   Problem Relation Age of Onset   • Hyperlipidemia Mother    • Arthritis Mother    • Diabetes Father    • Hypertension Father    • Arthritis Paternal Grandmother    • Breast cancer Neg Hx    • Ovarian cancer Neg Hx      Social History     Tobacco Use   • Smoking status: Never Smoker   • Smokeless tobacco: Never Used   Substance Use Topics   • Alcohol use: Yes     Alcohol/week: 2.0 standard drinks     Types: 2 Glasses of wine per week   • Drug use: No     Home medications:   Prior to Admission Medications     Prescriptions Last Dose Informant Patient Reported? Taking?    acamprosate (CAMPRAL) 333 MG EC tablet 7/15/2020  No Yes    Take 2 tablets by mouth 3 (Three) Times a Day.    alosetron (Lotronex) 0.5 MG tablet 7/15/2020  No Yes    Take 1 tablet by mouth daily.    carvedilol (COREG) 12.5 MG tablet 7/15/2020  No Yes    Take 1 tablet by mouth 2 (Two) Times a Day With Meals.    chlorthalidone (HYGROTON) 25 MG tablet 7/15/2020  No Yes    Take 1 tablet by mouth Daily.    desvenlafaxine ER (KHEDEZLA) 50 MG tablet sustained-release 24 hour 24 hr tablet 7/15/2020  No Yes    Take 1 tablet by mouth Daily.    esomeprazole (nexIUM) 40 MG capsule 7/15/2020  No Yes    Take 1 capsule by mouth Daily.    levothyroxine (SYNTHROID, LEVOTHROID) 50  MCG tablet 7/15/2020  No Yes    Take 1 tablet by mouth Daily.    LORazepam (Ativan) 0.5 MG tablet 7/15/2020  No Yes    Take 1 tablet by mouth Daily As Needed for Anxiety (panic attack).    Multiple Vitamins-Minerals (MULTIVITAMIN ADULT PO) 7/15/2020  Yes Yes    Take  by mouth.    mupirocin (Bactroban) 2 % ointment Past Week  No Yes    Apply  topically to the appropriate area as directed 2 (Two) Times a Day for 5 days.    zolpidem (AMBIEN) 10 MG tablet 7/15/2020  No Yes    TAKE 1 TABLET BY MOUTH AT BEDTIME AS NEEDED FOR SLEEP    FLUoxetine (PROzac) 20 MG capsule   Yes No    Take 60 mg by mouth Daily.        Emergency department medications: Medications   potassium chloride (MICRO-K) CR capsule 40 mEq (40 mEq Oral Given 7/16/20 0004)   carvedilol (COREG) tablet 12.5 mg (12.5 mg Oral Given 7/15/20 1701)   levothyroxine (SYNTHROID, LEVOTHROID) tablet 50 mcg (50 mcg Oral Given 7/16/20 0605)   LORazepam (ATIVAN) tablet 0.5 mg (0.5 mg Oral Given 7/15/20 2038)   zolpidem (AMBIEN) tablet 10 mg (10 mg Oral Given 7/15/20 2038)   sodium chloride 0.9 % flush 10 mL (10 mL Intravenous Not Given 7/15/20 2141)   acetaminophen (TYLENOL) tablet 650 mg (has no administration in time range)     Or   acetaminophen (TYLENOL) 160 MG/5ML solution 650 mg (has no administration in time range)     Or   acetaminophen (TYLENOL) suppository 650 mg (has no administration in time range)   ondansetron (ZOFRAN) injection 4 mg (has no administration in time range)   enoxaparin (LOVENOX) syringe 40 mg (40 mg Subcutaneous Given 7/15/20 1659)   sodium chloride 0.9 % 1,000 mL with potassium chloride 40 mEq infusion (100 mL/hr Intravenous New Bag 7/16/20 0127)   potassium chloride (MICRO-K) CR capsule 40 mEq (has no administration in time range)   sodium chloride 0.9 % bolus 1,000 mL (0 mL Intravenous Stopped 7/15/20 1434)   ondansetron (ZOFRAN) injection 2 mg (2 mg Intravenous Given 7/15/20 1234)   potassium chloride 10 mEq in 100 mL IVPB (10 mEq  "Intravenous New Bag 7/15/20 1442)   potassium chloride (MICRO-K) CR capsule 20 mEq (20 mEq Oral Given 7/15/20 1318)   cefTRIAXone (ROCEPHIN) IVPB 1 g/50ml dextrose (premix) (0 g Intravenous Stopped 7/15/20 1434)   magnesium sulfate 2g/50 mL (PREMIX) infusion (2 g Intravenous New Bag 7/15/20 0799)       Allergies:  Patient has no known allergies.    Review of Systems  1. Constitutional: Negative for fever and chills.  Denies any diaphoresis.  Positive fatigue or malaise and unexpected weight change.  She reports 30 pound weight loss in the last 30 days.  2. HENT: Negative for congestion and hearing loss.   3. Eyes: Negative for redness and visual disturbance.   4. Respiratory: negative for shortness of breath or cough. Negative for chest pain  5. Cardiovascular: Negative for chest pain and chest tightness or palpitations.   6. Gastrointestinal: Positive for diarrhea.  Negative for abdominal pain or distention, and blood in stool. Denies any Nausea, vomiting, or constipation.  7. Endocrine: Negative for heat or cold intolerance.   8. Genitourinary: Negative for difficulty urinating, dysuria and frequency.   9. Musculoskeletal: Negative for arthralgias, back pain.  Denies any myalgias.   10. Skin: Negative for color change, rash and wound.   11. Neurological: Negative for syncope and headaches.  Positive for dizziness.  12. Hematological: Negative for adenopathy. Does not bruise/bleed easily.   13. Psychiatric/Behavioral: Negative for confusion. The patient is nervous/anxious.     Objective:  Vital Signs  BP 98/44 (BP Location: Right arm, Patient Position: Lying)   Pulse 66   Temp 98 °F (36.7 °C) (Oral)   Resp 20   Ht 160 cm (63\")   Wt 67.1 kg (148 lb)   SpO2 94%   BMI 26.22 kg/m²          No intake/output data recorded.    Intake/Output Summary (Last 24 hours) at 7/16/2020 0887  Last data filed at 7/16/2020 0749  Gross per 24 hour   Intake 0 ml   Output 0 ml   Net 0 ml       Physical Exam:  General Appearance:  "  Alert, cooperative, in no acute distress.     Head:   Normocephalic, without obvious abnormality, atraumatic.     Eyes:      Normal, conjunctivae and sclerae, no icterus, no pallor, corneas clear, PERRLA        Throat:   Oral mucosa dry      Neck:  No adenopathy, supple, trachea midline, no thyromegaly, no carotid bruit, no JVD      Back:   No CVA tenderness on Percussion.     Lungs:    Clear to auscultation and fair air movement noted.      Heart:   Regular rhythm and normal rate, normal S1 and S2.       Abdomen:   Normal bowel sounds, no masses, no organomegaly, soft non-tender, distended, no guarding, no rebound tenderness      Genital urinary:   No urinary bladder palpable      Extremities:  Moves all extremities, trace edema, no cyanosis, no redness.     Pulses:  Pulses palpable and equal bilaterally but weak.     Skin:  No bleeding, bruising or rash        Neurologic:  Cranial nerves grossly intact, move all extremities           Lab Results (last 7 days)     Procedure Component Value Units Date/Time    Osmolality, Urine - Urine, Clean Catch [[id number]] Collected:  07/15/20 1235    Specimen:  Urine, Clean Catch Updated:  07/16/20 0735     Osmolality, Urine 168 mOsm/kg     Narrative:       Osmo Normal Reference Ranges:    Random:  mOsm/kg H2O, depending on fluid intake.  Random: >850 mOsm/kg H20, after 12 hour fluid restriction.    24 Hour: 300-900 mOsm/kg H2O.    Basic Metabolic Panel [[id number]]  (Abnormal) Collected:  07/16/20 0521    Specimen:  Blood Updated:  07/16/20 0602     Glucose 100 mg/dL      BUN 7 mg/dL      Creatinine 0.68 mg/dL      Sodium 127 mmol/L      Potassium 3.0 mmol/L      Chloride 88 mmol/L      CO2 31.1 mmol/L      Calcium 7.8 mg/dL      eGFR Non African Amer 90 mL/min/1.73      BUN/Creatinine Ratio 10.3     Anion Gap 7.9 mmol/L     Narrative:       GFR Normal >60  Chronic Kidney Disease <60  Kidney Failure <15      CBC Auto Differential [[id number]]  (Abnormal) Collected:   07/16/20 0521    Specimen:  Blood Updated:  07/16/20 0542     WBC 9.11 10*3/mm3      RBC 2.72 10*6/mm3      Hemoglobin 9.7 g/dL      Hematocrit 27.5 %      .1 fL      MCH 35.7 pg      MCHC 35.3 g/dL      RDW 16.9 %      RDW-SD 62.3 fl      MPV 13.1 fL      Platelets 116 10*3/mm3      Neutrophil % 74.3 %      Lymphocyte % 14.7 %      Monocyte % 9.4 %      Eosinophil % 0.4 %      Basophil % 0.5 %      Immature Grans % 0.7 %      Neutrophils, Absolute 6.76 10*3/mm3      Lymphocytes, Absolute 1.34 10*3/mm3      Monocytes, Absolute 0.86 10*3/mm3      Eosinophils, Absolute 0.04 10*3/mm3      Basophils, Absolute 0.05 10*3/mm3      Immature Grans, Absolute 0.06 10*3/mm3      nRBC 0.0 /100 WBC     Fecal Lactoferrin - Stool, Per Rectum [[id number]]  (Abnormal) Collected:  07/15/20 1438    Specimen:  Stool from Per Rectum Updated:  07/16/20 0204     Lactoferrin, Qual Positive    Basic Metabolic Panel [[id number]]  (Abnormal) Collected:  07/16/20 0002    Specimen:  Blood Updated:  07/16/20 0040     Glucose 78 mg/dL      BUN 6 mg/dL      Creatinine 0.61 mg/dL      Sodium 123 mmol/L      Potassium 3.5 mmol/L      Chloride 86 mmol/L      CO2 30.0 mmol/L      Calcium 7.8 mg/dL      eGFR Non African Amer 101 mL/min/1.73      BUN/Creatinine Ratio 9.8     Anion Gap 7.0 mmol/L     Narrative:       GFR Normal >60  Chronic Kidney Disease <60  Kidney Failure <15      Osmolality, Serum [[id number]]  (Abnormal) Collected:  07/15/20 1539    Specimen:  Blood Updated:  07/15/20 2330     Osmolality 262 mOsm/kg     Basic Metabolic Panel [[id number]]  (Abnormal) Collected:  07/15/20 1806    Specimen:  Blood Updated:  07/15/20 1829     Glucose 79 mg/dL      BUN 6 mg/dL      Creatinine 0.67 mg/dL      Sodium 124 mmol/L      Potassium 2.9 mmol/L      Chloride 83 mmol/L      CO2 30.5 mmol/L      Calcium 7.5 mg/dL      eGFR Non African Amer 91 mL/min/1.73      BUN/Creatinine Ratio 9.0     Anion Gap 10.5 mmol/L     Narrative:       GFR Normal  >60  Chronic Kidney Disease <60  Kidney Failure <15      BNP [[id number]]  (Normal) Collected:  07/15/20 1539    Specimen:  Blood Updated:  07/15/20 1629     proBNP 589.0 pg/mL     Narrative:       Among patients with dyspnea, NT-proBNP is highly sensitive for the detection of acute congestive heart failure. In addition NT-proBNP of <300 pg/ml effectively rules out acute congestive heart failure with 99% negative predictive value.    Results may be falsely decreased if patient taking Biotin.      Gastrointestinal Panel, PCR - Stool, Per Rectum [[id number]]  (Normal) Collected:  07/15/20 1438    Specimen:  Stool from Per Rectum Updated:  07/15/20 1622     Campylobacter Not Detected     Plesiomonas shigelloides Not Detected     Salmonella Not Detected     Vibrio Not Detected     Vibrio cholerae Not Detected     Yersinia enterocolitica Not Detected     Enteroaggregative E. coli (EAEC) Not Detected     Enteropathogenic E. coli (EPEC) Not Detected     Enterotoxigenic E. coli (ETEC) lt/st Not Detected     Shiga-like toxin-producing E. coli (STEC) stx1/stx2 Not Detected     E. coli O157 Not Detected     Shigella/Enteroinvasive E. coli (EIEC) Not Detected     Cryptosporidium Not Detected     Cyclospora cayetanensis Not Detected     Entamoeba histolytica Not Detected     Giardia lamblia Not Detected     Adenovirus F40/41 Not Detected     Astrovirus Not Detected     Norovirus GI/GII Not Detected     Rotavirus A Not Detected     Sapovirus (I, II, IV or V) Not Detected    Basic Metabolic Panel [[id number]]  (Abnormal) Collected:  07/15/20 1539    Specimen:  Blood Updated:  07/15/20 1617     Glucose 81 mg/dL      BUN 7 mg/dL      Creatinine 0.71 mg/dL      Sodium 123 mmol/L      Potassium 2.7 mmol/L      Chloride 81 mmol/L      CO2 30.4 mmol/L      Calcium 7.7 mg/dL      eGFR Non African Amer 85 mL/min/1.73      BUN/Creatinine Ratio 9.9     Anion Gap 11.6 mmol/L     Narrative:       GFR Normal >60  Chronic Kidney Disease  <60  Kidney Failure <15      Sodium, Urine, Random - Urine, Clean Catch [[id number]] Collected:  07/15/20 1235    Specimen:  Urine, Clean Catch Updated:  07/15/20 1520     Sodium, Urine <20 mmol/L     Narrative:       Reference intervals for random urine have not been established.  Clinical usage is dependent upon physician's interpretation in combination with other laboratory tests.       Occult Blood X 1, Stool - Stool, Per Rectum [[id number]]  (Normal) Collected:  07/15/20 1438    Specimen:  Stool from Per Rectum Updated:  07/15/20 1504     Fecal Occult Blood Negative    Rotavirus Antigen, Stool - Stool, Per Rectum [[id number]] Collected:  07/15/20 1438    Specimen:  Stool from Per Rectum Updated:  07/15/20 1441    Giardia / Cryptosporidium Screen - Stool, Per Rectum [[id number]] Collected:  07/15/20 1438    Specimen:  Stool from Per Rectum Updated:  07/15/20 1441    Magnesium [[id number]]  (Normal) Collected:  07/15/20 1234    Specimen:  Blood Updated:  07/15/20 1419     Magnesium 1.6 mg/dL     Phosphorus [[id number]]  (Normal) Collected:  07/15/20 1234    Specimen:  Blood Updated:  07/15/20 1419     Phosphorus 2.7 mg/dL     Urine Drug Screen - Urine, Clean Catch [[id number]]  (Abnormal) Collected:  07/15/20 1235    Specimen:  Urine, Clean Catch Updated:  07/15/20 1411     THC, Screen, Urine Negative     Phencyclidine (PCP), Urine Negative     Cocaine Screen, Urine Negative     Methamphetamine, Ur Negative     Opiate Screen Negative     Amphetamine Screen, Urine Negative     Benzodiazepine Screen, Urine Positive     Tricyclic Antidepressants Screen Negative     Methadone Screen, Urine Negative     Barbiturates Screen, Urine Negative     Oxycodone Screen, Urine Negative     Propoxyphene Screen Negative     Buprenorphine, Screen, Urine Negative    Narrative:       Limitations of this procedure include the possibility of false positives due to interfering substances in the urine sample. Clinical data should be correlated  with any questionable result. Positive results should be considered Presumptive Positive until results are confirmed with another methodology such as HPLC or GCMS.    Ethanol [[id number]]  (Abnormal) Collected:  07/15/20 1234    Specimen:  Blood Updated:  07/15/20 1321     Ethanol 85 mg/dL      Ethanol % 0.085 %     Comprehensive Metabolic Panel [[id number]]  (Abnormal) Collected:  07/15/20 1234    Specimen:  Blood Updated:  07/15/20 1307     Glucose 107 mg/dL      BUN 7 mg/dL      Creatinine 0.76 mg/dL      Sodium 121 mmol/L      Potassium 2.6 mmol/L      Chloride 76 mmol/L      CO2 32.1 mmol/L      Calcium 8.1 mg/dL      Total Protein 6.3 g/dL      Albumin 2.90 g/dL      ALT (SGPT) 26 U/L      AST (SGOT) 87 U/L      Alkaline Phosphatase 294 U/L      Total Bilirubin 5.1 mg/dL      eGFR Non African Amer 79 mL/min/1.73      Globulin 3.4 gm/dL      A/G Ratio 0.9 g/dL      BUN/Creatinine Ratio 9.2     Anion Gap 12.9 mmol/L     Narrative:       GFR Normal >60  Chronic Kidney Disease <60  Kidney Failure <15      Urinalysis, Microscopic Only - Urine, Clean Catch [[id number]]  (Abnormal) Collected:  07/15/20 1235    Specimen:  Urine, Clean Catch Updated:  07/15/20 1304     RBC, UA 0-2 /HPF      WBC, UA 21-30 /HPF      Bacteria, UA 3+ /HPF      Squamous Epithelial Cells, UA 0-2 /HPF      Hyaline Casts, UA None Seen /LPF      Methodology Manual Light Microscopy    Urine Culture - Urine, Urine, Clean Catch [[id number]] Collected:  07/15/20 1235    Specimen:  Urine, Clean Catch Updated:  07/15/20 1304    Troponin [[id number]]  (Normal) Collected:  07/15/20 1234    Specimen:  Blood Updated:  07/15/20 1259     Troponin T <0.010 ng/mL     Narrative:       Troponin T Reference Range:  <= 0.03 ng/mL-   Negative for AMI  >0.03 ng/mL-     Abnormal for myocardial necrosis.  Clinicians would have to utilize clinical acumen, EKG, Troponin and serial changes to determine if it is an Acute Myocardial Infarction or myocardial injury due to an  underlying chronic condition.       Results may be falsely decreased if patient taking Biotin.      BNP [[id number]]  (Normal) Collected:  07/15/20 1234    Specimen:  Blood Updated:  07/15/20 1259     proBNP 613.7 pg/mL     Narrative:       Among patients with dyspnea, NT-proBNP is highly sensitive for the detection of acute congestive heart failure. In addition NT-proBNP of <300 pg/ml effectively rules out acute congestive heart failure with 99% negative predictive value.    Results may be falsely decreased if patient taking Biotin.      Lipase [[id number]]  (Abnormal) Collected:  07/15/20 1234    Specimen:  Blood Updated:  07/15/20 1257     Lipase 91 U/L     Urinalysis With Culture If Indicated - Urine, Clean Catch [[id number]]  (Abnormal) Collected:  07/15/20 1235    Specimen:  Urine, Clean Catch Updated:  07/15/20 1243     Color, UA Yellow     Appearance, UA Cloudy     pH, UA 5.5     Specific Gravity, UA 1.007     Glucose, UA Negative     Ketones, UA Negative     Bilirubin, UA Negative     Blood, UA Negative     Protein, UA Negative     Leuk Esterase, UA Moderate (2+)     Nitrite, UA Negative     Urobilinogen, UA 1.0 E.U./dL    CBC & Differential [[id number]] Collected:  07/15/20 1235    Specimen:  Blood Updated:  07/15/20 1241    Narrative:       The following orders were created for panel order CBC & Differential.  Procedure                               Abnormality         Status                     ---------                               -----------         ------                     CBC Auto Differential[[id number]]        Abnormal            Final result                 Please view results for these tests on the individual orders.    CBC Auto Differential [[id number]]  (Abnormal) Collected:  07/15/20 1235    Specimen:  Blood Updated:  07/15/20 1241     WBC 12.61 10*3/mm3      RBC 2.95 10*6/mm3      Hemoglobin 10.5 g/dL      Hematocrit 29.4 %      MCV 99.7 fL      MCH 35.6 pg      MCHC 35.7 g/dL      RDW 16.1 %       RDW-SD 59.0 fl      MPV 12.5 fL      Platelets 129 10*3/mm3      Neutrophil % 75.9 %      Lymphocyte % 14.8 %      Monocyte % 7.9 %      Eosinophil % 0.3 %      Basophil % 0.5 %      Immature Grans % 0.6 %      Neutrophils, Absolute 9.57 10*3/mm3      Lymphocytes, Absolute 1.86 10*3/mm3      Monocytes, Absolute 1.00 10*3/mm3      Eosinophils, Absolute 0.04 10*3/mm3      Basophils, Absolute 0.06 10*3/mm3      Immature Grans, Absolute 0.08 10*3/mm3      nRBC 0.0 /100 WBC         Imaging Results (Last 72 Hours)     Procedure Component Value Units Date/Time    CT Abdomen Pelvis Without Contrast [[id number]] Collected:  07/15/20 1219     Updated:  07/15/20 1225    Narrative:       PROCEDURE: CT ABDOMEN PELVIS WO CONTRAST-     HISTORY: bloating/diarrhea     COMPARISON: None .     PROCEDURE: Axial images were obtained from the lung bases through the  pubic symphysis without intravenous contrast. .      FINDINGS:      ABDOMEN: The lung bases are clear. The heart size is normal. The limited  noncontrast images of the liver demonstrates diffuse hypodensity  consistent with fatty infiltration. No focal liver lesions are  identified. The spleen is normal. No adrenal masses are seen.  The  pancreas has an unremarkable unenhanced appearance.. The aorta is normal  in caliber. There is no significant free fluid or adenopathy.  There is  no nephrolithiasis. There is no hydronephrosis. There are postsurgical  changes from gastric bypass surgery. There is no evidence of an internal  hernia or volvulus.     PELVIS: The appendix is not identified. The urinary bladder is  unremarkable. The patient is status post hysterectomy. There is a small  amount of free fluid in the pelvis which is nonspecific. There is  diffuse body wall anasarca.       Impression:       1. Fatty infiltration of the liver with a small amount of ascites within  the pelvis.  2. Diffuse body wall anasarca.              628.60 mGy.cm.  13.29 mGy     This study was  performed with techniques to keep radiation doses as low  as reasonably achievable (ALARA). Individualized dose reduction  techniques using automated exposure control or adjustment of mA and/or  kV according to the patient size were employed.      This report was finalized on 7/15/2020 12:23 PM by Tania Strong M.D..    XR Chest 1 View [[id number]] Collected:  07/15/20 1219     Updated:  07/15/20 1223    Narrative:       PROCEDURE: XR CHEST 1 VW-     HISTORY: nausea     COMPARISON: None.     FINDINGS: The heart is normal in size. The mediastinum is unremarkable.  The lungs are clear. There is no pneumothorax.  There are no acute  osseous abnormalities.       Impression:       No acute cardiopulmonary process.     Continued followup is recommended.     This report was finalized on 7/15/2020 12:19 PM by Tania Strong M.D..        No results found for: UTPCR    carvedilol 12.5 mg Oral BID With Meals   enoxaparin 40 mg Subcutaneous Q24H   levothyroxine 50 mcg Oral Daily   potassium chloride 40 mEq Oral Q4H   sodium chloride 10 mL Intravenous Q12H       custom IV KCl infusion builder 100 mL/hr Last Rate: 100 mL/hr (07/16/20 0127)       Assessment/Plan:    1. Hyponatremia: Multifactorial likely due to chlorthalidone, alcoholism, hypothyroidism, and volume depletion from chronic diarrhea  2. Hypokalemia  3. Hypocalcemia  4. Volume depletion  5. Anemia  6. Hypertension  7. Fatty liver disease with history of anasarca  8. Irritable bowel syndrome  9. Chronic diarrhea  10. Hypothyroidism  11. Alcoholism: States she stopped drinking 2 months ago but admits to occasionally drinking wine.  Ethanol level was 85 on admission      Plan:  · Sodium is improving, will continue IV fluids with 40 of potassium.  · Potassium is 3.5, is being replaced orally.  · TSH was elevated, would recommend adjustment to levothyroxine.  This is likely contributing to the current picture.  · We will start patient on Lomotil for chronic  diarrhea control.  · Would avoid thiazide diuretics in this patient and recommend loop diuretics given current electrolyte imbalances.  · We will avoid diuretics today but will likely give a diuretic tomorrow after morning evaluation.  · Continue to follow urine sodium as it was <20 which suggests volume depletion.  · Patient is on every 6 hour BMP, would be appropriate to change to daily.  · Surveillance labs.  · Details were discussed with the patient and no family in the room.    · Details were also discussed with the hospitalist service.   · Further recommendations will depend on clinical course of the patient during the current hospitalization.    · I also discussed the details with the nursing staff.  · Rest as ordered.    In closing, I sincerely appreciate opportunity to participate in care of this patient. If I can be of any further assistance with the management of this patient, please don’t hesitate to contact me.    Lloyd Bobby MD, SYLVESTER  07/16/20  08:52    Dictated using Dragon.

## 2020-07-16 NOTE — PROGRESS NOTES
Discharge Planning Assessment  [facility]     Patient Name: Gaby Rasheed  MRN: [id number]  Today's Date: 7/16/2020    Admit Date: 7/15/2020    Discharge Needs Assessment     Row Name 07/16/20 1357       Living Environment    Lives With  significant other    Name(s) of Who Lives With Patient  Anton Jaquez    Current Living Arrangements  home/apartment/condo    Potentially Unsafe Housing Conditions  -- Pt denies any concerns.    Primary Care Provided by  self    Provides Primary Care For  no one    Family Caregiver if Needed  none    Quality of Family Relationships  unable to assess    Able to Return to Prior Arrangements  yes       Resource/Environmental Concerns    Resource/Environmental Concerns  none Pt denies any financial concerns for food, utilities and medications.       Transition Planning    Patient/Family Anticipates Transition to  home    Patient/Family Anticipated Services at Transition  none    Transportation Anticipated  family or friend will provide       Discharge Needs Assessment    Readmission Within the Last 30 Days  no previous admission in last 30 days    Concerns to be Addressed  denies needs/concerns at this time    Equipment Currently Used at Home  none    Anticipated Changes Related to Illness  none    Equipment Needed After Discharge  none    Provided Post Acute Provider List?  N/A    N/A Provider List Comment  Pt denies any needs.        Discharge Plan     Row Name 07/16/20 1400       Plan    Plan  Home    Provided Post Acute Provider List?  N/A    N/A Provider List Comment  Pt denies any needs at this time.    Patient/Family in Agreement with Plan  yes    Plan Comments Spoke to pt in room re [facility]; she is alone.  Pt reports she lives with her SO, Anton.  She is independent and plans to return home.  Pt works FT.  She can provide her own transportation.  Pt denies HH and DME.  Address, phone & PCP verified.  Pt denies AD.  LW booklet given to pt.  CM will continue to follow.         Destination      Coordination has not been started for this encounter.      Durable Medical Equipment      Coordination has not been started for this encounter.      Dialysis/Infusion      Coordination has not been started for this encounter.      Home Medical Care      Coordination has not been started for this encounter.      Therapy      Coordination has not been started for this encounter.      Community Resources      Coordination has not been started for this encounter.        Expected Discharge Date and Time     Expected Discharge Date Expected Discharge Time    Jul 17, 2020         Demographic Summary     Row Name 07/16/20 1356       General Information    Admission Type  inpatient    Arrived From  emergency department    Referral Source  admission list    Reason for Consult  discharge planning    Preferred Language  English     Used During This Interaction  no        Functional Status     Row Name 07/16/20 1356       Functional Status    Usual Activity Tolerance  good       Functional Status, IADL    Medications  independent    Meal Preparation  independent    Housekeeping  independent    Laundry  independent    Shopping  independent       Mental Status    General Appearance WDL  WDL       Mental Status Summary    Recent Changes in Mental Status/Cognitive Functioning  no changes       Employment/    Employment Status  employed full time    Shift Worked  swing shift        Psychosocial    No documentation.       Abuse/Neglect    No documentation.       Legal    No documentation.       Substance Abuse    No documentation.       Patient Forms    No documentation.           Jennifer Swenson RN

## 2020-07-16 NOTE — PAYOR COMM NOTE
"Jeninfer Gunter -[phone] fax [phone]  Ref number [id number]      Popeye Rasheed (55 y.o. Female)     Date of Birth Social Security Number Address Home Phone MRN    1965  [address] [place], [place] [address] [phone] [id number]    Jain Marital Status          Restorationism        Admission Date Admission Type Admitting Provider Attending Provider Department, Room/Bed    7/15/20 Emergency Juwan Hernandez DO Bhatti, Umar, DO [facility] MED SURG  4, 430/    Discharge Date Discharge Disposition Discharge Destination                       Attending Provider:  Juwan Hernandez DO    Allergies:  No Known Allergies    Isolation:  None   Infection:  COVID Screen (preop/placement) (20)   Code Status:  CPR    Ht:  160 cm (63\")   Wt:  67.1 kg (148 lb)    Admission Cmt:  None   Principal Problem:  None                Active Insurance as of 7/15/2020     Primary Coverage     Payor Plan Insurance Group Employer/Plan Group    [employer]UE [employer] PPO [id number]     Payor Plan Address Payor Plan Phone Number Payor Plan Fax Number Effective Dates    [address] [phone]  2020 - None Entered    [place] [address]       Subscriber Name Subscriber Birth Date Member ID       POPEYE RASHEED 1965 [id number]                 Emergency Contacts      (Rel.) Home Phone Work Phone Mobile Phone    Anton Jaquez (Significant Other) [phone] -- --               History & Physical      Juwan Hernandez DO at 07/15/20 [address] [place] [address] HOSPITALIST   HISTORY AND PHYSICAL      Name:  Popeye Rasheed   Age:  55 y.o.  Sex:  female  :  1965  MRN:  [id number]   Visit Number:  [id number]  Admission Date:  7/15/2020  Date Of Service:  07/15/20  Primary Care Physician:  Arabella Loving MD    Chief Complaint:   \" They called and told me my sodium was low\"    History Of Presenting Illness:    55 " yo F with history of irritable bowel syndrome on alosetron with multiple episodes of watery diarrhea daily, anxiety/depression recently started on desvenlafaxine who presented to the ED after she was told her sodium was low on a routine lab draw.  Patient reports that she has been having about 6 episodes of watery diarrhea daily since February.  She denies any water intake from streams, travel outside of the country, or laxative usage.  Her IBS has been evaluated by Dr. Abreu and she has had endoscopies with biopsies performed, but no clear etiology.  She also has anxiety and depression for which she was taking Prozac up until 2 weeks ago when she started taking desvenlafaxine.  She was also told by her PCP to consume water instead of Gatorade or Powerade to counteract the diarrhea episodes.  Patient is aware that she has fatty liver disease.  In the ER, she was given normal saline bolus and Rocephin for suspected UTI.  She denies dysuria, chest pain, abdominal pain, nausea, vomiting, fever, or other acute issues.    Review Of Systems:     A full 10 point review of systems was performed and all pertinent positives and negatives are noted in the HPI.     Past Medical History:    Past Medical History:   Diagnosis Date   • Acid reflux    • Anxiety    • Back pain    • Depression    • Fractures    • Hernia of abdominal cavity    • High cholesterol    • Hypertension    • Irritable bowel syndrome    • Nocturia 10/5/2016   • Positive TB test    • Sinus problem    • ELINA (stress urinary incontinence, female) 10/5/2016       Past Surgical history:    Past Surgical History:   Procedure Laterality Date   •  SECTION     • COLONOSCOPY     • GASTRIC BYPASS     • HERNIA REPAIR     • HYSTERECTOMY     • LAPAROSCOPIC TUBAL LIGATION     • TOTAL ABDOMINAL HYSTERECTOMY WITH SALPINGO OOPHORECTOMY      uterine prolapse       Social History:    Social History     Socioeconomic History   • Marital status:      Spouse  name: Not on file   • Number of children: Not on file   • Years of education: Not on file   • Highest education level: Not on file   Tobacco Use   • Smoking status: Never Smoker   • Smokeless tobacco: Never Used   Substance and Sexual Activity   • Alcohol use: Yes     Alcohol/week: 2.0 standard drinks     Types: 2 Glasses of wine per week   • Drug use: No   • Sexual activity: Defer       Family History:    Family History   Problem Relation Age of Onset   • Hyperlipidemia Mother    • Arthritis Mother    • Diabetes Father    • Hypertension Father    • Arthritis Paternal Grandmother    • Breast cancer Neg Hx    • Ovarian cancer Neg Hx        Allergies:      Patient has no known allergies.    Home Medications:    Prior to Admission Medications     Prescriptions Last Dose Informant Patient Reported? Taking?    acamprosate (CAMPRAL) 333 MG EC tablet   No No    Take 2 tablets by mouth 3 (Three) Times a Day.    alosetron (Lotronex) 0.5 MG tablet   No No    Take 1 tablet by mouth daily.    carvedilol (COREG) 12.5 MG tablet   No No    Take 1 tablet by mouth 2 (Two) Times a Day With Meals.    chlorthalidone (HYGROTON) 25 MG tablet   No No    Take 1 tablet by mouth Daily.    desvenlafaxine (PRISTIQ) 50 MG 24 hr tablet   Yes No    Take  by mouth Daily.    desvenlafaxine ER (KHEDEZLA) 50 MG tablet sustained-release 24 hour 24 hr tablet   No No    Take 1 tablet by mouth Daily.    esomeprazole (nexIUM) 40 MG capsule   No No    Take 1 capsule by mouth Daily.    levothyroxine (SYNTHROID, LEVOTHROID) 50 MCG tablet   No No    Take 1 tablet by mouth Daily.    LORazepam (Ativan) 0.5 MG tablet   No No    Take 1 tablet by mouth Daily As Needed for Anxiety (panic attack).    Multiple Vitamins-Minerals (MULTIVITAMIN ADULT PO)   Yes No    Take  by mouth.    mupirocin (Bactroban) 2 % ointment   No No    Apply  topically to the appropriate area as directed 2 (Two) Times a Day for 5 days.    zolpidem (AMBIEN) 10 MG tablet   No No    TAKE 1  TABLET BY MOUTH AT BEDTIME AS NEEDED FOR SLEEP             ED Medications:    Medications   potassium chloride (MICRO-K) CR capsule 40 mEq (has no administration in time range)   carvedilol (COREG) tablet 12.5 mg (has no administration in time range)   pantoprazole (PROTONIX) EC tablet 40 mg (has no administration in time range)   levothyroxine (SYNTHROID, LEVOTHROID) tablet 50 mcg (has no administration in time range)   LORazepam (ATIVAN) tablet 0.5 mg (has no administration in time range)   zolpidem (AMBIEN) tablet 10 mg (has no administration in time range)   sodium chloride 0.9 % flush 10 mL (has no administration in time range)   acetaminophen (TYLENOL) tablet 650 mg (has no administration in time range)     Or   acetaminophen (TYLENOL) 160 MG/5ML solution 650 mg (has no administration in time range)     Or   acetaminophen (TYLENOL) suppository 650 mg (has no administration in time range)   ondansetron (ZOFRAN) injection 4 mg (has no administration in time range)   enoxaparin (LOVENOX) syringe 40 mg (has no administration in time range)   sodium chloride 0.9 % bolus 1,000 mL (0 mL Intravenous Stopped 7/15/20 1434)   ondansetron (ZOFRAN) injection 2 mg (2 mg Intravenous Given 7/15/20 1234)   potassium chloride 10 mEq in 100 mL IVPB (10 mEq Intravenous New Bag 7/15/20 1442)   potassium chloride (MICRO-K) CR capsule 20 mEq (20 mEq Oral Given 7/15/20 1318)   cefTRIAXone (ROCEPHIN) IVPB 1 g/50ml dextrose (premix) (0 g Intravenous Stopped 7/15/20 1434)       Vital Signs:    Temp:  [97.6 °F (36.4 °C)-98 °F (36.7 °C)] 98 °F (36.7 °C)  Heart Rate:  [66-70] 70  Resp:  [16] 16  BP: (107-132)/(53-71) 109/53        07/15/20  1110 07/15/20  1556   Weight: 67.4 kg (148 lb 9.6 oz) 67.1 kg (148 lb)       Body mass index is 26.22 kg/m².     Physical Exam:  General: No acute distress, resting in bed  HEENT: EOMI, NC/AT, MMM  Heart: RRR  Lungs: CTAB  Abdomen: Soft, nondistended, nontender, palpable liver  Extremities: Severe  pitting edema in the lower extremities  Neurological: A&O x3, moves all extremities, cranial nerves grossly intact  Psychological: Mood and affect appropriate, speech is coherent  Skin: Warm, dry, good turgor    Laboratory data:    I have reviewed the labs done in the emergency room.    Results from last 7 days   Lab Units 07/15/20  1234 07/14/20  0943   SODIUM mmol/L 121* 120*   POTASSIUM mmol/L 2.6* 2.8*   CHLORIDE mmol/L 76* 75*   CO2 mmol/L 32.1* 33.8*   BUN mg/dL 7 7   CREATININE mg/dL 0.76 0.78   CALCIUM mg/dL 8.1* 8.4*   BILIRUBIN mg/dL 5.1* 5.0*   ALK PHOS U/L 294* 292*   ALT (SGPT) U/L 26 28   AST (SGOT) U/L 87* 92*   GLUCOSE mg/dL 107* 88     Results from last 7 days   Lab Units 07/15/20  1235   WBC 10*3/mm3 12.61*   HEMOGLOBIN g/dL 10.5*   HEMATOCRIT % 29.4*   PLATELETS 10*3/mm3 129*         Results from last 7 days   Lab Units 07/15/20  1234   TROPONIN T ng/mL <0.010     Results from last 7 days   Lab Units 07/15/20  1234   PROBNP pg/mL 613.7         Results from last 7 days   Lab Units 07/15/20  1234   LIPASE U/L 91*         Results from last 7 days   Lab Units 07/15/20  1235   COLOR UA  Yellow   GLUCOSE UA  Negative   KETONES UA  Negative   LEUKOCYTES UA  Moderate (2+)*   PH, URINE  5.5   BILIRUBIN UA  Negative   UROBILINOGEN UA  1.0 E.U./dL     Pain Management Panel     Pain Management Panel Latest Ref Rng & Units 7/15/2020    AMPHETAMINES SCREEN, URINE Negative Negative    BARBITURATES SCREEN Negative Negative    BENZODIAZEPINE SCREEN, URINE Negative Positive(A)    BUPRENORPHINEUR Negative Negative    COCAINE SCREEN, URINE Negative Negative    METHADONE SCREEN, URINE Negative Negative    METHAMPHETAMINEUR Negative Negative              EKG:    Ordered and pending    Radiology:    Imaging Results (Last 72 Hours)     Procedure Component Value Units Date/Time    CT Abdomen Pelvis Without Contrast [[id number]] Collected:  07/15/20 1219     Updated:  07/15/20 1225    Narrative:       PROCEDURE: CT ABDOMEN  PELVIS WO CONTRAST-     HISTORY: bloating/diarrhea     COMPARISON: None .     PROCEDURE: Axial images were obtained from the lung bases through the  pubic symphysis without intravenous contrast. .      FINDINGS:      ABDOMEN: The lung bases are clear. The heart size is normal. The limited  noncontrast images of the liver demonstrates diffuse hypodensity  consistent with fatty infiltration. No focal liver lesions are  identified. The spleen is normal. No adrenal masses are seen.  The  pancreas has an unremarkable unenhanced appearance.. The aorta is normal  in caliber. There is no significant free fluid or adenopathy.  There is  no nephrolithiasis. There is no hydronephrosis. There are postsurgical  changes from gastric bypass surgery. There is no evidence of an internal  hernia or volvulus.     PELVIS: The appendix is not identified. The urinary bladder is  unremarkable. The patient is status post hysterectomy. There is a small  amount of free fluid in the pelvis which is nonspecific. There is  diffuse body wall anasarca.       Impression:       1. Fatty infiltration of the liver with a small amount of ascites within  the pelvis.  2. Diffuse body wall anasarca.              628.60 mGy.cm.  13.29 mGy     This study was performed with techniques to keep radiation doses as low  as reasonably achievable (ALARA). Individualized dose reduction  techniques using automated exposure control or adjustment of mA and/or  kV according to the patient size were employed.      This report was finalized on 7/15/2020 12:23 PM by Tania Strong M.D..    XR Chest 1 View [[id number]] Collected:  07/15/20 1219     Updated:  07/15/20 1223    Narrative:       PROCEDURE: XR CHEST 1 VW-     HISTORY: nausea     COMPARISON: None.     FINDINGS: The heart is normal in size. The mediastinum is unremarkable.  The lungs are clear. There is no pneumothorax.  There are no acute  osseous abnormalities.       Impression:       No acute  cardiopulmonary process.     Continued followup is recommended.     This report was finalized on 7/15/2020 12:19 PM by Tania Strong M.D..            Hyponatremia    Assessment and Plan:  Severe hypoosmolar hyponatremia, euvolemic  Severe hypokalemia  Hypomagnesemia  Fatty liver disease with history anasarca  Anxiety/depression  IBS  Diarrhea  Asymptomatic bacteriuria  Chronic: HTN, hypothyroidism    -Check urine sodium, osmolarity, serum osmolarity  -Hold fluids, home SNRI, and alosetron  -Picture is complicated given longstanding diarrhea which would lead to hypovolemic hyponatremia, however she is euvolemic on exam and I favor a component of SIADH from desvenlafaxine and alosetron usage  -She is also on chlorthalidone for HTN which is always a winner for hyponatremia and her hypoK  -Every 6 hours BMPs; if she corrects by more than 8 mEq of sodium in a 24-hour period,  administer D5W or depression  -Awaiting studies and will reevaluate tomorrow  -3% NaCl 50 cc bolus to be used if she develops any epileptic activity or confusion  -Nephrology consulted    Advance Care Planning   ACP discussion was held with the patient during this visit. Patient does not have an advance directive, information provided.      Juwan Hernandez DO  07/15/20  16:10    Dictated utilizing Dragon dictation.    Electronically signed by Juwan Hernandez DO at 07/15/20 1635          Emergency Department Notes      Antonio Monae, PA-C at 07/15/20 1122     Attestation signed by Silverio Nuno MD at 07/16/20 2188          For this patient encounter, I reviewed the NP or PA documentation, treatment plan, and medical decision making. Silverio Nuno MD 7/16/2020 07:59                  Subjective   Patient presents with complaint of some diarrhea bloating symptoms over the past couple of weeks patient states she was diagnosed with irritable bowel syndrome last month.  She has had some daily diarrhea, no chest pain shortness of  air no fever chills reported patient apparently was at her PCPs office yesterday and had some labs drawn told to come here for evaluation      History provided by:  Patient      Review of Systems   Constitutional: Positive for appetite change. Negative for fever.   HENT: Negative.    Eyes: Negative.    Respiratory: Negative.  Negative for shortness of breath.    Cardiovascular: Negative.  Negative for chest pain.   Gastrointestinal: Positive for diarrhea. Negative for vomiting.   Genitourinary: Negative.    Musculoskeletal: Positive for arthralgias.   Skin: Negative.    Neurological: Negative.    Psychiatric/Behavioral: The patient is nervous/anxious.    All other systems reviewed and are negative.      Past Medical History:   Diagnosis Date   • Acid reflux    • Anxiety    • Back pain    • Depression    • Fractures    • Hernia of abdominal cavity    • High cholesterol    • Hypertension    • Irritable bowel syndrome    • Nocturia 10/5/2016   • Positive TB test    • Sinus problem    • ELINA (stress urinary incontinence, female) 10/5/2016       No Known Allergies    Past Surgical History:   Procedure Laterality Date   •  SECTION     • COLONOSCOPY     • GASTRIC BYPASS     • HERNIA REPAIR     • HYSTERECTOMY     • LAPAROSCOPIC TUBAL LIGATION     • TOTAL ABDOMINAL HYSTERECTOMY WITH SALPINGO OOPHORECTOMY      uterine prolapse       Family History   Problem Relation Age of Onset   • Hyperlipidemia Mother    • Arthritis Mother    • Diabetes Father    • Hypertension Father    • Arthritis Paternal Grandmother    • Breast cancer Neg Hx    • Ovarian cancer Neg Hx        Social History     Socioeconomic History   • Marital status:      Spouse name: Not on file   • Number of children: Not on file   • Years of education: Not on file   • Highest education level: Not on file   Tobacco Use   • Smoking status: Never Smoker   • Smokeless tobacco: Never Used   Substance and Sexual Activity   • Alcohol use: Yes      Alcohol/week: 14.0 standard drinks     Types: 14 Glasses of wine per week   • Drug use: No   • Sexual activity: Defer           Objective   Physical Exam   Constitutional: She is oriented to person, place, and time. She appears well-developed and well-nourished. No distress.   Afebrile nontoxic no acute distress   HENT:   Head: Normocephalic and atraumatic.   Mouth/Throat: No oropharyngeal exudate.   Eyes: Pupils are equal, round, and reactive to light. Conjunctivae and EOM are normal. No scleral icterus.   Neck: Normal range of motion. Neck supple.   Cardiovascular: Normal rate, regular rhythm and intact distal pulses.   Pulmonary/Chest: Effort normal and breath sounds normal.   Abdominal: Soft. Bowel sounds are normal. There is tenderness.   Mild tenderness lower abdomen, some slight distention   Musculoskeletal: Normal range of motion. She exhibits no edema.   Neurological: She is alert and oriented to person, place, and time. No cranial nerve deficit or sensory deficit. She exhibits normal muscle tone. Coordination normal.   Skin: Skin is warm. Capillary refill takes less than 2 seconds. No rash noted. She is not diaphoretic. No erythema.   Psychiatric: She has a normal mood and affect. Her behavior is normal. Judgment and thought content normal.   Nursing note and vitals reviewed.      Procedures          ED Course  ED Course as of Jul 15 1410   Wed Jul 15, 2020   1246 EKG interpreted by me: Sinus rhythm, normal rate, no acute ST changes, long QT, this is an abnormal EKG    [MP]   1354 Patient case and management all reviewed with Dr. Nuno    [SC]   1356 We will plan on talking with hospitalist for admission    [SC]   1357 Discussed with ... For admission    [SC]   1404 Will admit to telemetry inpatient per Dr Hernandez    [SC]      ED Course User Index  [MP] Silverio Nuno MD  [SC] Antonio Monae, LORI                                           MDM  Number of Diagnoses or Management  Options     Amount and/or Complexity of Data Reviewed  Review and summarize past medical records: yes  Discuss the patient with other providers: yes    Risk of Complications, Morbidity, and/or Mortality  Presenting problems: moderate  Diagnostic procedures: low  Management options: low        Final diagnoses:   Hyponatremia   Hypokalemia            Antonio Monae PA-C  07/15/20 1411      Electronically signed by Silverio Nuno MD at 20 0759     Marva Avelar at 07/15/20 1357        At this time, Hospitalist was contacted and transferred to LORI Alvarez.      Marva Avelar  07/15/20 1358      Electronically signed by Marva Avelar at 07/15/20 1358          Physician Progress Notes (last 24 hours) (Notes from 07/15/20 1355 through 20 1355)      Juwan Hernandez DO at 20 1223                [facility]    [facility]    Name:  Gaby Rasheed   Age:  55 y.o.  Sex:  female  :  1965  MRN:  [id number]   Visit Number:  [id number]  Admission Date:  7/15/2020  Date Of Service:  20  Primary Care Physician:  Arabella Loving MD     LOS: 1 day :  Patient Care Team:  Arabella Loving MD as PCP - General (Family Medicine)  Moose Abreu MD as Consulting Physician (General Surgery):    Subjective / Interval History:   Patient seen in follow-up for hyponatremia.    She denies taking alosetron and has not taken it for the past few weeks.  She has questions about cirrhosis and wonders if she has it.  She admits to drinking excessive alcohol in the past.    Review of Systems:   A full 10 point review of systems was performed and all pertinent positives and negatives are noted in the HPI.    Vital Signs:    Temp:  [98 °F (36.7 °C)-98.4 °F (36.9 °C)] 98 °F (36.7 °C)  Heart Rate:  [62-70] 66  Resp:  [16-20] 20  BP: ()/(44-66) 100/49    Intake and output:    No intake/output data recorded.  I/O this shift:  In: 240 [P.O.:240]  Out: -      Physical Examination:  General: No acute distress, resting in bed  HEENT: EOMI, NC/AT, MMM  Heart: RRR  Lungs: CTAB  Abdomen: Soft, nondistended, nontender, palpable liver  Extremities: Severe pitting edema in the lower extremities  Neurological: A&O x3, moves all extremities, cranial nerves grossly intact  Psychological: Mood and affect appropriate, speech is coherent  Skin: Warm, dry, good turgor    Laboratory results:    Results from last 7 days   Lab Units 07/16/20  0521 07/16/20  0002 07/15/20  1806  07/15/20  1234 07/14/20  0943   SODIUM mmol/L 127* 123* 124*   < > 121* 120*   POTASSIUM mmol/L 3.0* 3.5 2.9*   < > 2.6* 2.8*   CHLORIDE mmol/L 88* 86* 83*   < > 76* 75*   CO2 mmol/L 31.1* 30.0* 30.5*   < > 32.1* 33.8*   BUN mg/dL 7 6 6   < > 7 7   CREATININE mg/dL 0.68 0.61 0.67   < > 0.76 0.78   CALCIUM mg/dL 7.8* 7.8* 7.5*   < > 8.1* 8.4*   BILIRUBIN mg/dL  --   --   --   --  5.1* 5.0*   ALK PHOS U/L  --   --   --   --  294* 292*   ALT (SGPT) U/L  --   --   --   --  26 28   AST (SGOT) U/L  --   --   --   --  87* 92*   GLUCOSE mg/dL 100* 78 79   < > 107* 88    < > = values in this interval not displayed.     Results from last 7 days   Lab Units 07/16/20  0521 07/15/20  1235   WBC 10*3/mm3 9.11 12.61*   HEMOGLOBIN g/dL 9.7* 10.5*   HEMATOCRIT % 27.5* 29.4*   PLATELETS 10*3/mm3 116* 129*         Results from last 7 days   Lab Units 07/15/20  1234   TROPONIN T ng/mL <0.010               I have reviewed the patient's laboratory results.    Radiology results:    Imaging Results (Last 24 Hours)     Procedure Component Value Units Date/Time    CT Abdomen Pelvis Without Contrast [[id number]] Collected:  07/15/20 1219     Updated:  07/15/20 1225    Narrative:       PROCEDURE: CT ABDOMEN PELVIS WO CONTRAST-     HISTORY: bloating/diarrhea     COMPARISON: None .     PROCEDURE: Axial images were obtained from the lung bases through the  pubic symphysis without intravenous contrast. .      FINDINGS:      ABDOMEN: The lung  bases are clear. The heart size is normal. The limited  noncontrast images of the liver demonstrates diffuse hypodensity  consistent with fatty infiltration. No focal liver lesions are  identified. The spleen is normal. No adrenal masses are seen.  The  pancreas has an unremarkable unenhanced appearance.. The aorta is normal  in caliber. There is no significant free fluid or adenopathy.  There is  no nephrolithiasis. There is no hydronephrosis. There are postsurgical  changes from gastric bypass surgery. There is no evidence of an internal  hernia or volvulus.     PELVIS: The appendix is not identified. The urinary bladder is  unremarkable. The patient is status post hysterectomy. There is a small  amount of free fluid in the pelvis which is nonspecific. There is  diffuse body wall anasarca.       Impression:       1. Fatty infiltration of the liver with a small amount of ascites within  the pelvis.  2. Diffuse body wall anasarca.              628.60 mGy.cm.  13.29 mGy     This study was performed with techniques to keep radiation doses as low  as reasonably achievable (ALARA). Individualized dose reduction  techniques using automated exposure control or adjustment of mA and/or  kV according to the patient size were employed.      This report was finalized on 7/15/2020 12:23 PM by Tania Strong M.D..    XR Chest 1 View [[id number]] Collected:  07/15/20 1219     Updated:  07/15/20 1223    Narrative:       PROCEDURE: XR CHEST 1 VW-     HISTORY: nausea     COMPARISON: None.     FINDINGS: The heart is normal in size. The mediastinum is unremarkable.  The lungs are clear. There is no pneumothorax.  There are no acute  osseous abnormalities.       Impression:       No acute cardiopulmonary process.     Continued followup is recommended.     This report was finalized on 7/15/2020 12:19 PM by Tania Strong M.D..          I have reviewed the patient's radiology reports.    Medication Review:     I have reviewed  the patients active and prn medications.       Hyponatremia      Assessment and Plan:  Severe hypoosmolar hyponatremia, euvolemic  Severe hypokalemia  Hypomagnesemia  Fatty liver disease with anasarca  H/O alcoholism  Acute on chronic macrocytic anemia  Thrombocytopenia, chronic  Anxiety/depression  IBS  Diarrhea  Asymptomatic bacteriuria  Chronic: HTN, hypothyroidism     -urine sodium, osmolarity, serum osmolarity appear consistent with cirrhosis and volume overload  -Hold fluids, home SNRI, and d/c alosetron  -Picture is complicated given longstanding diarrhea which would lead to hypovolemic hyponatremia, however she is hypervolemic on exam, but SIADH from desvenlafaxine is not ruled out  -She is also on chlorthalidone for HTN which is always a winner for hyponatremia and her hypoK  -Goal is to correct the hypokalemia, allow the sodium to correct itself  -We will diurese her and start spironolactone  -[id number] BMPs; if she corrects by more than 8 mEq of sodium in a 24-hour period,  administer D5W or depression    Juwan Hernandez DO  07/16/20  12:23    Dictated utilizing Dragon dictation.      Electronically signed by Juwan Hernandez DO at 07/16/20 1230       Consult Notes (last 24 hours) (Notes from 07/15/20 1355 through 07/16/20 1355)    No notes of this type exist for this encounter.

## 2020-07-16 NOTE — PAYOR COMM NOTE
"TO:BC  FROM:CARLYLE ENNIS, RN PHONE [phone]  IN CLINICALS     Popeye Rasheed (55 y.o. Female)     Date of Birth Social Security Number Address Home Phone MRN    1965  [address] [id number] [phone] [id number]    [employer] Marital Status          Yaz[employer]ism        Admission Date Admission Type Admitting Provider Attending Provider Department, Room/Bed    7/15/20 Emergency Juwan Hernandez DO Bhatti, Umar, DO [facility] MED SURG  4, 430/1    Discharge Date Discharge Disposition Discharge Destination                       Attending Provider:  Juwan Hernandez DO    Allergies:  No Known Allergies    Isolation:  None   Infection:  COVID Screen (preop/placement) (20)   Code Status:  CPR    Ht:  160 cm (63\")   Wt:  67.1 kg (148 lb)    Admission Cmt:  None   Principal Problem:  None                Active Insurance as of 7/15/2020     Primary Coverage     Payor Plan Insurance Group Employer/Plan Group    [employer] PPO [id number]     Payor Plan Address Payor Plan Phone Number Payor Plan Fax Number Effective Dates    [phone]  2020 - None Entered    [place] [address]       Subscriber Name Subscriber Birth Date Member ID       POPEYE RASHEED 1965 [id number]                 Emergency Contacts      (Rel.) Home Phone Work Phone Mobile Phone    Anton Jaquez (Significant Other) [phone] -- --               History & Physical      Juwan Hernandez DO at 07/15/20 [address] [place] [address] HOSPITALIST   HISTORY AND PHYSICAL      Name:  Popeye Rasheed   Age:  55 y.o.  Sex:  female  :  1965  MRN:  [id number]   Visit Number:  [id number]  Admission Date:  7/15/2020  Date Of Service:  07/15/20  Primary Care Physician:  Arabella Loving MD    Chief Complaint:   \" They called and told me my sodium was low\"    History Of Presenting " Illness:    56 yo F with history of irritable bowel syndrome on alosetron with multiple episodes of watery diarrhea daily, anxiety/depression recently started on desvenlafaxine who presented to the ED after she was told her sodium was low on a routine lab draw.  Patient reports that she has been having about 6 episodes of watery diarrhea daily since February.  She denies any water intake from streams, travel outside of the country, or laxative usage.  Her IBS has been evaluated by Dr. Abreu and she has had endoscopies with biopsies performed, but no clear etiology.  She also has anxiety and depression for which she was taking Prozac up until 2 weeks ago when she started taking desvenlafaxine.  She was also told by her PCP to consume water instead of Gatorade or Powerade to counteract the diarrhea episodes.  Patient is aware that she has fatty liver disease.  In the ER, she was given normal saline bolus and Rocephin for suspected UTI.  She denies dysuria, chest pain, abdominal pain, nausea, vomiting, fever, or other acute issues.    Review Of Systems:     A full 10 point review of systems was performed and all pertinent positives and negatives are noted in the HPI.     Past Medical History:    Past Medical History:   Diagnosis Date   • Acid reflux    • Anxiety    • Back pain    • Depression    • Fractures    • Hernia of abdominal cavity    • High cholesterol    • Hypertension    • Irritable bowel syndrome    • Nocturia 10/5/2016   • Positive TB test    • Sinus problem    • ELINA (stress urinary incontinence, female) 10/5/2016       Past Surgical history:    Past Surgical History:   Procedure Laterality Date   •  SECTION     • COLONOSCOPY     • GASTRIC BYPASS     • HERNIA REPAIR     • HYSTERECTOMY     • LAPAROSCOPIC TUBAL LIGATION     • TOTAL ABDOMINAL HYSTERECTOMY WITH SALPINGO OOPHORECTOMY      uterine prolapse       Social History:    Social History     Socioeconomic History   • Marital status:       Spouse name: Not on file   • Number of children: Not on file   • Years of education: Not on file   • Highest education level: Not on file   Tobacco Use   • Smoking status: Never Smoker   • Smokeless tobacco: Never Used   Substance and Sexual Activity   • Alcohol use: Yes     Alcohol/week: 2.0 standard drinks     Types: 2 Glasses of wine per week   • Drug use: No   • Sexual activity: Defer       Family History:    Family History   Problem Relation Age of Onset   • Hyperlipidemia Mother    • Arthritis Mother    • Diabetes Father    • Hypertension Father    • Arthritis Paternal Grandmother    • Breast cancer Neg Hx    • Ovarian cancer Neg Hx        Allergies:      Patient has no known allergies.    Home Medications:    Prior to Admission Medications     Prescriptions Last Dose Informant Patient Reported? Taking?    acamprosate (CAMPRAL) 333 MG EC tablet   No No    Take 2 tablets by mouth 3 (Three) Times a Day.    alosetron (Lotronex) 0.5 MG tablet   No No    Take 1 tablet by mouth daily.    carvedilol (COREG) 12.5 MG tablet   No No    Take 1 tablet by mouth 2 (Two) Times a Day With Meals.    chlorthalidone (HYGROTON) 25 MG tablet   No No    Take 1 tablet by mouth Daily.    desvenlafaxine (PRISTIQ) 50 MG 24 hr tablet   Yes No    Take  by mouth Daily.    desvenlafaxine ER (KHEDEZLA) 50 MG tablet sustained-release 24 hour 24 hr tablet   No No    Take 1 tablet by mouth Daily.    esomeprazole (nexIUM) 40 MG capsule   No No    Take 1 capsule by mouth Daily.    levothyroxine (SYNTHROID, LEVOTHROID) 50 MCG tablet   No No    Take 1 tablet by mouth Daily.    LORazepam (Ativan) 0.5 MG tablet   No No    Take 1 tablet by mouth Daily As Needed for Anxiety (panic attack).    Multiple Vitamins-Minerals (MULTIVITAMIN ADULT PO)   Yes No    Take  by mouth.    mupirocin (Bactroban) 2 % ointment   No No    Apply  topically to the appropriate area as directed 2 (Two) Times a Day for 5 days.    zolpidem (AMBIEN) 10 MG tablet    No No    TAKE 1 TABLET BY MOUTH AT BEDTIME AS NEEDED FOR SLEEP             ED Medications:    Medications   potassium chloride (MICRO-K) CR capsule 40 mEq (has no administration in time range)   carvedilol (COREG) tablet 12.5 mg (has no administration in time range)   pantoprazole (PROTONIX) EC tablet 40 mg (has no administration in time range)   levothyroxine (SYNTHROID, LEVOTHROID) tablet 50 mcg (has no administration in time range)   LORazepam (ATIVAN) tablet 0.5 mg (has no administration in time range)   zolpidem (AMBIEN) tablet 10 mg (has no administration in time range)   sodium chloride 0.9 % flush 10 mL (has no administration in time range)   acetaminophen (TYLENOL) tablet 650 mg (has no administration in time range)     Or   acetaminophen (TYLENOL) 160 MG/5ML solution 650 mg (has no administration in time range)     Or   acetaminophen (TYLENOL) suppository 650 mg (has no administration in time range)   ondansetron (ZOFRAN) injection 4 mg (has no administration in time range)   enoxaparin (LOVENOX) syringe 40 mg (has no administration in time range)   sodium chloride 0.9 % bolus 1,000 mL (0 mL Intravenous Stopped 7/15/20 1434)   ondansetron (ZOFRAN) injection 2 mg (2 mg Intravenous Given 7/15/20 1234)   potassium chloride 10 mEq in 100 mL IVPB (10 mEq Intravenous New Bag 7/15/20 1442)   potassium chloride (MICRO-K) CR capsule 20 mEq (20 mEq Oral Given 7/15/20 1318)   cefTRIAXone (ROCEPHIN) IVPB 1 g/50ml dextrose (premix) (0 g Intravenous Stopped 7/15/20 1434)       Vital Signs:    Temp:  [97.6 °F (36.4 °C)-98 °F (36.7 °C)] 98 °F (36.7 °C)  Heart Rate:  [66-70] 70  Resp:  [16] 16  BP: (107-132)/(53-71) 109/53        07/15/20  1110 07/15/20  1556   Weight: 67.4 kg (148 lb 9.6 oz) 67.1 kg (148 lb)       Body mass index is 26.22 kg/m².     Physical Exam:  General: No acute distress, resting in bed  HEENT: EOMI, NC/AT, MMM  Heart: RRR  Lungs: CTAB  Abdomen: Soft, nondistended, nontender, palpable  liver  Extremities: Severe pitting edema in the lower extremities  Neurological: A&O x3, moves all extremities, cranial nerves grossly intact  Psychological: Mood and affect appropriate, speech is coherent  Skin: Warm, dry, good turgor    Laboratory data:    I have reviewed the labs done in the emergency room.    Results from last 7 days   Lab Units 07/15/20  1234 07/14/20  0943   SODIUM mmol/L 121* 120*   POTASSIUM mmol/L 2.6* 2.8*   CHLORIDE mmol/L 76* 75*   CO2 mmol/L 32.1* 33.8*   BUN mg/dL 7 7   CREATININE mg/dL 0.76 0.78   CALCIUM mg/dL 8.1* 8.4*   BILIRUBIN mg/dL 5.1* 5.0*   ALK PHOS U/L 294* 292*   ALT (SGPT) U/L 26 28   AST (SGOT) U/L 87* 92*   GLUCOSE mg/dL 107* 88     Results from last 7 days   Lab Units 07/15/20  1235   WBC 10*3/mm3 12.61*   HEMOGLOBIN g/dL 10.5*   HEMATOCRIT % 29.4*   PLATELETS 10*3/mm3 129*         Results from last 7 days   Lab Units 07/15/20  1234   TROPONIN T ng/mL <0.010     Results from last 7 days   Lab Units 07/15/20  1234   PROBNP pg/mL 613.7         Results from last 7 days   Lab Units 07/15/20  1234   LIPASE U/L 91*         Results from last 7 days   Lab Units 07/15/20  1235   COLOR UA  Yellow   GLUCOSE UA  Negative   KETONES UA  Negative   LEUKOCYTES UA  Moderate (2+)*   PH, URINE  5.5   BILIRUBIN UA  Negative   UROBILINOGEN UA  1.0 E.U./dL     Pain Management Panel     Pain Management Panel Latest Ref Rng & Units 7/15/2020    AMPHETAMINES SCREEN, URINE Negative Negative    BARBITURATES SCREEN Negative Negative    BENZODIAZEPINE SCREEN, URINE Negative Positive(A)    BUPRENORPHINEUR Negative Negative    COCAINE SCREEN, URINE Negative Negative    METHADONE SCREEN, URINE Negative Negative    METHAMPHETAMINEUR Negative Negative              EKG:    Ordered and pending    Radiology:    Imaging Results (Last 72 Hours)     Procedure Component Value Units Date/Time    CT Abdomen Pelvis Without Contrast [[id number]] Collected:  07/15/20 1219     Updated:  07/15/20 1225    Narrative:        PROCEDURE: CT ABDOMEN PELVIS WO CONTRAST-     HISTORY: bloating/diarrhea     COMPARISON: None .     PROCEDURE: Axial images were obtained from the lung bases through the  pubic symphysis without intravenous contrast. .      FINDINGS:      ABDOMEN: The lung bases are clear. The heart size is normal. The limited  noncontrast images of the liver demonstrates diffuse hypodensity  consistent with fatty infiltration. No focal liver lesions are  identified. The spleen is normal. No adrenal masses are seen.  The  pancreas has an unremarkable unenhanced appearance.. The aorta is normal  in caliber. There is no significant free fluid or adenopathy.  There is  no nephrolithiasis. There is no hydronephrosis. There are postsurgical  changes from gastric bypass surgery. There is no evidence of an internal  hernia or volvulus.     PELVIS: The appendix is not identified. The urinary bladder is  unremarkable. The patient is status post hysterectomy. There is a small  amount of free fluid in the pelvis which is nonspecific. There is  diffuse body wall anasarca.       Impression:       1. Fatty infiltration of the liver with a small amount of ascites within  the pelvis.  2. Diffuse body wall anasarca.              628.60 mGy.cm.  13.29 mGy     This study was performed with techniques to keep radiation doses as low  as reasonably achievable (ALARA). Individualized dose reduction  techniques using automated exposure control or adjustment of mA and/or  kV according to the patient size were employed.      This report was finalized on 7/15/2020 12:23 PM by Tania Strong M.D..    XR Chest 1 View [[id number]] Collected:  07/15/20 1219     Updated:  07/15/20 1223    Narrative:       PROCEDURE: XR CHEST 1 VW-     HISTORY: nausea     COMPARISON: None.     FINDINGS: The heart is normal in size. The mediastinum is unremarkable.  The lungs are clear. There is no pneumothorax.  There are no acute  osseous abnormalities.       Impression:        No acute cardiopulmonary process.     Continued followup is recommended.     This report was finalized on 7/15/2020 12:19 PM by Tania Strong M.D..            Hyponatremia    Assessment and Plan:  Severe hypoosmolar hyponatremia, euvolemic  Severe hypokalemia  Hypomagnesemia  Fatty liver disease with history anasarca  Anxiety/depression  IBS  Diarrhea  Asymptomatic bacteriuria  Chronic: HTN, hypothyroidism    -Check urine sodium, osmolarity, serum osmolarity  -Hold fluids, home SNRI, and alosetron  -Picture is complicated given longstanding diarrhea which would lead to hypovolemic hyponatremia, however she is euvolemic on exam and I favor a component of SIADH from desvenlafaxine and alosetron usage  -She is also on chlorthalidone for HTN which is always a winner for hyponatremia and her hypoK  -Every 6 hours BMPs; if she corrects by more than 8 mEq of sodium in a 24-hour period,  administer D5W or depression  -Awaiting studies and will reevaluate tomorrow  -3% NaCl 50 cc bolus to be used if she develops any epileptic activity or confusion  -Nephrology consulted    Advance Care Planning   ACP discussion was held with the patient during this visit. Patient does not have an advance directive, information provided.      Juwan Hernandez DO  07/15/20  16:10    Dictated utilizing Dragon dictation.    Electronically signed by Juwan Hernandez DO at 07/15/20 1635          Emergency Department Notes      Antonio Monae PA-C at 07/15/20 1122     Attestation signed by Silverio Nuno MD at 07/16/20 2062          For this patient encounter, I reviewed the NP or PA documentation, treatment plan, and medical decision making. Silverio Nuno MD 7/16/2020 07:59                  Subjective   Patient presents with complaint of some diarrhea bloating symptoms over the past couple of weeks patient states she was diagnosed with irritable bowel syndrome last month.  She has had some daily diarrhea, no chest pain  shortness of air no fever chills reported patient apparently was at her PCPs office yesterday and had some labs drawn told to come here for evaluation      History provided by:  Patient      Review of Systems   Constitutional: Positive for appetite change. Negative for fever.   HENT: Negative.    Eyes: Negative.    Respiratory: Negative.  Negative for shortness of breath.    Cardiovascular: Negative.  Negative for chest pain.   Gastrointestinal: Positive for diarrhea. Negative for vomiting.   Genitourinary: Negative.    Musculoskeletal: Positive for arthralgias.   Skin: Negative.    Neurological: Negative.    Psychiatric/Behavioral: The patient is nervous/anxious.    All other systems reviewed and are negative.      Past Medical History:   Diagnosis Date   • Acid reflux    • Anxiety    • Back pain    • Depression    • Fractures    • Hernia of abdominal cavity    • High cholesterol    • Hypertension    • Irritable bowel syndrome    • Nocturia 10/5/2016   • Positive TB test    • Sinus problem    • ELINA (stress urinary incontinence, female) 10/5/2016       No Known Allergies    Past Surgical History:   Procedure Laterality Date   •  SECTION     • COLONOSCOPY     • GASTRIC BYPASS     • HERNIA REPAIR     • HYSTERECTOMY     • LAPAROSCOPIC TUBAL LIGATION     • TOTAL ABDOMINAL HYSTERECTOMY WITH SALPINGO OOPHORECTOMY      uterine prolapse       Family History   Problem Relation Age of Onset   • Hyperlipidemia Mother    • Arthritis Mother    • Diabetes Father    • Hypertension Father    • Arthritis Paternal Grandmother    • Breast cancer Neg Hx    • Ovarian cancer Neg Hx        Social History     Socioeconomic History   • Marital status:      Spouse name: Not on file   • Number of children: Not on file   • Years of education: Not on file   • Highest education level: Not on file   Tobacco Use   • Smoking status: Never Smoker   • Smokeless tobacco: Never Used   Substance and Sexual Activity   • Alcohol  use: Yes     Alcohol/week: 14.0 standard drinks     Types: 14 Glasses of wine per week   • Drug use: No   • Sexual activity: Defer           Objective   Physical Exam   Constitutional: She is oriented to person, place, and time. She appears well-developed and well-nourished. No distress.   Afebrile nontoxic no acute distress   HENT:   Head: Normocephalic and atraumatic.   Mouth/Throat: No oropharyngeal exudate.   Eyes: Pupils are equal, round, and reactive to light. Conjunctivae and EOM are normal. No scleral icterus.   Neck: Normal range of motion. Neck supple.   Cardiovascular: Normal rate, regular rhythm and intact distal pulses.   Pulmonary/Chest: Effort normal and breath sounds normal.   Abdominal: Soft. Bowel sounds are normal. There is tenderness.   Mild tenderness lower abdomen, some slight distention   Musculoskeletal: Normal range of motion. She exhibits no edema.   Neurological: She is alert and oriented to person, place, and time. No cranial nerve deficit or sensory deficit. She exhibits normal muscle tone. Coordination normal.   Skin: Skin is warm. Capillary refill takes less than 2 seconds. No rash noted. She is not diaphoretic. No erythema.   Psychiatric: She has a normal mood and affect. Her behavior is normal. Judgment and thought content normal.   Nursing note and vitals reviewed.      Procedures          ED Course  ED Course as of Jul 15 1410   Wed Jul 15, 2020   1246 EKG interpreted by me: Sinus rhythm, normal rate, no acute ST changes, long QT, this is an abnormal EKG    [MP]   1359 Patient case and management all reviewed with Dr. Nuno    [SC]   1356 We will plan on talking with hospitalist for admission    [SC]   6550 Discussed with ... For admission    [SC]   1404 Will admit to telemetry inpatient per Dr Hernandez    [SC]      ED Course User Index  [MP] Silverio Nuno MD  [SC] Antonio Monae, LORI                                           MDM  Number of Diagnoses or  Management Options     Amount and/or Complexity of Data Reviewed  Review and summarize past medical records: yes  Discuss the patient with other providers: yes    Risk of Complications, Morbidity, and/or Mortality  Presenting problems: moderate  Diagnostic procedures: low  Management options: low        Final diagnoses:   Hyponatremia   Hypokalemia            Antonio Monae PA-C  07/15/20 1411      Electronically signed by Silverio Nuno MD at 07/16/20 0759     Marva Avelar at 07/15/20 1357        At this time, Hospitalist was contacted and transferred to LORI Alvarez.      Marva Avelar  07/15/20 1358      Electronically signed by Marva Avelar at 07/15/20 1358       Vital Signs (last day)     Date/Time   Temp   Temp src   Pulse   Resp   BP   Patient Position   SpO2    07/16/20 1147   98 (36.7)   Oral   --   20   100/49   Lying   96    07/16/20 0749   98 (36.7)   Oral   --   20   98/44   Lying   94    07/16/20 0356   98.2 (36.8)   Oral   66   18   109/50   Lying   96    07/16/20 0023   98.4 (36.9)   Oral   62   18   118/60   Sitting   98    07/15/20 1950   98.4 (36.9)   Oral   67   16   104/51   Lying   99    07/15/20 1556   98 (36.7)   Oral   70   16   109/53   Lying   99    07/15/20 1500   --   --   --   --   110/55   --   --    07/15/20 1457   --   --   67   --   --   --   98    07/15/20 1440   --   --   66   --   111/60   --   98    07/15/20 1438   --   --   67   --   --   --   98    07/15/20 1436   --   --   67   --   107/66   --   --    07/15/20 1110   97.6 (36.4)   Oral   69   16   132/71   Sitting   100                Current Facility-Administered Medications   Medication Dose Route Frequency Provider Last Rate Last Dose   • acetaminophen (TYLENOL) tablet 650 mg  650 mg Oral Q4H PRN Juwan Hernandez, DO        Or   • acetaminophen (TYLENOL) 160 MG/5ML solution 650 mg  650 mg Oral Q4H PRN Juwan Hernandez, DO        Or   • acetaminophen (TYLENOL) suppository 650 mg  650 mg Rectal Q4H [facility]       • diphenoxylate-atropine (LOMOTIL) 2.5-0.025 MG per tablet 1 tablet  1 tablet Oral BID Lloyd Bobby MD, SYLVESTER   1 tablet at 07/16/20 1112   • enoxaparin (LOVENOX) syringe 40 mg  40 mg Subcutaneous Q24H [facility]   40 mg at 07/15/20 1659   • furosemide (LASIX) injection 40 mg  40 mg Intravenous Q6H [facility] ar, DO       • levothyroxine (SYNTHROID, LEVOTHROID) tablet 50 mcg  50 mcg [facility] Daily [facility]   50 mcg at 07/16/20 0605   • LORazepam (ATIVAN) tablet 0.5 mg  0.5 mg Oral Daily [facility]N [facility]   0.5 mg at 07/15/20 2038   • ondansetron (ZOFRAN) injection 4 mg  4 mg Intravenous Q6H PRN [facility]       • potassium chloride (MICRO-K) CR capsule 40 mEq  40 mEq Oral Q3H [facility]   40 mEq at 07/16/20 1111   • sodium chloride 0.9 % flush 10 mL  10 mL Intravenous Q12H [facility]   10 mL at 07/16/20 0859   • spironolactone (ALDACTONE) tablet 50 mg  50 mg Oral Daily [facility] ar, DO       • zolpidem (AMBIEN) tablet 10 mg  10 mg Oral Nightly PRN [facility]   10 mg at 07/15/20 2038       Lab Results (last 24 hours)     Procedure Component Value Units Date/Time    Basic Metabolic Panel [[id number]]  (Abnormal) Collected:  07/16/20 1205    Specimen:  Blood Updated:  07/16/20 1243     Glucose 99 mg/dL      BUN 6 mg/dL      Creatinine 0.70 mg/dL      Sodium 128 mmol/L      Potassium 3.7 mmol/L      Chloride 89 mmol/L      CO2 31.5 mmol/L      Calcium 8.0 mg/dL      eGFR Non African Amer 87 mL/min/1.73      BUN/Creatinine Ratio 8.6     Anion Gap 7.5 mmol/L     Narrative:       GFR Normal >60  Chronic Kidney Disease <60  Kidney Failure <15      Urine Culture - Urine, Urine, Clean Catch [[id number]]  (Abnormal) Collected:  07/15/20 1235    Specimen:  Urine, Clean Catch Updated:  07/16/20 1229     Urine Culture >100,000 CFU/mL Gram Negative Bacilli    Magnesium [[id number]]  (Normal) Collected:  07/16/20 0520    Specimen:  Blood Updated:  07/16/20 0931     Magnesium  2.2 mg/dL     TSH [[id number]]  (Abnormal) Collected:  07/16/20 0521    Specimen:  Blood Updated:  07/16/20 0924     TSH 6.600 uIU/mL     Osmolality, Urine - Urine, Clean Catch [[id number]] Collected:  07/15/20 1235    Specimen:  Urine, Clean Catch Updated:  07/16/20 0735     Osmolality, Urine 168 mOsm/kg     Narrative:       Osmo Normal Reference Ranges:    Random:  mOsm/kg H2O, depending on fluid intake.  Random: >850 mOsm/kg H20, after 12 hour fluid restriction.    24 Hour: 300-900 mOsm/kg H2O.    Basic Metabolic Panel [[id number]]  (Abnormal) Collected:  07/16/20 0521    Specimen:  Blood Updated:  07/16/20 0602     Glucose 100 mg/dL      BUN 7 mg/dL      Creatinine 0.68 mg/dL      Sodium 127 mmol/L      Potassium 3.0 mmol/L      Chloride 88 mmol/L      CO2 31.1 mmol/L      Calcium 7.8 mg/dL      eGFR Non African Amer 90 mL/min/1.73      BUN/Creatinine Ratio 10.3     Anion Gap 7.9 mmol/L     Narrative:       GFR Normal >60  Chronic Kidney Disease <60  Kidney Failure <15      CBC Auto Differential [[id number]]  (Abnormal) Collected:  07/16/20 0521    Specimen:  Blood Updated:  07/16/20 0542     WBC 9.11 10*3/mm3      RBC 2.72 10*6/mm3      Hemoglobin 9.7 g/dL      Hematocrit 27.5 %      .1 fL      MCH 35.7 pg      MCHC 35.3 g/dL      RDW 16.9 %      RDW-SD 62.3 fl      MPV 13.1 fL      Platelets 116 10*3/mm3      Neutrophil % 74.3 %      Lymphocyte % 14.7 %      Monocyte % 9.4 %      Eosinophil % 0.4 %      Basophil % 0.5 %      Immature Grans % 0.7 %      Neutrophils, Absolute 6.76 10*3/mm3      Lymphocytes, Absolute 1.34 10*3/mm3      Monocytes, Absolute 0.86 10*3/mm3      Eosinophils, Absolute 0.04 10*3/mm3      Basophils, Absolute 0.05 10*3/mm3      Immature Grans, Absolute 0.06 10*3/mm3      nRBC 0.0 /100 WBC     Fecal Lactoferrin - Stool, Per Rectum [[id number]]  (Abnormal) Collected:  07/15/20 1438    Specimen:  Stool from Per Rectum Updated:  07/16/20 0204     Lactoferrin, Qual Positive    Basic  Metabolic Panel [[id number]]  (Abnormal) Collected:  07/16/20 0002    Specimen:  Blood Updated:  07/16/20 0040     Glucose 78 mg/dL      BUN 6 mg/dL      Creatinine 0.61 mg/dL      Sodium 123 mmol/L      Potassium 3.5 mmol/L      Chloride 86 mmol/L      CO2 30.0 mmol/L      Calcium 7.8 mg/dL      eGFR Non African Amer 101 mL/min/1.73      BUN/Creatinine Ratio 9.8     Anion Gap 7.0 mmol/L     Narrative:       GFR Normal >60  Chronic Kidney Disease <60  Kidney Failure <15      Osmolality, Serum [[id number]]  (Abnormal) Collected:  07/15/20 1539    Specimen:  Blood Updated:  07/15/20 2330     Osmolality 262 mOsm/kg     Basic Metabolic Panel [[id number]]  (Abnormal) Collected:  07/15/20 1806    Specimen:  Blood Updated:  07/15/20 1829     Glucose 79 mg/dL      BUN 6 mg/dL      Creatinine 0.67 mg/dL      Sodium 124 mmol/L      Potassium 2.9 mmol/L      Chloride 83 mmol/L      CO2 30.5 mmol/L      Calcium 7.5 mg/dL      eGFR Non African Amer 91 mL/min/1.73      BUN/Creatinine Ratio 9.0     Anion Gap 10.5 mmol/L     Narrative:       GFR Normal >60  Chronic Kidney Disease <60  Kidney Failure <15      BNP [[id number]]  (Normal) Collected:  07/15/20 1539    Specimen:  Blood Updated:  07/15/20 1629     proBNP 589.0 pg/mL     Narrative:       Among patients with dyspnea, NT-proBNP is highly sensitive for the detection of acute congestive heart failure. In addition NT-proBNP of <300 pg/ml effectively rules out acute congestive heart failure with 99% negative predictive value.    Results may be falsely decreased if patient taking Biotin.      Gastrointestinal Panel, PCR - Stool, Per Rectum [[id number]]  (Normal) Collected:  07/15/20 1438    Specimen:  Stool from Per Rectum Updated:  07/15/20 1622     Campylobacter Not Detected     Plesiomonas shigelloides Not Detected     Salmonella Not Detected     Vibrio Not Detected     Vibrio cholerae Not Detected     Yersinia enterocolitica Not Detected     Enteroaggregative E. coli (EAEC) Not  Detected     Enteropathogenic E. coli (EPEC) Not Detected     Enterotoxigenic E. coli (ETEC) lt/st Not Detected     Shiga-like toxin-producing E. coli (STEC) stx1/stx2 Not Detected     E. coli O157 Not Detected     Shigella/Enteroinvasive E. coli (EIEC) Not Detected     Cryptosporidium Not Detected     Cyclospora cayetanensis Not Detected     Entamoeba histolytica Not Detected     Giardia lamblia Not Detected     Adenovirus F40/41 Not Detected     Astrovirus Not Detected     Norovirus GI/GII Not Detected     Rotavirus A Not Detected     Sapovirus (I, II, IV or V) Not Detected    Basic Metabolic Panel [[id number]]  (Abnormal) Collected:  07/15/20 1539    Specimen:  Blood Updated:  07/15/20 1617     Glucose 81 mg/dL      BUN 7 mg/dL      Creatinine 0.71 mg/dL      Sodium 123 mmol/L      Potassium 2.7 mmol/L      Chloride 81 mmol/L      CO2 30.4 mmol/L      Calcium 7.7 mg/dL      eGFR Non African Amer 85 mL/min/1.73      BUN/Creatinine Ratio 9.9     Anion Gap 11.6 mmol/L     Narrative:       GFR Normal >60  Chronic Kidney Disease <60  Kidney Failure <15      Sodium, Urine, Random - Urine, Clean Catch [[id number]] Collected:  07/15/20 1235    Specimen:  Urine, Clean Catch Updated:  07/15/20 1520     Sodium, Urine <20 mmol/L     Narrative:       Reference intervals for random urine have not been established.  Clinical usage is dependent upon physician's interpretation in combination with other laboratory tests.       Occult Blood X 1, Stool - Stool, Per Rectum [[id number]]  (Normal) Collected:  07/15/20 1438    Specimen:  Stool from Per Rectum Updated:  07/15/20 1504     Fecal Occult Blood Negative    Rotavirus Antigen, Stool - Stool, Per Rectum [[id number]] Collected:  07/15/20 1438    Specimen:  Stool from Per Rectum Updated:  07/15/20 1441    Giardia / Cryptosporidium Screen - Stool, Per Rectum [[id number]] Collected:  07/15/20 1438    Specimen:  Stool from Per Rectum Updated:  07/15/20 1441    Magnesium [[id number]]   (Normal) Collected:  07/15/20 1234    Specimen:  Blood Updated:  07/15/20 1419     Magnesium 1.6 mg/dL     Phosphorus [[id number]]  (Normal) Collected:  07/15/20 1234    Specimen:  Blood Updated:  07/15/20 1419     Phosphorus 2.7 mg/dL     Urine Drug Screen - Urine, Clean Catch [[id number]]  (Abnormal) Collected:  07/15/20 1235    Specimen:  Urine, Clean Catch Updated:  07/15/20 1411     THC, Screen, Urine Negative     Phencyclidine (PCP), Urine Negative     Cocaine Screen, Urine Negative     Methamphetamine, Ur Negative     Opiate Screen Negative     Amphetamine Screen, Urine Negative     Benzodiazepine Screen, Urine Positive     Tricyclic Antidepressants Screen Negative     Methadone Screen, Urine Negative     Barbiturates Screen, Urine Negative     Oxycodone Screen, Urine Negative     Propoxyphene Screen Negative     Buprenorphine, Screen, Urine Negative    Narrative:       Limitations of this procedure include the possibility of false positives due to interfering substances in the urine sample. Clinical data should be correlated with any questionable result. Positive results should be considered Presumptive Positive until results are confirmed with another methodology such as HPLC or GCMS.    Ethanol [[id number]]  (Abnormal) Collected:  07/15/20 1234    Specimen:  Blood Updated:  07/15/20 1321     Ethanol 85 mg/dL      Ethanol % 0.085 %            Physician Progress Notes (last 24 hours) (Notes from 07/15/20 1312 through 20 1312)      Juwan Hernandez, DO at 20 1223                [facility]    [facility]    Name:  Gaby Rasheed   Age:  55 y.o.  Sex:  female  :  1965  MRN:  [id number]   Visit Number:  [id number]  Admission Date:  7/15/2020  Date Of Service:  20  Primary Care Physician:  Arabella Loving MD     LOS: 1 day :  Patient Care Team:  Arabella Loving MD as PCP - General (Family Medicine)  Moose Abreu MD as Consulting Physician  (General Surgery):    Subjective / Interval History:   Patient seen in follow-up for hyponatremia.    She denies taking alosetron and has not taken it for the past few weeks.  She has questions about cirrhosis and wonders if she has it.  She admits to drinking excessive alcohol in the past.    Review of Systems:   A full 10 point review of systems was performed and all pertinent positives and negatives are noted in the HPI.    Vital Signs:    Temp:  [98 °F (36.7 °C)-98.4 °F (36.9 °C)] 98 °F (36.7 °C)  Heart Rate:  [62-70] 66  Resp:  [16-20] 20  BP: ()/(44-66) 100/49    Intake and output:    No intake/output data recorded.  I/O this shift:  In: 240 [P.O.:240]  Out: -     Physical Examination:  General: No acute distress, resting in bed  HEENT: EOMI, NC/AT, MMM  Heart: RRR  Lungs: CTAB  Abdomen: Soft, nondistended, nontender, palpable liver  Extremities: Severe pitting edema in the lower extremities  Neurological: A&O x3, moves all extremities, cranial nerves grossly intact  Psychological: Mood and affect appropriate, speech is coherent  Skin: Warm, dry, good turgor    Laboratory results:    Results from last 7 days   Lab Units 07/16/20  0521 07/16/20  0002 07/15/20  1806  07/15/20  1234 07/14/20  0943   SODIUM mmol/L 127* 123* 124*   < > 121* 120*   POTASSIUM mmol/L 3.0* 3.5 2.9*   < > 2.6* 2.8*   CHLORIDE mmol/L 88* 86* 83*   < > 76* 75*   CO2 mmol/L 31.1* 30.0* 30.5*   < > 32.1* 33.8*   BUN mg/dL 7 6 6   < > 7 7   CREATININE mg/dL 0.68 0.61 0.67   < > 0.76 0.78   CALCIUM mg/dL 7.8* 7.8* 7.5*   < > 8.1* 8.4*   BILIRUBIN mg/dL  --   --   --   --  5.1* 5.0*   ALK PHOS U/L  --   --   --   --  294* 292*   ALT (SGPT) U/L  --   --   --   --  26 28   AST (SGOT) U/L  --   --   --   --  87* 92*   GLUCOSE mg/dL 100* 78 79   < > 107* 88    < > = values in this interval not displayed.     Results from last 7 days   Lab Units 07/16/20  0521 07/15/20  1235   WBC 10*3/mm3 9.11 12.61*   HEMOGLOBIN g/dL 9.7* 10.5*   HEMATOCRIT  % 27.5* 29.4*   PLATELETS 10*3/mm3 116* 129*         Results from last 7 days   Lab Units 07/15/20  1234   TROPONIN T ng/mL <0.010               I have reviewed the patient's laboratory results.    Radiology results:    Imaging Results (Last 24 Hours)     Procedure Component Value Units Date/Time    CT Abdomen Pelvis Without Contrast [[id number]] Collected:  07/15/20 1219     Updated:  07/15/20 1225    Narrative:       PROCEDURE: CT ABDOMEN PELVIS WO CONTRAST-     HISTORY: bloating/diarrhea     COMPARISON: None .     PROCEDURE: Axial images were obtained from the lung bases through the  pubic symphysis without intravenous contrast. .      FINDINGS:      ABDOMEN: The lung bases are clear. The heart size is normal. The limited  noncontrast images of the liver demonstrates diffuse hypodensity  consistent with fatty infiltration. No focal liver lesions are  identified. The spleen is normal. No adrenal masses are seen.  The  pancreas has an unremarkable unenhanced appearance.. The aorta is normal  in caliber. There is no significant free fluid or adenopathy.  There is  no nephrolithiasis. There is no hydronephrosis. There are postsurgical  changes from gastric bypass surgery. There is no evidence of an internal  hernia or volvulus.     PELVIS: The appendix is not identified. The urinary bladder is  unremarkable. The patient is status post hysterectomy. There is a small  amount of free fluid in the pelvis which is nonspecific. There is  diffuse body wall anasarca.       Impression:       1. Fatty infiltration of the liver with a small amount of ascites within  the pelvis.  2. Diffuse body wall anasarca.              628.60 mGy.cm.  13.29 mGy     This study was performed with techniques to keep radiation doses as low  as reasonably achievable (ALARA). Individualized dose reduction  techniques using automated exposure control or adjustment of mA and/or  kV according to the patient size were employed.      This report was  finalized on 7/15/2020 12:23 PM by Tania Strong M.D..    XR Chest 1 View [[id number]] Collected:  07/15/20 1219     Updated:  07/15/20 1223    Narrative:       PROCEDURE: XR CHEST 1 VW-     HISTORY: nausea     COMPARISON: None.     FINDINGS: The heart is normal in size. The mediastinum is unremarkable.  The lungs are clear. There is no pneumothorax.  There are no acute  osseous abnormalities.       Impression:       No acute cardiopulmonary process.     Continued followup is recommended.     This report was finalized on 7/15/2020 12:19 PM by Tania Strong M.D..          I have reviewed the patient's radiology reports.    Medication Review:     I have reviewed the patients active and prn medications.       Hyponatremia      Assessment and Plan:  Severe hypoosmolar hyponatremia, euvolemic  Severe hypokalemia  Hypomagnesemia  Fatty liver disease with anasarca  H/O alcoholism  Acute on chronic macrocytic anemia  Thrombocytopenia, chronic  Anxiety/depression  IBS  Diarrhea  Asymptomatic bacteriuria  Chronic: HTN, hypothyroidism     -urine sodium, osmolarity, serum osmolarity appear consistent with cirrhosis and volume overload  -Hold fluids, home SNRI, and d/c alosetron  -Picture is complicated given longstanding diarrhea which would lead to hypovolemic hyponatremia, however she is hypervolemic on exam, but SIADH from desvenlafaxine is not ruled out  -She is also on chlorthalidone for HTN which is always a winner for hyponatremia and her hypoK  -Goal is to correct the hypokalemia, allow the sodium to correct itself  -We will diurese her and start spironolactone  -q6h BMPs; if she corrects by more than 8 mEq of sodium in a 24-hour period,  administer D5W or depression    Juwan Hernandez DO  07/16/20  12:23    Dictated utilizing Dragon dictation.      Electronically signed by Juwan Hernandez DO at 07/16/20 1230       Consult Notes (last 24 hours) (Notes from 07/15/20 1312 through 07/16/20 1312)    No notes of this  type exist for this encounter.

## 2020-07-17 ENCOUNTER — TELEPHONE (OUTPATIENT)
Dept: GASTROENTEROLOGY | Facility: CLINIC | Age: 55
End: 2020-07-17

## 2020-07-17 ENCOUNTER — READMISSION MANAGEMENT (OUTPATIENT)
Dept: CALL CENTER | Facility: HOSPITAL | Age: 55
End: 2020-07-17

## 2020-07-17 VITALS
WEIGHT: 148 LBS | TEMPERATURE: 98 F | OXYGEN SATURATION: 95 % | SYSTOLIC BLOOD PRESSURE: 95 MMHG | RESPIRATION RATE: 18 BRPM | HEIGHT: 63 IN | HEART RATE: 69 BPM | BODY MASS INDEX: 26.22 KG/M2 | DIASTOLIC BLOOD PRESSURE: 52 MMHG

## 2020-07-17 PROBLEM — K74.60 CIRRHOSIS OF LIVER (HCC): Status: ACTIVE | Noted: 2020-07-17

## 2020-07-17 LAB
ANION GAP SERPL CALCULATED.3IONS-SCNC: 5.2 MMOL/L (ref 5–15)
ANION GAP SERPL CALCULATED.3IONS-SCNC: 6.5 MMOL/L (ref 5–15)
BACTERIA SPEC AEROBE CULT: ABNORMAL
BUN SERPL-MCNC: 5 MG/DL (ref 6–20)
BUN SERPL-MCNC: 6 MG/DL (ref 6–20)
BUN/CREAT SERPL: 7.7 (ref 7–25)
BUN/CREAT SERPL: 8.2 (ref 7–25)
CALCIUM SPEC-SCNC: 7.5 MG/DL (ref 8.6–10.5)
CALCIUM SPEC-SCNC: 7.9 MG/DL (ref 8.6–10.5)
CHLORIDE SERPL-SCNC: 92 MMOL/L (ref 98–107)
CHLORIDE SERPL-SCNC: 93 MMOL/L (ref 98–107)
CO2 SERPL-SCNC: 30.8 MMOL/L (ref 22–29)
CO2 SERPL-SCNC: 31.5 MMOL/L (ref 22–29)
CREAT SERPL-MCNC: 0.65 MG/DL (ref 0.57–1)
CREAT SERPL-MCNC: 0.73 MG/DL (ref 0.57–1)
GFR SERPL CREATININE-BSD FRML MDRD: 82 ML/MIN/1.73
GFR SERPL CREATININE-BSD FRML MDRD: 94 ML/MIN/1.73
GLUCOSE SERPL-MCNC: 103 MG/DL (ref 65–99)
GLUCOSE SERPL-MCNC: 92 MG/DL (ref 65–99)
POTASSIUM SERPL-SCNC: 3.8 MMOL/L (ref 3.5–5.2)
POTASSIUM SERPL-SCNC: 4 MMOL/L (ref 3.5–5.2)
SODIUM SERPL-SCNC: 129 MMOL/L (ref 136–145)
SODIUM SERPL-SCNC: 130 MMOL/L (ref 136–145)
SODIUM UR-SCNC: 76 MMOL/L

## 2020-07-17 PROCEDURE — 25010000002 FUROSEMIDE PER 20 MG: Performed by: EMERGENCY MEDICINE

## 2020-07-17 PROCEDURE — 84300 ASSAY OF URINE SODIUM: CPT | Performed by: INTERNAL MEDICINE

## 2020-07-17 PROCEDURE — 80048 BASIC METABOLIC PNL TOTAL CA: CPT | Performed by: EMERGENCY MEDICINE

## 2020-07-17 PROCEDURE — 99239 HOSP IP/OBS DSCHRG MGMT >30: CPT | Performed by: EMERGENCY MEDICINE

## 2020-07-17 RX ORDER — FUROSEMIDE 20 MG/1
20 TABLET ORAL DAILY
Qty: 30 TABLET | Refills: 0 | Status: SHIPPED | OUTPATIENT
Start: 2020-07-17 | End: 2020-09-23 | Stop reason: DRUGHIGH

## 2020-07-17 RX ORDER — SPIRONOLACTONE 50 MG/1
50 TABLET, FILM COATED ORAL DAILY
Qty: 30 TABLET | Refills: 0 | Status: SHIPPED | OUTPATIENT
Start: 2020-07-17 | End: 2020-08-16

## 2020-07-17 RX ORDER — SODIUM CHLORIDE 9 MG/ML
30 INJECTION, SOLUTION INTRAVENOUS CONTINUOUS
Status: DISCONTINUED | OUTPATIENT
Start: 2020-07-17 | End: 2020-07-17

## 2020-07-17 RX ADMIN — SPIRONOLACTONE 50 MG: 25 TABLET, FILM COATED ORAL at 10:19

## 2020-07-17 RX ADMIN — SODIUM CHLORIDE, PRESERVATIVE FREE 10 ML: 5 INJECTION INTRAVENOUS at 10:19

## 2020-07-17 RX ADMIN — FUROSEMIDE 40 MG: 10 INJECTION, SOLUTION INTRAMUSCULAR; INTRAVENOUS at 02:58

## 2020-07-17 RX ADMIN — LEVOTHYROXINE SODIUM 50 MCG: 50 TABLET ORAL at 06:36

## 2020-07-17 RX ADMIN — DIPHENOXYLATE HYDROCHLORIDE AND ATROPINE SULFATE 1 TABLET: 2.5; .025 TABLET ORAL at 10:19

## 2020-07-17 RX ADMIN — FUROSEMIDE 40 MG: 10 INJECTION, SOLUTION INTRAMUSCULAR; INTRAVENOUS at 10:19

## 2020-07-17 NOTE — DISCHARGE SUMMARY
[facility]      Name:  Gaby Rasheed   Age:  55 y.o.  Sex:  female  :  1965  MRN:  [id number]   Visit Number:  [id number]    Admission Date:  7/15/2020  Date of Discharge:  2020  Primary Care Physician:  Arabella Loving MD    Discharge Diagnoses:   Severe hypoosmolar hyponatremia, hypervolemic  Severe hypokalemia  Hypomagnesemia  Fatty liver disease with anasarca  H/O alcoholism  Acute on chronic macrocytic anemia  Thrombocytopenia, chronic  Anxiety/depression  IBS  Diarrhea  Asymptomatic bacteriuria  Chronic: HTN, hypothyroidism    Problem List:       Hyponatremia    Cirrhosis of liver (CMS/HCC)      Presenting Problem:    Hyponatremia [E87.1]     Consults:     Consults     Date and Time Order Name Status Description    7/15/2020 1608 Inpatient Nephrology Consult Completed         Consulting Physician(s)     Provider Relationship Specialty    Lloyd Bobby MD, SHEREEN Consulting Physician Nephrology        Hospital Course:  56 yo F with H/O IBS on alosetron with multiple episodes of watery diarrhea daily, anxiety/depression recently started on desvenlafaxine who presented to the ED after she was told her sodium was low on a routine lab draw.  Patient reports that she had been having about 6 episodes of watery diarrhea daily since February.  She denied water intake from streams, travel outside of the country, or laxative usage.  Her IBS has been evaluated by Dr. Abreu and she has had endoscopies with biopsies performed, but no clear etiology.  She also has anxiety/depression for which she was taking Prozac up until 2 weeks ago when she started taking desvenlafaxine.  She was also told by her PCP to consume water instead of Gatorade or Powerade to counteract the diarrhea episodes.  Patient also has fatty liver disease. As one may gather from all this, it is evident that her hypoNa could be multifactorial or very easily be confused.  However,  on exam she was hypervolemic and studies that could have been precluded by fluid administration in the ED, did still indicate possible differential of cirrhosis contributing to the hyponatremia.  She was fluid restricted the first day, given IV diuretics the next, and felt much better with edema improving day of discharge.  She was also given a tremendous amount of potassium for her hypokalemia which is likely due to the diarrhea and the chlorthalidone. Patient will be discharged with Lasix and spironolactone with follow-up for GI for evaluation of cirrhosis.  Orthostatic vital signs are being obtained prior to discharge.  I have elected to continue the desvenlafaxine for now, however if she becomes hyponatremic again, SIADH may need to be considered as a component. We have discussed and stopped alosetron for now as she hasn't been taking it regularly.    Vital Signs:    Temp:  [97.8 °F (36.6 °C)-98.2 °F (36.8 °C)] 98 °F (36.7 °C)  Heart Rate:  [68-71] 69  Resp:  [18-20] 18  BP: (100-115)/(49-70) 115/70    Physical Exam:  General: No acute distress, resting in bed  HEENT: EOMI, NC/AT  Heart: RRR  Lungs: CTAB  Abdomen: Soft, nondistended, nontender, palpable liver  Extremities: mild edema in the lower extremities  Neurological: A&O x3, moves all extremities, cranial nerves grossly intact  Psychological: Mood and affect appropriate, speech is coherent  Skin: Warm, dry, good turgor    Pertinent Lab Results:     Results from last 7 days   Lab Units 07/17/20  0531 07/17/20  0003 07/16/20  1856  07/15/20  1234 07/14/20  0943   SODIUM mmol/L 129* 130* 128*   < > 121* 120*   POTASSIUM mmol/L 3.8 4.0 3.9   < > 2.6* 2.8*   CHLORIDE mmol/L 93* 92* 90*   < > 76* 75*   CO2 mmol/L 30.8* 31.5* 30.9*   < > 32.1* 33.8*   BUN mg/dL 5* 6 5*   < > 7 7   CREATININE mg/dL 0.65 0.73 0.72   < > 0.76 0.78   CALCIUM mg/dL 7.5* 7.9* 7.9*   < > 8.1* 8.4*   BILIRUBIN mg/dL  --   --   --   --  5.1* 5.0*   ALK PHOS U/L  --   --   --   --  294*  292*   ALT (SGPT) U/L  --   --   --   --  26 28   AST (SGOT) U/L  --   --   --   --  87* 92*   GLUCOSE mg/dL 92 103* 104*   < > 107* 88    < > = values in this interval not displayed.     Results from last 7 days   Lab Units 07/16/20  0521 07/15/20  1235   WBC 10*3/mm3 9.11 12.61*   HEMOGLOBIN g/dL 9.7* 10.5*   HEMATOCRIT % 27.5* 29.4*   PLATELETS 10*3/mm3 116* 129*         Results from last 7 days   Lab Units 07/15/20  1234   TROPONIN T ng/mL <0.010     Results from last 7 days   Lab Units 07/15/20  1539   PROBNP pg/mL 589.0         Results from last 7 days   Lab Units 07/15/20  1234   LIPASE U/L 91*         Results from last 7 days   Lab Units 07/15/20  1235   COLOR UA  Yellow   GLUCOSE UA  Negative   KETONES UA  Negative   LEUKOCYTES UA  Moderate (2+)*   PH, URINE  5.5   BILIRUBIN UA  Negative   UROBILINOGEN UA  1.0 E.U./dL     Pain Management Panel     Pain Management Panel Latest Ref Rng & Units 7/15/2020    AMPHETAMINES SCREEN, URINE Negative Negative    BARBITURATES SCREEN Negative Negative    BENZODIAZEPINE SCREEN, URINE Negative Positive(A)    BUPRENORPHINEUR Negative Negative    COCAINE SCREEN, URINE Negative Negative    METHADONE SCREEN, URINE Negative Negative    METHAMPHETAMINEUR Negative Negative        Results from last 7 days   Lab Units 07/15/20  1235   URINECX  >100,000 CFU/mL Escherichia coli*     Pertinent Radiology Results:    Imaging Results (All)     Procedure Component Value Units Date/Time    CT Abdomen Pelvis Without Contrast [[id number]] Collected:  07/15/20 1219     Updated:  07/15/20 1225    Narrative:       PROCEDURE: CT ABDOMEN PELVIS WO CONTRAST-     HISTORY: bloating/diarrhea     COMPARISON: None .     PROCEDURE: Axial images were obtained from the lung bases through the  pubic symphysis without intravenous contrast. .      FINDINGS:      ABDOMEN: The lung bases are clear. The heart size is normal. The limited  noncontrast images of the liver demonstrates diffuse  hypodensity  consistent with fatty infiltration. No focal liver lesions are  identified. The spleen is normal. No adrenal masses are seen.  The  pancreas has an unremarkable unenhanced appearance.. The aorta is normal  in caliber. There is no significant free fluid or adenopathy.  There is  no nephrolithiasis. There is no hydronephrosis. There are postsurgical  changes from gastric bypass surgery. There is no evidence of an internal  hernia or volvulus.     PELVIS: The appendix is not identified. The urinary bladder is  unremarkable. The patient is status post hysterectomy. There is a small  amount of free fluid in the pelvis which is nonspecific. There is  diffuse body wall anasarca.       Impression:       1. Fatty infiltration of the liver with a small amount of ascites within  the pelvis.  2. Diffuse body wall anasarca.              628.60 mGy.cm.  13.29 mGy     This study was performed with techniques to keep radiation doses as low  as reasonably achievable (ALARA). Individualized dose reduction  techniques using automated exposure control or adjustment of mA and/or  kV according to the patient size were employed.      This report was finalized on 7/15/2020 12:23 PM by Tania Strong M.D..    XR Chest 1 View [[id number]] Collected:  07/15/20 1219     Updated:  07/15/20 1223    Narrative:       PROCEDURE: XR CHEST 1 VW-     HISTORY: nausea     COMPARISON: None.     FINDINGS: The heart is normal in size. The mediastinum is unremarkable.  The lungs are clear. There is no pneumothorax.  There are no acute  osseous abnormalities.       Impression:       No acute cardiopulmonary process.     Continued followup is recommended.     This report was finalized on 7/15/2020 12:19 PM by Tania Strong M.D..        Condition on Discharge:    Stable.    Code status during the hospital stay:    Code Status and Medical Interventions:   Ordered at: 07/15/20 1608     Level Of Support Discussed With:    Patient     Code  Status:    CPR     Medical Interventions (Level of Support Prior to Arrest):    Full     Discharge Disposition:    Home or Self Care    Discharge Medications:       Discharge Medications      New Medications      Instructions Start Date   furosemide 20 MG tablet  Commonly known as:  Lasix   20 mg, Oral, Daily      spironolactone 50 MG tablet  Commonly known as:  ALDACTONE   50 mg, Oral, Daily         Changes to Medications      Instructions Start Date   carvedilol 12.5 MG tablet  Commonly known as:  COREG  What changed:  additional instructions   12.5 mg, Oral, 2 Times Daily With Meals      desvenlafaxine ER 50 MG tablet sustained-release 24 hour 24 hr tablet  Commonly known as:  KHEDEZLA  What changed:  additional instructions   50 mg, Oral, Daily      esomeprazole 40 MG capsule  Commonly known as:  nexIUM  What changed:  additional instructions   40 mg, Oral, Daily      levothyroxine 50 MCG tablet  Commonly known as:  SYNTHROID, LEVOTHROID  What changed:  additional instructions   50 mcg, Oral, Daily      LORazepam 0.5 MG tablet  Commonly known as:  Ativan  What changed:  additional instructions   0.5 mg, Oral, Daily PRN      mupirocin 2 % ointment  Commonly known as:  Bactroban  What changed:  additional instructions   Topical, 2 Times Daily      zolpidem 10 MG tablet  Commonly known as:  AMBIEN  What changed:    · how much to take  · how to take this  · when to take this  · additional instructions   TAKE 1 TABLET BY MOUTH AT BEDTIME AS NEEDED FOR SLEEP         Continue These Medications      Instructions Start Date   MULTIVITAMIN ADULT PO   Oral         Stop These Medications    acamprosate 333 MG EC tablet  Commonly known as:  CAMPRAL     alosetron 0.5 MG tablet  Commonly known as:  Lotronex     chlorthalidone 25 MG tablet  Commonly known as:  HYGROTON          Discharge Diet:   cardiac    Activity at Discharge:   Ad sona    Follow-up Appointments:    Follow-up Information     Arabella Loving MD Follow up.     Specialties:  Family Medicine, Emergency Medicine  Contact information:  [address]  [address]  [facility] [address]  [phone]             Joshua Ruano MD Follow up.    Specialty:  Gastroenterology  Contact information:  [address]  [address]  [facility] [address]  [phone]                   Future Appointments   Date Time Provider Department Center   7/23/2020  8:30 AM Hannah Otero APRN [facility]   8/3/2020 10:15 AM Arabella Loving MD [facility] None   9/10/2020 10:15 AM Harlan Claudio MD [facility] None     Test Results Pending at Discharge:     Order Current Status    Rotavirus Antigen, Stool - Stool, Per Rectum In process         Juwan Hernandez DO  07/17/20  08:19    Time: I spent 59 minutes on this discharge activity which included: face-to-face encounter with the patient, reviewing the data in the system, coordination of the care with the nursing staff as well as consultants, documentation, and entering orders.     Dictated utilizing Dragon dictation.

## 2020-07-17 NOTE — DISCHARGE INSTRUCTIONS
Follow-up with PCP and gastroenterology in 1 week.  Start taking Lasix and spironolactone daily.  You need to watch the amount of fluids you consume.  However, if you are having diarrhea, focus on Gatorade or Powerade not just pure water.  Weight yourself daily, if you gain 2 pounds in 1 day or 5 pounds in 2 days, double your Lasix and call your PCP.  If you develop any new symptoms, present to the nearest ER.

## 2020-07-17 NOTE — PROGRESS NOTES
"Nephrology Progress Note.    LOS: 2 days    Patient Care Team:  Arabella Loving MD as PCP - General (Family Medicine)  Moose Abreu MD as Consulting Physician (General Surgery)    Chief Complaint:    Chief Complaint   Patient presents with   • Abnormal Lab       Subjective:   Follow-up for Hyponatremia.   Interval History:   Patient Complaints: none  Patient seen and examined this morning.  Events from last night noted.  Patient denies having any fevers chills.  No nausea or vomiting no abdominal pain.  She denies having any diarrhea.  Denies any chest pain, shortness of breath or cough and sputum production.  There is no significant edema.   Patient also denies having new onset weakness of numbness of either extremity.  She states she feels good enough to go home today.  History taken from: patient    Objective:    Vital Signs  BP 95/52 (BP Location: Right arm, Patient Position: Standing)   Pulse 69   Temp 98 °F (36.7 °C) (Oral)   Resp 18   Ht 160 cm (63\")   Wt 67.1 kg (148 lb)   SpO2 95%   BMI 26.22 kg/m²     No intake/output data recorded.    Intake/Output Summary (Last 24 hours) at 7/17/2020 0934  Last data filed at 7/16/2020 1746  Gross per 24 hour   Intake 796.53 ml   Output --   Net 796.53 ml       Physical Exam:  General Appearance: alert, oriented x 3, no acute distress,   HEENT: Oral mucosa dry, extra occular movements intact. Sclera clear.  Skin: Warm and dry  Neck: supple, no JVD, trachea midline  Lungs:Chest shape is normal. Breath sounds heard bilaterally equally. No crackles, No wheezing.   Heart: regular rate and rhythm. normal S1 and S2, no S3, no rub, peripheral pulses weak but palpable.  Abdomen: Obese, soft, non-tender,  present bowel sounds to auscultation  : no palpable bladder.  Extremities: Trace edema, no cyanosis or clubbing.   Neuro: normal speech and mental status, grossly non focal.     Results Review:   Results from last 7 days   Lab Units 07/17/20  0531 " 07/17/20  0003 07/16/20  1856  07/15/20  1234 07/14/20  0943   SODIUM mmol/L 129* 130* 128*   < > 121* 120*   POTASSIUM mmol/L 3.8 4.0 3.9   < > 2.6* 2.8*   CHLORIDE mmol/L 93* 92* 90*   < > 76* 75*   CO2 mmol/L 30.8* 31.5* 30.9*   < > 32.1* 33.8*   BUN mg/dL 5* 6 5*   < > 7 7   CREATININE mg/dL 0.65 0.73 0.72   < > 0.76 0.78   CALCIUM mg/dL 7.5* 7.9* 7.9*   < > 8.1* 8.4*   ALBUMIN g/dL  --   --   --   --  2.90* 2.70*   BILIRUBIN mg/dL  --   --   --   --  5.1* 5.0*   ALK PHOS U/L  --   --   --   --  294* 292*   ALT (SGPT) U/L  --   --   --   --  26 28   AST (SGOT) U/L  --   --   --   --  87* 92*   GLUCOSE mg/dL 92 103* 104*   < > 107* 88    < > = values in this interval not displayed.     Estimated Creatinine Clearance: 88.9 mL/min (by C-G formula based on SCr of 0.65 mg/dL).  Results from last 7 days   Lab Units 07/16/20  0520 07/15/20  1234   MAGNESIUM mg/dL 2.2 1.6   PHOSPHORUS mg/dL  --  2.7         Results from last 7 days   Lab Units 07/16/20  0521 07/15/20  1235   WBC 10*3/mm3 9.11 12.61*   HEMOGLOBIN g/dL 9.7* 10.5*   PLATELETS 10*3/mm3 116* 129*         Brief Urine Lab Results  (Last result in the past 365 days)      Color   Clarity   Blood   Leuk Est   Nitrite   Protein   CREAT   Urine HCG        07/15/20 1235 Yellow Cloudy Negative Moderate (2+) Negative Negative             No results found for: UTPCR  Imaging Results (Last 24 Hours)     ** No results found for the last 24 hours. **          diphenoxylate-atropine 1 tablet Oral BID   enoxaparin 40 mg Subcutaneous Q24H   furosemide 40 mg Intravenous Q6H   levothyroxine 50 mcg Oral Daily   sodium chloride 10 mL Intravenous Q12H   spironolactone 50 mg Oral Daily            Medication Review:   Current Facility-Administered Medications   Medication Dose Route Frequency Provider Last Rate Last Dose   • acetaminophen (TYLENOL) tablet 650 mg  650 mg Oral Q4H PRN Juwan Hernandez DO   650 mg at 07/16/20 2030    Or   • acetaminophen (TYLENOL) 160 MG/5ML solution 650  mg  650 mg Oral Q4H PRN MaryJuwan martinez, DO        Or   • acetaminophen (TYLENOL) suppository 650 mg  650 mg Rectal Q4H PRN MaryJuwan martinez, DO       • diphenoxylate-atropine (LOMOTIL) 2.5-0.025 MG per tablet 1 tablet  1 tablet Oral BID Lloyd Bobby MD, FASN   1 tablet at 07/16/20 2030   • enoxaparin (LOVENOX) syringe 40 mg  40 mg Subcutaneous Q24H MaryJuwan martinez, DO   40 mg at 07/16/20 1745   • furosemide (LASIX) injection 40 mg  40 mg Intravenous Q6H [facility]   40 mg at 07/17/20 0258   • levothyroxine (SYNTHROID, LEVOTHROID) tablet 50 mcg  50 mcg Oral Daily [facility]   50 mcg at 07/17/20 0636   • LORazepam (ATIVAN) tablet 0.5 mg  0.5 mg Oral Daily PRN MaryJuwan martinez, DO   0.5 mg at 07/16/20 2030   • ondansetron (ZOFRAN) injection 4 mg  4 mg Intravenous Q6H PRN MaryJuwan martinez, DO       • sodium chloride 0.9 % flush 10 mL  10 mL Intravenous Q12H MaryJuwan martinez, DO   10 mL at 07/16/20 2057   • spironolactone (ALDACTONE) tablet 50 mg  50 mg Oral Daily MaryJuwan martinez, DO   50 mg at 07/16/20 1501   • zolpidem (AMBIEN) tablet 10 mg  10 mg Oral Nightly PRN MaryJuwan martinez, DO   10 mg at 07/16/20 2030       Assessment/Plan:  1. Hyponatremia: Multifactorial likely due to chlorthalidone, alcoholism, hypothyroidism, and volume depletion from chronic diarrhea  2. Hypokalemia  3. Hypocalcemia  4. Volume depletion  5. Anemia  6. Hypertension  7. Fatty liver disease with history of anasarca  8. Irritable bowel syndrome  9. Chronic diarrhea  10. Hypothyroidism  11. Alcoholism: States she stopped drinking 2 months ago but admits to occasionally drinking wine.  Ethanol level was 85 on admission    Plan:  · I will restart her on low-dose saline, 30 cc an hour.  · If he goes home she will need to go on spironolactone 25 mg once a day  · Hold off giving Lasix to her at this point.  · Details were discussed with the patient no family in the room.    · Details were also discussed with the hospitalist service.  We will be happy to see her  in the office in 2 weeks.  · Continue with the surveillance labs.  · Further recommendations will depend on clinical course of the patient during the current hospitalization.    · I also discussed the details with the nursing staff.  · Rest as ordered.    Lloyd Bobby MD, SHEREEN  07/17/20  09:34    Dictated utilizing Dragon dictation.

## 2020-07-17 NOTE — OUTREACH NOTE
Prep Survey      Responses   [facility] facility patient discharged from?  [place]   Is LACE score < 7 ?  Yes   Eligibility  [facility]   Date of Admission  07/15/20   Date of Discharge  07/17/20   Discharge Disposition  Home or Self Care   Discharge diagnosis  Severe hypoosmolar hyponatremia  Acute on chronic macrocytic anemia  diarrhea    COVID-19 Test Status  Not tested   Does the patient have one of the following disease processes/diagnoses(primary or secondary)?  Other   Does the patient have Home health ordered?  No   Is there a DME ordered?  No   Prep survey completed?  Yes          Clara Castillo RN

## 2020-07-17 NOTE — PROGRESS NOTES
Case Management Discharge Note           Provided Post Acute Provider List?: N/A  N/A Provider List Comment: Pt denies any needs at this time.    Destination      No service has been selected for the patient.      Durable Medical Equipment      No service has been selected for the patient.      Dialysis/Infusion      No service has been selected for the patient.      Home Medical Care      No service has been selected for the patient.      Therapy      No service has been selected for the patient.      Community Resources      No service has been selected for the patient.        Transportation Services  Private: Car    Final Discharge Disposition Code: 01 - home or self-care

## 2020-07-17 NOTE — TELEPHONE ENCOUNTER
Navdeep Delong called to schedule a follow-up as patient is being discharged today.  Please advise how soon she needs to be seen.

## 2020-07-17 NOTE — TELEPHONE ENCOUNTER
Patient called in and requested to cancel any upcoming appts with Dr Claudio.  She states she's been referred to another GI .

## 2020-07-18 ENCOUNTER — TRANSITIONAL CARE MANAGEMENT TELEPHONE ENCOUNTER (OUTPATIENT)
Dept: CALL CENTER | Facility: HOSPITAL | Age: 55
End: 2020-07-18

## 2020-07-18 NOTE — OUTREACH NOTE
Call Center TCM Note      Responses   [facility]  [facility]   Does the patient have one of the following disease processes/diagnoses(primary or secondary)?  Other   TCM attempt successful?  No   Unsuccessful attempts  Attempt 1          Marily Andrew RN    7/18/2020, 15:01

## 2020-07-19 ENCOUNTER — TRANSITIONAL CARE MANAGEMENT TELEPHONE ENCOUNTER (OUTPATIENT)
Dept: CALL CENTER | Facility: HOSPITAL | Age: 55
End: 2020-07-19

## 2020-07-19 DIAGNOSIS — G47.26 SHIFT WORK SLEEP DISORDER: ICD-10-CM

## 2020-07-19 NOTE — OUTREACH NOTE
Call Center TCM Note      Responses   [facility] patient discharged from?  [place]   COVID-19 Test Status  Not tested   Does the patient have one of the following disease processes/diagnoses(primary or secondary)?  Other   TCM attempt successful?  Yes   Call start time  1710   Call end time  1714   Discharge diagnosis  Severe hypoosmolar hyponatremia  Acute on chronic macrocytic anemia  diarrhea    Meds reviewed with patient/caregiver?  Yes   Is the patient having any side effects they believe may be caused by any medication additions or changes?  No   Does the patient have all medications ordered at discharge?  Yes   Is the patient taking all medications as directed (includes completed medication regime)?  Yes   Does the patient have a primary care provider?   Yes   Does the patient have an appointment with their PCP within 7 days of discharge?  Yes   Comments regarding PCP  Dr. Loving   Has the patient kept scheduled appointments due by today?  N/A   Has home health visited the patient within 72 hours of discharge?  N/A   Psychosocial issues?  No   Did the patient receive a copy of their discharge instructions?  Yes   Nursing interventions  Reviewed instructions with patient   What is the patient's perception of their health status since discharge?  Improving   Is the patient/caregiver able to teach back signs and symptoms related to disease process for when to call PCP?  Yes   Is the patient/caregiver able to teach back signs and symptoms related to disease process for when to call 911?  Yes   Is the patient/caregiver able to teach back the hierarchy of who to call/visit for symptoms/problems? PCP, Specialist, Home health nurse, Urgent Care, ED, 911  Yes   Additional teach back comments  States she is able to eat a little bit. Still has a little diarrhea but much better than previously. States she had a good experience when she was in the hospital.    TCM call completed?  Yes          Marily Andrew,  RN    7/19/2020, 17:14

## 2020-07-20 LAB — RV AG STL QL IA: NEGATIVE

## 2020-07-20 RX ORDER — ZOLPIDEM TARTRATE 10 MG/1
TABLET ORAL
Qty: 30 TABLET | Refills: 0 | Status: SHIPPED | OUTPATIENT
Start: 2020-07-20 | End: 2020-08-20 | Stop reason: DRUGHIGH

## 2020-07-22 ENCOUNTER — OFFICE VISIT (OUTPATIENT)
Dept: INTERNAL MEDICINE | Facility: CLINIC | Age: 55
End: 2020-07-22

## 2020-07-22 VITALS
TEMPERATURE: 97.1 F | DIASTOLIC BLOOD PRESSURE: 60 MMHG | OXYGEN SATURATION: 100 % | BODY MASS INDEX: 24.45 KG/M2 | SYSTOLIC BLOOD PRESSURE: 100 MMHG | HEART RATE: 76 BPM | HEIGHT: 63 IN | WEIGHT: 138 LBS

## 2020-07-22 DIAGNOSIS — I10 ESSENTIAL HYPERTENSION: ICD-10-CM

## 2020-07-22 DIAGNOSIS — Z09 HOSPITAL DISCHARGE FOLLOW-UP: Primary | ICD-10-CM

## 2020-07-22 DIAGNOSIS — R79.89 ABNORMAL CBC: ICD-10-CM

## 2020-07-22 DIAGNOSIS — E87.1 HYPONATREMIA: ICD-10-CM

## 2020-07-22 DIAGNOSIS — E87.6 HYPOKALEMIA: ICD-10-CM

## 2020-07-22 DIAGNOSIS — K74.60 CIRRHOSIS OF LIVER WITH ASCITES, UNSPECIFIED HEPATIC CIRRHOSIS TYPE (HCC): ICD-10-CM

## 2020-07-22 DIAGNOSIS — Z13.220 SCREENING, LIPID: ICD-10-CM

## 2020-07-22 DIAGNOSIS — R18.8 CIRRHOSIS OF LIVER WITH ASCITES, UNSPECIFIED HEPATIC CIRRHOSIS TYPE (HCC): ICD-10-CM

## 2020-07-22 PROCEDURE — 99496 TRANSJ CARE MGMT HIGH F2F 7D: CPT | Performed by: NURSE PRACTITIONER

## 2020-07-22 NOTE — PROGRESS NOTES
Transitional Care Follow Up Visit  Subjective     Gaby Nick Rasheed is a 55 y.o. female who presents for a transitional care management visit.    Within 48 business hours after discharge our office contacted her via telephone to coordinate her care and needs.      I reviewed and discussed the details of that call along with the discharge summary, hospital problems, inpatient lab results, inpatient diagnostic studies, and consultation reports with Gaby.     Current outpatient and discharge medications have been reconciled for the patient.  Reviewed by: JEN Camilo      Date of TCM Phone Call 7/17/2020   [facility]   Date of Admission 7/15/2020   Date of Discharge 7/17/2020   Discharge Disposition Home or Self Care     Risk for Readmission (LACE) Score: 5 (7/17/2020  6:01 AM)      History of Present Illness   Course During Hospital Stay: Admitted to [facility] after presenting to ED based on labs drawn on 7/14/2020.  Had been having several episodes of watery diarrhea for 5 months.  On exam she was found to be hyperkalemic, hyponatremia.  It was believed cirrhosis may have contributed to hyponatremia.  Fluid restrictions while admitted, IV diuretics improved edema.  Potassium was replaced, hypokalemia thought to be due to diarrhea.  Chlorthalidone.  She was discharged with Lasix and spironolactone, to be followed up with gastroenterologist for evaluation of cirrhosis.  She currently states she is feeling much better, with mild abdominal pain and trace lower extremity edema tightness..  She is staying well-hydrated.  States she is having no difficulty abstaining from alcohol after discharge.  Previously had stated when she is home from work it is difficult to not drink.  States whenever she thinks of drinking alcohol now, she has no desire to drink and the thought makes her slightly ill.  She is not currently taking coreg.  Monitors blood pressure at home, states it has been low normal with  carvedilol.  Denies chest pain, dyspnea, orthopnea, palpitations, lower extremity edema, confusion, headaches, weakness, visual disturbances.     The following portions of the patient's history were reviewed and updated as appropriate: allergies, current medications, past family history, past medical history, past social history, past surgical history and problem list.    Review of Systems   Constitutional: Positive for fatigue. Negative for activity change, chills, diaphoresis and fever.   Respiratory: Negative.    Cardiovascular: Negative.    Gastrointestinal: Negative for abdominal distention, blood in stool, constipation, diarrhea, nausea and vomiting.   Neurological: Negative.    Hematological: Does not bruise/bleed easily.       Objective   Physical Exam   Constitutional: She is oriented to person, place, and time. She appears well-developed and well-nourished. No distress.   HENT:   Head: Normocephalic.   Right Ear: External ear normal.   Left Ear: External ear normal.   Eyes: Pupils are equal, round, and reactive to light. Conjunctivae are normal.   Neck: Normal range of motion. Neck supple.   Cardiovascular: Normal rate, regular rhythm, normal heart sounds and intact distal pulses.   Trace BLE edema   Pulmonary/Chest: Effort normal and breath sounds normal.   Abdominal: Soft. Bowel sounds are normal. There is tenderness in the right upper quadrant, epigastric area and left upper quadrant.   Neurological: She is alert and oriented to person, place, and time.   Skin: Skin is warm. Capillary refill takes less than 2 seconds.   Psychiatric: She has a normal mood and affect. Her behavior is normal.       Assessment/Plan   Gaby was seen today for follow-up.    Diagnoses and all orders for this visit:    Hospital discharge follow-up    Cirrhosis of liver with ascites, unspecified hepatic cirrhosis type (CMS/HCC)  -     Comprehensive Metabolic Panel  -     CBC & Differential  - Follow-up with gastroenterology  as scheduled.  - Continue to abstain from alcohol intake    Abnormal CBC  -     CBC & Differential    Hyponatremia  -     Comprehensive Metabolic Panel    Hypokalemia  -     Comprehensive Metabolic Panel    Screening, lipid  -     Lipid Panel    Essential hypertension        - Follow heart healthy diet.  Advised to reduce daily sodium intake to < 1500 mg per day.  Avoid processed & fast foods.        - Monitor blood pressure as discussed, keep log and bring to next appointment.          - Exercise as tolerated, with a goal of 30 minutes of moderate exercise most days.         - Take medications as prescribed.

## 2020-07-23 ENCOUNTER — OFFICE VISIT (OUTPATIENT)
Dept: BEHAVIORAL HEALTH | Facility: CLINIC | Age: 55
End: 2020-07-23

## 2020-07-23 ENCOUNTER — TELEPHONE (OUTPATIENT)
Dept: GASTROENTEROLOGY | Facility: CLINIC | Age: 55
End: 2020-07-23

## 2020-07-23 VITALS
HEART RATE: 84 BPM | HEIGHT: 63 IN | TEMPERATURE: 96.5 F | OXYGEN SATURATION: 97 % | DIASTOLIC BLOOD PRESSURE: 70 MMHG | BODY MASS INDEX: 24.8 KG/M2 | SYSTOLIC BLOOD PRESSURE: 112 MMHG | WEIGHT: 140 LBS

## 2020-07-23 DIAGNOSIS — E87.1 HYPONATREMIA: ICD-10-CM

## 2020-07-23 DIAGNOSIS — R79.89 ABNORMAL CBC: Primary | ICD-10-CM

## 2020-07-23 DIAGNOSIS — F41.1 GENERALIZED ANXIETY DISORDER: Primary | ICD-10-CM

## 2020-07-23 DIAGNOSIS — E87.6 HYPOKALEMIA: ICD-10-CM

## 2020-07-23 DIAGNOSIS — F51.04 PSYCHOPHYSIOLOGICAL INSOMNIA: ICD-10-CM

## 2020-07-23 DIAGNOSIS — F33.0 MILD EPISODE OF RECURRENT MAJOR DEPRESSIVE DISORDER (HCC): ICD-10-CM

## 2020-07-23 LAB
ALBUMIN SERPL-MCNC: 2.4 G/DL (ref 3.5–5.2)
ALBUMIN/GLOB SERPL: 0.8 G/DL
ALP SERPL-CCNC: 196 U/L (ref 39–117)
ALT SERPL-CCNC: 26 U/L (ref 1–33)
AST SERPL-CCNC: 83 U/L (ref 1–32)
BASOPHILS # BLD AUTO: ABNORMAL 10*3/UL
BILIRUB SERPL-MCNC: 4.8 MG/DL (ref 0–1.2)
BUN SERPL-MCNC: 7 MG/DL (ref 6–20)
BUN/CREAT SERPL: 11.1 (ref 7–25)
CALCIUM SERPL-MCNC: 8.3 MG/DL (ref 8.6–10.5)
CHLORIDE SERPL-SCNC: 94 MMOL/L (ref 98–107)
CHOLEST SERPL-MCNC: 185 MG/DL (ref 0–200)
CO2 SERPL-SCNC: 27.4 MMOL/L (ref 22–29)
CREAT SERPL-MCNC: 0.63 MG/DL (ref 0.57–1)
DIFFERENTIAL COMMENT: ABNORMAL
EOSINOPHIL # BLD AUTO: ABNORMAL 10*3/UL
EOSINOPHIL NFR BLD AUTO: ABNORMAL %
ERYTHROCYTE [DISTWIDTH] IN BLOOD BY AUTOMATED COUNT: 14.6 % (ref 12.3–15.4)
GLOBULIN SER CALC-MCNC: 3.2 GM/DL
GLUCOSE SERPL-MCNC: 74 MG/DL (ref 65–99)
HCT VFR BLD AUTO: 29 % (ref 34–46.6)
HDLC SERPL-MCNC: 17 MG/DL (ref 40–60)
HGB BLD-MCNC: 10.3 G/DL (ref 12–15.9)
LDLC SERPL CALC-MCNC: 135 MG/DL (ref 0–100)
LYMPHOCYTES # BLD AUTO: ABNORMAL 10*3/UL
LYMPHOCYTES # BLD MANUAL: 0.95 10*3/MM3 (ref 0.7–3.1)
LYMPHOCYTES NFR BLD AUTO: ABNORMAL %
LYMPHOCYTES NFR BLD MANUAL: 8 % (ref 19.6–45.3)
MCH RBC QN AUTO: 35.4 PG (ref 26.6–33)
MCHC RBC AUTO-ENTMCNC: 35.5 G/DL (ref 31.5–35.7)
MCV RBC AUTO: 99.7 FL (ref 79–97)
MONOCYTES # BLD MANUAL: 0.36 10*3/MM3 (ref 0.1–0.9)
MONOCYTES NFR BLD AUTO: ABNORMAL %
MONOCYTES NFR BLD MANUAL: 3 % (ref 5–12)
NEUTROPHILS # BLD MANUAL: 10.62 10*3/MM3 (ref 1.7–7)
NEUTROPHILS NFR BLD AUTO: ABNORMAL %
NEUTROPHILS NFR BLD MANUAL: 89 % (ref 42.7–76)
PLATELET # BLD AUTO: 135 10*3/MM3 (ref 140–450)
PLATELET BLD QL SMEAR: ABNORMAL
POTASSIUM SERPL-SCNC: 4.5 MMOL/L (ref 3.5–5.2)
PROT SERPL-MCNC: 5.6 G/DL (ref 6–8.5)
RBC # BLD AUTO: 2.91 10*6/MM3 (ref 3.77–5.28)
RBC MORPH BLD: ABNORMAL
SODIUM SERPL-SCNC: 130 MMOL/L (ref 136–145)
TRIGL SERPL-MCNC: 165 MG/DL (ref 0–150)
VLDLC SERPL CALC-MCNC: 33 MG/DL
WBC # BLD AUTO: 11.93 10*3/MM3 (ref 3.4–10.8)

## 2020-07-23 PROCEDURE — 99213 OFFICE O/P EST LOW 20 MIN: CPT | Performed by: NURSE PRACTITIONER

## 2020-07-23 RX ORDER — DESVENLAFAXINE 100 MG/1
100 TABLET, EXTENDED RELEASE ORAL DAILY
Qty: 30 TABLET | Refills: 3 | Status: SHIPPED | OUTPATIENT
Start: 2020-07-23 | End: 2020-11-20

## 2020-07-23 NOTE — TELEPHONE ENCOUNTER
I called Ashia back a RN from [facility]/ BS. No answer; left voice message. Patient has cancelled her follow up appointment for 9/10/2020 with Dr Claudio.

## 2020-07-23 NOTE — PROGRESS NOTES
Patient Name: Gaby Rasheed  MRN: [id number]   :  1965     Chief Complaint:      ICD-10-CM ICD-9-CM   1. Generalized anxiety disorder F41.1 300.02   2. Mild episode of recurrent major depressive disorder (CMS/HCC) F33.0 296.31   3. Psychophysiological insomnia F51.04 307.42       History of Present Illness: Gaby Rasheed is a 55 y.o. female is here today for medication management follow up.  Patient states she does see improvement.  Does states she feels like she still needs a little boost.  Spent a few days in the hospital for low sodium and low potassium.  Did a CT scan of the liver and was told she had a fatty liver and must quit drinking.  Patient states she quit for about 2 months had something to drink a couple weeks ago and now she is quit again.    The following portions of the patient's history were reviewed and updated as appropriate: allergies, current medications, past family history, past medical history, past social history, past surgical history and problem list.    Review of Systems;;  Review of Systems   Constitutional: Negative for activity change, appetite change, fatigue, unexpected weight gain and unexpected weight loss.   Respiratory: Negative for shortness of breath and wheezing.    Gastrointestinal: Negative for constipation, diarrhea, nausea and vomiting.   Musculoskeletal: Negative for gait problem.   Skin: Negative for dry skin and rash.   Neurological: Negative for dizziness, speech difficulty, weakness, light-headedness, headache, memory problem and confusion.   Psychiatric/Behavioral: Positive for dysphoric mood, sleep disturbance, depressed mood and stress. Negative for agitation, behavioral problems, decreased concentration, hallucinations, self-injury, suicidal ideas and negative for hyperactivity. The patient is nervous/anxious.        Physical Exam;;  Physical Exam   Constitutional: She is oriented to person, place, and time. She appears well-developed and  "well-nourished. She is cooperative. No distress.   HENT:   Head: Normocephalic and atraumatic.   Eyes: Conjunctivae are normal.   Neck: Normal range of motion and full passive range of motion without pain.   Cardiovascular: Normal rate.   Pulmonary/Chest: Effort normal. No respiratory distress.   Musculoskeletal: Normal range of motion.   Neurological: She is alert and oriented to person, place, and time.   Skin: Skin is warm, dry and intact. She is not diaphoretic.   Psychiatric: Her behavior is normal. Judgment and thought content normal. Her mood appears anxious. Her affect is not angry, not blunt, not labile and not inappropriate. Her speech is not rapid and/or pressured and not tangential. She is not agitated, not aggressive, not hyperactive, not slowed, not withdrawn and not combative. Thought content is not paranoid and not delusional. Cognition and memory are normal. She exhibits a depressed mood. She expresses no homicidal and no suicidal ideation. She expresses no suicidal plans and no homicidal plans.   Nursing note and vitals reviewed.    Blood pressure 112/70, pulse 84, temperature 96.5 °F (35.8 °C), height 160 cm (63\"), weight 63.5 kg (140 lb), SpO2 97 %, not currently breastfeeding.  Body mass index is 24.8 kg/m².    Current Medications;;    Current Outpatient Medications:   •  esomeprazole (nexIUM) 40 MG capsule, Take 1 capsule by mouth Daily. (Patient taking differently: Take 40 mg by mouth Daily. Per pharmacy, last filled 4/9/2020 for a 30 day supply.), Disp: 30 capsule, Rfl: 1  •  furosemide (Lasix) 20 MG tablet, Take 1 tablet by mouth Daily, Disp: 30 tablet, Rfl: 0  •  levothyroxine (SYNTHROID, LEVOTHROID) 50 MCG tablet, Take 1 tablet by mouth Daily. (Patient taking differently: Take 50 mcg by mouth Daily. Per pharmacy, last filled 6/9/2020 for a 90 day supply.), Disp: 90 tablet, Rfl: 3  •  Multiple Vitamins-Minerals (MULTIVITAMIN ADULT PO), Take  by mouth., Disp: , Rfl:   •  spironolactone " (ALDACTONE) 50 MG tablet, Take 1 tablet by mouth Daily, Disp: 30 tablet, Rfl: 0  •  zolpidem (AMBIEN) 10 MG tablet, TAKE 1 TABLET BY MOUTH EVERY NIGHT AT BEDTIME AS NEEDED FOR SLEEP, Disp: 30 tablet, Rfl: 0  •  carvedilol (COREG) 12.5 MG tablet, Take 1 tablet by mouth 2 (Two) Times a Day With Meals. (Patient taking differently: Take 12.5 mg by mouth 2 (Two) Times a Day With Meals. Per pharmacy, last filled on 3/14/2020 for a 90 day supply. Patient reports only taking this medication once daily, not twice daily as prescribed.), Disp: 180 tablet, Rfl: 3  •  desvenlafaxine (Pristiq) 100 MG 24 hr tablet, Take 1 tablet by mouth Daily., Disp: 30 tablet, Rfl: 3    Lab Results:   Office Visit on 07/22/2020   Component Date Value Ref Range Status   • Glucose 07/22/2020 74  65 - 99 mg/dL Final   • BUN 07/22/2020 7  6 - 20 mg/dL Final   • Creatinine 07/22/2020 0.63  0.57 - 1.00 mg/dL Final   • eGFR Non  Am 07/22/2020 98  >60 mL/min/1.73 Final   • eGFR African Am 07/22/2020 118  >60 mL/min/1.73 Final   • BUN/Creatinine Ratio 07/22/2020 11.1  7.0 - 25.0 Final   • Sodium 07/22/2020 130* 136 - 145 mmol/L Final   • Potassium 07/22/2020 4.5  3.5 - 5.2 mmol/L Final   • Chloride 07/22/2020 94* 98 - 107 mmol/L Final   • Total CO2 07/22/2020 27.4  22.0 - 29.0 mmol/L Final   • Calcium 07/22/2020 8.3* 8.6 - 10.5 mg/dL Final   • Total Protein 07/22/2020 5.6* 6.0 - 8.5 g/dL Final   • Albumin 07/22/2020 2.40* 3.50 - 5.20 g/dL Final   • Globulin 07/22/2020 3.2  gm/dL Final   • A/G Ratio 07/22/2020 0.8  g/dL Final   • Total Bilirubin 07/22/2020 4.8* 0.0 - 1.2 mg/dL Final   • Alkaline Phosphatase 07/22/2020 196* 39 - 117 U/L Final   • AST (SGOT) 07/22/2020 83* 1 - 32 U/L Final   • ALT (SGPT) 07/22/2020 26  1 - 33 U/L Final   • Total Cholesterol 07/22/2020 185  0 - 200 mg/dL Final   • Triglycerides 07/22/2020 165* 0 - 150 mg/dL Final   • HDL Cholesterol 07/22/2020 17* 40 - 60 mg/dL Final   • VLDL Cholesterol 07/22/2020 33  mg/dL Final   •  LDL Cholesterol  07/22/2020 135* 0 - 100 mg/dL Final   • WBC 07/22/2020 11.93* 3.40 - 10.80 10*3/mm3 Final   • RBC 07/22/2020 2.91* 3.77 - 5.28 10*6/mm3 Final   • Hemoglobin 07/22/2020 10.3* 12.0 - 15.9 g/dL Final   • Hematocrit 07/22/2020 29.0* 34.0 - 46.6 % Final   • MCV 07/22/2020 99.7* 79.0 - 97.0 fL Final   • MCH 07/22/2020 35.4* 26.6 - 33.0 pg Final   • MCHC 07/22/2020 35.5  31.5 - 35.7 g/dL Final   • RDW 07/22/2020 14.6  12.3 - 15.4 % Final   • Platelets 07/22/2020 135* 140 - 450 10*3/mm3 Final   • Neutrophil Rel % 07/22/2020 CANCELED   Final-Edited    Comment: Test not performed    Result canceled by the ancillary.     • Lymphocyte Rel % 07/22/2020 CANCELED   Final-Edited    Comment: Test not performed    Result canceled by the ancillary.     • Monocyte Rel % 07/22/2020 CANCELED   Final-Edited    Comment: Test not performed    Result canceled by the ancillary.     • Eosinophil Rel % 07/22/2020 CANCELED   Final-Edited    Comment: Test not performed    Result canceled by the ancillary.     • Lymphocytes Absolute 07/22/2020 CANCELED   Final-Edited    Comment: Test not performed    Result canceled by the ancillary.     • Eosinophils Absolute 07/22/2020 CANCELED   Final-Edited    Comment: Test not performed    Result canceled by the ancillary.     • Basophils Absolute 07/22/2020 CANCELED   Final-Edited    Comment: Test not performed    Result canceled by the ancillary.     • Neutrophil Rel % 07/22/2020 89.0* 42.7 - 76.0 % Final   • Lymphocyte Rel % 07/22/2020 8.0* 19.6 - 45.3 % Final   • Monocyte Rel % 07/22/2020 3.0* 5.0 - 12.0 % Final   • Neutrophils Absolute 07/22/2020 10.62* 1.70 - 7.00 10*3/mm3 Final   • Lymphocytes Absolute 07/22/2020 0.95  0.70 - 3.10 10*3/mm3 Final   • Monocytes Absolute 07/22/2020 0.36  0.10 - 0.90 10*3/mm3 Final   • Differential Comment 07/22/2020 Comment   Final    WBC MORPHOLOGY               Normal                      Normal     N   • Comment 07/22/2020 Comment   Final    Comment:  ANISOCYTOSIS                 Slight/1+                   None Seen  N  POIKILOCYTOSIS               Slight/1+                   None Seen  N  POLYCHROMASIA                Slight/1+                   None Seen  N     • Plt Comment 07/22/2020 Comment   Final    PLATELET MORPHOLOGY          Normal                      Normal     N   No results displayed because visit has over 200 results.      Office Visit on 07/14/2020   Component Date Value Ref Range Status   • Glucose 07/14/2020 88  65 - 99 mg/dL Final   • BUN 07/14/2020 7  6 - 20 mg/dL Final   • Creatinine 07/14/2020 0.78  0.57 - 1.00 mg/dL Final   • Sodium 07/14/2020 120* 136 - 145 mmol/L Final   • Potassium 07/14/2020 2.8* 3.5 - 5.2 mmol/L Final   • Chloride 07/14/2020 75* 98 - 107 mmol/L Final   • CO2 07/14/2020 33.8* 22.0 - 29.0 mmol/L Final   • Calcium 07/14/2020 8.4* 8.6 - 10.5 mg/dL Final   • Total Protein 07/14/2020 6.1  6.0 - 8.5 g/dL Final   • Albumin 07/14/2020 2.70* 3.50 - 5.20 g/dL Final   • ALT (SGPT) 07/14/2020 28  1 - 33 U/L Final   • AST (SGOT) 07/14/2020 92* 1 - 32 U/L Final   • Alkaline Phosphatase 07/14/2020 292* 39 - 117 U/L Final   • Total Bilirubin 07/14/2020 5.0* 0.0 - 1.2 mg/dL Final   • eGFR Non African Amer 07/14/2020 76  >60 mL/min/1.73 Final   • Globulin 07/14/2020 3.4  gm/dL Final   • A/G Ratio 07/14/2020 0.8  g/dL Final   • BUN/Creatinine Ratio 07/14/2020 9.0  7.0 - 25.0 Final   • Anion Gap 07/14/2020 11.2  5.0 - 15.0 mmol/L Final   • proBNP 07/14/2020 1,093.0* 0.0 - 900.0 pg/mL Final   Lab Requisition on 06/08/2020   Component Date Value Ref Range Status   • Case Report 06/08/2020    Final                    Value:Surgical Pathology Report                         Case: [id number]                                  Authorizing Provider:  Harlan Claudio MD    Collected:           06/08/2020 09:20 AM          Ordering Location:     [facility]   Received:            06/08/2020 11:31 AM                                  LABORATORY                                                                   Pathologist:           Alexis Ha MD                                                           Specimens:   1) - Small Intestine, Jejunum                                                                       2) - Gastric, pouch                                                                                 3) - Esophagus, Distal                                                                    • Clinical Information 06/08/2020    Final                    Value:This result contains rich text formatting which cannot be displayed here.   • Final Diagnosis 06/08/2020    Final                    Value:This result contains rich text formatting which cannot be displayed here.   • Gross Description 06/08/2020    Final                    Value:This result contains rich text formatting which cannot be displayed here.   • Microscopic Description 06/08/2020    Final                    Value:This result contains rich text formatting which cannot be displayed here.   Lab on 06/05/2020   Component Date Value Ref Range Status   • Reference Lab Report 06/05/2020 See Scanned Report   Final   • COVID19 06/05/2020 Not Detected  Not Detected - Ref. Range Final   Lab on 05/11/2020   Component Date Value Ref Range Status   • TSH 05/11/2020 4.200  0.270 - 4.200 uIU/mL Final    TSH results may be falsely decreased if patient taking Biotin.   • T Uptake 05/11/2020 0.93  0.80 - 1.30 TBI Final   • T4, Total 05/11/2020 10.10  4.50 - 11.70 mcg/dL Final    T4 results may be falsely increased if patient taking Biotin.   • IgA 05/11/2020 513* 87 - 352 mg/dL Final   • Gliadin Deamidated Peptide Ab, IgA 05/11/2020 8  0 - 19 units Final                       Negative                   0 - 19                     Weak Positive             20 - 30                     Moderate to Strong Positive   >30   • Deaminated Gliadin Ab IgG 05/11/2020 2  0 - 19 units Final                        Negative                   0 - 19                     Weak Positive             20 - 30                     Moderate to Strong Positive   >30   • Tissue Transglutaminase IgA 05/11/2020 <2  0 - 3 U/mL Final                                  Negative        0 -  3                                Weak Positive   4 - 10                                Positive           >10   Tissue Transglutaminase (tTG) has been identified   as the endomysial antigen.  Studies have demonstr-   ated that endomysial IgA antibodies have over 99%   specificity for gluten sensitive enteropathy.   • Tissue Transglutaminase IgG 05/11/2020 <2  0 - 5 U/mL Final                                  Negative        0 - 5                                Weak Positive   6 - 9                                Positive           >9   Office Visit on 05/11/2020   Component Date Value Ref Range Status   • Glucose 05/11/2020 83  65 - 99 mg/dL Final   • BUN 05/11/2020 10  6 - 20 mg/dL Final   • Creatinine 05/11/2020 0.70  0.57 - 1.00 mg/dL Final   • Sodium 05/11/2020 139  136 - 145 mmol/L Final   • Potassium 05/11/2020 3.8  3.5 - 5.2 mmol/L Final   • Chloride 05/11/2020 103  98 - 107 mmol/L Final   • CO2 05/11/2020 25.1  22.0 - 29.0 mmol/L Final   • Calcium 05/11/2020 8.7  8.6 - 10.5 mg/dL Final   • Total Protein 05/11/2020 5.9* 6.0 - 8.5 g/dL Final   • Albumin 05/11/2020 3.10* 3.50 - 5.20 g/dL Final   • ALT (SGPT) 05/11/2020 30  1 - 33 U/L Final   • AST (SGOT) 05/11/2020 59* 1 - 32 U/L Final   • Alkaline Phosphatase 05/11/2020 144* 39 - 117 U/L Final   • Total Bilirubin 05/11/2020 1.6* 0.2 - 1.2 mg/dL Final   • eGFR Non African Amer 05/11/2020 87  >60 mL/min/1.73 Final   • Globulin 05/11/2020 2.8  gm/dL Final   • A/G Ratio 05/11/2020 1.1  g/dL Final   • BUN/Creatinine Ratio 05/11/2020 14.3  7.0 - 25.0 Final   • Anion Gap 05/11/2020 10.9  5.0 - 15.0 mmol/L Final   • Protime 05/11/2020 15.3* 11.5 - 14.0 Seconds Final   • INR 05/11/2020 1.23* 0.85 - 1.16  Final       Mental Status Exam:   Hygiene:   good  Cooperation:  Cooperative  Eye Contact:  Good  Psychomotor Behavior:  Appropriate  Mood:anxious, depressed and dysthymic  Affect:  Appropriate  Hopelessness: Denies  Speech:  Normal  Thought Process:  Goal directed  Thought Content:  Normal  Suicidal:  None  Homicidal:  None  Hallucinations:  None  Delusion:  None  Memory:  Intact  Orientation:  Person, Place, Time and Situation  Reliability:  good  Insight:  Good  Judgement:  Good  Impulse Control:  Good  Physical/Medical Issues:  No     PHQ-9 Depression Screening  Little interest or pleasure in doing things? 0   Feeling down, depressed, or hopeless? 3   Trouble falling or staying asleep, or sleeping too much? 0   Feeling tired or having little energy? 2   Poor appetite or overeating? 3   Feeling bad about yourself - or that you are a failure or have let yourself or your family down? 0   Trouble concentrating on things, such as reading the newspaper or watching television? 2   Moving or speaking so slowly that other people could have noticed? Or the opposite - being so fidgety or restless that you have been moving around a lot more than usual? 2   Thoughts that you would be better off dead, or of hurting yourself in some way? 0   PHQ-9 Total Score 12   If you checked off any problems, how difficult have these problems made it for you to do your work, take care of things at home, or get along with other people?          Assessment/Plan:  Gaby was seen today for follow-up, depression and anxiety.    Diagnoses and all orders for this visit:    Generalized anxiety disorder  -     desvenlafaxine (Pristiq) 100 MG 24 hr tablet; Take 1 tablet by mouth Daily.    Mild episode of recurrent major depressive disorder (CMS/HCC)  -     desvenlafaxine (Pristiq) 100 MG 24 hr tablet; Take 1 tablet by mouth Daily.    Psychophysiological insomnia      Increase Pristiq to 100 mg p.o. daily.    A psychological evaluation was  conducted in order to assess past and current level of functioning. Areas assessed included, but were not limited to: perception of social support, perception of ability to face and deal with challenges in life (positive functioning), anxiety symptoms, depressive symptoms, perspective on beliefs/belief system, coping skills for stress, intelligence level,  Therapeutic rapport was established. Interventions conducted today were geared towards incorporating medication management along with support for continued therapy. Education was also provided as to the med management with this provider and what to expect in subsequent sessions.    We discussed risks, benefits,goals and side effects of the above medication and the patient was agreeable with the plan.Patient was educated on the importance of compliance with treatment and follow-up appointments. Patient is aware to contact the [facility] with any worsening of symptoms. To call for questions or concerns and return early if necessary. Patent is agreeable to go to the Emergency Department or call 911 should they begin SI/HI.     Treatment Plan:   Discussed risks, benefits, and alternatives of medication. Encouraged healthy habits (eating, exercise and sleep). Call if any questions or problems arise. Medication reconciled. Controlled substance monitoring report reviewed. Provided psychoeducation.. Discussed coping strategies and current stressors. Set appropriate boundaries and limits for patient's well-being. Use distraction techniques to improve symptoms. Access support networks.      Return in about 4 weeks (around 8/20/2020) for Medication recheck 15 minutes.    JEN Mcgee

## 2020-07-23 NOTE — PROGRESS NOTES
I've ordered a BMP and CBC to be redrawn tomorrow.  Please advise her if she has any symptoms of illness to return to clinic or go to ED depending on severity of symptoms and timing.

## 2020-07-23 NOTE — PROGRESS NOTES
Patient notified of orders and provider's advice to return to clinic or go to ED if symptoms return or worsen

## 2020-07-24 LAB
BASOPHILS # BLD AUTO: ABNORMAL 10*3/UL
BASOPHILS # BLD MANUAL: 0.28 10*3/MM3 (ref 0–0.2)
BASOPHILS NFR BLD MANUAL: 2.2 % (ref 0–1.5)
BUN SERPL-MCNC: 6 MG/DL (ref 6–20)
BUN/CREAT SERPL: 8.7 (ref 7–25)
CALCIUM SERPL-MCNC: 8.4 MG/DL (ref 8.6–10.5)
CHLORIDE SERPL-SCNC: 98 MMOL/L (ref 98–107)
CO2 SERPL-SCNC: 29.7 MMOL/L (ref 22–29)
CREAT SERPL-MCNC: 0.69 MG/DL (ref 0.57–1)
DIFFERENTIAL COMMENT: ABNORMAL
EOSINOPHIL # BLD AUTO: ABNORMAL 10*3/UL
EOSINOPHIL # BLD MANUAL: 0.28 10*3/MM3 (ref 0–0.4)
EOSINOPHIL NFR BLD AUTO: ABNORMAL %
EOSINOPHIL NFR BLD MANUAL: 2.2 % (ref 0.3–6.2)
ERYTHROCYTE [DISTWIDTH] IN BLOOD BY AUTOMATED COUNT: 14.9 % (ref 12.3–15.4)
GLUCOSE SERPL-MCNC: 82 MG/DL (ref 65–99)
HCT VFR BLD AUTO: 29.8 % (ref 34–46.6)
HGB BLD-MCNC: 10.6 G/DL (ref 12–15.9)
LYMPHOCYTES # BLD AUTO: ABNORMAL 10*3/UL
LYMPHOCYTES # BLD MANUAL: 0.41 10*3/MM3 (ref 0.7–3.1)
LYMPHOCYTES NFR BLD AUTO: ABNORMAL %
LYMPHOCYTES NFR BLD MANUAL: 3.2 % (ref 19.6–45.3)
MCH RBC QN AUTO: 36.3 PG (ref 26.6–33)
MCHC RBC AUTO-ENTMCNC: 35.6 G/DL (ref 31.5–35.7)
MCV RBC AUTO: 102.1 FL (ref 79–97)
MONOCYTES # BLD MANUAL: 0 10*3/MM3 (ref 0.1–0.9)
MONOCYTES NFR BLD AUTO: ABNORMAL %
MONOCYTES NFR BLD MANUAL: 0 % (ref 5–12)
NEUTROPHILS # BLD MANUAL: 11.89 10*3/MM3 (ref 1.7–7)
NEUTROPHILS NFR BLD AUTO: ABNORMAL %
NEUTROPHILS NFR BLD MANUAL: 92.5 % (ref 42.7–76)
PLATELET # BLD AUTO: 165 10*3/MM3 (ref 140–450)
PLATELET BLD QL SMEAR: ABNORMAL
POTASSIUM SERPL-SCNC: 3.7 MMOL/L (ref 3.5–5.2)
RBC # BLD AUTO: 2.92 10*6/MM3 (ref 3.77–5.28)
RBC MORPH BLD: ABNORMAL
SODIUM SERPL-SCNC: 136 MMOL/L (ref 136–145)
WBC # BLD AUTO: 12.85 10*3/MM3 (ref 3.4–10.8)

## 2020-07-28 NOTE — TELEPHONE ENCOUNTER
I called AshiaRN at [facility]. Informed her that patient has an appointment with Dr Joshua Ruano at [facility] [place] on 8/19/2020. No answer; left voice message.

## 2020-08-10 ENCOUNTER — OFFICE VISIT (OUTPATIENT)
Dept: INTERNAL MEDICINE | Facility: CLINIC | Age: 55
End: 2020-08-10

## 2020-08-10 VITALS
DIASTOLIC BLOOD PRESSURE: 60 MMHG | BODY MASS INDEX: 25.38 KG/M2 | SYSTOLIC BLOOD PRESSURE: 110 MMHG | HEART RATE: 91 BPM | WEIGHT: 143.25 LBS | OXYGEN SATURATION: 98 % | TEMPERATURE: 97.3 F | HEIGHT: 63 IN | RESPIRATION RATE: 18 BRPM

## 2020-08-10 DIAGNOSIS — S61.215A LACERATION OF LEFT RING FINGER WITHOUT FOREIGN BODY WITHOUT DAMAGE TO NAIL, INITIAL ENCOUNTER: ICD-10-CM

## 2020-08-10 DIAGNOSIS — S61.213A LACERATION OF LEFT MIDDLE FINGER WITHOUT FOREIGN BODY WITHOUT DAMAGE TO NAIL, INITIAL ENCOUNTER: Primary | ICD-10-CM

## 2020-08-10 DIAGNOSIS — K70.31 ALCOHOLIC CIRRHOSIS OF LIVER WITH ASCITES (HCC): ICD-10-CM

## 2020-08-10 DIAGNOSIS — E03.8 SUBCLINICAL HYPOTHYROIDISM: ICD-10-CM

## 2020-08-10 DIAGNOSIS — R79.89 ABNORMAL CBC: ICD-10-CM

## 2020-08-10 PROCEDURE — 90471 IMMUNIZATION ADMIN: CPT | Performed by: FAMILY MEDICINE

## 2020-08-10 PROCEDURE — 90715 TDAP VACCINE 7 YRS/> IM: CPT | Performed by: FAMILY MEDICINE

## 2020-08-10 PROCEDURE — 99214 OFFICE O/P EST MOD 30 MIN: CPT | Performed by: FAMILY MEDICINE

## 2020-08-10 NOTE — PROGRESS NOTES
"Subjective    Gaby Rasheed is a 55 y.o. female here for:  Chief Complaint   Patient presents with   • Laceration     Cut 2 fingers on her left hand on Saturday. Pt ask if she can have sutures also thinks she may need a tetanus shot.    • Results     Follow up from labs when she was in the hospital.        History per MA reviewed.    Cut two fingers left hand about 3 days ago using a butter knife to open pickle jar. Not up to date on tetanus shot. Hemostasis achieved, did not go to [facility] or ER.    Reports no further alcohol use. Having fluid retention and swelling. Has GI appointment soon as well as nephrology.          The following portions of the patient's history were reviewed and updated as appropriate: allergies, current medications, past family history, past medical history, past social history, past surgical history and problem list.    Review of Systems   Constitutional: Positive for fatigue. Negative for fever.   Cardiovascular: Positive for leg swelling.   Gastrointestinal: Positive for abdominal distention.   Skin: Positive for skin lesions (wound).   Psychiatric/Behavioral: Positive for stress.       Visit Vitals  /60   Pulse 91   Temp 97.3 °F (36.3 °C) (Temporal)   Resp 18   Ht 160 cm (62.99\")   Wt 65 kg (143 lb 4 oz)   SpO2 98%   BMI 25.38 kg/m²         Objective   Physical Exam   Constitutional: She is oriented to person, place, and time. Vital signs are normal. She appears well-developed and well-nourished. She is active.  Non-toxic appearance. She does not appear ill. No distress. Face mask in place.   HENT:   Head: Normocephalic and atraumatic.   Right Ear: Hearing normal.   Left Ear: Hearing normal.   Eyes: EOM are normal. Right eye exhibits no discharge. Left eye exhibits no discharge. Scleral icterus is present.   Neck: Phonation normal. Neck supple.   Pulmonary/Chest: Effort normal.   Neurological: She is alert and oriented to person, place, and time. She displays no tremor. No " cranial nerve deficit.   Skin: Skin is warm. Laceration (left middle finger at base palmar aspect, 4th finger just distal to mcp palmar aspect, no bleeding at this time) noted. No rash noted. She is not diaphoretic. No pallor.   Mildly icteric in face   Psychiatric: She has a normal mood and affect. Her speech is normal and behavior is normal. Judgment and thought content normal. Cognition and memory are normal.   Nursing note and vitals reviewed.        Assessment/Plan     Problem List Items Addressed This Visit        Digestive    Cirrhosis of liver (CMS/HCC)      Other Visit Diagnoses     Laceration of left middle finger without foreign body without damage to nail, initial encounter    -  Primary    Relevant Orders    Tdap Vaccine Greater Than or Equal To 8yo IM (Completed)    Laceration of left ring finger without foreign body without damage to nail, initial encounter        Relevant Orders    Tdap Vaccine Greater Than or Equal To 8yo IM (Completed)    Abnormal CBC        Relevant Orders    CBC & Differential    Subclinical hypothyroidism        Relevant Orders    TSH+Free T4          · Total abstinence from alcohol stressed. Follow up with gastroenterology and nephrology. As she'll likely have labs when she goes to see gastroenterology I asked her to have the labs I ordered drawn same time  · Keep wounds clean, covered.     Arabella Loving MD

## 2020-08-19 ENCOUNTER — OFFICE VISIT (OUTPATIENT)
Dept: GASTROENTEROLOGY | Facility: CLINIC | Age: 55
End: 2020-08-19

## 2020-08-19 ENCOUNTER — LAB (OUTPATIENT)
Dept: LAB | Facility: HOSPITAL | Age: 55
End: 2020-08-19

## 2020-08-19 VITALS
BODY MASS INDEX: 25.52 KG/M2 | DIASTOLIC BLOOD PRESSURE: 70 MMHG | RESPIRATION RATE: 16 BRPM | SYSTOLIC BLOOD PRESSURE: 134 MMHG | TEMPERATURE: 97.3 F | HEART RATE: 84 BPM | WEIGHT: 144 LBS | HEIGHT: 63 IN

## 2020-08-19 DIAGNOSIS — K74.60 CHRONIC LIVER DISEASE AND CIRRHOSIS (HCC): Primary | ICD-10-CM

## 2020-08-19 DIAGNOSIS — D53.9 MACROCYTIC ANEMIA: ICD-10-CM

## 2020-08-19 DIAGNOSIS — K70.9 ALCOHOLIC LIVER DISEASE (HCC): Primary | ICD-10-CM

## 2020-08-19 DIAGNOSIS — K58.0 IRRITABLE BOWEL SYNDROME WITH DIARRHEA: ICD-10-CM

## 2020-08-19 DIAGNOSIS — Z98.84 HISTORY OF GASTRIC BYPASS: ICD-10-CM

## 2020-08-19 DIAGNOSIS — K76.9 CHRONIC LIVER DISEASE AND CIRRHOSIS (HCC): Primary | ICD-10-CM

## 2020-08-19 DIAGNOSIS — K70.9 ALCOHOLIC LIVER DISEASE (HCC): ICD-10-CM

## 2020-08-19 DIAGNOSIS — Z12.11 ENCOUNTER FOR SCREENING FOR MALIGNANT NEOPLASM OF COLON: ICD-10-CM

## 2020-08-19 DIAGNOSIS — G47.26 SHIFT WORK SLEEP DISORDER: ICD-10-CM

## 2020-08-19 DIAGNOSIS — D69.6 THROMBOCYTOPENIA (HCC): ICD-10-CM

## 2020-08-19 LAB
ALBUMIN SERPL-MCNC: 2.9 G/DL (ref 3.5–5.2)
ALBUMIN/GLOB SERPL: 0.9 G/DL
ALP SERPL-CCNC: 186 U/L (ref 39–117)
ALT SERPL W P-5'-P-CCNC: 16 U/L (ref 1–33)
ANION GAP SERPL CALCULATED.3IONS-SCNC: 6.3 MMOL/L (ref 5–15)
AST SERPL-CCNC: 55 U/L (ref 1–32)
BILIRUB SERPL-MCNC: 4.6 MG/DL (ref 0–1.2)
BUN SERPL-MCNC: 8 MG/DL (ref 6–20)
BUN/CREAT SERPL: 13.3 (ref 7–25)
CALCIUM SPEC-SCNC: 8.6 MG/DL (ref 8.6–10.5)
CHLORIDE SERPL-SCNC: 97 MMOL/L (ref 98–107)
CO2 SERPL-SCNC: 30.7 MMOL/L (ref 22–29)
CREAT SERPL-MCNC: 0.6 MG/DL (ref 0.57–1)
FERRITIN SERPL-MCNC: 255 NG/ML (ref 13–150)
GFR SERPL CREATININE-BSD FRML MDRD: 103 ML/MIN/1.73
GLOBULIN UR ELPH-MCNC: 3.2 GM/DL
GLUCOSE SERPL-MCNC: 78 MG/DL (ref 65–99)
HBV SURFACE AB SER RIA-ACNC: REACTIVE
INR PPP: 1.53 (ref 0.9–1.1)
IRON 24H UR-MRATE: 114 MCG/DL (ref 37–145)
IRON SATN MFR SERPL: 67 % (ref 20–50)
POTASSIUM SERPL-SCNC: 3.4 MMOL/L (ref 3.5–5.2)
PROT SERPL-MCNC: 6.1 G/DL (ref 6–8.5)
PROTHROMBIN TIME: 19.2 SECONDS (ref 12–15.1)
SODIUM SERPL-SCNC: 134 MMOL/L (ref 136–145)
TIBC SERPL-MCNC: 170 MCG/DL (ref 298–536)
TRANSFERRIN SERPL-MCNC: 114 MG/DL (ref 200–360)

## 2020-08-19 PROCEDURE — 82728 ASSAY OF FERRITIN: CPT

## 2020-08-19 PROCEDURE — 84466 ASSAY OF TRANSFERRIN: CPT

## 2020-08-19 PROCEDURE — 85610 PROTHROMBIN TIME: CPT

## 2020-08-19 PROCEDURE — 84460 ALANINE AMINO (ALT) (SGPT): CPT

## 2020-08-19 PROCEDURE — 83010 ASSAY OF HAPTOGLOBIN QUANT: CPT

## 2020-08-19 PROCEDURE — 83516 IMMUNOASSAY NONANTIBODY: CPT

## 2020-08-19 PROCEDURE — 82947 ASSAY GLUCOSE BLOOD QUANT: CPT

## 2020-08-19 PROCEDURE — 80053 COMPREHEN METABOLIC PANEL: CPT

## 2020-08-19 PROCEDURE — 83883 ASSAY NEPHELOMETRY NOT SPEC: CPT

## 2020-08-19 PROCEDURE — 83540 ASSAY OF IRON: CPT

## 2020-08-19 PROCEDURE — 86706 HEP B SURFACE ANTIBODY: CPT

## 2020-08-19 PROCEDURE — 99214 OFFICE O/P EST MOD 30 MIN: CPT | Performed by: INTERNAL MEDICINE

## 2020-08-19 PROCEDURE — 36415 COLL VENOUS BLD VENIPUNCTURE: CPT

## 2020-08-19 PROCEDURE — 82172 ASSAY OF APOLIPOPROTEIN: CPT

## 2020-08-19 PROCEDURE — 82247 BILIRUBIN TOTAL: CPT

## 2020-08-19 PROCEDURE — 82977 ASSAY OF GGT: CPT

## 2020-08-19 PROCEDURE — 84450 TRANSFERASE (AST) (SGOT): CPT

## 2020-08-19 PROCEDURE — 86708 HEPATITIS A ANTIBODY: CPT

## 2020-08-19 PROCEDURE — 84478 ASSAY OF TRIGLYCERIDES: CPT

## 2020-08-19 PROCEDURE — 82465 ASSAY BLD/SERUM CHOLESTEROL: CPT

## 2020-08-19 RX ORDER — CHOLESTYRAMINE LIGHT 4 G/5.7G
4 POWDER, FOR SUSPENSION ORAL 2 TIMES DAILY
Qty: 60 PACKET | Refills: 2 | Status: SHIPPED | OUTPATIENT
Start: 2020-08-19 | End: 2020-11-16 | Stop reason: SDUPTHER

## 2020-08-19 RX ORDER — LOPERAMIDE HYDROCHLORIDE 2 MG/1
2 CAPSULE ORAL EVERY 8 HOURS
Status: ON HOLD | COMMUNITY
End: 2021-01-01

## 2020-08-19 RX ORDER — DIPHENOXYLATE HYDROCHLORIDE AND ATROPINE SULFATE 2.5; .025 MG/1; MG/1
1 TABLET ORAL 2 TIMES DAILY
COMMUNITY
End: 2020-01-01

## 2020-08-19 RX ORDER — SPIRONOLACTONE 50 MG/1
50 TABLET, FILM COATED ORAL DAILY
COMMUNITY
End: 2020-11-18

## 2020-08-19 NOTE — PROGRESS NOTES
New Patient Consult      Date: 2020   Patient Name: Gaby Rasheed  MRN: [id number]  : 1965     Referring Physician: Juwan Hernandez DO    Chief Complaint   Patient presents with   • Liver Eval       History of Present Illness: Gaby Rasheed is a 55 y.o. female who is here today to establish care with Gastroenterology for follow-up of her elevated liver enzymes.   Patient was followed at Dr. Claudio  office at [place] until recently.   She has a known history of alcoholic liver disease.  She has cut down alcohol but she continues to drink until 2020.  She has ongoing issues with the diarrhea, she is on lomotil and she also take imodium.  Her recent lab work revealed significantly elevated total bilirubin suspicious for cirrhosis. She developed recent abdominal distension and getting worse. She is on lasix 20mg po daily. Spironolactone 50mg   Her last EGD was in 2020 which revealed normal pouch and jejunum, biopsies negative for any abnormalities  Colonoscopy done in 2019 by Dr. Cotton mentioned that she does did have some colitis involving the right side colon however biopsies done were all normal without any signs of microscopic colitis.  Terminal ileum was not examined and no biopsies obtained.       Mrs. Rasheed returns to the office.  I last evaluated this patient in 2018 for alcohol-related liver disease.  She has recently been followed by Dr. Abreu who performed surgery for incarcerated hernia in January.  The patient also had a colonoscopy in December with biopsies negative for colitis.  Her main complaint is issues with urgency to have a bowel movement for meals. She has experienced this problem for about 8-9 months.  The patient denies any diarrhea that will wake her up from a deep sleep.  She is working long hour shifts at the [employer].  The job is very stressful at this time.  Mrs. Rasheed denies any gerri blood in the  stool.  She did have weight loss of about 10 pounds but has gained some of the weight back.  She denies any nausea or emesis.  Mrs. Rasheed denies any localized abdominal pain except before going to the restroom with the discomfort is usually in the lower abdomen.  The patient denies any breakthrough heartburn.  She does admit to still consuming some alcohol usually 2 glasses of wine with meals when she is not working.  The patient does not drink any alcohol during the course of her work week.  Subjective      Past Medical History:   Past Medical History:   Diagnosis Date   • Acid reflux    • Anxiety    • Back pain    • Depression    • Fractures    • Hernia of abdominal cavity    • High cholesterol    • High triglycerides    • Hypertension    • Hypothyroidism    • Irritable bowel syndrome    • Nocturia 10/5/2016   • Positive TB test    • Sinus problem    • ELINA (stress urinary incontinence, female) 10/5/2016       Past Surgical History:   Past Surgical History:   Procedure Laterality Date   •  SECTION     • COLONOSCOPY     • GASTRIC BYPASS     • HERNIA REPAIR      umbilical hernia   • HERNIA REPAIR  2020    ventral   • HYSTERECTOMY     • LAPAROSCOPIC TUBAL LIGATION     • TOTAL ABDOMINAL HYSTERECTOMY WITH SALPINGO OOPHORECTOMY      uterine prolapse       Family History:   Family History   Problem Relation Age of Onset   • Hyperlipidemia Mother    • Arthritis Mother    • Inflammatory bowel disease Mother    • Diabetes Father    • Hypertension Father    • Arthritis Paternal Grandmother    • Breast cancer Neg Hx    • Ovarian cancer Neg Hx    • Colon cancer Neg Hx    • Cirrhosis Neg Hx    • Liver disease Neg Hx    • Liver cancer Neg Hx        Social History:   Social History     Socioeconomic History   • Marital status:      Spouse name: Not on file   • Number of children: Not on file   • Years of education: Not on file   • Highest education level: Not on file   Tobacco Use   • Smoking status:  Never Smoker   • Smokeless tobacco: Never Used   Substance and Sexual Activity   • Alcohol use: Not Currently     Comment: former use   • Drug use: No   • Sexual activity: Defer         Current Outpatient Medications:   •  carvedilol (COREG) 12.5 MG tablet, Take 1 tablet by mouth 2 (Two) Times a Day With Meals. (Patient taking differently: Take 12.5 mg by mouth 2 (Two) Times a Day With Meals. Per pharmacy, last filled on 3/14/2020 for a 90 day supply. Patient reports only taking this medication once daily, not twice daily as prescribed.), Disp: 180 tablet, Rfl: 3  •  desvenlafaxine (Pristiq) 100 MG 24 hr tablet, Take 1 tablet by mouth Daily., Disp: 30 tablet, Rfl: 3  •  diphenoxylate-atropine (LOMOTIL) 2.5-0.025 MG per tablet, Take 1 tablet by mouth 2 (two) times a day., Disp: , Rfl:   •  esomeprazole (nexIUM) 40 MG capsule, Take 1 capsule by mouth Daily. (Patient taking differently: Take 40 mg by mouth Daily. Per pharmacy, last filled 4/9/2020 for a 30 day supply.), Disp: 30 capsule, Rfl: 1  •  furosemide (Lasix) 20 MG tablet, Take 1 tablet by mouth Daily, Disp: 30 tablet, Rfl: 0  •  levothyroxine (SYNTHROID, LEVOTHROID) 50 MCG tablet, Take 1 tablet by mouth Daily. (Patient taking differently: Take 50 mcg by mouth Daily. Per pharmacy, last filled 6/9/2020 for a 90 day supply.), Disp: 90 tablet, Rfl: 3  •  loperamide (IMODIUM) 2 MG capsule, Take 2 mg by mouth Every 8 (Eight) Hours., Disp: , Rfl:   •  Multiple Vitamins-Minerals (MULTIVITAMIN ADULT PO), Take  by mouth., Disp: , Rfl:   •  spironolactone (ALDACTONE) 50 MG tablet, Take 50 mg by mouth Daily., Disp: , Rfl:   •  zolpidem (AMBIEN) 10 MG tablet, TAKE 1 TABLET BY MOUTH EVERY NIGHT AT BEDTIME AS NEEDED FOR SLEEP, Disp: 30 tablet, Rfl: 0    No Known Allergies    Review of Systems:   Review of Systems   Constitutional: Negative for appetite change, fatigue and unexpected weight loss.   Gastrointestinal: Positive for abdominal pain, diarrhea and nausea. Negative  "for abdominal distention, anal bleeding, blood in stool, constipation, rectal pain, vomiting, GERD and indigestion.       The following portions of the patient's history were reviewed and updated as appropriate: allergies, current medications, past family history, past medical history, past social history, past surgical history and problem list.    Objective     Physical Exam:  Vital Signs:   Vitals:    08/19/20 1312   BP: 134/70   Pulse: 84   Resp: 16   Temp: 97.3 °F (36.3 °C)   Weight: 65.3 kg (144 lb)   Height: 160 cm (63\")       Physical Exam   Constitutional: She is oriented to person, place, and time. She appears well-developed and well-nourished.   HENT:   Mouth/Throat: Oropharynx is clear and moist.   Eyes: Conjunctivae and EOM are normal.   Neck: Neck supple.   Cardiovascular: Normal rate and regular rhythm.   No murmur heard.  Pulmonary/Chest: Effort normal and breath sounds normal. No respiratory distress.   Abdominal: Soft. Bowel sounds are normal. She exhibits distension (Ascites present). She exhibits no mass. There is no tenderness. No hernia.   Prior surgical scar noted in the mid abdomen   Musculoskeletal: She exhibits edema (Lower extremity edema noted).   Lymphadenopathy:     She has no cervical adenopathy.   Neurological: She is alert and oriented to person, place, and time. No sensory deficit.   Skin: Skin is warm and dry.   Nursing note and vitals reviewed.      Results Review:   I have reviewed the patient's new clinical and imaging results and agree with the interpretation.     Orders Only on 07/23/2020   Component Date Value Ref Range Status   • Glucose 07/24/2020 82  65 - 99 mg/dL Final   • BUN 07/24/2020 6  6 - 20 mg/dL Final   • Creatinine 07/24/2020 0.69  0.57 - 1.00 mg/dL Final   • eGFR Non  Am 07/24/2020 88  >60 mL/min/1.73 Final   • eGFR African Am 07/24/2020 107  >60 mL/min/1.73 Final   • BUN/Creatinine Ratio 07/24/2020 8.7  7.0 - 25.0 Final   • Sodium 07/24/2020 136  136 - " 145 mmol/L Final   • Potassium 07/24/2020 3.7  3.5 - 5.2 mmol/L Final   • Chloride 07/24/2020 98  98 - 107 mmol/L Final   • Total CO2 07/24/2020 29.7* 22.0 - 29.0 mmol/L Final   • Calcium 07/24/2020 8.4* 8.6 - 10.5 mg/dL Final   • WBC 07/24/2020 12.85* 3.40 - 10.80 10*3/mm3 Final   • RBC 07/24/2020 2.92* 3.77 - 5.28 10*6/mm3 Final   • Hemoglobin 07/24/2020 10.6* 12.0 - 15.9 g/dL Final   • Hematocrit 07/24/2020 29.8* 34.0 - 46.6 % Final   • MCV 07/24/2020 102.1* 79.0 - 97.0 fL Final   • MCH 07/24/2020 36.3* 26.6 - 33.0 pg Final   • MCHC 07/24/2020 35.6  31.5 - 35.7 g/dL Final   • RDW 07/24/2020 14.9  12.3 - 15.4 % Final   • Platelets 07/24/2020 165  140 - 450 10*3/mm3 Final   • Neutrophil Rel % 07/24/2020 CANCELED   Final-Edited    Comment: Test not performed    Result canceled by the ancillary.     • Lymphocyte Rel % 07/24/2020 CANCELED   Final-Edited    Comment: Test not performed    Result canceled by the ancillary.     • Monocyte Rel % 07/24/2020 CANCELED   Final-Edited    Comment: Test not performed    Result canceled by the ancillary.     • Eosinophil Rel % 07/24/2020 CANCELED   Final-Edited    Comment: Test not performed    Result canceled by the ancillary.     • Lymphocytes Absolute 07/24/2020 CANCELED   Final-Edited    Comment: Test not performed    Result canceled by the ancillary.     • Eosinophils Absolute 07/24/2020 CANCELED   Final-Edited    Comment: Test not performed    Result canceled by the ancillary.     • Basophils Absolute 07/24/2020 CANCELED   Final-Edited    Comment: Test not performed    Result canceled by the ancillary.     • Neutrophil Rel % 07/24/2020 92.5* 42.7 - 76.0 % Final   • Lymphocyte Rel % 07/24/2020 3.2* 19.6 - 45.3 % Final   • Monocyte Rel % 07/24/2020 0.0* 5.0 - 12.0 % Final   • Eosinophil Rel % 07/24/2020 2.2  0.3 - 6.2 % Final   • Basophil Rel % 07/24/2020 2.2* 0.0 - 1.5 % Final   • Neutrophils Absolute 07/24/2020 11.89* 1.70 - 7.00 10*3/mm3 Final   • Lymphocytes Absolute  07/24/2020 0.41* 0.70 - 3.10 10*3/mm3 Final   • Monocytes Absolute 07/24/2020 0.00* 0.10 - 0.90 10*3/mm3 Final   • Eosinophil Abs 07/24/2020 0.28  0.00 - 0.40 10*3/mm3 Final   • Basophils Absolute 07/24/2020 0.28* 0.00 - 0.20 10*3/mm3 Final   • Differential Comment 07/24/2020 Comment   Final    SMUDGE CELLS                 Slight/1+                   None Seen  N   • Comment 07/24/2020 Comment   Final    Comment: ANISOCYTOSIS                 Large/3+                    None Seen  N  POLYCHROMASIA                Slight/1+                   None Seen  N     • Plt Comment 07/24/2020 Comment   Final    PLATELET MORPHOLOGY          Normal                      Normal     N   Office Visit on 07/22/2020   Component Date Value Ref Range Status   • Glucose 07/22/2020 74  65 - 99 mg/dL Final   • BUN 07/22/2020 7  6 - 20 mg/dL Final   • Creatinine 07/22/2020 0.63  0.57 - 1.00 mg/dL Final   • eGFR Non  Am 07/22/2020 98  >60 mL/min/1.73 Final   • eGFR African Am 07/22/2020 118  >60 mL/min/1.73 Final   • BUN/Creatinine Ratio 07/22/2020 11.1  7.0 - 25.0 Final   • Sodium 07/22/2020 130* 136 - 145 mmol/L Final   • Potassium 07/22/2020 4.5  3.5 - 5.2 mmol/L Final   • Chloride 07/22/2020 94* 98 - 107 mmol/L Final   • Total CO2 07/22/2020 27.4  22.0 - 29.0 mmol/L Final   • Calcium 07/22/2020 8.3* 8.6 - 10.5 mg/dL Final   • Total Protein 07/22/2020 5.6* 6.0 - 8.5 g/dL Final   • Albumin 07/22/2020 2.40* 3.50 - 5.20 g/dL Final   • Globulin 07/22/2020 3.2  gm/dL Final   • A/G Ratio 07/22/2020 0.8  g/dL Final   • Total Bilirubin 07/22/2020 4.8* 0.0 - 1.2 mg/dL Final   • Alkaline Phosphatase 07/22/2020 196* 39 - 117 U/L Final   • AST (SGOT) 07/22/2020 83* 1 - 32 U/L Final   • ALT (SGPT) 07/22/2020 26  1 - 33 U/L Final   • Total Cholesterol 07/22/2020 185  0 - 200 mg/dL Final   • Triglycerides 07/22/2020 165* 0 - 150 mg/dL Final   • HDL Cholesterol 07/22/2020 17* 40 - 60 mg/dL Final   • VLDL Cholesterol 07/22/2020 33  mg/dL Final   •  LDL Cholesterol  07/22/2020 135* 0 - 100 mg/dL Final   • WBC 07/22/2020 11.93* 3.40 - 10.80 10*3/mm3 Final   • RBC 07/22/2020 2.91* 3.77 - 5.28 10*6/mm3 Final   • Hemoglobin 07/22/2020 10.3* 12.0 - 15.9 g/dL Final   • Hematocrit 07/22/2020 29.0* 34.0 - 46.6 % Final   • MCV 07/22/2020 99.7* 79.0 - 97.0 fL Final   • MCH 07/22/2020 35.4* 26.6 - 33.0 pg Final   • MCHC 07/22/2020 35.5  31.5 - 35.7 g/dL Final   • RDW 07/22/2020 14.6  12.3 - 15.4 % Final   • Platelets 07/22/2020 135* 140 - 450 10*3/mm3 Final   • Neutrophil Rel % 07/22/2020 CANCELED   Final-Edited    Comment: Test not performed    Result canceled by the ancillary.     • Lymphocyte Rel % 07/22/2020 CANCELED   Final-Edited    Comment: Test not performed    Result canceled by the ancillary.     • Monocyte Rel % 07/22/2020 CANCELED   Final-Edited    Comment: Test not performed    Result canceled by the ancillary.     • Eosinophil Rel % 07/22/2020 CANCELED   Final-Edited    Comment: Test not performed    Result canceled by the ancillary.     • Lymphocytes Absolute 07/22/2020 CANCELED   Final-Edited    Comment: Test not performed    Result canceled by the ancillary.     • Eosinophils Absolute 07/22/2020 CANCELED   Final-Edited    Comment: Test not performed    Result canceled by the ancillary.     • Basophils Absolute 07/22/2020 CANCELED   Final-Edited    Comment: Test not performed    Result canceled by the ancillary.     • Neutrophil Rel % 07/22/2020 89.0* 42.7 - 76.0 % Final   • Lymphocyte Rel % 07/22/2020 8.0* 19.6 - 45.3 % Final   • Monocyte Rel % 07/22/2020 3.0* 5.0 - 12.0 % Final   • Neutrophils Absolute 07/22/2020 10.62* 1.70 - 7.00 10*3/mm3 Final   • Lymphocytes Absolute 07/22/2020 0.95  0.70 - 3.10 10*3/mm3 Final   • Monocytes Absolute 07/22/2020 0.36  0.10 - 0.90 10*3/mm3 Final   • Differential Comment 07/22/2020 Comment   Final    WBC MORPHOLOGY               Normal                      Normal     N   • Comment 07/22/2020 Comment   Final    Comment:  ANISOCYTOSIS                 Slight/1+                   None Seen  N  POIKILOCYTOSIS               Slight/1+                   None Seen  N  POLYCHROMASIA                Slight/1+                   None Seen  N     • Plt Comment 07/22/2020 Comment   Final    PLATELET MORPHOLOGY          Normal                      Normal     N   No results displayed because visit has over 200 results.      Office Visit on 07/14/2020   Component Date Value Ref Range Status   • Glucose 07/14/2020 88  65 - 99 mg/dL Final   • BUN 07/14/2020 7  6 - 20 mg/dL Final   • Creatinine 07/14/2020 0.78  0.57 - 1.00 mg/dL Final   • Sodium 07/14/2020 120* 136 - 145 mmol/L Final   • Potassium 07/14/2020 2.8* 3.5 - 5.2 mmol/L Final   • Chloride 07/14/2020 75* 98 - 107 mmol/L Final   • CO2 07/14/2020 33.8* 22.0 - 29.0 mmol/L Final   • Calcium 07/14/2020 8.4* 8.6 - 10.5 mg/dL Final   • Total Protein 07/14/2020 6.1  6.0 - 8.5 g/dL Final   • Albumin 07/14/2020 2.70* 3.50 - 5.20 g/dL Final   • ALT (SGPT) 07/14/2020 28  1 - 33 U/L Final   • AST (SGOT) 07/14/2020 92* 1 - 32 U/L Final   • Alkaline Phosphatase 07/14/2020 292* 39 - 117 U/L Final   • Total Bilirubin 07/14/2020 5.0* 0.0 - 1.2 mg/dL Final   • eGFR Non African Amer 07/14/2020 76  >60 mL/min/1.73 Final   • Globulin 07/14/2020 3.4  gm/dL Final   • A/G Ratio 07/14/2020 0.8  g/dL Final   • BUN/Creatinine Ratio 07/14/2020 9.0  7.0 - 25.0 Final   • Anion Gap 07/14/2020 11.2  5.0 - 15.0 mmol/L Final   • proBNP 07/14/2020 1,093.0* 0.0 - 900.0 pg/mL Final   Lab Requisition on 06/08/2020   Component Date Value Ref Range Status   • Case Report 06/08/2020    Final                    Value:Surgical Pathology Report                         Case: [id number]                                  Authorizing Provider:  Harlan Claudio MD    Collected:           06/08/2020 09:20 AM          Ordering Location:     [facility]   Received:            06/08/2020 11:31 AM                                  LABORATORY                                                                   Pathologist:           Alexis Ha MD                                                           Specimens:   1) - Small Intestine, Jejunum                                                                       2) - Gastric, pouch                                                                                 3) - Esophagus, Distal                                                                    • Clinical Information 06/08/2020    Final                    Value:This result contains rich text formatting which cannot be displayed here.   • Final Diagnosis 06/08/2020    Final                    Value:This result contains rich text formatting which cannot be displayed here.   • Gross Description 06/08/2020    Final                    Value:This result contains rich text formatting which cannot be displayed here.   • Microscopic Description 06/08/2020    Final                    Value:This result contains rich text formatting which cannot be displayed here.   Lab on 06/05/2020   Component Date Value Ref Range Status   • Reference Lab Report 06/05/2020 See Scanned Report   Final   • COVID19 06/05/2020 Not Detected  Not Detected - Ref. Range Final      Ct Abdomen Pelvis Without Contrast    Result Date: 7/15/2020  1. Fatty infiltration of the liver with a small amount of ascites within the pelvis. 2. Diffuse body wall anasarca.     628.60 mGy.cm. 13.29 mGy  This study was performed with techniques to keep radiation doses as low as reasonably achievable (ALARA). Individualized dose reduction techniques using automated exposure control or adjustment of mA and/or kV according to the patient size were employed.  This report was finalized on 7/15/2020 12:23 PM by Tania Strong M.D..    Xr Chest 1 View    Result Date: 7/15/2020  No acute cardiopulmonary process.  Continued followup is recommended.  This report was finalized on 7/15/2020 12:19  PM by Tania Strong M.D..    Us Venous Doppler Lower Extremity Left (duplex)    Result Date: 7/14/2020  No evidence of deep venous thrombosis.  This report was finalized on 7/14/2020 10:31 AM by Tania Strong M.D..      Assessment / Plan      Assessment & Plan:  1.  Suspected alcoholic cirrhosis with ascites  Patient had a longstanding alcoholic liver disease.  Her recent lab work reveals a significantly elevated total bilirubin suspicious for developing cirrhosis.  Her CT scan of the abdomen pelvis done also shows a mild ascites.  Fatty liver disease without any liver lesions.   Her previous CB, anti-smooth muscle antibody was negative.  Acute hepatitis panel was negative    We will get a hepatitis A total antibody and the hepatitis B surface antigen body to assess for immunity  We will get iron studies, PT/INR, CMP  Increase lasix to 40mg po daily  And continue Aldactone 50 mg po daily, will consider increasing the Aldactone 200 mg p.o. daily next visit  We will get a diagnostic and therapeutic paracentesis  Low salt diet , high-protein diet discussed  As per patient she had EGD done in June 2020 which as per report did not reveal any esophageal varices    2. Macrocytic anemia  Microcytic anemia suggesting most likely alcohol related.  No history suggestive any GI bleed  Her recent hemoglobin was 10.6 g/dL  No history suggestive any GI bleed    3. Thrombocytopenia (CMS/HCC)  Borderline thrombocytopenia again suggesting possible pre-cirrhotic or cirrhosis    4.  IBS with diarrhea  Patient had a longstanding history of loose stools.  Worked up by GI in the past.  As per colonoscopy report done by Dr. Abreu in December 2020 mention about a mild right colon colitis however biopsies were negative.  Unable to interpret anything with this findings as procedure was done by someone else.  Her serology was negative for celiac disease.  She is on Lomotil and Imodium without much benefit  We will start her on  cholestyramine 4 g p.o. twice daily  We will consider repeat colonoscopy if there is any persistent symptoms or any signs of IBD    5. History of gastric bypass  She had a gastric Stan-en-Y surgery about 10 years ago    6. Encounter for screening for malignant neoplasm of colon  Last colonoscopy was in December 2020 by Dr. Abreu.  Reported as having a mild right colon colitis however biopsies were negative        Follow Up:   No follow-ups on file.    Joshua Ruano MD  [facility]  8/19/2020  13:16    Please note that portions of this note may have been completed with a voice recognition program.

## 2020-08-20 DIAGNOSIS — G47.26 SHIFT WORK SLEEP DISORDER: ICD-10-CM

## 2020-08-20 DIAGNOSIS — K74.60 CHRONIC LIVER DISEASE AND CIRRHOSIS (HCC): ICD-10-CM

## 2020-08-20 DIAGNOSIS — K76.9 CHRONIC LIVER DISEASE AND CIRRHOSIS (HCC): ICD-10-CM

## 2020-08-20 LAB
ACTIN IGG SERPL-ACNC: 5 UNITS (ref 0–19)
HAV AB SER QL IA: POSITIVE

## 2020-08-20 RX ORDER — ZOLPIDEM TARTRATE 5 MG/1
5 TABLET ORAL NIGHTLY PRN
Qty: 30 TABLET | Refills: 2 | Status: SHIPPED | OUTPATIENT
Start: 2020-08-20 | End: 2020-11-23 | Stop reason: SDUPTHER

## 2020-08-20 RX ORDER — ZOLPIDEM TARTRATE 10 MG/1
TABLET ORAL
Qty: 30 TABLET | OUTPATIENT
Start: 2020-08-20

## 2020-08-20 RX ORDER — ZOLPIDEM TARTRATE 5 MG/1
5 TABLET ORAL NIGHTLY PRN
Qty: 30 TABLET | Refills: 2 | OUTPATIENT
Start: 2020-08-20

## 2020-08-20 NOTE — TELEPHONE ENCOUNTER
PATIENT CALLED REQUESTING A REFILL FOR    zolpidem (Ambien) 5 MG tablet    PATIENT HAS ONLY 1 TABLET LEFT      PHARMACY      [id number] - [place], [place] - [address] AT [facility] [address] - [phone]  - [phone] FX     PATIENT CALL BACK    [phone]

## 2020-08-20 NOTE — TELEPHONE ENCOUNTER
Received refill request for Ambien 10 mg.    MELD Score (Model For End-Stage Liver Disease) (12 and older) from [facility]  on 8/20/2020    RESULT SUMMARY:  19 points  MELD Score (2016)*    6.0%    Estimated 3-Month Mortality      INPUTS:  Dialysis at least twice in the past week --> 0 = No  Creatinine --> 0.6 mg/dL  Bilirubin --> 4.6 mg/dL  INR --> 1.53    Sodium --> 134 mEq/L      Based on hepatic impairment Ambien dose needs to be decreased. Will send 5 mg, needs to try this dose as safer.

## 2020-08-21 LAB
A2 MACROGLOB SERPL-MCNC: 189 MG/DL (ref 110–276)
ALT SERPL W P-5'-P-CCNC: 16 IU/L (ref 0–40)
APO A-I SERPL-MCNC: 76 MG/DL (ref 116–209)
ASH SCORING: ABNORMAL
AST SERPL W P-5'-P-CCNC: 61 IU/L (ref 0–40)
BILIRUB SERPL-MCNC: 3.9 MG/DL (ref 0–1.2)
CHOLEST SERPL-MCNC: 182 MG/DL (ref 100–199)
FIBROSIS SCORING:: ABNORMAL
FIBROSIS STAGE SERPL QL: ABNORMAL
GGT SERPL-CCNC: 180 IU/L (ref 0–60)
GLUCOSE SERPL-MCNC: 67 MG/DL (ref 65–99)
HAPTOGLOB SERPL-MCNC: 129 MG/DL (ref 33–346)
INTERPRETATION: ABNORMAL
LABORATORY COMMENT REPORT: ABNORMAL
LIMITATIONS: (REFERENCE): ABNORMAL
LIVER FIBR SCORE SERPL CALC.FIBROSURE: 0.84 (ref 0–0.21)
NECROINFLAMMATORY ACT GRADE SERPL QL: ABNORMAL
NECROINFLAMMATORY ACT SCORE SERPL: 0.88 (ref 0–17)
STEATOSIS GRADE (REFERENCE): ABNORMAL
STEATOSIS GRADING (REFERENCE): ABNORMAL
STEATOSIS SCORE (REFERENCE): 0.74 (ref 0–0.3)
TRIGL SERPL-MCNC: 140 MG/DL (ref 0–149)
WEIGHT: (REFERENCE): 144 LBS

## 2020-08-25 ENCOUNTER — APPOINTMENT (OUTPATIENT)
Dept: LAB | Facility: HOSPITAL | Age: 55
End: 2020-08-25

## 2020-08-25 ENCOUNTER — HOSPITAL ENCOUNTER (OUTPATIENT)
Dept: ULTRASOUND IMAGING | Facility: HOSPITAL | Age: 55
Discharge: HOME | End: 2020-08-25
Admitting: INTERNAL MEDICINE

## 2020-08-25 ENCOUNTER — LAB (OUTPATIENT)
Dept: LAB | Facility: HOSPITAL | Age: 55
End: 2020-08-25

## 2020-08-25 ENCOUNTER — HOSPITAL ENCOUNTER (OUTPATIENT)
Dept: ULTRASOUND IMAGING | Facility: HOSPITAL | Age: 55
Discharge: HOME | End: 2020-08-25

## 2020-08-25 DIAGNOSIS — K70.9 ALCOHOLIC LIVER DISEASE (HCC): ICD-10-CM

## 2020-08-25 DIAGNOSIS — D53.9 MACROCYTIC ANEMIA: ICD-10-CM

## 2020-08-25 LAB
BASOPHILS # BLD AUTO: 0.05 10*3/MM3 (ref 0–0.2)
BASOPHILS NFR BLD AUTO: 0.6 % (ref 0–1.5)
DEPRECATED RDW RBC AUTO: 40.7 FL (ref 37–54)
EOSINOPHIL # BLD AUTO: 0.1 10*3/MM3 (ref 0–0.4)
EOSINOPHIL NFR BLD AUTO: 1.2 % (ref 0.3–6.2)
ERYTHROCYTE [DISTWIDTH] IN BLOOD BY AUTOMATED COUNT: 11.4 % (ref 12.3–15.4)
HCT VFR BLD AUTO: 29.6 % (ref 34–46.6)
HGB BLD-MCNC: 10.5 G/DL (ref 12–15.9)
IMM GRANULOCYTES # BLD AUTO: 0.05 10*3/MM3 (ref 0–0.05)
IMM GRANULOCYTES NFR BLD AUTO: 0.6 % (ref 0–0.5)
LYMPHOCYTES # BLD AUTO: 1.24 10*3/MM3 (ref 0.7–3.1)
LYMPHOCYTES NFR BLD AUTO: 15.2 % (ref 19.6–45.3)
MCH RBC QN AUTO: 35.5 PG (ref 26.6–33)
MCHC RBC AUTO-ENTMCNC: 35.5 G/DL (ref 31.5–35.7)
MCV RBC AUTO: 100 FL (ref 79–97)
MONOCYTES # BLD AUTO: 0.62 10*3/MM3 (ref 0.1–0.9)
MONOCYTES NFR BLD AUTO: 7.6 % (ref 5–12)
NEUTROPHILS NFR BLD AUTO: 6.12 10*3/MM3 (ref 1.7–7)
NEUTROPHILS NFR BLD AUTO: 74.8 % (ref 42.7–76)
NRBC BLD AUTO-RTO: 0 /100 WBC (ref 0–0.2)
PLATELET # BLD AUTO: 135 10*3/MM3 (ref 140–450)
PMV BLD AUTO: 13 FL (ref 6–12)
RBC # BLD AUTO: 2.96 10*6/MM3 (ref 3.77–5.28)
T4 FREE SERPL-MCNC: 1.88 NG/DL (ref 0.93–1.7)
TSH SERPL DL<=0.05 MIU/L-ACNC: 5.37 UIU/ML (ref 0.27–4.2)
WBC # BLD AUTO: 8.18 10*3/MM3 (ref 3.4–10.8)

## 2020-08-25 PROCEDURE — 36415 COLL VENOUS BLD VENIPUNCTURE: CPT | Performed by: FAMILY MEDICINE

## 2020-08-25 PROCEDURE — 84443 ASSAY THYROID STIM HORMONE: CPT | Performed by: FAMILY MEDICINE

## 2020-08-25 PROCEDURE — 76700 US EXAM ABDOM COMPLETE: CPT

## 2020-08-25 PROCEDURE — 85025 COMPLETE CBC W/AUTO DIFF WBC: CPT | Performed by: FAMILY MEDICINE

## 2020-08-25 PROCEDURE — 84439 ASSAY OF FREE THYROXINE: CPT | Performed by: FAMILY MEDICINE

## 2020-09-14 ENCOUNTER — OFFICE VISIT (OUTPATIENT)
Dept: GASTROENTEROLOGY | Facility: CLINIC | Age: 55
End: 2020-09-14

## 2020-09-14 VITALS
HEIGHT: 63 IN | BODY MASS INDEX: 23.21 KG/M2 | HEART RATE: 93 BPM | WEIGHT: 131 LBS | SYSTOLIC BLOOD PRESSURE: 120 MMHG | TEMPERATURE: 98.4 F | DIASTOLIC BLOOD PRESSURE: 66 MMHG | RESPIRATION RATE: 12 BRPM

## 2020-09-14 DIAGNOSIS — K70.31 ALCOHOLIC CIRRHOSIS OF LIVER WITH ASCITES (HCC): ICD-10-CM

## 2020-09-14 DIAGNOSIS — K58.0 IRRITABLE BOWEL SYNDROME WITH DIARRHEA: ICD-10-CM

## 2020-09-14 DIAGNOSIS — D50.9 MICROCYTIC ANEMIA: ICD-10-CM

## 2020-09-14 DIAGNOSIS — K70.9 ALCOHOLIC LIVER DISEASE (HCC): Primary | ICD-10-CM

## 2020-09-14 PROCEDURE — 99214 OFFICE O/P EST MOD 30 MIN: CPT | Performed by: INTERNAL MEDICINE

## 2020-09-14 RX ORDER — ALOSETRON HYDROCHLORIDE 0.5 MG/1
0.5 TABLET, FILM COATED ORAL
COMMUNITY
End: 2020-01-01

## 2020-09-14 RX ORDER — FUROSEMIDE 40 MG/1
80 TABLET ORAL DAILY
COMMUNITY
End: 2020-09-23 | Stop reason: DRUGHIGH

## 2020-09-14 NOTE — PROGRESS NOTES
Follow Up Note     Date: 2020   Patient Name: Gaby Rasheed  MRN: [id number]  : 1965     Referring Physician: No ref. provider found    Chief Complaint:    Chief Complaint   Patient presents with   • Irritable Bowel Syndrome   • Anemia   • Liver Follow-up       Interval History:   2020  Gaby Rasheed is a 55 y.o. female who is here today for follow up for cirrhosis.. She states that she stopped drinking alcohol since last visit in 2020.   She continued to have abdominal bloating, diarrhea has improved after she was started on cholestyramine.  Denies any change in mental status.  She continued to have a significant fatigue.  No melena    2020  Gaby Rasheed is a 55 y.o. female who is here today to establish care with Gastroenterology for follow-up of her elevated liver enzymes.   Patient was followed at Dr. Claudio  office at [place] until recently.   She has a known history of alcoholic liver disease.  She has cut down alcohol but she continues to drink until 2020.  She has ongoing issues with the diarrhea, she is on lomotil and she also take imodium.  Her recent lab work revealed significantly elevated total bilirubin suspicious for cirrhosis. She developed recent abdominal distension and getting worse. She is on lasix 20mg po daily. Spironolactone 50mg   Her last EGD was in 2020 which revealed normal pouch and jejunum, biopsies negative for any abnormalities  Colonoscopy done in 2019 by Dr. Cotton mentioned that she does did have some colitis involving the right side colon however biopsies done were all normal without any signs of microscopic colitis.  Terminal ileum was not examined and no biopsies obtained.        Mrs. Rasheed returns to the office.  I last evaluated this patient in 2018 for alcohol-related liver disease.  She has recently been followed by Dr. Abreu who performed surgery for incarcerated hernia in  January.  The patient also had a colonoscopy in December with biopsies negative for colitis.  Her main complaint is issues with urgency to have a bowel movement for meals. She has experienced this problem for about 8-9 months.  The patient denies any diarrhea that will wake her up from a deep sleep.  She is working long hour shifts at the [employer].  The job is very stressful at this time.  Mrs. Rasheed denies any gerri blood in the stool.  She did have weight loss of about 10 pounds but has gained some of the weight back.  She denies any nausea or emesis.  Mrs. Rasheed denies any localized abdominal pain except before going to the restroom with the discomfort is usually in the lower abdomen.  The patient denies any breakthrough heartburn.  She does admit to still consuming some alcohol usually 2 glasses of wine with meals when she is not working.  The patient does not drink any alcohol during the course of her work week.    Subjective      Past Medical History:   Past Medical History:   Diagnosis Date   • Acid reflux    • Anxiety    • Back pain    • Depression    • Fractures    • Hernia of abdominal cavity    • High cholesterol    • High triglycerides    • Hypertension    • Hypothyroidism    • Irritable bowel syndrome    • Nocturia 10/5/2016   • Positive TB test    • Sinus problem    • ELINA (stress urinary incontinence, female) 10/5/2016     Past Surgical History:   Past Surgical History:   Procedure Laterality Date   •  SECTION     • COLONOSCOPY     • GASTRIC BYPASS     • HERNIA REPAIR      umbilical hernia   • HERNIA REPAIR  2020    ventral   • HYSTERECTOMY     • LAPAROSCOPIC TUBAL LIGATION     • TOTAL ABDOMINAL HYSTERECTOMY WITH SALPINGO OOPHORECTOMY      uterine prolapse       Family History:   Family History   Problem Relation Age of Onset   • Hyperlipidemia Mother    • Arthritis Mother    • Inflammatory bowel disease Mother    • Diabetes Father    • Hypertension Father    • Arthritis  Paternal Grandmother    • Breast cancer Neg Hx    • Ovarian cancer Neg Hx    • Colon cancer Neg Hx    • Cirrhosis Neg Hx    • Liver disease Neg Hx    • Liver cancer Neg Hx        Social History:   Social History     Socioeconomic History   • Marital status:      Spouse name: Not on file   • Number of children: Not on file   • Years of education: Not on file   • Highest education level: Not on file   Tobacco Use   • Smoking status: Never Smoker   • Smokeless tobacco: Never Used   Substance and Sexual Activity   • Alcohol use: Not Currently     Comment: former use   • Drug use: No   • Sexual activity: Defer       Medications:     Current Outpatient Medications:   •  Calcium Carbonate Antacid (TUMS PO), Take  by mouth., Disp: , Rfl:   •  cholestyramine light 4 g packet, Take 1 packet by mouth 2 (Two) Times a Day., Disp: 60 packet, Rfl: 2  •  desvenlafaxine (Pristiq) 100 MG 24 hr tablet, Take 1 tablet by mouth Daily., Disp: 30 tablet, Rfl: 3  •  esomeprazole (nexIUM) 40 MG capsule, Take 1 capsule by mouth Daily. (Patient taking differently: Take 40 mg by mouth Daily. Per pharmacy, last filled 4/9/2020 for a 30 day supply.), Disp: 30 capsule, Rfl: 1  •  furosemide (Lasix) 20 MG tablet, Take 1 tablet by mouth Daily, Disp: 30 tablet, Rfl: 0  •  furosemide (LASIX) 40 MG tablet, Take 80 mg by mouth Daily., Disp: , Rfl:   •  levothyroxine (SYNTHROID, LEVOTHROID) 50 MCG tablet, Take 1 tablet by mouth Daily. (Patient taking differently: Take 50 mcg by mouth Daily. Per pharmacy, last filled 6/9/2020 for a 90 day supply.), Disp: 90 tablet, Rfl: 3  •  loperamide (IMODIUM) 2 MG capsule, Take 2 mg by mouth Every 8 (Eight) Hours., Disp: , Rfl:   •  Multiple Vitamins-Minerals (MULTIVITAMIN ADULT PO), Take  by mouth., Disp: , Rfl:   •  Nutritional Supplements (ENSURE PO), Take  by mouth., Disp: , Rfl:   •  Prenatal Vit-Fe Fumarate-FA (PRENATAL VITAMIN PO), Take 1 tablet by mouth Daily., Disp: , Rfl:   •  Simethicone (GAS-X PO),  "Take  by mouth., Disp: , Rfl:   •  spironolactone (ALDACTONE) 50 MG tablet, Take 50 mg by mouth Daily., Disp: , Rfl:   •  zolpidem (Ambien) 5 MG tablet, Take 1 tablet by mouth At Night As Needed for Sleep. Lower dose required due to liver disease., Disp: 30 tablet, Rfl: 2  •  alosetron (Lotronex) 0.5 MG tablet, Take 0.5 mg by mouth., Disp: , Rfl:   •  carvedilol (COREG) 12.5 MG tablet, Take 1 tablet by mouth 2 (Two) Times a Day With Meals. (Patient taking differently: Take 12.5 mg by mouth 2 (Two) Times a Day With Meals. Per pharmacy, last filled on 3/14/2020 for a 90 day supply. Patient reports only taking this medication once daily, not twice daily as prescribed.), Disp: 180 tablet, Rfl: 3  •  diphenoxylate-atropine (LOMOTIL) 2.5-0.025 MG per tablet, Take 1 tablet by mouth 2 (two) times a day., Disp: , Rfl:     Allergies:   No Known Allergies    Review of Systems:   Review of Systems   Constitutional: Positive for appetite change, fatigue and unexpected weight loss.   HENT: Negative for trouble swallowing.    Gastrointestinal: Positive for abdominal distention, abdominal pain, anal bleeding, blood in stool and diarrhea. Negative for constipation, nausea, rectal pain, vomiting, GERD and indigestion.       The following portions of the patient's history were reviewed and updated as appropriate: allergies, current medications, past family history, past medical history, past social history, past surgical history and problem list.    Objective     Physical Exam:  Vital Signs:   Vitals:    09/14/20 1527   BP: 120/66   Pulse: 93   Resp: 12   Temp: 98.4 °F (36.9 °C)   Weight: 59.4 kg (131 lb)   Height: 160 cm (63\")       Physical Exam  Vitals signs and nursing note reviewed.   Constitutional:       Appearance: She is well-developed.   Eyes:      General: Scleral icterus present.      Comments: Pallor present  Mild icterus noted   Neck:      Musculoskeletal: Neck supple.   Cardiovascular:      Rate and Rhythm: Normal rate " and regular rhythm.      Heart sounds: No murmur.   Pulmonary:      Effort: Pulmonary effort is normal. No respiratory distress.      Breath sounds: Normal breath sounds.   Abdominal:      General: Bowel sounds are normal. There is distension (Mild gaseous distention noted).      Palpations: Abdomen is soft. There is no mass.      Tenderness: There is no abdominal tenderness.      Hernia: No hernia is present.      Comments: Prior hernia repair scar noted   Lymphadenopathy:      Cervical: No cervical adenopathy.   Skin:     General: Skin is warm and dry.   Neurological:      Mental Status: She is alert and oriented to person, place, and time.      Sensory: No sensory deficit.         Results Review:   I reviewed the patient's new clinical results.    Lab on 08/19/2020   Component Date Value Ref Range Status   • Protime 08/19/2020 19.2* 12.0 - 15.1 Seconds Final   • INR 08/19/2020 1.53* 0.90 - 1.10 Final   • Fibrosis Score (References) 08/19/2020 0.84* 0.00 - 0.21 Final   • Fibrosis Stage (Reference) 08/19/2020 Comment   Final                         F4 - Cirrhosis   • Steatosis Score (Reference) 08/19/2020 0.74* 0.00 - 0.30 Final   • Steatosis Grade (Reference) 08/19/2020 Comment   Final                S3 - Marked or Severe Steatosis   • LOUISE Score 08/19/2020 0.88  0.00 - 17.00 Final   • LOUISE Grade 08/19/2020 Comment   Final                       H3 - Severe LOUISE   • Height: (Reference) 08/19/2020 63  in Final   • Weight: (Reference) 08/19/2020 144  LBS Final   • Alpha 2-Macroglobulins, Qn 08/19/2020 189  110 - 276 mg/dL Final   • Haptoglobin 08/19/2020 129  33 - 346 mg/dL Final   • Apolipoprotein A-1 08/19/2020 76* 116 - 209 mg/dL Final   • Total Bilirubin 08/19/2020 3.9* 0.0 - 1.2 mg/dL Final   • GGT 08/19/2020 180* 0 - 60 IU/L Final   • ALT (SGPT) 08/19/2020 16  0 - 40 IU/L Final   • AST (SGOT) P5P (Reference) 08/19/2020 61* 0 - 40 IU/L Final   • Cholesterol, Total (Reference) 08/19/2020 182  100 - 199 mg/dL Final    • Glucose, Serum (Reference) 2020 67  65 - 99 mg/dL Final   • Triglycerides 2020 140  0 - 149 mg/dL Final   • Interpretation 2020 Comment   Final    Comment: Quantitative results of 10 biochemicals in combination with age,  gender, height and weight, are analyzed using a computational  algorithm to provide a quantitative surrogate marker (0.0-1.0) of  liver fibrosis (Metavir F0-F4), hepatic steatosis (0.0-1.0, S0-S3),  and Alcoholic Steato-Hepatitis (LOUISE) (0.0-1.0, H0-H3).  Fibrosis Marker:  In a study of 221 alcoholic patients where 63% had  significant alcoholic fibrosis (Metavir F2-F4) and 31% had cirrhosis  by liver biopsy, a fibrosis result of >0.3 yielded a sensitivity of  84% and a specificity of 66% for the detection of significant  fibrosis.  A fibrosis result of >0.7 yielded a sensitivity of 91% and  a specificity of 87% for detection of cirrhosis(1).  Steatosis Marker:  In a population of 744 patients (583 HCV, 18 HBV,  69 NAFLD, and 74 alcoholic disease patients), where 36% had  significant steatosis (>5%) on a liver biopsy, a steatosis score >0.5  had a sensitivity of 71% and a specificity of 72% for identification  of                            significant steatosis(2).  LOUISE Marker:  In a population of 225 alcoholic patients where 34% had  alcoholic hepatitis features (polynuclear neutrophil infiltrate and  hepatocellular necrosis) by liver biopsy, an Alcoholic Steato-  Hepatitis value >0.5 had a sensitivity of 80% and a specificity of 84%  in identifying alcoholic steato-hepatitis(3).   • Fibrosis Scorin2020 Comment   Final          <0.21 = Stage F0 - No fibrosis  0.21 - 0.27 = Stage F0 - F1  0.27 - 0.31 = Stage F1 - Portal fibrosis  0.31 - 0.48 = Stage F1 - F2  0.48 - 0.58 = Stage F2 - Bridging fibrosis with few septa  0.58 - 0.72 = Stage F3 - Bridging fibrosis with many septa  0.72 - 0.74 = Stage F3 - F4        >0.74 = Stage F4 - Cirrhosis   • Steatosis Grading  (Reference) 08/19/2020 Comment   Final          < 0.30 = S0 - No Steatosis  0.30 to 0.38 = S0 - S1  0.38 to 0.48 = S1 - Minimal Steatosis  0.48 to 0.57 = S1 - S2  0.57 to 0.67 = S2 - Moderate Steatosis  0.67 to 0.69 = S2 - S3        > 0.69 = S3 - Marked or Severe Steatosis   • VARUN Scoring 08/19/2020 Comment   Final            < 0.1700 = H0 - No VARUN  0.1700 to 0.5535 = H1 - Mild VARUN  0.5535 to 0.7800 = H2 - Moderate VARUN          > 0.7800 = H3 - Severe VARUN   • Limitations: (Reference) 08/19/2020 Comment   Final    VARUN FibroSure is recommended for patients with suspected alcoholic  liver disease.  It is not recommended for patients with other liver  diseases.  It is also not recommended in patients with Gilbert  Disease, acute hemolysis, acute viral hepatitis, drug induced  hepatitis, genetic liver disease, autoimmune hepatitis, and/or extra-  hepatic cholestasis.  Any of these clinical situations may lead to  inaccurate quantitative predictions of fibrosis.   • Comment (Reference) 08/19/2020 Comment   Final    This test was developed and its performance characteristics determined  by [facility].  It has not been cleared or approved by the Food and Drug  Administration.  The FDA has determined that such clearance or  approval is not necessary.  For questions regarding this report please contact customer service  at [phone].  References:  1.  Kevin SINGH. et al. Biomarkers for the Prediction of Liver Fibrosis  in Patients with Chronic Alcoholic Liver Disease.  Clinical  Gastroenterol. / Hepatol. 2005;3:167-174.  2.  IRINA Good. et al. The Diagnostic Value of Biomarkers (Steato  Test) for the Prediction of Liver Steatosis.  Comparative Hepatol.  2005; 4:10.  3.  Yuridia MANZANO. et al. The Diagnostic Value of Biomarkers (Varun Test) for  the Prediction of Alcoholic Steato-hepatitis in Patients with Chronic  Alcoholic Liver Disease.  J Hepatol. 2006;44:1175-85.   • Smooth Muscle Ab 08/19/2020 5  0 - 19 Units Final                      Negative                     0 - 19                   Weak positive               20 - 30                   Moderate to strong positive     >30   Actin Antibodies are found in 52-85% of patients with   autoimmune hepatitis or chronic active hepatitis and   in 22% of patients with primary biliary cirrhosis.   • Iron 08/19/2020 114  37 - 145 mcg/dL Final   • Iron Saturation 08/19/2020 67* 20 - 50 % Final   • Transferrin 08/19/2020 114* 200 - 360 mg/dL Final   • TIBC 08/19/2020 170* 298 - 536 mcg/dL Final   • Ferritin 08/19/2020 255.00* 13.00 - 150.00 ng/mL Final   • Hep A Total Ab 08/19/2020 Positive* Negative Final   • Hep B S Ab 08/19/2020 Reactive* Non-Reactive Final   • Glucose 08/19/2020 78  65 - 99 mg/dL Final   • BUN 08/19/2020 8  6 - 20 mg/dL Final   • Creatinine 08/19/2020 0.60  0.57 - 1.00 mg/dL Final   • Sodium 08/19/2020 134* 136 - 145 mmol/L Final   • Potassium 08/19/2020 3.4* 3.5 - 5.2 mmol/L Final   • Chloride 08/19/2020 97* 98 - 107 mmol/L Final   • CO2 08/19/2020 30.7* 22.0 - 29.0 mmol/L Final   • Calcium 08/19/2020 8.6  8.6 - 10.5 mg/dL Final   • Total Protein 08/19/2020 6.1  6.0 - 8.5 g/dL Final   • Albumin 08/19/2020 2.90* 3.50 - 5.20 g/dL Final   • ALT (SGPT) 08/19/2020 16  1 - 33 U/L Final   • AST (SGOT) 08/19/2020 55* 1 - 32 U/L Final   • Alkaline Phosphatase 08/19/2020 186* 39 - 117 U/L Final   • Total Bilirubin 08/19/2020 4.6* 0.0 - 1.2 mg/dL Final   • eGFR Non African Amer 08/19/2020 103  >60 mL/min/1.73 Final   • Globulin 08/19/2020 3.2  gm/dL Final   • A/G Ratio 08/19/2020 0.9  g/dL Final   • BUN/Creatinine Ratio 08/19/2020 13.3  7.0 - 25.0 Final   • Anion Gap 08/19/2020 6.3  5.0 - 15.0 mmol/L Final   Office Visit on 08/10/2020   Component Date Value Ref Range Status   • TSH 08/25/2020 5.370* 0.270 - 4.200 uIU/mL Final   • Free T4 08/25/2020 1.88* 0.93 - 1.70 ng/dL Final    T4 results may be falsely increased if patient taking Biotin.   • WBC 08/25/2020 8.18  3.40 - 10.80 10*3/mm3 Final    • RBC 08/25/2020 2.96* 3.77 - 5.28 10*6/mm3 Final   • Hemoglobin 08/25/2020 10.5* 12.0 - 15.9 g/dL Final   • Hematocrit 08/25/2020 29.6* 34.0 - 46.6 % Final   • MCV 08/25/2020 100.0* 79.0 - 97.0 fL Final   • MCH 08/25/2020 35.5* 26.6 - 33.0 pg Final   • MCHC 08/25/2020 35.5  31.5 - 35.7 g/dL Final   • RDW 08/25/2020 11.4* 12.3 - 15.4 % Final   • RDW-SD 08/25/2020 40.7  37.0 - 54.0 fl Final   • MPV 08/25/2020 13.0* 6.0 - 12.0 fL Final   • Platelets 08/25/2020 135* 140 - 450 10*3/mm3 Final   • Neutrophil % 08/25/2020 74.8  42.7 - 76.0 % Final   • Lymphocyte % 08/25/2020 15.2* 19.6 - 45.3 % Final   • Monocyte % 08/25/2020 7.6  5.0 - 12.0 % Final   • Eosinophil % 08/25/2020 1.2  0.3 - 6.2 % Final   • Basophil % 08/25/2020 0.6  0.0 - 1.5 % Final   • Immature Grans % 08/25/2020 0.6* 0.0 - 0.5 % Final   • Neutrophils, Absolute 08/25/2020 6.12  1.70 - 7.00 10*3/mm3 Final   • Lymphocytes, Absolute 08/25/2020 1.24  0.70 - 3.10 10*3/mm3 Final   • Monocytes, Absolute 08/25/2020 0.62  0.10 - 0.90 10*3/mm3 Final   • Eosinophils, Absolute 08/25/2020 0.10  0.00 - 0.40 10*3/mm3 Final   • Basophils, Absolute 08/25/2020 0.05  0.00 - 0.20 10*3/mm3 Final   • Immature Grans, Absolute 08/25/2020 0.05  0.00 - 0.05 10*3/mm3 Final   • nRBC 08/25/2020 0.0  0.0 - 0.2 /100 WBC Final   Orders Only on 07/23/2020   Component Date Value Ref Range Status   • Glucose 07/24/2020 82  65 - 99 mg/dL Final   • BUN 07/24/2020 6  6 - 20 mg/dL Final   • Creatinine 07/24/2020 0.69  0.57 - 1.00 mg/dL Final   • eGFR Non  Am 07/24/2020 88  >60 mL/min/1.73 Final   • eGFR African Am 07/24/2020 107  >60 mL/min/1.73 Final   • BUN/Creatinine Ratio 07/24/2020 8.7  7.0 - 25.0 Final   • Sodium 07/24/2020 136  136 - 145 mmol/L Final   • Potassium 07/24/2020 3.7  3.5 - 5.2 mmol/L Final   • Chloride 07/24/2020 98  98 - 107 mmol/L Final   • Total CO2 07/24/2020 29.7* 22.0 - 29.0 mmol/L Final   • Calcium 07/24/2020 8.4* 8.6 - 10.5 mg/dL Final   • WBC 07/24/2020  12.85* 3.40 - 10.80 10*3/mm3 Final   • RBC 07/24/2020 2.92* 3.77 - 5.28 10*6/mm3 Final   • Hemoglobin 07/24/2020 10.6* 12.0 - 15.9 g/dL Final   • Hematocrit 07/24/2020 29.8* 34.0 - 46.6 % Final   • MCV 07/24/2020 102.1* 79.0 - 97.0 fL Final   • MCH 07/24/2020 36.3* 26.6 - 33.0 pg Final   • MCHC 07/24/2020 35.6  31.5 - 35.7 g/dL Final   • RDW 07/24/2020 14.9  12.3 - 15.4 % Final   • Platelets 07/24/2020 165  140 - 450 10*3/mm3 Final   • Neutrophil Rel % 07/24/2020 CANCELED   Final-Edited    Comment: Test not performed    Result canceled by the ancillary.     • Lymphocyte Rel % 07/24/2020 CANCELED   Final-Edited    Comment: Test not performed    Result canceled by the ancillary.     • Monocyte Rel % 07/24/2020 CANCELED   Final-Edited    Comment: Test not performed    Result canceled by the ancillary.     • Eosinophil Rel % 07/24/2020 CANCELED   Final-Edited    Comment: Test not performed    Result canceled by the ancillary.     • Lymphocytes Absolute 07/24/2020 CANCELED   Final-Edited    Comment: Test not performed    Result canceled by the ancillary.     • Eosinophils Absolute 07/24/2020 CANCELED   Final-Edited    Comment: Test not performed    Result canceled by the ancillary.     • Basophils Absolute 07/24/2020 CANCELED   Final-Edited    Comment: Test not performed    Result canceled by the ancillary.     • Neutrophil Rel % 07/24/2020 92.5* 42.7 - 76.0 % Final   • Lymphocyte Rel % 07/24/2020 3.2* 19.6 - 45.3 % Final   • Monocyte Rel % 07/24/2020 0.0* 5.0 - 12.0 % Final   • Eosinophil Rel % 07/24/2020 2.2  0.3 - 6.2 % Final   • Basophil Rel % 07/24/2020 2.2* 0.0 - 1.5 % Final   • Neutrophils Absolute 07/24/2020 11.89* 1.70 - 7.00 10*3/mm3 Final   • Lymphocytes Absolute 07/24/2020 0.41* 0.70 - 3.10 10*3/mm3 Final   • Monocytes Absolute 07/24/2020 0.00* 0.10 - 0.90 10*3/mm3 Final   • Eosinophil Abs 07/24/2020 0.28  0.00 - 0.40 10*3/mm3 Final   • Basophils Absolute 07/24/2020 0.28* 0.00 - 0.20 10*3/mm3 Final   •  Differential Comment 07/24/2020 Comment   Final    SMUDGE CELLS                 Slight/1+                   None Seen  N   • Comment 07/24/2020 Comment   Final    Comment: ANISOCYTOSIS                 Large/3+                    None Seen  N  POLYCHROMASIA                Slight/1+                   None Seen  N     • Plt Comment 07/24/2020 Comment   Final    PLATELET MORPHOLOGY          Normal                      Normal     N   Office Visit on 07/22/2020   Component Date Value Ref Range Status   • Glucose 07/22/2020 74  65 - 99 mg/dL Final   • BUN 07/22/2020 7  6 - 20 mg/dL Final   • Creatinine 07/22/2020 0.63  0.57 - 1.00 mg/dL Final   • eGFR Non  Am 07/22/2020 98  >60 mL/min/1.73 Final   • eGFR African Am 07/22/2020 118  >60 mL/min/1.73 Final   • BUN/Creatinine Ratio 07/22/2020 11.1  7.0 - 25.0 Final   • Sodium 07/22/2020 130* 136 - 145 mmol/L Final   • Potassium 07/22/2020 4.5  3.5 - 5.2 mmol/L Final   • Chloride 07/22/2020 94* 98 - 107 mmol/L Final   • Total CO2 07/22/2020 27.4  22.0 - 29.0 mmol/L Final   • Calcium 07/22/2020 8.3* 8.6 - 10.5 mg/dL Final   • Total Protein 07/22/2020 5.6* 6.0 - 8.5 g/dL Final   • Albumin 07/22/2020 2.40* 3.50 - 5.20 g/dL Final   • Globulin 07/22/2020 3.2  gm/dL Final   • A/G Ratio 07/22/2020 0.8  g/dL Final   • Total Bilirubin 07/22/2020 4.8* 0.0 - 1.2 mg/dL Final   • Alkaline Phosphatase 07/22/2020 196* 39 - 117 U/L Final   • AST (SGOT) 07/22/2020 83* 1 - 32 U/L Final   • ALT (SGPT) 07/22/2020 26  1 - 33 U/L Final   • Total Cholesterol 07/22/2020 185  0 - 200 mg/dL Final   • Triglycerides 07/22/2020 165* 0 - 150 mg/dL Final   • HDL Cholesterol 07/22/2020 17* 40 - 60 mg/dL Final   • VLDL Cholesterol 07/22/2020 33  mg/dL Final   • LDL Cholesterol  07/22/2020 135* 0 - 100 mg/dL Final   • WBC 07/22/2020 11.93* 3.40 - 10.80 10*3/mm3 Final   • RBC 07/22/2020 2.91* 3.77 - 5.28 10*6/mm3 Final   • Hemoglobin 07/22/2020 10.3* 12.0 - 15.9 g/dL Final   • Hematocrit 07/22/2020 29.0*  34.0 - 46.6 % Final   • MCV 07/22/2020 99.7* 79.0 - 97.0 fL Final   • MCH 07/22/2020 35.4* 26.6 - 33.0 pg Final   • MCHC 07/22/2020 35.5  31.5 - 35.7 g/dL Final   • RDW 07/22/2020 14.6  12.3 - 15.4 % Final   • Platelets 07/22/2020 135* 140 - 450 10*3/mm3 Final   • Neutrophil Rel % 07/22/2020 CANCELED   Final-Edited    Comment: Test not performed    Result canceled by the ancillary.     • Lymphocyte Rel % 07/22/2020 CANCELED   Final-Edited    Comment: Test not performed    Result canceled by the ancillary.     • Monocyte Rel % 07/22/2020 CANCELED   Final-Edited    Comment: Test not performed    Result canceled by the ancillary.     • Eosinophil Rel % 07/22/2020 CANCELED   Final-Edited    Comment: Test not performed    Result canceled by the ancillary.     • Lymphocytes Absolute 07/22/2020 CANCELED   Final-Edited    Comment: Test not performed    Result canceled by the ancillary.     • Eosinophils Absolute 07/22/2020 CANCELED   Final-Edited    Comment: Test not performed    Result canceled by the ancillary.     • Basophils Absolute 07/22/2020 CANCELED   Final-Edited    Comment: Test not performed    Result canceled by the ancillary.     • Neutrophil Rel % 07/22/2020 89.0* 42.7 - 76.0 % Final   • Lymphocyte Rel % 07/22/2020 8.0* 19.6 - 45.3 % Final   • Monocyte Rel % 07/22/2020 3.0* 5.0 - 12.0 % Final   • Neutrophils Absolute 07/22/2020 10.62* 1.70 - 7.00 10*3/mm3 Final   • Lymphocytes Absolute 07/22/2020 0.95  0.70 - 3.10 10*3/mm3 Final   • Monocytes Absolute 07/22/2020 0.36  0.10 - 0.90 10*3/mm3 Final   • Differential Comment 07/22/2020 Comment   Final    WBC MORPHOLOGY               Normal                      Normal     N   • Comment 07/22/2020 Comment   Final    Comment: ANISOCYTOSIS                 Slight/1+                   None Seen  N  POIKILOCYTOSIS               Slight/1+                   None Seen  N  POLYCHROMASIA                Slight/1+                   None Seen  N     • Plt Comment 07/22/2020  Comment   Final    PLATELET MORPHOLOGY          Normal                      Normal     N   No results displayed because visit has over 200 results.      Office Visit on 07/14/2020   Component Date Value Ref Range Status   • Glucose 07/14/2020 88  65 - 99 mg/dL Final   • BUN 07/14/2020 7  6 - 20 mg/dL Final   • Creatinine 07/14/2020 0.78  0.57 - 1.00 mg/dL Final   • Sodium 07/14/2020 120* 136 - 145 mmol/L Final   • Potassium 07/14/2020 2.8* 3.5 - 5.2 mmol/L Final   • Chloride 07/14/2020 75* 98 - 107 mmol/L Final   • CO2 07/14/2020 33.8* 22.0 - 29.0 mmol/L Final   • Calcium 07/14/2020 8.4* 8.6 - 10.5 mg/dL Final   • Total Protein 07/14/2020 6.1  6.0 - 8.5 g/dL Final   • Albumin 07/14/2020 2.70* 3.50 - 5.20 g/dL Final   • ALT (SGPT) 07/14/2020 28  1 - 33 U/L Final   • AST (SGOT) 07/14/2020 92* 1 - 32 U/L Final   • Alkaline Phosphatase 07/14/2020 292* 39 - 117 U/L Final   • Total Bilirubin 07/14/2020 5.0* 0.0 - 1.2 mg/dL Final   • eGFR Non African Amer 07/14/2020 76  >60 mL/min/1.73 Final   • Globulin 07/14/2020 3.4  gm/dL Final   • A/G Ratio 07/14/2020 0.8  g/dL Final   • BUN/Creatinine Ratio 07/14/2020 9.0  7.0 - 25.0 Final   • Anion Gap 07/14/2020 11.2  5.0 - 15.0 mmol/L Final   • proBNP 07/14/2020 1,093.0* 0.0 - 900.0 pg/mL Final      Ct Abdomen Pelvis Without Contrast    Result Date: 7/15/2020  1. Fatty infiltration of the liver with a small amount of ascites within the pelvis. 2. Diffuse body wall anasarca.     628.60 mGy.cm. 13.29 mGy  This study was performed with techniques to keep radiation doses as low as reasonably achievable (ALARA). Individualized dose reduction techniques using automated exposure control or adjustment of mA and/or kV according to the patient size were employed.  This report was finalized on 7/15/2020 12:23 PM by Tania Strong M.D..    Us Abdomen Complete    Result Date: 8/25/2020  1. Nodular contour of the liver consistent with history of cirrhosis.   2. Very minimal free fluid.   This report was finalized on 8/25/2020 10:13 AM by Rony Castro MD.    Xr Chest 1 View    Result Date: 7/15/2020  No acute cardiopulmonary process.  Continued followup is recommended.  This report was finalized on 7/15/2020 12:19 PM by Tania Strong M.D..    Us Venous Doppler Lower Extremity Left (duplex)    Result Date: 7/14/2020  No evidence of deep venous thrombosis.  This report was finalized on 7/14/2020 10:31 AM by Tania Strong M.D..      Assessment / Plan      1.  Alcoholic cirrhosis with ascites  MELD score 16 (last alcohol use was in July 2020)  LOUISE Fibro-sure test; F4 fibrosis suggesting cirrhosis, S3 steatosis, H3 severe steatohepatitis  9/14/2020  Ultrasound done in August 2020 did not reveal any liver lesions.  Findings consistent with cirrhosis.  Minimal ascites noted  EGD done in June 2020 by Dr. Abreu reported as normal -however looking at the images patient appears to have a grade 1 - 2 esophageal varices.   We will repeat the EGD in 6 months time, she may have to be put on nonselective beta-blockers.  She is immune to hepatitis A and hepatitis B.   No history suggestive any hepatic encephalopathy, he is having loose stools at least 3 to 4/day  We will continue Lasix 40 mg p.o. daily along with Aldactone 50 g p.o. daily.  Recent CMP reveals normal electrolytes and normal creatinine  She needs an repeat ultrasound in 6 months time and that will be February 2020  Low-salt diet and continued adherence to avoiding alcohol discussed with the patient again today  CBC CMP and iron studies in 6 months time before the next visit  We will consider referring this patient to transplant unit after 6-month    8/19/2020  Patient had a longstanding alcoholic liver disease.  Her recent lab work reveals a significantly elevated total bilirubin suspicious for developing cirrhosis.  Her CT scan of the abdomen pelvis done also shows a mild ascites.  Fatty liver disease without any liver lesions.   Her  previous CB, anti-smooth muscle antibody was negative.   Ceruloplasmin level and alpha 1 antitrypsin levels were normal . Acute hepatitis panel was negative.  Iron studies reveal normal iron and the ferritin borderline elevated.  Her iron saturation 60 which is most likely secondary to recent acute alcoholic hepatitis.  We will repeat that in 3-6 months time  We will get a hepatitis A total antibody and the hepatitis B surface antigen body to assess for immunity  We will get iron studies, PT/INR, CMP. Increase lasix to 40mg po daily  And continue Aldactone 50 mg po daily, will consider increasing the Aldactone 200 mg p.o. daily next visit  We will get a diagnostic and therapeutic paracentesis  Low salt diet , high-protein diet discussed. As per patient she had EGD done in June 2020 which as per report did not reveal any esophageal varices     Prior history  2. Macrocytic anemia  Microcytic anemia suggesting most likely alcohol related.  No history suggestive any GI bleed  Her recent hemoglobin was 10.6 g/dL  No history suggestive any GI bleed     3. Thrombocytopenia (CMS/HCC)  Borderline thrombocytopenia again suggesting possible pre-cirrhotic or cirrhosis     4.  IBS with diarrhea  Diarrhea improved.   Patient had a longstanding history of loose stools.  Worked up by GI in the past.  As per colonoscopy report done by Dr. Abreu in December 2020 mention about a mild right colon colitis however biopsies were negative.  Unable to interpret anything with this findings as procedure was done by someone else.  Her serology was negative for celiac disease.  She is on Lomotil and Imodium without much benefit  We will start her on cholestyramine 4 g p.o. twice daily  We will consider repeat colonoscopy if there is any persistent symptoms or any signs of IBD     5. History of gastric bypass  She had a gastric Stan-en-Y surgery about 10 years ago     6. Encounter for screening for malignant neoplasm of colon  Last  colonoscopy was in December 2020 by Dr. Abreu.  Reported as having a mild right colon colitis however biopsies were negative          Follow Up:   No follow-ups on file.    Joshua Ruano MD  [facility] [place]  9/14/2020  15:30 EDT     Please note that portions of this note may have been completed with a voice recognition program.

## 2020-09-23 ENCOUNTER — TELEPHONE (OUTPATIENT)
Dept: GASTROENTEROLOGY | Facility: CLINIC | Age: 55
End: 2020-09-23

## 2020-09-23 RX ORDER — FUROSEMIDE 40 MG/1
40 TABLET ORAL DAILY
Qty: 30 TABLET | Refills: 2 | Status: SHIPPED | OUTPATIENT
Start: 2020-09-23 | End: 2020-11-16 | Stop reason: SDUPTHER

## 2020-09-23 NOTE — TELEPHONE ENCOUNTER
----- Message from Joshua Ruano MD sent at 9/22/2020 10:39 PM EDT -----  Regarding: RE: Med Question    Her sodium is still very low   She should be on lasix 40 daily        ----- Message -----  From: Marily Jaquez MA  Sent: 9/22/2020   8:07 AM EDT  To: Joshua Ruano MD  Subject: Med Question                                     Patient called and left a message with Clara regarding Lasix. She states that she was taking 40 mg bid however your note states 40 mg qd. Do you want to increase it to bid or keep it at qd?

## 2020-11-16 RX ORDER — FUROSEMIDE 40 MG/1
40 TABLET ORAL DAILY
Qty: 30 TABLET | Refills: 2 | Status: SHIPPED | OUTPATIENT
Start: 2020-11-16 | End: 2021-01-01

## 2020-11-16 RX ORDER — CHOLESTYRAMINE LIGHT 4 G/5.7G
4 POWDER, FOR SUSPENSION ORAL 2 TIMES DAILY
Qty: 60 PACKET | Refills: 2 | Status: SHIPPED | OUTPATIENT
Start: 2020-11-16 | End: 2020-01-01

## 2020-11-17 RX ORDER — SPIRONOLACTONE 50 MG/1
50 TABLET, FILM COATED ORAL DAILY
OUTPATIENT
Start: 2020-11-17

## 2020-11-17 RX ORDER — FUROSEMIDE 40 MG/1
40 TABLET ORAL DAILY
Qty: 30 TABLET | Refills: 2 | OUTPATIENT
Start: 2020-11-17

## 2020-11-17 NOTE — TELEPHONE ENCOUNTER
Caller: Gaby Rasheed    Relationship: Self    Best call back number:[phone]    Medication needed:   Requested Prescriptions     Pending Prescriptions Disp Refills   • furosemide (LASIX) 40 MG tablet 30 tablet 2     Sig: Take 1 tablet by mouth Daily.   • spironolactone (ALDACTONE) 50 MG tablet        Sig: Take 1 tablet by mouth Daily.       When do you need the refill by: ASAP    What details did the patient provide when requesting the medication: PATIENT HAS 2 OF THE LASIX LEFT BUT IS OUT OF THE BLOOD PRESSURE    Does the patient have less than a 3 day supply:  [x] Yes  [] No    What is the patient's preferred pharmacy: [id number]  [place], [place] - [address] AT [facility] [address] - [phone]  - [phone] FX

## 2020-11-17 NOTE — TELEPHONE ENCOUNTER
Lasix--refilled by Dr. mcbride  Spironolactone listed as historical. She needs to double check with him if he wants her to stay on     Both of these are diuretics/water pills along with blood pressure medicines.

## 2020-11-18 RX ORDER — SPIRONOLACTONE 50 MG/1
TABLET, FILM COATED ORAL
Qty: 90 TABLET | Refills: 3 | Status: SHIPPED | OUTPATIENT
Start: 2020-11-18 | End: 2021-01-01

## 2020-11-18 NOTE — TELEPHONE ENCOUNTER
Spoke with pt and advised Dr. Ruano refilled the Lasix on 11/16/2020. Dr. Loving wants her to check with Dr. Ruano as to whether or not she needs to take both the lasix and furosemide. Pt will call Dr. Ruano's office.

## 2020-11-19 DIAGNOSIS — F33.0 MILD EPISODE OF RECURRENT MAJOR DEPRESSIVE DISORDER (HCC): ICD-10-CM

## 2020-11-19 DIAGNOSIS — F41.1 GENERALIZED ANXIETY DISORDER: ICD-10-CM

## 2020-11-23 DIAGNOSIS — K76.9 CHRONIC LIVER DISEASE AND CIRRHOSIS (HCC): ICD-10-CM

## 2020-11-23 DIAGNOSIS — G47.26 SHIFT WORK SLEEP DISORDER: ICD-10-CM

## 2020-11-23 DIAGNOSIS — K74.60 CHRONIC LIVER DISEASE AND CIRRHOSIS (HCC): ICD-10-CM

## 2020-11-23 RX ORDER — DESVENLAFAXINE 100 MG/1
TABLET, EXTENDED RELEASE ORAL
Qty: 90 TABLET | Refills: 0 | Status: SHIPPED | OUTPATIENT
Start: 2020-11-23 | End: 2021-01-01 | Stop reason: SDUPTHER

## 2020-12-07 NOTE — PROGRESS NOTES
Chief Complaint / Reason:      Chief Complaint   Patient presents with   • Fatigue     ER in July and my potassium and sodium dropped. Real emotional but cant get into see anyone for 6 weeks. Advised she had seen hannah before.    • Tingling     feet. I probably should of went to ER. My nerves are killing me. I have a drinking problem and hard to stop       Subjective     HPI  Presents today with multiple complaints which include fatigue and states that she had been to the ER back in July and her potassium and sodium had dropped.  She states that she feels very emotional and could not get in to see anyone for about 6 weeks and she has seen Hannah in the past.  She states her nerves are killing her denies SI or HI.  She states that she has been having more tingling and she has seen GI for liver cirrhosis.  Patient does appear jaundiced and has abdominal swelling-ascites.  Patient states that she does drink every day and she works at [employer].  Is wondering if she could have her hormones checked.  Does have some leg cramping worse at night or if she has been on her feet.  Blood pressure and heart rate both are elevated but she denies chest pain and states that she gets nervous coming to the doctor.  History taken from: patient    PMH/FH/Social History were reviewed and updated appropriately in the electronic medical record.   Past Medical History:   Diagnosis Date   • Acid reflux    • Anxiety    • Back pain    • Depression    • Fractures    • Hernia of abdominal cavity    • High cholesterol    • High triglycerides    • Hypertension    • Hypothyroidism    • Irritable bowel syndrome    • Nocturia 10/5/2016   • Positive TB test    • Sinus problem    • ELINA (stress urinary incontinence, female) 10/5/2016     Past Surgical History:   Procedure Laterality Date   •  SECTION     • COLONOSCOPY     • GASTRIC BYPASS     • HERNIA REPAIR      umbilical hernia   • HERNIA REPAIR  2020    ventral   •  HYSTERECTOMY     • LAPAROSCOPIC TUBAL LIGATION     • TOTAL ABDOMINAL HYSTERECTOMY WITH SALPINGO OOPHORECTOMY      uterine prolapse     Social History     Socioeconomic History   • Marital status:      Spouse name: Not on file   • Number of children: Not on file   • Years of education: Not on file   • Highest education level: Not on file   Tobacco Use   • Smoking status: Never Smoker   • Smokeless tobacco: Never Used   Substance and Sexual Activity   • Alcohol use: Yes     Comment: whiskey-4-5 glasses on days not working   • Drug use: No   • Sexual activity: Defer     Family History   Problem Relation Age of Onset   • Hyperlipidemia Mother    • Arthritis Mother    • Inflammatory bowel disease Mother    • Diabetes Father    • Hypertension Father    • Arthritis Paternal Grandmother    • Breast cancer Neg Hx    • Ovarian cancer Neg Hx    • Colon cancer Neg Hx    • Cirrhosis Neg Hx    • Liver disease Neg Hx    • Liver cancer Neg Hx        Review of Systems:   Review of Systems   Constitutional: Positive for fatigue. Negative for fever.   Respiratory: Negative for cough and shortness of breath.    Cardiovascular: Negative for chest pain.   Gastrointestinal: Positive for abdominal distention and diarrhea.   Musculoskeletal: Positive for arthralgias.   Skin: Positive for color change.   Allergic/Immunologic: Positive for environmental allergies.   Neurological: Positive for dizziness, numbness (Tingling) and memory problem.   Hematological: Bruises/bleeds easily.   Psychiatric/Behavioral: Positive for sleep disturbance, depressed mood and stress. The patient is nervous/anxious.    All other systems reviewed and are negative.        All other systems were reviewed and are negative.  Exceptions are noted in the subjective or above.      Objective     Vital Signs  Vitals:    12/07/20 1642   BP: 162/75   Pulse: 103   Resp: 16   SpO2: 100%       Body mass index is 20.78 kg/m².    Physical Exam  Vitals signs and nursing  note reviewed.   Constitutional:       Appearance: She is well-developed.   Cardiovascular:      Rate and Rhythm: Regular rhythm. Bradycardia present.      Heart sounds: Normal heart sounds.   Pulmonary:      Effort: Pulmonary effort is normal.      Breath sounds: Normal breath sounds.   Abdominal:      General: Bowel sounds are normal. There is distension.      Palpations: Abdomen is soft. There is hepatomegaly.      Tenderness: There is abdominal tenderness.   Musculoskeletal: Normal range of motion.   Skin:     General: Skin is warm and dry.      Capillary Refill: Capillary refill takes less than 2 seconds.      Coloration: Skin is jaundiced.   Neurological:      Mental Status: She is alert and oriented to person, place, and time.      Sensory: Sensory deficit present.      Motor: Weakness present.      Coordination: Coordination normal.      Gait: Gait normal.   Psychiatric:         Mood and Affect: Mood is anxious and depressed.         Behavior: Behavior normal.         Thought Content: Thought content normal.         Judgment: Judgment normal.              Results Review:    I reviewed the patient's previous clinical results.       Medication Review:     Current Outpatient Medications:   •  Calcium Carbonate Antacid (TUMS PO), Take  by mouth., Disp: , Rfl:   •  desvenlafaxine (PRISTIQ) 100 MG 24 hr tablet, TAKE 1 TABLET BY MOUTH EVERY DAY, Disp: 90 tablet, Rfl: 0  •  esomeprazole (nexIUM) 40 MG capsule, Take 1 capsule by mouth Daily. (Patient taking differently: Take 40 mg by mouth Daily. Per pharmacy, last filled 4/9/2020 for a 30 day supply.), Disp: 30 capsule, Rfl: 1  •  furosemide (LASIX) 40 MG tablet, Take 1 tablet by mouth Daily., Disp: 30 tablet, Rfl: 2  •  levothyroxine (SYNTHROID, LEVOTHROID) 50 MCG tablet, Take 1 tablet by mouth Daily. (Patient taking differently: Take 50 mcg by mouth Daily. Per pharmacy, last filled 6/9/2020 for a 90 day supply.), Disp: 90 tablet, Rfl: 3  •  loperamide (IMODIUM) 2  MG capsule, Take 2 mg by mouth Every 8 (Eight) Hours., Disp: , Rfl:   •  Multiple Vitamins-Minerals (MULTIVITAMIN ADULT PO), Take  by mouth., Disp: , Rfl:   •  Nutritional Supplements (ENSURE PO), Take  by mouth., Disp: , Rfl:   •  Prenatal Vit-Fe Fumarate-FA (PRENATAL VITAMIN PO), Take 1 tablet by mouth Daily., Disp: , Rfl:   •  Simethicone (GAS-X PO), Take  by mouth., Disp: , Rfl:   •  spironolactone (ALDACTONE) 50 MG tablet, TAKE 1 TABLET BY MOUTH EVERY DAY, Disp: 90 tablet, Rfl: 3  •  zolpidem (Ambien) 5 MG tablet, Take 1 tablet by mouth At Night As Needed for Sleep. Lower dose required due to liver disease., Disp: 30 tablet, Rfl: 3    Diagnoses and all orders for this visit:    Chronic fatigue  -     Vitamin B12  -     Iron and TIBC  -     Ferritin  -     Estradiol  -     Testosterone (Free & Total), LC / MS  Discussed sleep hygiene with patient recommend getting adequate rest hydration nutrition.  Hypokalemia  -     Comprehensive metabolic panel    Hyponatremia  -     Comprehensive metabolic panel    Subclinical hypothyroidism  -     TSH  -     T4, free    Chronic liver disease and cirrhosis (CMS/HCC)  -     Magnesium  -     Phosphorus  -     Comprehensive metabolic panel  -     Iron and TIBC  -     Ferritin  -     CBC w AUTO Differential  -     Protime-INR  -     Gamma GT  Advised follow-up with GI.  Advised patient to continue taking spironolactone prescribed  Essential hypertension    Initiate lifestyle modifications.   DASH Diet and exercise.   Compliance with medication regimen and discussed ways to prevent of long-term complications from high blood pressure.  Discussed when to seek medical attention.  Encouraged patient to take blood pressure daily and keep a log.  Advised patient to decrease alcohol intake and discussed risk factors associated with continued use.        Return in about 4 weeks (around 1/4/2021), or if symptoms worsen or fail to improve.    Leda White,  APRN  12/07/2020

## 2020-12-10 NOTE — PROGRESS NOTES
Please contact patient and let her know that her blood work is concerning as her platelets have dropped from 135 to 80 along with her hemoglobin and hematocrit which could be related to her cirrhosis along with her AST did increase magnesium is low and I will send in Rx for magnesium to be taken daily as that could contribute to her numbness and tingling her potassium is 3.6 so please make sure she eats foods high in potassium.  She needs to follow-up with GI and avoid any falls or injury as she is high risk for bleeding.

## 2021-01-01 ENCOUNTER — OUTSIDE FACILITY SERVICE (OUTPATIENT)
Dept: INTERNAL MEDICINE | Facility: CLINIC | Age: 56
End: 2021-01-01

## 2021-01-01 ENCOUNTER — TELEPHONE (OUTPATIENT)
Dept: INTERNAL MEDICINE | Facility: CLINIC | Age: 56
End: 2021-01-01

## 2021-01-01 ENCOUNTER — APPOINTMENT (OUTPATIENT)
Dept: GENERAL RADIOLOGY | Facility: HOSPITAL | Age: 56
End: 2021-01-01

## 2021-01-01 ENCOUNTER — HOSPITAL ENCOUNTER (EMERGENCY)
Facility: HOSPITAL | Age: 56
Discharge: ANOTHER HOSPITAL | End: 2021-01-06
Attending: EMERGENCY MEDICINE | Admitting: EMERGENCY MEDICINE

## 2021-01-01 ENCOUNTER — OFFICE VISIT (OUTPATIENT)
Dept: BEHAVIORAL HEALTH | Facility: CLINIC | Age: 56
End: 2021-01-01

## 2021-01-01 ENCOUNTER — HOSPITAL ENCOUNTER (OUTPATIENT)
Facility: HOSPITAL | Age: 56
Setting detail: OBSERVATION
Discharge: HOME | End: 2021-04-06
Attending: EMERGENCY MEDICINE | Admitting: EMERGENCY MEDICINE

## 2021-01-01 ENCOUNTER — HOSPITAL ENCOUNTER (OUTPATIENT)
Dept: MRI IMAGING | Facility: HOSPITAL | Age: 56
End: 2021-01-01

## 2021-01-01 ENCOUNTER — TRANSITIONAL CARE MANAGEMENT TELEPHONE ENCOUNTER (OUTPATIENT)
Dept: CALL CENTER | Facility: HOSPITAL | Age: 56
End: 2021-01-01

## 2021-01-01 ENCOUNTER — DOCUMENTATION (OUTPATIENT)
Dept: INTERNAL MEDICINE | Facility: CLINIC | Age: 56
End: 2021-01-01

## 2021-01-01 ENCOUNTER — OFFICE VISIT (OUTPATIENT)
Dept: INTERNAL MEDICINE | Facility: CLINIC | Age: 56
End: 2021-01-01

## 2021-01-01 ENCOUNTER — HOSPITAL ENCOUNTER (INPATIENT)
Facility: HOSPITAL | Age: 56
LOS: 6 days | Discharge: HOME | End: 2021-07-23
Attending: EMERGENCY MEDICINE | Admitting: FAMILY MEDICINE

## 2021-01-01 ENCOUNTER — LAB (OUTPATIENT)
Dept: LAB | Facility: HOSPITAL | Age: 56
End: 2021-01-01

## 2021-01-01 ENCOUNTER — READMISSION MANAGEMENT (OUTPATIENT)
Dept: CALL CENTER | Facility: HOSPITAL | Age: 56
End: 2021-01-01

## 2021-01-01 ENCOUNTER — APPOINTMENT (OUTPATIENT)
Dept: CT IMAGING | Facility: HOSPITAL | Age: 56
End: 2021-01-01

## 2021-01-01 ENCOUNTER — EPISODE CHANGES (OUTPATIENT)
Dept: CASE MANAGEMENT | Facility: OTHER | Age: 56
End: 2021-01-01

## 2021-01-01 ENCOUNTER — TELEPHONE (OUTPATIENT)
Dept: SURGERY | Facility: CLINIC | Age: 56
End: 2021-01-01

## 2021-01-01 ENCOUNTER — APPOINTMENT (OUTPATIENT)
Dept: ULTRASOUND IMAGING | Facility: HOSPITAL | Age: 56
End: 2021-01-01

## 2021-01-01 ENCOUNTER — TELEPHONE (OUTPATIENT)
Dept: BEHAVIORAL HEALTH | Facility: CLINIC | Age: 56
End: 2021-01-01

## 2021-01-01 ENCOUNTER — TRANSCRIBE ORDERS (OUTPATIENT)
Dept: ADMINISTRATIVE | Facility: HOSPITAL | Age: 56
End: 2021-01-01

## 2021-01-01 ENCOUNTER — PATIENT OUTREACH (OUTPATIENT)
Dept: CASE MANAGEMENT | Facility: OTHER | Age: 56
End: 2021-01-01

## 2021-01-01 ENCOUNTER — TRANSCRIBE ORDERS (OUTPATIENT)
Dept: LAB | Facility: HOSPITAL | Age: 56
End: 2021-01-01

## 2021-01-01 ENCOUNTER — HOSPITAL ENCOUNTER (OUTPATIENT)
Dept: MAMMOGRAPHY | Facility: HOSPITAL | Age: 56
Discharge: HOME | End: 2021-06-09
Admitting: OBSTETRICS & GYNECOLOGY

## 2021-01-01 ENCOUNTER — OFFICE VISIT (OUTPATIENT)
Dept: OBSTETRICS AND GYNECOLOGY | Facility: CLINIC | Age: 56
End: 2021-01-01

## 2021-01-01 ENCOUNTER — OFFICE VISIT (OUTPATIENT)
Dept: SURGERY | Facility: CLINIC | Age: 56
End: 2021-01-01

## 2021-01-01 VITALS
RESPIRATION RATE: 16 BRPM | TEMPERATURE: 97.9 F | OXYGEN SATURATION: 98 % | WEIGHT: 116.4 LBS | HEIGHT: 63 IN | DIASTOLIC BLOOD PRESSURE: 62 MMHG | BODY MASS INDEX: 20.62 KG/M2 | HEART RATE: 84 BPM | SYSTOLIC BLOOD PRESSURE: 123 MMHG

## 2021-01-01 VITALS
SYSTOLIC BLOOD PRESSURE: 100 MMHG | RESPIRATION RATE: 18 BRPM | BODY MASS INDEX: 20.11 KG/M2 | OXYGEN SATURATION: 99 % | TEMPERATURE: 97.3 F | HEIGHT: 63 IN | HEART RATE: 99 BPM | DIASTOLIC BLOOD PRESSURE: 60 MMHG | WEIGHT: 113.5 LBS

## 2021-01-01 VITALS
RESPIRATION RATE: 18 BRPM | HEART RATE: 75 BPM | HEIGHT: 63 IN | TEMPERATURE: 98 F | OXYGEN SATURATION: 100 % | DIASTOLIC BLOOD PRESSURE: 66 MMHG | SYSTOLIC BLOOD PRESSURE: 121 MMHG | BODY MASS INDEX: 22.57 KG/M2 | WEIGHT: 127.38 LBS

## 2021-01-01 VITALS
SYSTOLIC BLOOD PRESSURE: 124 MMHG | TEMPERATURE: 97.5 F | DIASTOLIC BLOOD PRESSURE: 70 MMHG | HEART RATE: 106 BPM | HEIGHT: 63 IN | BODY MASS INDEX: 20.55 KG/M2 | WEIGHT: 116 LBS | OXYGEN SATURATION: 100 %

## 2021-01-01 VITALS
HEART RATE: 103 BPM | TEMPERATURE: 97.9 F | RESPIRATION RATE: 20 BRPM | BODY MASS INDEX: 21.76 KG/M2 | HEIGHT: 63 IN | SYSTOLIC BLOOD PRESSURE: 122 MMHG | OXYGEN SATURATION: 99 % | DIASTOLIC BLOOD PRESSURE: 60 MMHG | WEIGHT: 122.8 LBS

## 2021-01-01 VITALS
HEIGHT: 63 IN | BODY MASS INDEX: 21.86 KG/M2 | WEIGHT: 123.4 LBS | SYSTOLIC BLOOD PRESSURE: 120 MMHG | DIASTOLIC BLOOD PRESSURE: 82 MMHG

## 2021-01-01 VITALS
SYSTOLIC BLOOD PRESSURE: 125 MMHG | WEIGHT: 116.13 LBS | OXYGEN SATURATION: 99 % | RESPIRATION RATE: 20 BRPM | TEMPERATURE: 97.8 F | BODY MASS INDEX: 20.58 KG/M2 | HEART RATE: 97 BPM | HEIGHT: 63 IN | DIASTOLIC BLOOD PRESSURE: 60 MMHG

## 2021-01-01 VITALS — HEIGHT: 63 IN | TEMPERATURE: 97.2 F | WEIGHT: 123.6 LBS | BODY MASS INDEX: 21.9 KG/M2

## 2021-01-01 VITALS
OXYGEN SATURATION: 97 % | WEIGHT: 111.4 LBS | SYSTOLIC BLOOD PRESSURE: 86 MMHG | HEART RATE: 86 BPM | DIASTOLIC BLOOD PRESSURE: 64 MMHG | BODY MASS INDEX: 19.74 KG/M2 | TEMPERATURE: 97.4 F | HEIGHT: 63 IN

## 2021-01-01 VITALS
OXYGEN SATURATION: 97 % | WEIGHT: 130 LBS | DIASTOLIC BLOOD PRESSURE: 48 MMHG | HEART RATE: 74 BPM | RESPIRATION RATE: 20 BRPM | SYSTOLIC BLOOD PRESSURE: 112 MMHG | TEMPERATURE: 98.4 F | HEIGHT: 63 IN | BODY MASS INDEX: 23.04 KG/M2

## 2021-01-01 VITALS
HEIGHT: 63 IN | WEIGHT: 120 LBS | SYSTOLIC BLOOD PRESSURE: 118 MMHG | BODY MASS INDEX: 21.26 KG/M2 | DIASTOLIC BLOOD PRESSURE: 74 MMHG

## 2021-01-01 VITALS
TEMPERATURE: 96.8 F | BODY MASS INDEX: 23.03 KG/M2 | HEART RATE: 88 BPM | RESPIRATION RATE: 18 BRPM | HEIGHT: 63 IN | OXYGEN SATURATION: 98 %

## 2021-01-01 VITALS
DIASTOLIC BLOOD PRESSURE: 70 MMHG | TEMPERATURE: 97.5 F | BODY MASS INDEX: 21.33 KG/M2 | WEIGHT: 120.38 LBS | RESPIRATION RATE: 18 BRPM | SYSTOLIC BLOOD PRESSURE: 115 MMHG | OXYGEN SATURATION: 96 % | HEIGHT: 63 IN | HEART RATE: 95 BPM

## 2021-01-01 VITALS
OXYGEN SATURATION: 100 % | WEIGHT: 127.6 LBS | HEIGHT: 63 IN | BODY MASS INDEX: 22.61 KG/M2 | DIASTOLIC BLOOD PRESSURE: 60 MMHG | TEMPERATURE: 98 F | SYSTOLIC BLOOD PRESSURE: 100 MMHG | HEART RATE: 80 BPM

## 2021-01-01 VITALS
DIASTOLIC BLOOD PRESSURE: 68 MMHG | BODY MASS INDEX: 23.88 KG/M2 | HEIGHT: 63 IN | WEIGHT: 134.8 LBS | SYSTOLIC BLOOD PRESSURE: 110 MMHG

## 2021-01-01 DIAGNOSIS — K76.82 HEPATIC ENCEPHALOPATHY (HCC): ICD-10-CM

## 2021-01-01 DIAGNOSIS — E03.8 SUBCLINICAL HYPOTHYROIDISM: ICD-10-CM

## 2021-01-01 DIAGNOSIS — Z74.09 IMPAIRED MOBILITY AND ADLS: ICD-10-CM

## 2021-01-01 DIAGNOSIS — K70.31 ALCOHOLIC CIRRHOSIS OF LIVER WITH ASCITES (HCC): ICD-10-CM

## 2021-01-01 DIAGNOSIS — K72.90 DECOMPENSATED HEPATIC CIRRHOSIS (HCC): Primary | ICD-10-CM

## 2021-01-01 DIAGNOSIS — K70.31 ASCITES DUE TO ALCOHOLIC CIRRHOSIS (HCC): ICD-10-CM

## 2021-01-01 DIAGNOSIS — Z78.9 IMPAIRED MOBILITY AND ADLS: ICD-10-CM

## 2021-01-01 DIAGNOSIS — K72.10 END STAGE LIVER DISEASE (HCC): ICD-10-CM

## 2021-01-01 DIAGNOSIS — F33.1 MODERATE EPISODE OF RECURRENT MAJOR DEPRESSIVE DISORDER (HCC): ICD-10-CM

## 2021-01-01 DIAGNOSIS — K70.31 ALCOHOLIC CIRRHOSIS OF LIVER WITH ASCITES (HCC): Primary | ICD-10-CM

## 2021-01-01 DIAGNOSIS — K74.60 DECOMPENSATED HEPATIC CIRRHOSIS (HCC): Primary | ICD-10-CM

## 2021-01-01 DIAGNOSIS — F10.20 ALCOHOLISM (HCC): ICD-10-CM

## 2021-01-01 DIAGNOSIS — R64 CACHEXIA (HCC): ICD-10-CM

## 2021-01-01 DIAGNOSIS — F33.2 SEVERE EPISODE OF RECURRENT MAJOR DEPRESSIVE DISORDER, WITHOUT PSYCHOTIC FEATURES (HCC): Primary | ICD-10-CM

## 2021-01-01 DIAGNOSIS — J90 PLEURAL EFFUSION, LEFT: Primary | ICD-10-CM

## 2021-01-01 DIAGNOSIS — D69.6 THROMBOCYTOPENIA (HCC): ICD-10-CM

## 2021-01-01 DIAGNOSIS — I71.03 DISSECTION OF THORACOABDOMINAL AORTA (HCC): ICD-10-CM

## 2021-01-01 DIAGNOSIS — I10 ESSENTIAL HYPERTENSION: ICD-10-CM

## 2021-01-01 DIAGNOSIS — K74.60 CHRONIC LIVER DISEASE AND CIRRHOSIS (HCC): ICD-10-CM

## 2021-01-01 DIAGNOSIS — K72.90 DECOMPENSATED HEPATIC CIRRHOSIS (HCC): ICD-10-CM

## 2021-01-01 DIAGNOSIS — Z09 HOSPITAL DISCHARGE FOLLOW-UP: ICD-10-CM

## 2021-01-01 DIAGNOSIS — F33.0 MILD EPISODE OF RECURRENT MAJOR DEPRESSIVE DISORDER (HCC): Primary | ICD-10-CM

## 2021-01-01 DIAGNOSIS — K74.60 DECOMPENSATED HEPATIC CIRRHOSIS (HCC): ICD-10-CM

## 2021-01-01 DIAGNOSIS — K76.9 CHRONIC LIVER DISEASE AND CIRRHOSIS (HCC): ICD-10-CM

## 2021-01-01 DIAGNOSIS — Z98.890 HISTORY OF ABDOMINAL SURGERY: ICD-10-CM

## 2021-01-01 DIAGNOSIS — F33.0 MILD EPISODE OF RECURRENT MAJOR DEPRESSIVE DISORDER (HCC): ICD-10-CM

## 2021-01-01 DIAGNOSIS — F41.9 ANXIETY: ICD-10-CM

## 2021-01-01 DIAGNOSIS — Z23 NEED FOR INFLUENZA VACCINATION: Primary | ICD-10-CM

## 2021-01-01 DIAGNOSIS — K70.30 ALCOHOLIC CIRRHOSIS, UNSPECIFIED WHETHER ASCITES PRESENT (HCC): ICD-10-CM

## 2021-01-01 DIAGNOSIS — F33.0 MAJOR DEPRESSIVE DISORDER, RECURRENT EPISODE, MILD (HCC): ICD-10-CM

## 2021-01-01 DIAGNOSIS — F51.04 PSYCHOPHYSIOLOGICAL INSOMNIA: ICD-10-CM

## 2021-01-01 DIAGNOSIS — F41.1 GENERALIZED ANXIETY DISORDER: ICD-10-CM

## 2021-01-01 DIAGNOSIS — F41.1 GENERALIZED ANXIETY DISORDER: Primary | ICD-10-CM

## 2021-01-01 DIAGNOSIS — J90 PLEURAL EFFUSION: ICD-10-CM

## 2021-01-01 DIAGNOSIS — Z93.8 S/P CHEST TUBE PLACEMENT (HCC): ICD-10-CM

## 2021-01-01 DIAGNOSIS — Z01.419 ENCOUNTER FOR GYNECOLOGICAL EXAMINATION WITHOUT ABNORMAL FINDING: ICD-10-CM

## 2021-01-01 DIAGNOSIS — F33.2 SEVERE EPISODE OF RECURRENT MAJOR DEPRESSIVE DISORDER, WITHOUT PSYCHOTIC FEATURES (HCC): ICD-10-CM

## 2021-01-01 DIAGNOSIS — J94.8 HYDROTHORAX: ICD-10-CM

## 2021-01-01 DIAGNOSIS — R64 CACHEXIA: Primary | ICD-10-CM

## 2021-01-01 DIAGNOSIS — J95.811 POSTPROCEDURAL PNEUMOTHORAX: Primary | ICD-10-CM

## 2021-01-01 DIAGNOSIS — K72.00 ACUTE LIVER FAILURE WITHOUT HEPATIC COMA: ICD-10-CM

## 2021-01-01 DIAGNOSIS — Z12.4 SCREENING FOR MALIGNANT NEOPLASM OF CERVIX: ICD-10-CM

## 2021-01-01 DIAGNOSIS — E83.42 HYPOMAGNESEMIA: ICD-10-CM

## 2021-01-01 DIAGNOSIS — Z93.8 S/P CHEST TUBE PLACEMENT (HCC): Primary | ICD-10-CM

## 2021-01-01 DIAGNOSIS — E43 PROTEIN-CALORIE MALNUTRITION, SEVERE (HCC): ICD-10-CM

## 2021-01-01 DIAGNOSIS — I85.10 SECONDARY ESOPHAGEAL VARICES WITHOUT BLEEDING (HCC): ICD-10-CM

## 2021-01-01 DIAGNOSIS — K72.10 END STAGE LIVER DISEASE (HCC): Primary | ICD-10-CM

## 2021-01-01 DIAGNOSIS — Z12.31 VISIT FOR SCREENING MAMMOGRAM: Primary | ICD-10-CM

## 2021-01-01 DIAGNOSIS — Z98.84 HISTORY OF ROUX-EN-Y GASTRIC BYPASS: ICD-10-CM

## 2021-01-01 DIAGNOSIS — Z01.419 ENCOUNTER FOR GYNECOLOGICAL EXAMINATION WITHOUT ABNORMAL FINDING: Primary | ICD-10-CM

## 2021-01-01 DIAGNOSIS — G47.26 SHIFT WORK SLEEP DISORDER: ICD-10-CM

## 2021-01-01 DIAGNOSIS — E87.1 HYPONATREMIA: ICD-10-CM

## 2021-01-01 DIAGNOSIS — Z12.31 ENCOUNTER FOR SCREENING MAMMOGRAM FOR BREAST CANCER: ICD-10-CM

## 2021-01-01 DIAGNOSIS — K65.9 PERITONITIS (HCC): Primary | ICD-10-CM

## 2021-01-01 DIAGNOSIS — D64.9 ANEMIA, UNSPECIFIED TYPE: ICD-10-CM

## 2021-01-01 DIAGNOSIS — R53.1 WEAKNESS: ICD-10-CM

## 2021-01-01 DIAGNOSIS — S32.020A COMPRESSION FRACTURE OF L2, CLOSED, INITIAL ENCOUNTER (HCC): Primary | ICD-10-CM

## 2021-01-01 DIAGNOSIS — E46 MALNUTRITION, UNSPECIFIED TYPE: ICD-10-CM

## 2021-01-01 DIAGNOSIS — Z12.31 VISIT FOR SCREENING MAMMOGRAM: ICD-10-CM

## 2021-01-01 DIAGNOSIS — K28.6: ICD-10-CM

## 2021-01-01 DIAGNOSIS — E46 MALNUTRITION, UNSPECIFIED TYPE (HCC): ICD-10-CM

## 2021-01-01 DIAGNOSIS — K63.1 BOWEL PERFORATION (HCC): Primary | ICD-10-CM

## 2021-01-01 DIAGNOSIS — K43.9 ABDOMINAL WALL HERNIA: ICD-10-CM

## 2021-01-01 DIAGNOSIS — J90 PLEURAL EFFUSION, LEFT: ICD-10-CM

## 2021-01-01 DIAGNOSIS — I72.4 PSEUDOANEURYSM OF RIGHT FEMORAL ARTERY (HCC): ICD-10-CM

## 2021-01-01 DIAGNOSIS — K70.9 ALCOHOL LIVER DAMAGE (HCC): ICD-10-CM

## 2021-01-01 DIAGNOSIS — K58.0 IRRITABLE BOWEL SYNDROME WITH DIARRHEA: ICD-10-CM

## 2021-01-01 LAB
ABO GROUP BLD: NORMAL
ALBUMIN FLD-MCNC: <0.4 G/DL
ALBUMIN SERPL-MCNC: 2.6 G/DL (ref 3.5–5.2)
ALBUMIN SERPL-MCNC: 2.6 G/DL (ref 3.5–5.2)
ALBUMIN SERPL-MCNC: 2.7 G/DL (ref 3.5–5.2)
ALBUMIN SERPL-MCNC: 2.8 G/DL (ref 3.5–5.2)
ALBUMIN SERPL-MCNC: 2.9 G/DL (ref 3.5–5.2)
ALBUMIN SERPL-MCNC: 3.1 G/DL (ref 3.5–5.2)
ALBUMIN SERPL-MCNC: 3.6 G/DL (ref 3.5–5.2)
ALBUMIN/GLOB SERPL: 0.7 G/DL
ALBUMIN/GLOB SERPL: 0.8 G/DL
ALBUMIN/GLOB SERPL: 0.9 G/DL
ALBUMIN/GLOB SERPL: 0.9 G/DL
ALBUMIN/GLOB SERPL: 1 G/DL
ALBUMIN/GLOB SERPL: 1.6 G/DL
ALP SERPL-CCNC: 112 U/L (ref 39–117)
ALP SERPL-CCNC: 116 U/L (ref 39–117)
ALP SERPL-CCNC: 137 U/L (ref 39–117)
ALP SERPL-CCNC: 139 U/L (ref 39–117)
ALP SERPL-CCNC: 152 U/L (ref 39–117)
ALP SERPL-CCNC: 168 U/L (ref 39–117)
ALP SERPL-CCNC: 174 U/L (ref 39–117)
ALP SERPL-CCNC: 187 U/L (ref 39–117)
ALP SERPL-CCNC: 195 U/L (ref 39–117)
ALT SERPL W P-5'-P-CCNC: 14 U/L (ref 1–33)
ALT SERPL W P-5'-P-CCNC: 15 U/L (ref 1–33)
ALT SERPL W P-5'-P-CCNC: 15 U/L (ref 1–33)
ALT SERPL W P-5'-P-CCNC: 16 U/L (ref 1–33)
ALT SERPL W P-5'-P-CCNC: 17 U/L (ref 1–33)
ALT SERPL W P-5'-P-CCNC: 18 U/L (ref 1–33)
ALT SERPL W P-5'-P-CCNC: 18 U/L (ref 1–33)
ALT SERPL-CCNC: 13 U/L (ref 1–33)
ALT SERPL-CCNC: 24 U/L (ref 1–33)
AMMONIA BLD-SCNC: 30 UMOL/L (ref 11–51)
AMMONIA PLAS-MCNC: 30 UG/DL (ref 34–178)
ANION GAP SERPL CALCULATED.3IONS-SCNC: 10.6 MMOL/L (ref 5–15)
ANION GAP SERPL CALCULATED.3IONS-SCNC: 10.8 MMOL/L (ref 5–15)
ANION GAP SERPL CALCULATED.3IONS-SCNC: 11.2 MMOL/L (ref 5–15)
ANION GAP SERPL CALCULATED.3IONS-SCNC: 12.1 MMOL/L (ref 5–15)
ANION GAP SERPL CALCULATED.3IONS-SCNC: 13 MMOL/L (ref 5–15)
ANION GAP SERPL CALCULATED.3IONS-SCNC: 13.7 MMOL/L (ref 5–15)
ANION GAP SERPL CALCULATED.3IONS-SCNC: 6.2 MMOL/L (ref 5–15)
ANION GAP SERPL CALCULATED.3IONS-SCNC: 7.7 MMOL/L (ref 5–15)
ANION GAP SERPL CALCULATED.3IONS-SCNC: 8.2 MMOL/L (ref 5–15)
ANION GAP SERPL CALCULATED.3IONS-SCNC: 8.4 MMOL/L (ref 5–15)
ANION GAP SERPL CALCULATED.3IONS-SCNC: 8.6 MMOL/L (ref 5–15)
ANISOCYTOSIS BLD QL: NORMAL
ANISOCYTOSIS BLD QL: NORMAL
APPEARANCE FLD: ABNORMAL
APPEARANCE FLD: ABNORMAL
APTT PPP: 33.8 SECONDS (ref 24.5–37.2)
APTT PPP: 36.3 SECONDS (ref 24.5–37.2)
APTT PPP: 39.2 SECONDS (ref 24.5–37.2)
APTT PPP: 41.3 SECONDS (ref 24.5–37.2)
AST SERPL-CCNC: 36 U/L (ref 1–32)
AST SERPL-CCNC: 36 U/L (ref 1–32)
AST SERPL-CCNC: 37 U/L (ref 1–32)
AST SERPL-CCNC: 39 U/L (ref 1–32)
AST SERPL-CCNC: 41 U/L (ref 1–32)
AST SERPL-CCNC: 46 U/L (ref 1–32)
AST SERPL-CCNC: 47 U/L (ref 1–32)
AST SERPL-CCNC: 48 U/L (ref 1–32)
AST SERPL-CCNC: 58 U/L (ref 1–32)
BACTERIA FLD CULT: NORMAL
BACTERIA SPEC AEROBE CULT: ABNORMAL
BACTERIA SPEC AEROBE CULT: ABNORMAL
BACTERIA SPEC AEROBE CULT: NORMAL
BACTERIA SPEC ANAEROBE CULT: NORMAL
BASOPHILS # BLD AUTO: 0.06 10*3/MM3 (ref 0–0.2)
BASOPHILS # BLD AUTO: 0.06 10*3/MM3 (ref 0–0.2)
BASOPHILS # BLD AUTO: 0.07 10*3/MM3 (ref 0–0.2)
BASOPHILS # BLD AUTO: 0.09 10*3/MM3 (ref 0–0.2)
BASOPHILS # BLD AUTO: 0.1 10*3/MM3 (ref 0–0.2)
BASOPHILS # BLD AUTO: 0.13 10*3/MM3 (ref 0–0.2)
BASOPHILS NFR BLD AUTO: 0.3 % (ref 0–1.5)
BASOPHILS NFR BLD AUTO: 0.7 % (ref 0–1.5)
BASOPHILS NFR BLD AUTO: 0.8 % (ref 0–1.5)
BASOPHILS NFR BLD AUTO: 0.8 % (ref 0–1.5)
BH BB BLOOD EXPIRATION DATE: NORMAL
BH BB BLOOD EXPIRATION DATE: NORMAL
BH BB BLOOD TYPE BARCODE: 7300
BH BB BLOOD TYPE BARCODE: 7300
BH BB DISPENSE STATUS: NORMAL
BH BB DISPENSE STATUS: NORMAL
BH BB PRODUCT CODE: NORMAL
BH BB PRODUCT CODE: NORMAL
BH BB UNIT NUMBER: NORMAL
BH BB UNIT NUMBER: NORMAL
BILIRUB FLD-MCNC: 0.4 MG/DL
BILIRUB SERPL-MCNC: 3.2 MG/DL (ref 0–1.2)
BILIRUB SERPL-MCNC: 3.7 MG/DL (ref 0–1.2)
BILIRUB SERPL-MCNC: 4.1 MG/DL (ref 0–1.2)
BILIRUB SERPL-MCNC: 4.9 MG/DL (ref 0–1.2)
BILIRUB SERPL-MCNC: 5.1 MG/DL (ref 0–1.2)
BILIRUB SERPL-MCNC: 5.2 MG/DL (ref 0–1.2)
BILIRUB SERPL-MCNC: 7 MG/DL (ref 0–1.2)
BILIRUB SERPL-MCNC: 7.9 MG/DL (ref 0–1.2)
BILIRUB SERPL-MCNC: 8.2 MG/DL (ref 0–1.2)
BILIRUB UR QL STRIP: NEGATIVE
BLD GP AB SCN SERPL QL: NEGATIVE
BUN SERPL-MCNC: 20 MG/DL (ref 6–20)
BUN SERPL-MCNC: 21 MG/DL (ref 6–20)
BUN SERPL-MCNC: 21 MG/DL (ref 6–20)
BUN SERPL-MCNC: 24 MG/DL (ref 6–20)
BUN SERPL-MCNC: 26 MG/DL (ref 6–20)
BUN SERPL-MCNC: 26 MG/DL (ref 6–20)
BUN SERPL-MCNC: 28 MG/DL (ref 6–20)
BUN SERPL-MCNC: 32 MG/DL (ref 6–20)
BUN SERPL-MCNC: 38 MG/DL (ref 6–20)
BUN SERPL-MCNC: 44 MG/DL (ref 6–20)
BUN SERPL-MCNC: 8 MG/DL (ref 6–20)
BUN/CREAT SERPL: 11.8 (ref 7–25)
BUN/CREAT SERPL: 17.6 (ref 7–25)
BUN/CREAT SERPL: 19 (ref 7–25)
BUN/CREAT SERPL: 22.6 (ref 7–25)
BUN/CREAT SERPL: 25.9 (ref 7–25)
BUN/CREAT SERPL: 29.6 (ref 7–25)
BUN/CREAT SERPL: 30 (ref 7–25)
BUN/CREAT SERPL: 31 (ref 7–25)
BUN/CREAT SERPL: 34.4 (ref 7–25)
BUN/CREAT SERPL: 35.2 (ref 7–25)
BUN/CREAT SERPL: 35.3 (ref 7–25)
BUN/CREAT SERPL: 38.6 (ref 7–25)
BUN/CREAT SERPL: 41.3 (ref 7–25)
CALCIUM SERPL-MCNC: 8.3 MG/DL (ref 8.6–10.5)
CALCIUM SERPL-MCNC: 8.6 MG/DL (ref 8.6–10.5)
CALCIUM SPEC-SCNC: 8 MG/DL (ref 8.6–10.5)
CALCIUM SPEC-SCNC: 8.2 MG/DL (ref 8.6–10.5)
CALCIUM SPEC-SCNC: 8.2 MG/DL (ref 8.6–10.5)
CALCIUM SPEC-SCNC: 8.3 MG/DL (ref 8.6–10.5)
CALCIUM SPEC-SCNC: 8.3 MG/DL (ref 8.6–10.5)
CALCIUM SPEC-SCNC: 8.4 MG/DL (ref 8.6–10.5)
CALCIUM SPEC-SCNC: 8.4 MG/DL (ref 8.6–10.5)
CALCIUM SPEC-SCNC: 8.5 MG/DL (ref 8.6–10.5)
CALCIUM SPEC-SCNC: 8.9 MG/DL (ref 8.6–10.5)
CALCIUM SPEC-SCNC: 8.9 MG/DL (ref 8.6–10.5)
CALCIUM SPEC-SCNC: 9 MG/DL (ref 8.6–10.5)
CHLORIDE SERPL-SCNC: 100 MMOL/L (ref 98–107)
CHLORIDE SERPL-SCNC: 100 MMOL/L (ref 98–107)
CHLORIDE SERPL-SCNC: 102 MMOL/L (ref 98–107)
CHLORIDE SERPL-SCNC: 102 MMOL/L (ref 98–107)
CHLORIDE SERPL-SCNC: 88 MMOL/L (ref 98–107)
CHLORIDE SERPL-SCNC: 95 MMOL/L (ref 98–107)
CHLORIDE SERPL-SCNC: 96 MMOL/L (ref 98–107)
CHLORIDE SERPL-SCNC: 97 MMOL/L (ref 98–107)
CHLORIDE SERPL-SCNC: 99 MMOL/L (ref 98–107)
CK SERPL-CCNC: 44 U/L (ref 20–180)
CLARITY UR: ABNORMAL
CO2 SERPL-SCNC: 21.1 MMOL/L (ref 22–29)
CO2 SERPL-SCNC: 21.2 MMOL/L (ref 22–29)
CO2 SERPL-SCNC: 22.8 MMOL/L (ref 22–29)
CO2 SERPL-SCNC: 23.3 MMOL/L (ref 22–29)
CO2 SERPL-SCNC: 23.4 MMOL/L (ref 22–29)
CO2 SERPL-SCNC: 23.6 MMOL/L (ref 22–29)
CO2 SERPL-SCNC: 24.3 MMOL/L (ref 22–29)
CO2 SERPL-SCNC: 25.9 MMOL/L (ref 22–29)
CO2 SERPL-SCNC: 28.1 MMOL/L (ref 22–29)
CO2 SERPL-SCNC: 28.4 MMOL/L (ref 22–29)
CO2 SERPL-SCNC: 29 MMOL/L (ref 22–29)
COLOR FLD: YELLOW
COLOR FLD: YELLOW
COLOR UR: ABNORMAL
CREAT SERPL-MCNC: 0.61 MG/DL (ref 0.57–1)
CREAT SERPL-MCNC: 0.63 MG/DL (ref 0.57–1)
CREAT SERPL-MCNC: 0.68 MG/DL (ref 0.57–1)
CREAT SERPL-MCNC: 0.68 MG/DL (ref 0.57–1)
CREAT SERPL-MCNC: 0.8 MG/DL (ref 0.57–1)
CREAT SERPL-MCNC: 0.81 MG/DL (ref 0.57–1)
CREAT SERPL-MCNC: 0.83 MG/DL (ref 0.57–1)
CREAT SERPL-MCNC: 0.84 MG/DL (ref 0.57–1)
CREAT SERPL-MCNC: 1.05 MG/DL (ref 0.57–1)
CREAT SERPL-MCNC: 1.19 MG/DL (ref 0.57–1)
CREAT SERPL-MCNC: 1.24 MG/DL (ref 0.57–1)
CREAT SERPL-MCNC: 1.25 MG/DL (ref 0.57–1)
CREAT SERPL-MCNC: 1.47 MG/DL (ref 0.57–1)
CROSSMATCH INTERPRETATION: NORMAL
CROSSMATCH INTERPRETATION: NORMAL
D-LACTATE SERPL-SCNC: 2 MMOL/L (ref 0.5–2)
D-LACTATE SERPL-SCNC: 2.9 MMOL/L (ref 0.5–2)
D-LACTATE SERPL-SCNC: 3.8 MMOL/L (ref 0.5–2)
DEPRECATED RDW RBC AUTO: 38.6 FL (ref 37–54)
DEPRECATED RDW RBC AUTO: 40.3 FL (ref 37–54)
DEPRECATED RDW RBC AUTO: 50.5 FL (ref 37–54)
DEPRECATED RDW RBC AUTO: 51.9 FL (ref 37–54)
DEPRECATED RDW RBC AUTO: 78.8 FL (ref 37–54)
DEPRECATED RDW RBC AUTO: 79.6 FL (ref 37–54)
DEPRECATED RDW RBC AUTO: 80.3 FL (ref 37–54)
DEPRECATED RDW RBC AUTO: 81.5 FL (ref 37–54)
DEPRECATED RDW RBC AUTO: 85.1 FL (ref 37–54)
DIFFERENTIAL COMMENT: ABNORMAL
EOSINOPHIL # BLD AUTO: 0.01 10*3/MM3 (ref 0–0.4)
EOSINOPHIL # BLD AUTO: 0.07 10*3/MM3 (ref 0–0.4)
EOSINOPHIL # BLD AUTO: 0.07 10*3/MM3 (ref 0–0.4)
EOSINOPHIL # BLD AUTO: 0.14 10*3/MM3 (ref 0–0.4)
EOSINOPHIL # BLD AUTO: 0.25 10*3/MM3 (ref 0–0.4)
EOSINOPHIL # BLD AUTO: 0.33 10*3/MM3 (ref 0–0.4)
EOSINOPHIL # BLD MANUAL: 0.16 10*3/MM3 (ref 0–0.4)
EOSINOPHIL NFR BLD AUTO: 0 % (ref 0.3–6.2)
EOSINOPHIL NFR BLD AUTO: 0.7 % (ref 0.3–6.2)
EOSINOPHIL NFR BLD AUTO: 1 % (ref 0.3–6.2)
EOSINOPHIL NFR BLD AUTO: 1.1 % (ref 0.3–6.2)
EOSINOPHIL NFR BLD AUTO: 1.8 % (ref 0.3–6.2)
EOSINOPHIL NFR BLD AUTO: 2 % (ref 0.3–6.2)
EOSINOPHIL NFR BLD MANUAL: 1 % (ref 0.3–6.2)
ERYTHROCYTE [DISTWIDTH] IN BLOOD BY AUTOMATED COUNT: 11.5 % (ref 12.3–15.4)
ERYTHROCYTE [DISTWIDTH] IN BLOOD BY AUTOMATED COUNT: 11.8 % (ref 12.3–15.4)
ERYTHROCYTE [DISTWIDTH] IN BLOOD BY AUTOMATED COUNT: 12 % (ref 12.3–15.4)
ERYTHROCYTE [DISTWIDTH] IN BLOOD BY AUTOMATED COUNT: 13.5 % (ref 12.3–15.4)
ERYTHROCYTE [DISTWIDTH] IN BLOOD BY AUTOMATED COUNT: 13.6 % (ref 12.3–15.4)
ERYTHROCYTE [DISTWIDTH] IN BLOOD BY AUTOMATED COUNT: 19.1 % (ref 12.3–15.4)
ERYTHROCYTE [DISTWIDTH] IN BLOOD BY AUTOMATED COUNT: 20.6 % (ref 12.3–15.4)
ERYTHROCYTE [DISTWIDTH] IN BLOOD BY AUTOMATED COUNT: 22.2 % (ref 12.3–15.4)
ERYTHROCYTE [DISTWIDTH] IN BLOOD BY AUTOMATED COUNT: 22.2 % (ref 12.3–15.4)
ERYTHROCYTE [DISTWIDTH] IN BLOOD BY AUTOMATED COUNT: 22.5 % (ref 12.3–15.4)
ERYTHROCYTE [DISTWIDTH] IN BLOOD BY AUTOMATED COUNT: 22.5 % (ref 12.3–15.4)
ETHANOL BLD-MCNC: <10 MG/DL (ref 0–10)
ETHANOL UR QL: <0.01 %
FOLATE SERPL-MCNC: 7.49 NG/ML (ref 4.78–24.2)
GFR SERPL CREATININE-BSD FRML MDRD: 101 ML/MIN/1.73
GFR SERPL CREATININE-BSD FRML MDRD: 37 ML/MIN/1.73
GFR SERPL CREATININE-BSD FRML MDRD: 44 ML/MIN/1.73
GFR SERPL CREATININE-BSD FRML MDRD: 45 ML/MIN/1.73
GFR SERPL CREATININE-BSD FRML MDRD: 47 ML/MIN/1.73
GFR SERPL CREATININE-BSD FRML MDRD: 54 ML/MIN/1.73
GFR SERPL CREATININE-BSD FRML MDRD: 70 ML/MIN/1.73
GFR SERPL CREATININE-BSD FRML MDRD: 71 ML/MIN/1.73
GFR SERPL CREATININE-BSD FRML MDRD: 73 ML/MIN/1.73
GFR SERPL CREATININE-BSD FRML MDRD: 89 ML/MIN/1.73
GFR SERPL CREATININE-BSD FRML MDRD: 97 ML/MIN/1.73
GGT SERPL-CCNC: 36 U/L (ref 5–36)
GLOBULIN SER CALC-MCNC: 3.6 GM/DL
GLOBULIN SER CALC-MCNC: 3.6 GM/DL
GLOBULIN UR ELPH-MCNC: 2.2 GM/DL
GLOBULIN UR ELPH-MCNC: 2.9 GM/DL
GLOBULIN UR ELPH-MCNC: 3 GM/DL
GLOBULIN UR ELPH-MCNC: 3.1 GM/DL
GLOBULIN UR ELPH-MCNC: 3.2 GM/DL
GLOBULIN UR ELPH-MCNC: 3.6 GM/DL
GLOBULIN UR ELPH-MCNC: 3.8 GM/DL
GLUCOSE FLD-MCNC: 112 MG/DL
GLUCOSE SERPL-MCNC: 104 MG/DL (ref 65–99)
GLUCOSE SERPL-MCNC: 129 MG/DL (ref 65–99)
GLUCOSE SERPL-MCNC: 169 MG/DL (ref 65–99)
GLUCOSE SERPL-MCNC: 176 MG/DL (ref 65–99)
GLUCOSE SERPL-MCNC: 70 MG/DL (ref 65–99)
GLUCOSE SERPL-MCNC: 74 MG/DL (ref 65–99)
GLUCOSE SERPL-MCNC: 75 MG/DL (ref 65–99)
GLUCOSE SERPL-MCNC: 77 MG/DL (ref 65–99)
GLUCOSE SERPL-MCNC: 78 MG/DL (ref 65–99)
GLUCOSE SERPL-MCNC: 80 MG/DL (ref 65–99)
GLUCOSE SERPL-MCNC: 80 MG/DL (ref 65–99)
GLUCOSE SERPL-MCNC: 84 MG/DL (ref 65–99)
GLUCOSE SERPL-MCNC: 91 MG/DL (ref 65–99)
GLUCOSE UR STRIP-MCNC: NEGATIVE MG/DL
GRAM STN SPEC: ABNORMAL
GRAM STN SPEC: NORMAL
HCT VFR BLD AUTO: 16.5 % (ref 34–46.6)
HCT VFR BLD AUTO: 21.5 % (ref 34–46.6)
HCT VFR BLD AUTO: 23.5 % (ref 34–46.6)
HCT VFR BLD AUTO: 25.3 % (ref 34–46.6)
HCT VFR BLD AUTO: 25.3 % (ref 34–46.6)
HCT VFR BLD AUTO: 26.7 % (ref 34–46.6)
HCT VFR BLD AUTO: 27 % (ref 34–46.6)
HCT VFR BLD AUTO: 27.1 % (ref 34–46.6)
HCT VFR BLD AUTO: 27.8 % (ref 34–46.6)
HCT VFR BLD AUTO: 28.8 % (ref 34–46.6)
HCT VFR BLD AUTO: 29.3 % (ref 34–46.6)
HCT VFR BLD AUTO: 30.7 % (ref 34–46.6)
HGB BLD-MCNC: 10 G/DL (ref 12–15.9)
HGB BLD-MCNC: 5 G/DL (ref 12–15.9)
HGB BLD-MCNC: 7.8 G/DL (ref 12–15.9)
HGB BLD-MCNC: 8.1 G/DL (ref 12–15.9)
HGB BLD-MCNC: 8.7 G/DL (ref 12–15.9)
HGB BLD-MCNC: 8.9 G/DL (ref 12–15.9)
HGB BLD-MCNC: 9.1 G/DL (ref 12–15.9)
HGB BLD-MCNC: 9.2 G/DL (ref 12–15.9)
HGB BLD-MCNC: 9.2 G/DL (ref 12–15.9)
HGB BLD-MCNC: 9.3 G/DL (ref 12–15.9)
HGB BLD-MCNC: 9.6 G/DL (ref 12–15.9)
HGB BLD-MCNC: 9.8 G/DL (ref 12–15.9)
HGB UR QL STRIP.AUTO: NEGATIVE
HOLD SPECIMEN: NORMAL
HOLD SPECIMEN: NORMAL
IMM GRANULOCYTES # BLD AUTO: 0.05 10*3/MM3 (ref 0–0.05)
IMM GRANULOCYTES # BLD AUTO: 0.06 10*3/MM3 (ref 0–0.05)
IMM GRANULOCYTES # BLD AUTO: 0.07 10*3/MM3 (ref 0–0.05)
IMM GRANULOCYTES # BLD AUTO: 0.1 10*3/MM3 (ref 0–0.05)
IMM GRANULOCYTES # BLD AUTO: 0.11 10*3/MM3 (ref 0–0.05)
IMM GRANULOCYTES # BLD AUTO: 0.19 10*3/MM3 (ref 0–0.05)
IMM GRANULOCYTES NFR BLD AUTO: 0.6 % (ref 0–0.5)
IMM GRANULOCYTES NFR BLD AUTO: 0.6 % (ref 0–0.5)
IMM GRANULOCYTES NFR BLD AUTO: 0.7 % (ref 0–0.5)
IMM GRANULOCYTES NFR BLD AUTO: 0.9 % (ref 0–0.5)
INR PPP: 1.56 (ref 0.9–1.1)
INR PPP: 1.59 (ref 0.9–1.1)
INR PPP: 1.75 (ref 0.9–1.1)
INR PPP: 1.83 (ref 0.9–1.1)
INR PPP: 2.02 (ref 0.9–1.1)
INR PPP: 2.18 (ref 0.9–1.1)
INR PPP: 2.58 (ref 0.9–1.1)
KETONES UR QL STRIP: NEGATIVE
LACTATE HOLD SPECIMEN: NORMAL
LDH FLD-CCNC: 51 U/L
LDH FLD-CCNC: 78 U/L
LEUKOCYTE ESTERASE UR QL STRIP.AUTO: NEGATIVE
LIPASE SERPL-CCNC: 39 U/L (ref 13–60)
LIPASE SERPL-CCNC: 44 U/L (ref 13–60)
LYMPHOCYTES # BLD AUTO: 0.78 10*3/MM3 (ref 0.7–3.1)
LYMPHOCYTES # BLD AUTO: 1.46 10*3/MM3 (ref 0.7–3.1)
LYMPHOCYTES # BLD AUTO: 1.5 10*3/MM3 (ref 0.7–3.1)
LYMPHOCYTES # BLD AUTO: 1.63 10*3/MM3 (ref 0.7–3.1)
LYMPHOCYTES # BLD AUTO: 2.06 10*3/MM3 (ref 0.7–3.1)
LYMPHOCYTES # BLD AUTO: 2.32 10*3/MM3 (ref 0.7–3.1)
LYMPHOCYTES # BLD MANUAL: 1.13 10*3/MM3 (ref 0.7–3.1)
LYMPHOCYTES NFR BLD AUTO: 10.9 % (ref 19.6–45.3)
LYMPHOCYTES NFR BLD AUTO: 13.9 % (ref 19.6–45.3)
LYMPHOCYTES NFR BLD AUTO: 16.3 % (ref 19.6–45.3)
LYMPHOCYTES NFR BLD AUTO: 16.6 % (ref 19.6–45.3)
LYMPHOCYTES NFR BLD AUTO: 20.2 % (ref 19.6–45.3)
LYMPHOCYTES NFR BLD AUTO: 3.7 % (ref 19.6–45.3)
LYMPHOCYTES NFR BLD MANUAL: 7.1 % (ref 19.6–45.3)
LYMPHOCYTES NFR FLD MANUAL: 63 %
LYMPHOCYTES NFR FLD MANUAL: 86 %
MACROCYTES BLD QL SMEAR: NORMAL
MACROCYTES BLD QL SMEAR: NORMAL
MAGNESIUM SERPL-MCNC: 1.8 MG/DL (ref 1.6–2.6)
MAGNESIUM SERPL-MCNC: 2.1 MG/DL (ref 1.6–2.6)
MCH RBC QN AUTO: 32.1 PG (ref 26.6–33)
MCH RBC QN AUTO: 32.4 PG (ref 26.6–33)
MCH RBC QN AUTO: 32.5 PG (ref 26.6–33)
MCH RBC QN AUTO: 33 PG (ref 26.6–33)
MCH RBC QN AUTO: 33.2 PG (ref 26.6–33)
MCH RBC QN AUTO: 33.6 PG (ref 26.6–33)
MCH RBC QN AUTO: 34.3 PG (ref 26.6–33)
MCH RBC QN AUTO: 34.7 PG (ref 26.6–33)
MCH RBC QN AUTO: 34.8 PG (ref 26.6–33)
MCH RBC QN AUTO: 34.8 PG (ref 26.6–33)
MCH RBC QN AUTO: 35.2 PG (ref 26.6–33)
MCHC RBC AUTO-ENTMCNC: 30.3 G/DL (ref 31.5–35.7)
MCHC RBC AUTO-ENTMCNC: 32.2 G/DL (ref 31.5–35.7)
MCHC RBC AUTO-ENTMCNC: 32.3 G/DL (ref 31.5–35.7)
MCHC RBC AUTO-ENTMCNC: 32.8 G/DL (ref 31.5–35.7)
MCHC RBC AUTO-ENTMCNC: 33.1 G/DL (ref 31.5–35.7)
MCHC RBC AUTO-ENTMCNC: 33.3 G/DL (ref 31.5–35.7)
MCHC RBC AUTO-ENTMCNC: 34.5 G/DL (ref 31.5–35.7)
MCHC RBC AUTO-ENTMCNC: 36 G/DL (ref 31.5–35.7)
MCHC RBC AUTO-ENTMCNC: 36.2 G/DL (ref 31.5–35.7)
MCHC RBC AUTO-ENTMCNC: 36.3 G/DL (ref 31.5–35.7)
MCHC RBC AUTO-ENTMCNC: 36.4 G/DL (ref 31.5–35.7)
MCV RBC AUTO: 100.7 FL (ref 79–97)
MCV RBC AUTO: 102.2 FL (ref 79–97)
MCV RBC AUTO: 105.3 FL (ref 79–97)
MCV RBC AUTO: 114.6 FL (ref 79–97)
MCV RBC AUTO: 91.5 FL (ref 79–97)
MCV RBC AUTO: 93.4 FL (ref 79–97)
MCV RBC AUTO: 94.4 FL (ref 79–97)
MCV RBC AUTO: 96.1 FL (ref 79–97)
MCV RBC AUTO: 98.9 FL (ref 79–97)
MCV RBC AUTO: 99 FL (ref 79–97)
MCV RBC AUTO: 99.6 FL (ref 79–97)
MONOCYTES # BLD AUTO: 0.77 10*3/MM3 (ref 0.1–0.9)
MONOCYTES # BLD AUTO: 0.85 10*3/MM3 (ref 0.1–0.9)
MONOCYTES # BLD AUTO: 0.85 10*3/MM3 (ref 0.1–0.9)
MONOCYTES # BLD AUTO: 0.87 10*3/MM3 (ref 0.1–0.9)
MONOCYTES # BLD AUTO: 1.4 10*3/MM3 (ref 0.1–0.9)
MONOCYTES # BLD AUTO: 1.62 10*3/MM3 (ref 0.1–0.9)
MONOCYTES # BLD MANUAL: 0.97 10*3/MM3 (ref 0.1–0.9)
MONOCYTES NFR BLD AUTO: 10.1 % (ref 5–12)
MONOCYTES NFR BLD AUTO: 10.7 % (ref 5–12)
MONOCYTES NFR BLD AUTO: 4 % (ref 5–12)
MONOCYTES NFR BLD AUTO: 6.9 % (ref 5–12)
MONOCYTES NFR BLD AUTO: 8.7 % (ref 5–12)
MONOCYTES NFR BLD AUTO: 9.7 % (ref 5–12)
MONOCYTES NFR BLD MANUAL: 6.1 % (ref 5–12)
MONOCYTES NFR FLD: 2 %
MONOCYTES NFR FLD: 4 %
NEUTROPHILS # BLD MANUAL: 13.72 10*3/MM3 (ref 1.7–7)
NEUTROPHILS NFR BLD AUTO: 10.47 10*3/MM3 (ref 1.7–7)
NEUTROPHILS NFR BLD AUTO: 12.15 10*3/MM3 (ref 1.7–7)
NEUTROPHILS NFR BLD AUTO: 19.19 10*3/MM3 (ref 1.7–7)
NEUTROPHILS NFR BLD AUTO: 4.82 10*3/MM3 (ref 1.7–7)
NEUTROPHILS NFR BLD AUTO: 66.6 % (ref 42.7–76)
NEUTROPHILS NFR BLD AUTO: 7.33 10*3/MM3 (ref 1.7–7)
NEUTROPHILS NFR BLD AUTO: 72.9 % (ref 42.7–76)
NEUTROPHILS NFR BLD AUTO: 73 % (ref 42.7–76)
NEUTROPHILS NFR BLD AUTO: 74.1 % (ref 42.7–76)
NEUTROPHILS NFR BLD AUTO: 75.8 % (ref 42.7–76)
NEUTROPHILS NFR BLD AUTO: 9.18 10*3/MM3 (ref 1.7–7)
NEUTROPHILS NFR BLD AUTO: 91.1 % (ref 42.7–76)
NEUTROPHILS NFR BLD MANUAL: 85.9 % (ref 42.7–76)
NEUTROPHILS NFR FLD MANUAL: 12 %
NEUTROPHILS NFR FLD MANUAL: 33 %
NITRITE UR QL STRIP: NEGATIVE
NRBC BLD AUTO-RTO: 0 /100 WBC (ref 0–0.2)
NRBC BLD AUTO-RTO: 0.2 /100 WBC (ref 0–0.2)
NT-PROBNP SERPL-MCNC: 6380 PG/ML (ref 0–900)
PH UR STRIP.AUTO: 5.5 [PH] (ref 5–8)
PLATELET # BLD AUTO: 129 10*3/MM3 (ref 140–450)
PLATELET # BLD AUTO: 140 10*3/MM3 (ref 140–450)
PLATELET # BLD AUTO: 147 10*3/MM3 (ref 140–450)
PLATELET # BLD AUTO: 151 10*3/MM3 (ref 140–450)
PLATELET # BLD AUTO: 152 10*3/MM3 (ref 140–450)
PLATELET # BLD AUTO: 154 10*3/MM3 (ref 140–450)
PLATELET # BLD AUTO: 161 10*3/MM3 (ref 140–450)
PLATELET # BLD AUTO: 173 10*3/MM3 (ref 140–450)
PLATELET # BLD AUTO: 173 10*3/MM3 (ref 140–450)
PLATELET # BLD AUTO: 216 10*3/MM3 (ref 140–450)
PLATELET # BLD AUTO: 221 10*3/MM3 (ref 140–450)
PLATELET BLD QL SMEAR: ABNORMAL
PMV BLD AUTO: 10.4 FL (ref 6–12)
PMV BLD AUTO: 11.1 FL (ref 6–12)
PMV BLD AUTO: 11.1 FL (ref 6–12)
PMV BLD AUTO: 11.4 FL (ref 6–12)
PMV BLD AUTO: 11.5 FL (ref 6–12)
PMV BLD AUTO: 11.8 FL (ref 6–12)
PMV BLD AUTO: 12 FL (ref 6–12)
PMV BLD AUTO: 12.4 FL (ref 6–12)
PMV BLD AUTO: 13.5 FL (ref 6–12)
POTASSIUM SERPL-SCNC: 3.2 MMOL/L (ref 3.5–5.2)
POTASSIUM SERPL-SCNC: 3.4 MMOL/L (ref 3.5–5.2)
POTASSIUM SERPL-SCNC: 3.9 MMOL/L (ref 3.5–5.2)
POTASSIUM SERPL-SCNC: 4.4 MMOL/L (ref 3.5–5.2)
POTASSIUM SERPL-SCNC: 4.7 MMOL/L (ref 3.5–5.2)
POTASSIUM SERPL-SCNC: 4.7 MMOL/L (ref 3.5–5.2)
POTASSIUM SERPL-SCNC: 5 MMOL/L (ref 3.5–5.2)
POTASSIUM SERPL-SCNC: 5.1 MMOL/L (ref 3.5–5.2)
POTASSIUM SERPL-SCNC: 5.2 MMOL/L (ref 3.5–5.2)
POTASSIUM SERPL-SCNC: 5.3 MMOL/L (ref 3.5–5.2)
PREALB SERPL-MCNC: 6 MG/DL (ref 10–36)
PROCALCITONIN SERPL-MCNC: 0.11 NG/ML (ref 0–0.25)
PROCALCITONIN SERPL-MCNC: 1.52 NG/ML (ref 0–0.25)
PROT FLD-MCNC: 1.5 G/DL
PROT SERPL-MCNC: 5.6 G/DL (ref 6–8.5)
PROT SERPL-MCNC: 5.8 G/DL (ref 6–8.5)
PROT SERPL-MCNC: 6.2 G/DL (ref 6–8.5)
PROT SERPL-MCNC: 6.2 G/DL (ref 6–8.5)
PROT SERPL-MCNC: 6.3 G/DL (ref 6–8.5)
PROT SERPL-MCNC: 6.3 G/DL (ref 6–8.5)
PROT SERPL-MCNC: 6.7 G/DL (ref 6–8.5)
PROT UR QL STRIP: NEGATIVE
PROTHROMBIN TIME: 19.3 SECONDS (ref 12–15.1)
PROTHROMBIN TIME: 19.5 SECONDS (ref 12–15.1)
PROTHROMBIN TIME: 21 SECONDS (ref 12–15.1)
PROTHROMBIN TIME: 22.2 SECONDS (ref 12–15.1)
PROTHROMBIN TIME: 23.4 SECONDS (ref 12–15.1)
PROTHROMBIN TIME: 24.8 SECONDS (ref 12–15.1)
PROTHROMBIN TIME: 29.4 SECONDS (ref 12–15.1)
RBC # BLD AUTO: 1.44 10*6/MM3 (ref 3.77–5.28)
RBC # BLD AUTO: 2.3 10*6/MM3 (ref 3.77–5.28)
RBC # BLD AUTO: 2.35 10*6/MM3 (ref 3.77–5.28)
RBC # BLD AUTO: 2.64 10*6/MM3 (ref 3.77–5.28)
RBC # BLD AUTO: 2.68 10*6/MM3 (ref 3.77–5.28)
RBC # BLD AUTO: 2.7 10*6/MM3 (ref 3.77–5.28)
RBC # BLD AUTO: 2.71 10*6/MM3 (ref 3.77–5.28)
RBC # BLD AUTO: 2.71 10*6/MM3 (ref 3.77–5.28)
RBC # BLD AUTO: 2.82 10*6/MM3 (ref 3.77–5.28)
RBC # BLD AUTO: 2.86 10*6/MM3 (ref 3.77–5.28)
RBC # BLD AUTO: 2.96 10*6/MM3 (ref 3.77–5.28)
RBC # FLD AUTO: 1000 /MM3 (ref 0–200000)
RBC # FLD AUTO: ABNORMAL /MM3 (ref 0–200000)
RBC MORPH BLD: ABNORMAL
RH BLD: POSITIVE
SARS-COV-2 RNA PNL SPEC NAA+PROBE: NOT DETECTED
SARS-COV-2 RNA PNL SPEC NAA+PROBE: NOT DETECTED
SMALL PLATELETS BLD QL SMEAR: ADEQUATE
SODIUM SERPL-SCNC: 128 MMOL/L (ref 136–145)
SODIUM SERPL-SCNC: 128 MMOL/L (ref 136–145)
SODIUM SERPL-SCNC: 129 MMOL/L (ref 136–145)
SODIUM SERPL-SCNC: 130 MMOL/L (ref 136–145)
SODIUM SERPL-SCNC: 131 MMOL/L (ref 136–145)
SODIUM SERPL-SCNC: 132 MMOL/L (ref 136–145)
SODIUM SERPL-SCNC: 134 MMOL/L (ref 136–145)
SODIUM SERPL-SCNC: 137 MMOL/L (ref 136–145)
SP GR UR STRIP: 1.02 (ref 1–1.03)
T&S EXPIRATION DATE: NORMAL
T4 FREE SERPL-MCNC: 1.3 NG/DL (ref 0.93–1.7)
T4 FREE SERPL-MCNC: 1.53 NG/DL (ref 0.93–1.7)
TROPONIN T SERPL-MCNC: 0.04 NG/ML (ref 0–0.03)
TSH SERPL DL<=0.005 MIU/L-ACNC: 1.27 UIU/ML (ref 0.27–4.2)
TSH SERPL DL<=0.005 MIU/L-ACNC: 4.09 UIU/ML (ref 0.27–4.2)
TSH SERPL DL<=0.05 MIU/L-ACNC: 3.4 UIU/ML (ref 0.27–4.2)
UNIT  ABO: NORMAL
UNIT  ABO: NORMAL
UNIT  RH: NORMAL
UNIT  RH: NORMAL
UROBILINOGEN UR QL STRIP: ABNORMAL
VIT B1 BLD-SCNC: 78 NMOL/L (ref 66.5–200)
VIT B12 SERPL-MCNC: 1242 PG/ML (ref 211–946)
VIT B6 SERPL-MCNC: 1.8 UG/L (ref 2–32.8)
WBC # BLD AUTO: 10.03 10*3/MM3 (ref 3.4–10.8)
WBC # BLD AUTO: 11.61 10*3/MM3 (ref 3.4–10.8)
WBC # BLD AUTO: 12.39 10*3/MM3 (ref 3.4–10.8)
WBC # BLD AUTO: 13.69 10*3/MM3 (ref 3.4–10.8)
WBC # BLD AUTO: 13.82 10*3/MM3 (ref 3.4–10.8)
WBC # BLD AUTO: 14.46 10*3/MM3 (ref 3.4–10.8)
WBC # BLD AUTO: 15.97 10*3/MM3 (ref 3.4–10.8)
WBC # BLD AUTO: 16.66 10*3/MM3 (ref 3.4–10.8)
WBC # BLD AUTO: 21.08 10*3/MM3 (ref 3.4–10.8)
WBC # BLD AUTO: 7.23 10*3/MM3 (ref 3.4–10.8)
WBC # BLD AUTO: 9 10*3/MM3 (ref 3.4–10.8)
WBC # FLD AUTO: 246 /MM3 (ref 0–1000)
WBC # FLD AUTO: 32 /MM3 (ref 0–1000)
WBC MORPH BLD: NORMAL
WHOLE BLOOD HOLD SPECIMEN: NORMAL
WHOLE BLOOD HOLD SPECIMEN: NORMAL

## 2021-01-01 PROCEDURE — 0W9G3ZZ DRAINAGE OF PERITONEAL CAVITY, PERCUTANEOUS APPROACH: ICD-10-PCS | Performed by: RADIOLOGY

## 2021-01-01 PROCEDURE — 82945 GLUCOSE OTHER FLUID: CPT | Performed by: INTERNAL MEDICINE

## 2021-01-01 PROCEDURE — 25010000002 FUROSEMIDE PER 20 MG: Performed by: FAMILY MEDICINE

## 2021-01-01 PROCEDURE — 71045 X-RAY EXAM CHEST 1 VIEW: CPT

## 2021-01-01 PROCEDURE — 85025 COMPLETE CBC W/AUTO DIFF WBC: CPT | Performed by: NURSE PRACTITIONER

## 2021-01-01 PROCEDURE — 36415 COLL VENOUS BLD VENIPUNCTURE: CPT

## 2021-01-01 PROCEDURE — 25010000002 THIAMINE PER 100 MG: Performed by: NURSE PRACTITIONER

## 2021-01-01 PROCEDURE — 96365 THER/PROPH/DIAG IV INF INIT: CPT

## 2021-01-01 PROCEDURE — 85025 COMPLETE CBC W/AUTO DIFF WBC: CPT | Performed by: INTERNAL MEDICINE

## 2021-01-01 PROCEDURE — 90791 PSYCH DIAGNOSTIC EVALUATION: CPT | Performed by: COUNSELOR

## 2021-01-01 PROCEDURE — 87205 SMEAR GRAM STAIN: CPT | Performed by: EMERGENCY MEDICINE

## 2021-01-01 PROCEDURE — P9047 ALBUMIN (HUMAN), 25%, 50ML: HCPCS | Performed by: EMERGENCY MEDICINE

## 2021-01-01 PROCEDURE — 86900 BLOOD TYPING SEROLOGIC ABO: CPT | Performed by: NURSE PRACTITIONER

## 2021-01-01 PROCEDURE — 81003 URINALYSIS AUTO W/O SCOPE: CPT | Performed by: PHYSICIAN ASSISTANT

## 2021-01-01 PROCEDURE — 99213 OFFICE O/P EST LOW 20 MIN: CPT | Performed by: FAMILY MEDICINE

## 2021-01-01 PROCEDURE — 25010000002 LORAZEPAM PER 2 MG

## 2021-01-01 PROCEDURE — 25010000002 ALBUMIN HUMAN 25% PER 50 ML: Performed by: EMERGENCY MEDICINE

## 2021-01-01 PROCEDURE — 85007 BL SMEAR W/DIFF WBC COUNT: CPT | Performed by: NURSE PRACTITIONER

## 2021-01-01 PROCEDURE — 99231 SBSQ HOSP IP/OBS SF/LOW 25: CPT | Performed by: FAMILY MEDICINE

## 2021-01-01 PROCEDURE — 25010000002 LORAZEPAM PER 2 MG: Performed by: SURGERY

## 2021-01-01 PROCEDURE — 99495 TRANSJ CARE MGMT MOD F2F 14D: CPT | Performed by: FAMILY MEDICINE

## 2021-01-01 PROCEDURE — 25010000002 MAGNESIUM SULFATE PER 500 MG OF MAGNESIUM: Performed by: NURSE PRACTITIONER

## 2021-01-01 PROCEDURE — 83615 LACTATE (LD) (LDH) ENZYME: CPT | Performed by: INTERNAL MEDICINE

## 2021-01-01 PROCEDURE — 87070 CULTURE OTHR SPECIMN AEROBIC: CPT | Performed by: EMERGENCY MEDICINE

## 2021-01-01 PROCEDURE — G0378 HOSPITAL OBSERVATION PER HR: HCPCS

## 2021-01-01 PROCEDURE — 85007 BL SMEAR W/DIFF WBC COUNT: CPT | Performed by: EMERGENCY MEDICINE

## 2021-01-01 PROCEDURE — 32551 INSERTION OF CHEST TUBE: CPT | Performed by: SURGERY

## 2021-01-01 PROCEDURE — 25010000002 CEFTRIAXONE SODIUM-DEXTROSE 1-3.74 GM-%(50ML) RECONSTITUTED SOLUTION: Performed by: EMERGENCY MEDICINE

## 2021-01-01 PROCEDURE — 85027 COMPLETE CBC AUTOMATED: CPT

## 2021-01-01 PROCEDURE — 49083 ABD PARACENTESIS W/IMAGING: CPT | Performed by: INTERNAL MEDICINE

## 2021-01-01 PROCEDURE — 80048 BASIC METABOLIC PNL TOTAL CA: CPT | Performed by: EMERGENCY MEDICINE

## 2021-01-01 PROCEDURE — 84484 ASSAY OF TROPONIN QUANT: CPT | Performed by: NURSE PRACTITIONER

## 2021-01-01 PROCEDURE — 77063 BREAST TOMOSYNTHESIS BI: CPT | Performed by: RADIOLOGY

## 2021-01-01 PROCEDURE — 84145 PROCALCITONIN (PCT): CPT | Performed by: EMERGENCY MEDICINE

## 2021-01-01 PROCEDURE — 99396 PREV VISIT EST AGE 40-64: CPT | Performed by: OBSTETRICS & GYNECOLOGY

## 2021-01-01 PROCEDURE — 83690 ASSAY OF LIPASE: CPT | Performed by: NURSE PRACTITIONER

## 2021-01-01 PROCEDURE — 87075 CULTR BACTERIA EXCEPT BLOOD: CPT | Performed by: EMERGENCY MEDICINE

## 2021-01-01 PROCEDURE — 80053 COMPREHEN METABOLIC PANEL: CPT | Performed by: FAMILY MEDICINE

## 2021-01-01 PROCEDURE — 82077 ASSAY SPEC XCP UR&BREATH IA: CPT | Performed by: NURSE PRACTITIONER

## 2021-01-01 PROCEDURE — 85730 THROMBOPLASTIN TIME PARTIAL: CPT | Performed by: NURSE PRACTITIONER

## 2021-01-01 PROCEDURE — 25010000002 HYDROMORPHONE 1 MG/ML SOLUTION: Performed by: SURGERY

## 2021-01-01 PROCEDURE — 74176 CT ABD & PELVIS W/O CONTRAST: CPT

## 2021-01-01 PROCEDURE — 80048 BASIC METABOLIC PNL TOTAL CA: CPT | Performed by: FAMILY MEDICINE

## 2021-01-01 PROCEDURE — 99213 OFFICE O/P EST LOW 20 MIN: CPT | Performed by: NURSE PRACTITIONER

## 2021-01-01 PROCEDURE — 76942 ECHO GUIDE FOR BIOPSY: CPT

## 2021-01-01 PROCEDURE — 77063 BREAST TOMOSYNTHESIS BI: CPT

## 2021-01-01 PROCEDURE — 85025 COMPLETE CBC W/AUTO DIFF WBC: CPT | Performed by: EMERGENCY MEDICINE

## 2021-01-01 PROCEDURE — 99213 OFFICE O/P EST LOW 20 MIN: CPT | Performed by: PHYSICIAN ASSISTANT

## 2021-01-01 PROCEDURE — 85730 THROMBOPLASTIN TIME PARTIAL: CPT

## 2021-01-01 PROCEDURE — 87015 SPECIMEN INFECT AGNT CONCNTJ: CPT | Performed by: EMERGENCY MEDICINE

## 2021-01-01 PROCEDURE — 87635 SARS-COV-2 COVID-19 AMP PRB: CPT | Performed by: INTERNAL MEDICINE

## 2021-01-01 PROCEDURE — 93005 ELECTROCARDIOGRAM TRACING: CPT | Performed by: EMERGENCY MEDICINE

## 2021-01-01 PROCEDURE — 85610 PROTHROMBIN TIME: CPT | Performed by: FAMILY MEDICINE

## 2021-01-01 PROCEDURE — 84145 PROCALCITONIN (PCT): CPT | Performed by: NURSE PRACTITIONER

## 2021-01-01 PROCEDURE — C9803 HOPD COVID-19 SPEC COLLECT: HCPCS

## 2021-01-01 PROCEDURE — 25010000002 ONDANSETRON PER 1 MG: Performed by: NURSE PRACTITIONER

## 2021-01-01 PROCEDURE — 80053 COMPREHEN METABOLIC PANEL: CPT | Performed by: PHYSICIAN ASSISTANT

## 2021-01-01 PROCEDURE — 99284 EMERGENCY DEPT VISIT MOD MDM: CPT

## 2021-01-01 PROCEDURE — 99232 SBSQ HOSP IP/OBS MODERATE 35: CPT | Performed by: SURGERY

## 2021-01-01 PROCEDURE — 90686 IIV4 VACC NO PRSV 0.5 ML IM: CPT | Performed by: FAMILY MEDICINE

## 2021-01-01 PROCEDURE — P9016 RBC LEUKOCYTES REDUCED: HCPCS

## 2021-01-01 PROCEDURE — 85610 PROTHROMBIN TIME: CPT | Performed by: NURSE PRACTITIONER

## 2021-01-01 PROCEDURE — 85610 PROTHROMBIN TIME: CPT

## 2021-01-01 PROCEDURE — 83735 ASSAY OF MAGNESIUM: CPT | Performed by: NURSE PRACTITIONER

## 2021-01-01 PROCEDURE — 99232 SBSQ HOSP IP/OBS MODERATE 35: CPT | Performed by: INTERNAL MEDICINE

## 2021-01-01 PROCEDURE — 86850 RBC ANTIBODY SCREEN: CPT | Performed by: NURSE PRACTITIONER

## 2021-01-01 PROCEDURE — 99222 1ST HOSP IP/OBS MODERATE 55: CPT | Performed by: INTERNAL MEDICINE

## 2021-01-01 PROCEDURE — 83605 ASSAY OF LACTIC ACID: CPT | Performed by: PHYSICIAN ASSISTANT

## 2021-01-01 PROCEDURE — 85610 PROTHROMBIN TIME: CPT | Performed by: EMERGENCY MEDICINE

## 2021-01-01 PROCEDURE — 71275 CT ANGIOGRAPHY CHEST: CPT

## 2021-01-01 PROCEDURE — 96375 TX/PRO/DX INJ NEW DRUG ADDON: CPT

## 2021-01-01 PROCEDURE — 80053 COMPREHEN METABOLIC PANEL: CPT

## 2021-01-01 PROCEDURE — 77067 SCR MAMMO BI INCL CAD: CPT

## 2021-01-01 PROCEDURE — 87040 BLOOD CULTURE FOR BACTERIA: CPT | Performed by: PHYSICIAN ASSISTANT

## 2021-01-01 PROCEDURE — 99232 SBSQ HOSP IP/OBS MODERATE 35: CPT | Performed by: FAMILY MEDICINE

## 2021-01-01 PROCEDURE — 87040 BLOOD CULTURE FOR BACTERIA: CPT | Performed by: EMERGENCY MEDICINE

## 2021-01-01 PROCEDURE — 0W9B30Z DRAINAGE OF LEFT PLEURAL CAVITY WITH DRAINAGE DEVICE, PERCUTANEOUS APPROACH: ICD-10-PCS | Performed by: SURGERY

## 2021-01-01 PROCEDURE — 25010000002 PIPERACILLIN SOD-TAZOBACTAM PER 1 G: Performed by: EMERGENCY MEDICINE

## 2021-01-01 PROCEDURE — 82140 ASSAY OF AMMONIA: CPT | Performed by: NURSE PRACTITIONER

## 2021-01-01 PROCEDURE — 82550 ASSAY OF CK (CPK): CPT | Performed by: NURSE PRACTITIONER

## 2021-01-01 PROCEDURE — 25010000002 CEFTRIAXONE SODIUM-DEXTROSE 1-3.74 GM-%(50ML) RECONSTITUTED SOLUTION: Performed by: PHYSICIAN ASSISTANT

## 2021-01-01 PROCEDURE — 86920 COMPATIBILITY TEST SPIN: CPT

## 2021-01-01 PROCEDURE — 86901 BLOOD TYPING SEROLOGIC RH(D): CPT | Performed by: NURSE PRACTITIONER

## 2021-01-01 PROCEDURE — 99496 TRANSJ CARE MGMT HIGH F2F 7D: CPT | Performed by: FAMILY MEDICINE

## 2021-01-01 PROCEDURE — 89051 BODY FLUID CELL COUNT: CPT | Performed by: EMERGENCY MEDICINE

## 2021-01-01 PROCEDURE — 89051 BODY FLUID CELL COUNT: CPT | Performed by: INTERNAL MEDICINE

## 2021-01-01 PROCEDURE — 80053 COMPREHEN METABOLIC PANEL: CPT | Performed by: INTERNAL MEDICINE

## 2021-01-01 PROCEDURE — 82042 OTHER SOURCE ALBUMIN QUAN EA: CPT | Performed by: EMERGENCY MEDICINE

## 2021-01-01 PROCEDURE — 99212 OFFICE O/P EST SF 10 MIN: CPT | Performed by: FAMILY MEDICINE

## 2021-01-01 PROCEDURE — 77067 SCR MAMMO BI INCL CAD: CPT | Performed by: RADIOLOGY

## 2021-01-01 PROCEDURE — 99217 PR OBSERVATION CARE DISCHARGE MANAGEMENT: CPT | Performed by: NURSE PRACTITIONER

## 2021-01-01 PROCEDURE — 85025 COMPLETE CBC W/AUTO DIFF WBC: CPT | Performed by: PHYSICIAN ASSISTANT

## 2021-01-01 PROCEDURE — G0180 MD CERTIFICATION HHA PATIENT: HCPCS | Performed by: FAMILY MEDICINE

## 2021-01-01 PROCEDURE — 25010000002 IOPAMIDOL 61 % SOLUTION: Performed by: EMERGENCY MEDICINE

## 2021-01-01 PROCEDURE — 99239 HOSP IP/OBS DSCHRG MGMT >30: CPT | Performed by: FAMILY MEDICINE

## 2021-01-01 PROCEDURE — 99214 OFFICE O/P EST MOD 30 MIN: CPT | Performed by: NURSE PRACTITIONER

## 2021-01-01 PROCEDURE — 85014 HEMATOCRIT: CPT | Performed by: FAMILY MEDICINE

## 2021-01-01 PROCEDURE — 0W9B3ZZ DRAINAGE OF LEFT PLEURAL CAVITY, PERCUTANEOUS APPROACH: ICD-10-PCS | Performed by: EMERGENCY MEDICINE

## 2021-01-01 PROCEDURE — 90471 IMMUNIZATION ADMIN: CPT | Performed by: FAMILY MEDICINE

## 2021-01-01 PROCEDURE — 86900 BLOOD TYPING SEROLOGIC ABO: CPT

## 2021-01-01 PROCEDURE — 85007 BL SMEAR W/DIFF WBC COUNT: CPT | Performed by: PHYSICIAN ASSISTANT

## 2021-01-01 PROCEDURE — 25010000003 LIDOCAINE 1 % SOLUTION

## 2021-01-01 PROCEDURE — 99214 OFFICE O/P EST MOD 30 MIN: CPT | Performed by: FAMILY MEDICINE

## 2021-01-01 PROCEDURE — 71046 X-RAY EXAM CHEST 2 VIEWS: CPT

## 2021-01-01 PROCEDURE — 99219 PR INITIAL OBSERVATION CARE/DAY 50 MINUTES: CPT | Performed by: EMERGENCY MEDICINE

## 2021-01-01 PROCEDURE — 80053 COMPREHEN METABOLIC PANEL: CPT | Performed by: EMERGENCY MEDICINE

## 2021-01-01 PROCEDURE — 85027 COMPLETE CBC AUTOMATED: CPT | Performed by: FAMILY MEDICINE

## 2021-01-01 PROCEDURE — 80053 COMPREHEN METABOLIC PANEL: CPT | Performed by: NURSE PRACTITIONER

## 2021-01-01 PROCEDURE — 36430 TRANSFUSION BLD/BLD COMPNT: CPT

## 2021-01-01 PROCEDURE — 83880 ASSAY OF NATRIURETIC PEPTIDE: CPT | Performed by: NURSE PRACTITIONER

## 2021-01-01 PROCEDURE — 84443 ASSAY THYROID STIM HORMONE: CPT | Performed by: NURSE PRACTITIONER

## 2021-01-01 PROCEDURE — 82247 BILIRUBIN TOTAL: CPT | Performed by: EMERGENCY MEDICINE

## 2021-01-01 PROCEDURE — 84157 ASSAY OF PROTEIN OTHER: CPT | Performed by: INTERNAL MEDICINE

## 2021-01-01 PROCEDURE — 74177 CT ABD & PELVIS W/CONTRAST: CPT

## 2021-01-01 PROCEDURE — 99212 OFFICE O/P EST SF 10 MIN: CPT | Performed by: SURGERY

## 2021-01-01 PROCEDURE — 0W9G3ZZ DRAINAGE OF PERITONEAL CAVITY, PERCUTANEOUS APPROACH: ICD-10-PCS | Performed by: INTERNAL MEDICINE

## 2021-01-01 PROCEDURE — 83605 ASSAY OF LACTIC ACID: CPT | Performed by: NURSE PRACTITIONER

## 2021-01-01 PROCEDURE — 83690 ASSAY OF LIPASE: CPT | Performed by: PHYSICIAN ASSISTANT

## 2021-01-01 PROCEDURE — 87635 SARS-COV-2 COVID-19 AMP PRB: CPT | Performed by: EMERGENCY MEDICINE

## 2021-01-01 PROCEDURE — 85018 HEMOGLOBIN: CPT | Performed by: FAMILY MEDICINE

## 2021-01-01 PROCEDURE — 83615 LACTATE (LD) (LDH) ENZYME: CPT | Performed by: EMERGENCY MEDICINE

## 2021-01-01 RX ORDER — CEFTRIAXONE 1 G/50ML
1 INJECTION, SOLUTION INTRAVENOUS ONCE
Status: COMPLETED | OUTPATIENT
Start: 2021-01-01 | End: 2021-01-01

## 2021-01-01 RX ORDER — PANTOPRAZOLE SODIUM 40 MG/1
40 TABLET, DELAYED RELEASE ORAL DAILY
Status: DISCONTINUED | OUTPATIENT
Start: 2021-01-01 | End: 2021-01-01 | Stop reason: HOSPADM

## 2021-01-01 RX ORDER — LORAZEPAM 0.5 MG/1
0.5 TABLET ORAL 2 TIMES DAILY PRN
Qty: 60 TABLET | Refills: 2 | Status: SHIPPED | OUTPATIENT
Start: 2021-01-01

## 2021-01-01 RX ORDER — LACTULOSE 10 G/15ML
20 SOLUTION ORAL 3 TIMES DAILY PRN
Status: DISCONTINUED | OUTPATIENT
Start: 2021-01-01 | End: 2021-01-01 | Stop reason: HOSPADM

## 2021-01-01 RX ORDER — PANTOPRAZOLE SODIUM 40 MG/1
40 TABLET, DELAYED RELEASE ORAL DAILY
COMMUNITY
End: 2021-01-01

## 2021-01-01 RX ORDER — SODIUM CHLORIDE 0.9 % (FLUSH) 0.9 %
10 SYRINGE (ML) INJECTION AS NEEDED
Status: DISCONTINUED | OUTPATIENT
Start: 2021-01-01 | End: 2021-01-01 | Stop reason: HOSPADM

## 2021-01-01 RX ORDER — SPIRONOLACTONE 50 MG/1
50 TABLET, FILM COATED ORAL DAILY
COMMUNITY
Start: 2021-01-01 | End: 2021-01-01

## 2021-01-01 RX ORDER — LORAZEPAM 2 MG/ML
0.5 INJECTION INTRAMUSCULAR ONCE
Status: COMPLETED | OUTPATIENT
Start: 2021-01-01 | End: 2021-01-01

## 2021-01-01 RX ORDER — MULTIPLE VITAMINS W/ MINERALS TAB 9MG-400MCG
1 TAB ORAL DAILY
Status: DISCONTINUED | OUTPATIENT
Start: 2021-01-01 | End: 2021-01-01 | Stop reason: HOSPADM

## 2021-01-01 RX ORDER — DESVENLAFAXINE 100 MG/1
TABLET, EXTENDED RELEASE ORAL
Qty: 90 TABLET | Refills: 2 | OUTPATIENT
Start: 2021-01-01

## 2021-01-01 RX ORDER — LIDOCAINE HYDROCHLORIDE 10 MG/ML
INJECTION, SOLUTION INFILTRATION; PERINEURAL
Status: COMPLETED
Start: 2021-01-01 | End: 2021-01-01

## 2021-01-01 RX ORDER — ACETAMINOPHEN 160 MG/5ML
650 SOLUTION ORAL EVERY 4 HOURS PRN
Status: DISCONTINUED | OUTPATIENT
Start: 2021-01-01 | End: 2021-01-01 | Stop reason: HOSPADM

## 2021-01-01 RX ORDER — LACTULOSE 10 G/15ML
20 SOLUTION ORAL 2 TIMES DAILY PRN
COMMUNITY
End: 2021-01-01 | Stop reason: SDUPTHER

## 2021-01-01 RX ORDER — B-COMPLEX WITH VITAMIN C
1 TABLET ORAL DAILY
COMMUNITY
End: 2021-01-01

## 2021-01-01 RX ORDER — LEVOTHYROXINE SODIUM 0.05 MG/1
50 TABLET ORAL DAILY
Status: DISCONTINUED | OUTPATIENT
Start: 2021-01-01 | End: 2021-01-01 | Stop reason: HOSPADM

## 2021-01-01 RX ORDER — SPIRONOLACTONE 25 MG/1
50 TABLET ORAL DAILY
Status: DISCONTINUED | OUTPATIENT
Start: 2021-01-01 | End: 2021-01-01 | Stop reason: HOSPADM

## 2021-01-01 RX ORDER — ALBUMIN (HUMAN) 12.5 G/50ML
25 SOLUTION INTRAVENOUS ONCE
Status: COMPLETED | OUTPATIENT
Start: 2021-01-01 | End: 2021-01-01

## 2021-01-01 RX ORDER — RIFAXIMIN 550 MG/1
TABLET ORAL
Qty: 42 TABLET | Refills: 0 | OUTPATIENT
Start: 2021-01-01

## 2021-01-01 RX ORDER — ZOLPIDEM TARTRATE 5 MG/1
2.5 TABLET ORAL NIGHTLY PRN
Status: DISCONTINUED | OUTPATIENT
Start: 2021-01-01 | End: 2021-01-01 | Stop reason: HOSPADM

## 2021-01-01 RX ORDER — DOXEPIN HYDROCHLORIDE 10 MG/1
10 CAPSULE ORAL NIGHTLY
Qty: 30 CAPSULE | Refills: 2 | Status: SHIPPED | OUTPATIENT
Start: 2021-01-01 | End: 2021-01-01

## 2021-01-01 RX ORDER — ZOLPIDEM TARTRATE 10 MG/1
10 TABLET ORAL NIGHTLY PRN
Qty: 30 TABLET | Refills: 2 | Status: SHIPPED | OUTPATIENT
Start: 2021-01-01

## 2021-01-01 RX ORDER — TEMAZEPAM 15 MG/1
15 CAPSULE ORAL NIGHTLY PRN
Qty: 7 CAPSULE | Refills: 0 | Status: SHIPPED | OUTPATIENT
Start: 2021-01-01 | End: 2021-01-01 | Stop reason: SDUPTHER

## 2021-01-01 RX ORDER — DESVENLAFAXINE SUCCINATE 50 MG/1
TABLET, EXTENDED RELEASE ORAL
Qty: 30 TABLET | OUTPATIENT
Start: 2021-01-01

## 2021-01-01 RX ORDER — BACITRACIN ZINC 500 [USP'U]/G
OINTMENT TOPICAL ONCE
Status: COMPLETED | OUTPATIENT
Start: 2021-01-01 | End: 2021-01-01

## 2021-01-01 RX ORDER — FUROSEMIDE 10 MG/ML
20 INJECTION INTRAMUSCULAR; INTRAVENOUS ONCE
Status: COMPLETED | OUTPATIENT
Start: 2021-01-01 | End: 2021-01-01

## 2021-01-01 RX ORDER — SODIUM CHLORIDE 0.9 % (FLUSH) 0.9 %
10 SYRINGE (ML) INJECTION EVERY 12 HOURS SCHEDULED
Status: DISCONTINUED | OUTPATIENT
Start: 2021-01-01 | End: 2021-01-01 | Stop reason: HOSPADM

## 2021-01-01 RX ORDER — DOXEPIN HYDROCHLORIDE 10 MG/1
10 CAPSULE ORAL NIGHTLY
COMMUNITY
Start: 2021-01-01 | End: 2021-01-01

## 2021-01-01 RX ORDER — LIDOCAINE HYDROCHLORIDE 10 MG/ML
5 INJECTION, SOLUTION INFILTRATION; PERINEURAL ONCE
Status: COMPLETED | OUTPATIENT
Start: 2021-01-01 | End: 2021-01-01

## 2021-01-01 RX ORDER — ACETAMINOPHEN 325 MG/1
650 TABLET ORAL EVERY 4 HOURS PRN
Status: DISCONTINUED | OUTPATIENT
Start: 2021-01-01 | End: 2021-01-01 | Stop reason: HOSPADM

## 2021-01-01 RX ORDER — FUROSEMIDE 10 MG/ML
20 INJECTION INTRAMUSCULAR; INTRAVENOUS EVERY 6 HOURS
Status: COMPLETED | OUTPATIENT
Start: 2021-01-01 | End: 2021-01-01

## 2021-01-01 RX ORDER — ARIPIPRAZOLE 10 MG/1
10 TABLET ORAL DAILY
Qty: 90 TABLET | Refills: 0 | Status: SHIPPED | OUTPATIENT
Start: 2021-01-01 | End: 2021-01-01 | Stop reason: SDUPTHER

## 2021-01-01 RX ORDER — LORAZEPAM 0.5 MG/1
0.5 TABLET ORAL 2 TIMES DAILY PRN
Qty: 60 TABLET | Refills: 1 | Status: SHIPPED | OUTPATIENT
Start: 2021-01-01 | End: 2021-01-01 | Stop reason: SDUPTHER

## 2021-01-01 RX ORDER — LEVOTHYROXINE SODIUM 0.05 MG/1
50 TABLET ORAL DAILY
Qty: 90 TABLET | Refills: 3 | Status: SHIPPED | OUTPATIENT
Start: 2021-01-01

## 2021-01-01 RX ORDER — DOXEPIN HYDROCHLORIDE 10 MG/1
CAPSULE ORAL
Qty: 30 CAPSULE | Refills: 6 | Status: SHIPPED | OUTPATIENT
Start: 2021-01-01 | End: 2021-01-01

## 2021-01-01 RX ORDER — NADOLOL 20 MG/1
TABLET ORAL
COMMUNITY
Start: 2021-01-01

## 2021-01-01 RX ORDER — DESVENLAFAXINE 100 MG/1
100 TABLET, EXTENDED RELEASE ORAL DAILY
Qty: 90 TABLET | Refills: 2 | Status: SHIPPED | OUTPATIENT
Start: 2021-01-01 | End: 2021-01-01 | Stop reason: SDUPTHER

## 2021-01-01 RX ORDER — ARIPIPRAZOLE 10 MG/1
10 TABLET ORAL DAILY
Qty: 30 TABLET | Refills: 3 | Status: SHIPPED | OUTPATIENT
Start: 2021-01-01

## 2021-01-01 RX ORDER — LORAZEPAM 2 MG/ML
INJECTION INTRAMUSCULAR
Status: COMPLETED
Start: 2021-01-01 | End: 2021-01-01

## 2021-01-01 RX ORDER — FUROSEMIDE 20 MG/1
20 TABLET ORAL DAILY PRN
Qty: 7 TABLET | Refills: 0 | Status: SHIPPED | OUTPATIENT
Start: 2021-01-01 | End: 2021-01-01

## 2021-01-01 RX ORDER — VENLAFAXINE HYDROCHLORIDE 75 MG/1
75 CAPSULE, EXTENDED RELEASE ORAL
Status: DISCONTINUED | OUTPATIENT
Start: 2021-01-01 | End: 2021-01-01 | Stop reason: HOSPADM

## 2021-01-01 RX ORDER — LORAZEPAM 2 MG/ML
1 INJECTION INTRAMUSCULAR ONCE
Status: DISCONTINUED | OUTPATIENT
Start: 2021-01-01 | End: 2021-01-01

## 2021-01-01 RX ORDER — ARIPIPRAZOLE 10 MG/1
10 TABLET ORAL DAILY
Qty: 30 TABLET | Refills: 1 | Status: SHIPPED | OUTPATIENT
Start: 2021-01-01 | End: 2021-01-01 | Stop reason: SDUPTHER

## 2021-01-01 RX ORDER — FUROSEMIDE 40 MG/1
40 TABLET ORAL DAILY
Status: DISCONTINUED | OUTPATIENT
Start: 2021-01-01 | End: 2021-01-01 | Stop reason: HOSPADM

## 2021-01-01 RX ORDER — ONDANSETRON 2 MG/ML
4 INJECTION INTRAMUSCULAR; INTRAVENOUS EVERY 6 HOURS PRN
Status: DISCONTINUED | OUTPATIENT
Start: 2021-01-01 | End: 2021-01-01 | Stop reason: HOSPADM

## 2021-01-01 RX ORDER — POTASSIUM CHLORIDE 20 MEQ/1
20 TABLET, EXTENDED RELEASE ORAL DAILY
Status: DISCONTINUED | OUTPATIENT
Start: 2021-01-01 | End: 2021-01-01

## 2021-01-01 RX ORDER — ZOLPIDEM TARTRATE 10 MG/1
TABLET ORAL
COMMUNITY
Start: 2021-01-01 | End: 2021-01-01 | Stop reason: SDUPTHER

## 2021-01-01 RX ORDER — ARIPIPRAZOLE 5 MG/1
5 TABLET ORAL DAILY
Qty: 30 TABLET | Refills: 3 | Status: SHIPPED | OUTPATIENT
Start: 2021-01-01 | End: 2021-01-01

## 2021-01-01 RX ORDER — POTASSIUM CHLORIDE 1500 MG/1
20 TABLET, FILM COATED, EXTENDED RELEASE ORAL DAILY
COMMUNITY
Start: 2021-01-01 | End: 2021-01-01

## 2021-01-01 RX ORDER — TEMAZEPAM 15 MG/1
15 CAPSULE ORAL NIGHTLY PRN
Qty: 30 CAPSULE | Refills: 2 | Status: SHIPPED | OUTPATIENT
Start: 2021-01-01 | End: 2021-01-01 | Stop reason: ALTCHOICE

## 2021-01-01 RX ORDER — ARIPIPRAZOLE 10 MG/1
10 TABLET ORAL DAILY
Qty: 30 TABLET | Refills: 1 | Status: SHIPPED | OUTPATIENT
Start: 2021-01-01 | End: 2021-01-01

## 2021-01-01 RX ORDER — FUROSEMIDE 40 MG/1
40 TABLET ORAL DAILY
Qty: 30 TABLET | Refills: 2 | Status: SHIPPED | OUTPATIENT
Start: 2021-01-01 | End: 2021-01-01

## 2021-01-01 RX ORDER — CEFTRIAXONE 1 G/50ML
1 INJECTION, SOLUTION INTRAVENOUS EVERY 24 HOURS
Status: DISCONTINUED | OUTPATIENT
Start: 2021-01-01 | End: 2021-01-01 | Stop reason: HOSPADM

## 2021-01-01 RX ORDER — ZOLPIDEM TARTRATE 5 MG/1
5 TABLET ORAL NIGHTLY PRN
Status: DISCONTINUED | OUTPATIENT
Start: 2021-01-01 | End: 2021-01-01 | Stop reason: HOSPADM

## 2021-01-01 RX ORDER — ARIPIPRAZOLE 5 MG/1
10 TABLET ORAL DAILY
Status: DISCONTINUED | OUTPATIENT
Start: 2021-01-01 | End: 2021-01-01 | Stop reason: HOSPADM

## 2021-01-01 RX ORDER — ZOLPIDEM TARTRATE 5 MG/1
5 TABLET ORAL NIGHTLY PRN
Qty: 30 TABLET | Refills: 3 | Status: SHIPPED | OUTPATIENT
Start: 2021-01-01 | End: 2021-01-01

## 2021-01-01 RX ORDER — LORAZEPAM 0.5 MG/1
0.5 TABLET ORAL 2 TIMES DAILY PRN
Status: DISCONTINUED | OUTPATIENT
Start: 2021-01-01 | End: 2021-01-01 | Stop reason: HOSPADM

## 2021-01-01 RX ORDER — AMINO ACIDS/PROTEIN HYDROLYS 15G-100/30
30 LIQUID (ML) ORAL 2 TIMES DAILY
Status: DISCONTINUED | OUTPATIENT
Start: 2021-01-01 | End: 2021-01-01 | Stop reason: HOSPADM

## 2021-01-01 RX ORDER — LACTULOSE 10 G/15ML
20 SOLUTION ORAL 3 TIMES DAILY PRN
Qty: 1892 ML | Refills: 11 | Status: SHIPPED | OUTPATIENT
Start: 2021-01-01

## 2021-01-01 RX ORDER — ACETAMINOPHEN 650 MG/1
650 SUPPOSITORY RECTAL EVERY 4 HOURS PRN
Status: DISCONTINUED | OUTPATIENT
Start: 2021-01-01 | End: 2021-01-01 | Stop reason: HOSPADM

## 2021-01-01 RX ORDER — ZOLPIDEM TARTRATE 10 MG/1
10 TABLET ORAL NIGHTLY PRN
Qty: 30 TABLET | Refills: 2 | Status: SHIPPED | OUTPATIENT
Start: 2021-01-01 | End: 2021-01-01

## 2021-01-01 RX ORDER — ONDANSETRON 2 MG/ML
4 INJECTION INTRAMUSCULAR; INTRAVENOUS ONCE
Status: COMPLETED | OUTPATIENT
Start: 2021-01-01 | End: 2021-01-01

## 2021-01-01 RX ORDER — CIPROFLOXACIN 500 MG/1
500 TABLET, FILM COATED ORAL DAILY
COMMUNITY
Start: 2021-01-01

## 2021-01-01 RX ORDER — DESVENLAFAXINE 100 MG/1
100 TABLET, EXTENDED RELEASE ORAL DAILY
Qty: 90 TABLET | Refills: 2 | Status: SHIPPED | OUTPATIENT
Start: 2021-01-01

## 2021-01-01 RX ORDER — ARIPIPRAZOLE 2 MG/1
2 TABLET ORAL DAILY
Qty: 30 TABLET | Refills: 1 | Status: SHIPPED | OUTPATIENT
Start: 2021-01-01 | End: 2021-01-01

## 2021-01-01 RX ORDER — FUROSEMIDE 40 MG/1
40 TABLET ORAL DAILY
Qty: 30 TABLET | Refills: 2 | OUTPATIENT
Start: 2021-01-01

## 2021-01-01 RX ADMIN — PANTOPRAZOLE SODIUM 40 MG: 40 TABLET, DELAYED RELEASE ORAL at 09:46

## 2021-01-01 RX ADMIN — SPIRONOLACTONE 50 MG: 25 TABLET, FILM COATED ORAL at 09:46

## 2021-01-01 RX ADMIN — SPIRONOLACTONE 50 MG: 25 TABLET, FILM COATED ORAL at 08:17

## 2021-01-01 RX ADMIN — METOPROLOL TARTRATE 25 MG: 25 TABLET, FILM COATED ORAL at 08:24

## 2021-01-01 RX ADMIN — Medication 30 ML: at 09:28

## 2021-01-01 RX ADMIN — METOPROLOL TARTRATE 25 MG: 25 TABLET, FILM COATED ORAL at 00:37

## 2021-01-01 RX ADMIN — TAZOBACTAM SODIUM AND PIPERACILLIN SODIUM 4.5 G: 500; 4 INJECTION, SOLUTION INTRAVENOUS at 22:44

## 2021-01-01 RX ADMIN — VENLAFAXINE HYDROCHLORIDE 75 MG: 75 CAPSULE, EXTENDED RELEASE ORAL at 08:24

## 2021-01-01 RX ADMIN — Medication 30 ML: at 21:20

## 2021-01-01 RX ADMIN — FUROSEMIDE 20 MG: 10 INJECTION, SOLUTION INTRAMUSCULAR; INTRAVENOUS at 15:00

## 2021-01-01 RX ADMIN — METOPROLOL TARTRATE 25 MG: 25 TABLET, FILM COATED ORAL at 21:19

## 2021-01-01 RX ADMIN — SODIUM CHLORIDE, PRESERVATIVE FREE 10 ML: 5 INJECTION INTRAVENOUS at 08:17

## 2021-01-01 RX ADMIN — SODIUM CHLORIDE, PRESERVATIVE FREE 10 ML: 5 INJECTION INTRAVENOUS at 21:20

## 2021-01-01 RX ADMIN — MAGNESIUM GLUCONATE 500 MG ORAL TABLET 400 MG: 500 TABLET ORAL at 08:23

## 2021-01-01 RX ADMIN — SODIUM CHLORIDE, PRESERVATIVE FREE 10 ML: 5 INJECTION INTRAVENOUS at 09:28

## 2021-01-01 RX ADMIN — LORAZEPAM 0.5 MG: 2 INJECTION INTRAMUSCULAR; INTRAVENOUS at 00:10

## 2021-01-01 RX ADMIN — SODIUM CHLORIDE, PRESERVATIVE FREE 10 ML: 5 INJECTION INTRAVENOUS at 08:24

## 2021-01-01 RX ADMIN — LEVOTHYROXINE SODIUM 50 MCG: 50 TABLET ORAL at 09:46

## 2021-01-01 RX ADMIN — ARIPIPRAZOLE 10 MG: 5 TABLET ORAL at 08:23

## 2021-01-01 RX ADMIN — SPIRONOLACTONE 50 MG: 25 TABLET, FILM COATED ORAL at 08:29

## 2021-01-01 RX ADMIN — SODIUM CHLORIDE, PRESERVATIVE FREE 10 ML: 5 INJECTION INTRAVENOUS at 20:05

## 2021-01-01 RX ADMIN — LEVOTHYROXINE SODIUM 50 MCG: 50 TABLET ORAL at 09:52

## 2021-01-01 RX ADMIN — PANTOPRAZOLE SODIUM 40 MG: 40 TABLET, DELAYED RELEASE ORAL at 08:29

## 2021-01-01 RX ADMIN — PANTOPRAZOLE SODIUM 40 MG: 40 TABLET, DELAYED RELEASE ORAL at 08:17

## 2021-01-01 RX ADMIN — METOPROLOL TARTRATE 25 MG: 25 TABLET, FILM COATED ORAL at 09:46

## 2021-01-01 RX ADMIN — Medication 1 TABLET: at 08:17

## 2021-01-01 RX ADMIN — METOPROLOL TARTRATE 25 MG: 25 TABLET, FILM COATED ORAL at 08:23

## 2021-01-01 RX ADMIN — SPIRONOLACTONE 50 MG: 25 TABLET, FILM COATED ORAL at 08:16

## 2021-01-01 RX ADMIN — LACTULOSE 20 G: 20 SOLUTION ORAL at 21:51

## 2021-01-01 RX ADMIN — SODIUM CHLORIDE, PRESERVATIVE FREE 10 ML: 5 INJECTION INTRAVENOUS at 21:51

## 2021-01-01 RX ADMIN — PANTOPRAZOLE SODIUM 40 MG: 40 TABLET, DELAYED RELEASE ORAL at 09:26

## 2021-01-01 RX ADMIN — VENLAFAXINE HYDROCHLORIDE 75 MG: 75 CAPSULE, EXTENDED RELEASE ORAL at 08:23

## 2021-01-01 RX ADMIN — BACITRACIN ZINC: 500 OINTMENT TOPICAL at 15:54

## 2021-01-01 RX ADMIN — VENLAFAXINE HYDROCHLORIDE 75 MG: 75 CAPSULE, EXTENDED RELEASE ORAL at 09:26

## 2021-01-01 RX ADMIN — SODIUM CHLORIDE, PRESERVATIVE FREE 10 ML: 5 INJECTION INTRAVENOUS at 00:37

## 2021-01-01 RX ADMIN — VENLAFAXINE HYDROCHLORIDE 75 MG: 75 CAPSULE, EXTENDED RELEASE ORAL at 09:52

## 2021-01-01 RX ADMIN — ZOLPIDEM TARTRATE 5 MG: 5 TABLET ORAL at 22:33

## 2021-01-01 RX ADMIN — LEVOTHYROXINE SODIUM 50 MCG: 50 TABLET ORAL at 08:24

## 2021-01-01 RX ADMIN — SODIUM CHLORIDE 1000 ML: 9 INJECTION, SOLUTION INTRAVENOUS at 21:17

## 2021-01-01 RX ADMIN — Medication 1 TABLET: at 09:52

## 2021-01-01 RX ADMIN — SPIRONOLACTONE 50 MG: 25 TABLET, FILM COATED ORAL at 09:26

## 2021-01-01 RX ADMIN — SPIRONOLACTONE 50 MG: 25 TABLET, FILM COATED ORAL at 08:24

## 2021-01-01 RX ADMIN — LIDOCAINE HYDROCHLORIDE 5 ML: 10 INJECTION, SOLUTION INFILTRATION; PERINEURAL at 16:45

## 2021-01-01 RX ADMIN — METOPROLOL TARTRATE 25 MG: 25 TABLET, FILM COATED ORAL at 20:04

## 2021-01-01 RX ADMIN — ZOLPIDEM TARTRATE 5 MG: 5 TABLET ORAL at 00:18

## 2021-01-01 RX ADMIN — SODIUM CHLORIDE, PRESERVATIVE FREE 10 ML: 5 INJECTION INTRAVENOUS at 08:29

## 2021-01-01 RX ADMIN — LACTULOSE 20 G: 20 SOLUTION ORAL at 18:02

## 2021-01-01 RX ADMIN — SODIUM CHLORIDE, PRESERVATIVE FREE 10 ML: 5 INJECTION INTRAVENOUS at 22:23

## 2021-01-01 RX ADMIN — POTASSIUM CHLORIDE 20 MEQ: 1500 TABLET, EXTENDED RELEASE ORAL at 08:24

## 2021-01-01 RX ADMIN — VENLAFAXINE HYDROCHLORIDE 75 MG: 75 CAPSULE, EXTENDED RELEASE ORAL at 08:17

## 2021-01-01 RX ADMIN — FUROSEMIDE 20 MG: 10 INJECTION, SOLUTION INTRAMUSCULAR; INTRAVENOUS at 17:59

## 2021-01-01 RX ADMIN — LEVOTHYROXINE SODIUM 50 MCG: 50 TABLET ORAL at 08:29

## 2021-01-01 RX ADMIN — ARIPIPRAZOLE 10 MG: 5 TABLET ORAL at 09:52

## 2021-01-01 RX ADMIN — VENLAFAXINE HYDROCHLORIDE 75 MG: 75 CAPSULE, EXTENDED RELEASE ORAL at 09:46

## 2021-01-01 RX ADMIN — SODIUM CHLORIDE, PRESERVATIVE FREE 10 ML: 5 INJECTION INTRAVENOUS at 03:24

## 2021-01-01 RX ADMIN — Medication 1 TABLET: at 09:46

## 2021-01-01 RX ADMIN — LORAZEPAM 0.5 MG: 0.5 TABLET ORAL at 23:57

## 2021-01-01 RX ADMIN — ARIPIPRAZOLE 10 MG: 5 TABLET ORAL at 09:27

## 2021-01-01 RX ADMIN — METOPROLOL TARTRATE 25 MG: 25 TABLET, FILM COATED ORAL at 22:23

## 2021-01-01 RX ADMIN — Medication 30 ML: at 09:52

## 2021-01-01 RX ADMIN — MAGNESIUM GLUCONATE 500 MG ORAL TABLET 400 MG: 500 TABLET ORAL at 09:52

## 2021-01-01 RX ADMIN — SODIUM CHLORIDE, PRESERVATIVE FREE 10 ML: 5 INJECTION INTRAVENOUS at 21:56

## 2021-01-01 RX ADMIN — Medication 1 TABLET: at 08:23

## 2021-01-01 RX ADMIN — ARIPIPRAZOLE 10 MG: 5 TABLET ORAL at 08:17

## 2021-01-01 RX ADMIN — ZOLPIDEM TARTRATE 5 MG: 5 TABLET ORAL at 20:05

## 2021-01-01 RX ADMIN — METOPROLOL TARTRATE 25 MG: 25 TABLET, FILM COATED ORAL at 21:55

## 2021-01-01 RX ADMIN — LACTULOSE 20 G: 20 SOLUTION ORAL at 08:28

## 2021-01-01 RX ADMIN — ZOLPIDEM TARTRATE 5 MG: 5 TABLET ORAL at 21:55

## 2021-01-01 RX ADMIN — MAGNESIUM GLUCONATE 500 MG ORAL TABLET 400 MG: 500 TABLET ORAL at 09:46

## 2021-01-01 RX ADMIN — METOPROLOL TARTRATE 25 MG: 25 TABLET, FILM COATED ORAL at 08:17

## 2021-01-01 RX ADMIN — ZOLPIDEM TARTRATE 5 MG: 5 TABLET ORAL at 23:52

## 2021-01-01 RX ADMIN — METOPROLOL TARTRATE 25 MG: 25 TABLET, FILM COATED ORAL at 21:51

## 2021-01-01 RX ADMIN — CEFTRIAXONE 1 G: 1 INJECTION, SOLUTION INTRAVENOUS at 21:31

## 2021-01-01 RX ADMIN — SODIUM CHLORIDE, PRESERVATIVE FREE 10 ML: 5 INJECTION INTRAVENOUS at 09:48

## 2021-01-01 RX ADMIN — Medication 1 TABLET: at 09:26

## 2021-01-01 RX ADMIN — LACTULOSE 20 G: 20 SOLUTION ORAL at 09:46

## 2021-01-01 RX ADMIN — LACTULOSE 20 G: 20 SOLUTION ORAL at 07:36

## 2021-01-01 RX ADMIN — LORAZEPAM 0.5 MG: 2 INJECTION INTRAMUSCULAR; INTRAVENOUS at 21:17

## 2021-01-01 RX ADMIN — LEVOTHYROXINE SODIUM 50 MCG: 50 TABLET ORAL at 09:26

## 2021-01-01 RX ADMIN — Medication 30 ML: at 21:51

## 2021-01-01 RX ADMIN — FOLIC ACID 1000 ML/HR: 5 INJECTION, SOLUTION INTRAMUSCULAR; INTRAVENOUS; SUBCUTANEOUS at 20:55

## 2021-01-01 RX ADMIN — IOPAMIDOL 100 ML: 612 INJECTION, SOLUTION INTRAVENOUS at 19:50

## 2021-01-01 RX ADMIN — SODIUM CHLORIDE, PRESERVATIVE FREE 10 ML: 5 INJECTION INTRAVENOUS at 09:52

## 2021-01-01 RX ADMIN — METOPROLOL TARTRATE 25 MG: 25 TABLET, FILM COATED ORAL at 09:52

## 2021-01-01 RX ADMIN — PANTOPRAZOLE SODIUM 40 MG: 40 TABLET, DELAYED RELEASE ORAL at 08:23

## 2021-01-01 RX ADMIN — HYDROMORPHONE HYDROCHLORIDE 0.5 MG: 1 INJECTION, SOLUTION INTRAMUSCULAR; INTRAVENOUS; SUBCUTANEOUS at 21:17

## 2021-01-01 RX ADMIN — CEFTRIAXONE 1 G: 1 INJECTION, SOLUTION INTRAVENOUS at 15:57

## 2021-01-01 RX ADMIN — FUROSEMIDE 40 MG: 40 TABLET ORAL at 08:29

## 2021-01-01 RX ADMIN — SPIRONOLACTONE 50 MG: 25 TABLET, FILM COATED ORAL at 09:52

## 2021-01-01 RX ADMIN — PANTOPRAZOLE SODIUM 40 MG: 40 TABLET, DELAYED RELEASE ORAL at 09:52

## 2021-01-01 RX ADMIN — ARIPIPRAZOLE 10 MG: 5 TABLET ORAL at 09:45

## 2021-01-01 RX ADMIN — POTASSIUM CHLORIDE 20 MEQ: 1500 TABLET, EXTENDED RELEASE ORAL at 08:17

## 2021-01-01 RX ADMIN — ALBUMIN HUMAN 25 G: 0.25 SOLUTION INTRAVENOUS at 04:01

## 2021-01-01 RX ADMIN — FUROSEMIDE 20 MG: 10 INJECTION, SOLUTION INTRAMUSCULAR; INTRAVENOUS at 09:46

## 2021-01-01 RX ADMIN — FUROSEMIDE 20 MG: 10 INJECTION, SOLUTION INTRAMUSCULAR; INTRAVENOUS at 09:22

## 2021-01-01 RX ADMIN — LEVOTHYROXINE SODIUM 50 MCG: 50 TABLET ORAL at 08:17

## 2021-01-01 RX ADMIN — MAGNESIUM GLUCONATE 500 MG ORAL TABLET 400 MG: 500 TABLET ORAL at 08:17

## 2021-01-01 RX ADMIN — SODIUM CHLORIDE, PRESERVATIVE FREE 10 ML: 5 INJECTION INTRAVENOUS at 08:34

## 2021-01-01 RX ADMIN — LEVOTHYROXINE SODIUM 50 MCG: 50 TABLET ORAL at 08:23

## 2021-01-01 RX ADMIN — ONDANSETRON 4 MG: 2 INJECTION INTRAMUSCULAR; INTRAVENOUS at 20:45

## 2021-01-01 RX ADMIN — MAGNESIUM GLUCONATE 500 MG ORAL TABLET 400 MG: 500 TABLET ORAL at 09:26

## 2021-01-07 NOTE — ED NOTES
Emergent O-negative blood administration began at this time; Kylee ANTOINE verified administration. Vitals, times, and signatures recorded on hard copy of blood transfusion documentation.      Jenni Greenwood RN  01/06/21 [id number]

## 2021-01-07 NOTE — ED PROVIDER NOTES
Subjective   History of Present Illness  This is a 55-year-old female who comes in today complaining of abdominal cramping and swelling.  She also reports some right shoulder pain.  She reports she fell just before [place] is a pending an MRI on her right shoulder.  However the bigger problem is her swelling of her abdomen, her loss of appetite, her yellowing skin and her significant other reports she has had some confusion.  She apparently was diagnosed with cirrhosis back in the summer.  Review of Systems   Constitutional: Negative.    HENT: Negative.    Eyes: Negative.    Respiratory: Negative.    Cardiovascular: Negative.    Gastrointestinal: Positive for abdominal pain, diarrhea and nausea.   Genitourinary: Negative.    Skin: Negative.    Neurological: Negative.    Psychiatric/Behavioral: Negative.        Past Medical History:   Diagnosis Date   • Acid reflux    • Anxiety    • Back pain    • Depression    • Fractures    • Hernia of abdominal cavity    • High cholesterol    • High triglycerides    • Hypertension    • Hypothyroidism    • Irritable bowel syndrome    • Nocturia 10/5/2016   • Positive TB test    • Sinus problem    • ELINA (stress urinary incontinence, female) 10/5/2016       Allergies   Allergen Reactions   • Prednisone Anxiety       Past Surgical History:   Procedure Laterality Date   •  SECTION     • COLONOSCOPY     • GASTRIC BYPASS     • HERNIA REPAIR      umbilical hernia   • HERNIA REPAIR  2020    ventral   • HYSTERECTOMY     • LAPAROSCOPIC TUBAL LIGATION     • TOTAL ABDOMINAL HYSTERECTOMY WITH SALPINGO OOPHORECTOMY      uterine prolapse       Family History   Problem Relation Age of Onset   • Hyperlipidemia Mother    • Arthritis Mother    • Inflammatory bowel disease Mother    • Diabetes Father    • Hypertension Father    • Arthritis Paternal Grandmother    • Breast cancer Neg Hx    • Ovarian cancer Neg Hx    • Colon cancer Neg Hx    • Cirrhosis Neg Hx    • Liver disease Neg  Hx    • Liver cancer Neg Hx        Social History     Socioeconomic History   • Marital status:      Spouse name: Not on file   • Number of children: Not on file   • Years of education: Not on file   • Highest education level: Not on file   Tobacco Use   • Smoking status: Never Smoker   • Smokeless tobacco: Never Used   Substance and Sexual Activity   • Alcohol use: Yes     Comment: whiskey-4-5 glasses on days not working   • Drug use: No   • Sexual activity: Defer           Objective   Physical Exam  Vitals signs and nursing note reviewed.   Constitutional:       Appearance: She is normal weight.   Neurological:      Mental Status: She is alert.     GEN: No acute distress  Head: Normocephalic, atraumatic  Eyes: Pupils equal round reactive to light, icterus  ENT: Posterior pharynx normal in appearance, oral mucosa is moist  Chest: Nontender to palpation  Cardiovascular: Regular rate  Lungs: Clear to auscultation bilaterally  Abdomen: Soft, tender, distended, no peritoneal signs  Extremities: No edema, normal appearance  Neuro: GCS 15  Psych: Mood and affect are appropriate  Skin is jaundiced    Procedures           ED Course  ED Course as of Jan 06 2232 Wed Jan 06, 2021 2020 EKG interpreted by me: Sinus rhythm, normal rate, some nonspecific T wave changes, no acute ST changes, this is an abnormal EKG    [MP]   2114 Patient blood pressure low. IV bolus started.     [TW]   2130 MELD score 32    [TW]   2157 Dr. Nuno reviewed case. We will give blood instead of fluid bolus. Will start abx. Informed patient and family and will call  for transfer.     [TW]   2215 Consult with Dr. Yordy kang surgery team. Will accept for emergency transfer to .     [TW]   2230 Discussed the findings with the patient and reviewed her condtion. I did discuss the severity of her condition and her high risk for mortality. She is critically ill. She wishes to have everything done if she needs emergency life threatening measures  to save her life. We will emergently transport to .       [TW]      ED Course User Index  [MP] Silverio Nuno MD  [TW] Margaret Gomez, APRANJU                                           MDM  Number of Diagnoses or Management Options  Acute liver failure without hepatic coma:   Bowel perforation (CMS/HCC):   Diagnosis management comments: This appears to be a 55-year-old female with advanced liver disease who has continued to drink alcohol.  She apparently had not inform her family of her diagnosis and reports that she did not know she had a diagnosis of cirrhosis either.  She sees GI specialist here but has not seen him since September.  Her significant other reports that she has gotten increasingly worse with weakness and confusion.       Amount and/or Complexity of Data Reviewed  Clinical lab tests: ordered and reviewed  Tests in the radiology section of CPT®: ordered and reviewed  Review and summarize past medical records: yes  Discuss the patient with other providers: yes  Independent visualization of images, tracings, or specimens: yes    Risk of Complications, Morbidity, and/or Mortality  Presenting problems: moderate  Diagnostic procedures: moderate  Management options: moderate    Critical Care  Total time providing critical care: 30-74 minutes      Final diagnoses:   Bowel perforation (CMS/HCC)   Acute liver failure without hepatic coma            Margaret Gomez, JEN  01/06/21 2217       Margaret Gomez, JEN  01/06/21 2233

## 2021-01-07 NOTE — ED NOTES
Called  MD's requesting surgery per Margaret. Hannah paged [facility] with Dr. Patterson. Call transferred to Margaret at this time.           Tawana Mendoza  01/06/21 0700

## 2021-01-08 NOTE — TELEPHONE ENCOUNTER
[facility] FROM [facility]  IS REQUESTING A LIST OF PT'S MEDICINE LIST FAXED OVER.    FAX  ATTN: TRAN  [phone]      PLEASE ADVISE  [phone]

## 2021-02-09 NOTE — TELEPHONE ENCOUNTER
ALVA 01-07-21 DC 02-08-21    [facility]    DX: Peptic Ulcer with Perforation

## 2021-02-10 NOTE — OUTREACH NOTE
Call Center TCM Note      Responses   [facility] patient discharged from?  Non- []   Does the patient have one of the following disease processes/diagnoses(primary or secondary)?  Other   TCM attempt successful?  Yes   Call start time  1427   Call end time  1448   Discharge diagnosis  peptic ulcer with perforation   Is patient permission given to speak with other caregiver?  No   Meds reviewed with patient/caregiver?  No [Patient states that she does have new meds ordered, will bring med list to dr bains in the am. ]   Does the patient have all medications ordered at discharge?  Yes   Is the patient taking all medications as directed (includes completed medication regime)?  Yes   Medication comments  Patient states that she has all needed meds at this time.    Does the patient have a primary care provider?   Yes   Comments regarding PCP  PCP Dr Loving. Hospital follow up scheduled for in the am 2/11 930am.    What is the Home health agency?   Patient reports HH was not arranged for her, but needs PT.    DME comments  Patient reports that she has a BSC.    Psychosocial issues?  No   Did the patient receive a copy of their discharge instructions?  Yes   Nursing interventions  Reviewed instructions with patient   What is the patient's perception of their health status since discharge?  Improving   Is the patient/caregiver able to teach back signs and symptoms related to disease process for when to call PCP?  Yes   Is the patient/caregiver able to teach back signs and symptoms related to disease process for when to call 911?  Yes   Is the patient/caregiver able to teach back the hierarchy of who to call/visit for symptoms/problems? PCP, Specialist, Home health nurse, Urgent Care, ED, 911  Yes   If the patient is a current smoker, are they able to teach back resources for cessation?  Not a smoker   TCM call completed?  Yes          Clara Esquivel RN    2/10/2021, 14:49 EST

## 2021-02-10 NOTE — OUTREACH NOTE
Prep Survey      Responses   Sabian[facility] facility patient discharged from?  Non-BH   Is LACE score < 7 ?  Non-BH Discharge   Emergency Room discharge w/ pulse ox?  No   Eligibility  [facility]   Date of Admission  01/07/21   Date of Discharge  02/08/21   Discharge diagnosis  peptic ulcer with perforation   Does the patient have one of the following disease processes/diagnoses(primary or secondary)?  Other   Prep survey completed?  Yes          Renetta Meyer RN

## 2021-02-12 PROBLEM — K76.9 CHRONIC LIVER DISEASE AND CIRRHOSIS (HCC): Status: ACTIVE | Noted: 2021-01-01

## 2021-02-12 PROBLEM — K28.6: Status: ACTIVE | Noted: 2021-01-01

## 2021-02-12 PROBLEM — K70.9 ALCOHOL LIVER DAMAGE (HCC): Status: ACTIVE | Noted: 2021-01-01

## 2021-02-12 PROBLEM — I71.03 DISSECTION OF THORACOABDOMINAL AORTA (HCC): Status: ACTIVE | Noted: 2021-01-01

## 2021-02-12 PROBLEM — K74.60 CHRONIC LIVER DISEASE AND CIRRHOSIS (HCC): Status: ACTIVE | Noted: 2021-01-01

## 2021-02-12 PROBLEM — I85.10 SECONDARY ESOPHAGEAL VARICES WITHOUT BLEEDING (HCC): Status: ACTIVE | Noted: 2021-01-01

## 2021-02-12 PROBLEM — D69.6 THROMBOCYTOPENIA (HCC): Status: ACTIVE | Noted: 2021-01-01

## 2021-02-12 PROBLEM — K76.82 HEPATIC ENCEPHALOPATHY (HCC): Status: ACTIVE | Noted: 2021-01-01

## 2021-02-12 PROBLEM — F10.20 ALCOHOLISM (HCC): Status: ACTIVE | Noted: 2021-01-01

## 2021-02-12 PROBLEM — I72.4 PSEUDOANEURYSM OF RIGHT FEMORAL ARTERY (HCC): Status: ACTIVE | Noted: 2021-01-01

## 2021-02-12 NOTE — TELEPHONE ENCOUNTER
Caller: Gaby Rasheed    Relationship to patient: Self    Best call back number: [phone]       Patient is needing: PATIENT IS RETURNING A CALL TO THE OFFICE

## 2021-02-12 NOTE — PROGRESS NOTES
Transitional Care Follow Up Visit  Subjective     Gaby Rasheed is a 55 y.o. female who presents for a transitional care management visit.    Within 48 business hours after discharge our office contacted her via telephone to coordinate her care and needs.      I reviewed and discussed the details of that call along with the discharge summary, hospital problems, inpatient lab results, inpatient diagnostic studies, and consultation reports with Gaby.     Current outpatient and discharge medications have been reconciled for the patient.  Reviewed by: Arabella Loving MD      Date of TCM Phone Call 2/10/2021   [facility]   Date of Admission 1/7/2021   Date of Discharge 2/8/2021   Discharge Disposition -     Risk for Readmission (LACE) No data recorded    History of Present Illness   Course During Hospital Stay:  See  Discharge note for further information. In brief:    Admitted 1/7 and discharged 2/8. Treated for duodenal ulcer with hemorrhage, coagulopathy, hyperbilirubinemia, pneumoperitoneum, alcohol abuse, cirrhosis, hypochloremia, thrombocytopenia, acute renal failure, anemia, peptic ulcer with perforation; right CFA pseudoaneurysm from previous groin access for embolization on 1/17; aortic dissection.     Procedures included exploratory laparotomy, elena patch repair of gastrojejunostomy perforation, thrombin injection.        Reviewed labs via [facility] portal. I am not able to print all of them. I have printed CMP and CBC from first day of admission and last day of admission. Notable for    2/8/21: sodium 130, Creatinine normal at 0.5, bilirubin total 6.1, ast 49, alt 16, alk phos 112, calcium 8.3, protein 6.1, albumin 2.8, INR 2.3; WBC 11.54, Hgb 8.5, Hct 26.4, platelets 119 (s/p transfusions)    1/6/21: sodium 135, creatinine 1.54 with eGFR 42, bilirubin total 8.1, ast 46, alt 16 alk phos 152, calcium 7.6, protein 5.2, albumin 2.2; WBC 23.04, Hgb 5.2, Hct 17.0, platelets 109,     MELD score  discharge 37, with 52.6% estimated 3-month mortality     In with partner of 7 years, he was not aware of her liver disease prior to 1/6/21, he reports other family members were also not aware. No alcohol in house. She is very weak, falling, he has a walker for her but she needs further assistance. She is not able to return to work at [employer] at this time, a physically exertional job. Has follow up with surgery coming up. Would like to get set up with hepatology at .      The following portions of the patient's history were reviewed and updated as appropriate: allergies, current medications, past family history, past medical history, past social history, past surgical history and problem list.    Review of Systems   Constitutional: Positive for fatigue.   Musculoskeletal: Positive for gait problem.   Neurological: Positive for weakness.       Objective   Physical Exam  Vitals signs and nursing note reviewed.   Constitutional:       General: She is not in acute distress.     Appearance: Normal appearance. She is well-developed and well-groomed. She is cachectic. She is not ill-appearing, toxic-appearing or diaphoretic.      Interventions: Face mask in place.   HENT:      Head: Normocephalic and atraumatic.      Right Ear: Hearing normal.      Left Ear: Hearing normal.   Eyes:      General: Lids are normal. Scleral icterus present.      Extraocular Movements: Extraocular movements intact.   Neck:      Trachea: Phonation normal.   Pulmonary:      Effort: Pulmonary effort is normal.   Abdominal:      General: Abdomen is protuberant. A surgical scar is present.   Skin:     Coloration: Skin is not jaundiced.   Neurological:      General: No focal deficit present.      Mental Status: She is alert and oriented to person, place, and time.      Motor: Motor function is intact.      Comments: In wheelchair   Psychiatric:         Attention and Perception: Attention and perception normal.         Mood and Affect: Mood and  affect normal.         Speech: Speech normal.         Behavior: Behavior normal. Behavior is cooperative.         Thought Content: Thought content normal.         Cognition and Memory: Cognition and memory normal.         Judgment: Judgment normal.         Assessment/Plan   Diagnoses and all orders for this visit:    1. Alcoholic cirrhosis of liver with ascites (CMS/HCC) (Primary)  -     Ambulatory Referral to Hepatology  -     Ambulatory Referral to Social Work  -     Ambulatory Referral to Home Health  -     lactulose (CHRONULAC) 10 GM/15ML solution; Take 30 mL by mouth 3 (Three) Times a Day As Needed (hepatic encephalopathy; need to have at least 4 bowel movements a day).  Dispense: 1892 mL; Refill: 11    2. Alcohol liver damage (CMS/HCC)  -     Ambulatory Referral to Hepatology  -     Ambulatory Referral to Social Work  -     Ambulatory Referral to Home Health    3. Hepatic encephalopathy (CMS/HCC)  -     Ambulatory Referral to Hepatology  -     Ambulatory Referral to Social Work  -     Ambulatory Referral to Home Health  -     lactulose (CHRONULAC) 10 GM/15ML solution; Take 30 mL by mouth 3 (Three) Times a Day As Needed (hepatic encephalopathy; need to have at least 4 bowel movements a day).  Dispense: 1892 mL; Refill: 11    4. Alcoholism (CMS/HCC)  -     Ambulatory Referral to Hepatology  -     Ambulatory Referral to Social Work  -     Ambulatory Referral to Home Health    5. Chronic liver disease and cirrhosis (CMS/HCC)  -     Ambulatory Referral to Hepatology  -     Ambulatory Referral to Social Work  -     Ambulatory Referral to Home Health  -     lactulose (CHRONULAC) 10 GM/15ML solution; Take 30 mL by mouth 3 (Three) Times a Day As Needed (hepatic encephalopathy; need to have at least 4 bowel movements a day).  Dispense: 1892 mL; Refill: 11    6. Secondary esophageal varices without bleeding (CMS/HCC)    7. Malnutrition, unspecified type (CMS/HCC)  -     Ambulatory Referral to Home Health    8. Cachexia  (CMS/HCC)  -     Ambulatory Referral to Home Health    9. Gastrojejunal ulcer with hemorrhage and perforation (CMS/HCC)    10. Pseudoaneurysm of right femoral artery (CMS/HCC)    11. Dissection of thoracoabdominal aorta (CMS/HCC)    12. Thrombocytopenia (CMS/HCC)    13. Moderate episode of recurrent major depressive disorder (CMS/HCC)  -     Ambulatory Referral to Psychology  -     Ambulatory Referral to Home Health    14. Anxiety  -     Ambulatory Referral to Psychology  -     Ambulatory Referral to Home Health    15. Essential hypertension  -     Ambulatory Referral to Social Work  -     Ambulatory Referral to Home Health    16. Abdominal wall hernia  -     Ambulatory Referral to Home Health    17. Hyponatremia    18. Anemia, unspecified type    19. Impaired mobility and ADLs  -     Ambulatory Referral to Home Health    20. History of abdominal surgery  -     Ambulatory Referral to Home Health    21. Hospital discharge follow-up
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
## 2021-02-15 NOTE — OUTREACH NOTE
"Patient Outreach Note    SW reached out to pt follow up. Pt stated, \" I have been at  for 34 days and finally made it home last Monday. I think I am ok with everything else, but I really need HH. I don't want to sit around and get weaker\". SW told pt that she and ACMGabrielle will be working to find a HH agency that will be able to work with her. Pt stated, \" Ok thank you all so much\". SW asked if she could ask some questions regarding resources. Pt stated, \" Yes that is ok\". SW asked the following:    Housing- Pt stated, \" Yes, I do have a house and live with a family member\".    Food- Pt stated, \" Yes, we have plenty of food\".    Transportation-  Pt stated,  \" Yes, I do have transportation\".     DME- Pt stated, \" I do have a bedside commode and wheelchair. I am ok\".     Mental Health- Pt stated, \" I see Hannah and Stephanie at the office so I am good with that\".    Addiction- Pt stated, \" My last drink of alcohol Dec 24. I haven't drunk anything since then and I have no intentions of drinking\".     SW asked pt if she needed any other resources. Pt stated, \" I will take your number and if something comes up I will call you\". SW provided pt with her phone number and told her that she will call back when we have more information on HH. Pt stated, \" Ok thank you so much for calling\".         Raissa Simms, LIZA  Ambulatory     2/15/2021, 13:59 EST      "

## 2021-02-17 NOTE — OUTREACH NOTE
"Patient Outreach Note    SW contacted pt to let her know that [facility]  will be calling to schedule a home visit when the weather gets better. SW told pt that if she has not heard from them by next week to call SW back and let me know. Pt stated, \" I am grateful that help is coming soon. Thank you for letting me know\".     Raissa Simms, LIZA  Ambulatory     2/17/2021, 15:50 EST      "

## 2021-02-18 NOTE — TELEPHONE ENCOUNTER
SHARIFA FROM [facility] CALLED TO LET DOCTOR ESTRELLITA KNOW THAT [facility] IS APPROVED FOR HOME HEALTH AND THEY WILL BE STARTING NEXT WEEK. Monday OR Tuesday.      CALL BACK NUMBER: [phone] (SHARIFA)

## 2021-02-18 NOTE — TELEPHONE ENCOUNTER
PATIENTS DAUGHTER CALLED STATING THAT PATIENTS FMLA PROVIDER SANDRA IS NEEDING UPDATED DOCUMENTATION.    DR. AHRO HAS EXTENDED HER LEAVE OF ABSENCE FROM WORK UNTIL 03/29/2021 (THINKS THAT IS THE DATE)    SANDRA IS NEEDING DOCUMENTATION STATING THAT DR. HARO HAS EXTENDED THE DATES.    PLEASE FAX DOCUMENTS INCLUDING THE CLAIM NUMBER ASAP  FAX # [phone]   CLAIM # [id number]    PLEASE CALL PATIENTS DAUGHTER DAMION [phone] WITH ANY QUESTIONS AND/OR CONCERNS

## 2021-02-19 NOTE — TELEPHONE ENCOUNTER
I do not remember specific date I said but she is not able to return to work anytime soon. I will complete papers and put that date for now, but come closer to that we will probably have to extend it.

## 2021-02-22 NOTE — TELEPHONE ENCOUNTER
Patient left message requesting 2 copies of medical records.     Tried to call pt, left message that patient would need to come in to sign authorization and that the records are ready for .    Patient called back and informed her. She will be in tomorrow to sign the auth and .

## 2021-02-24 NOTE — TELEPHONE ENCOUNTER
Caller: Gaby Rasheed    Relationship: Self    Best call back number:[phone]     What form or medical record are you requesting: UPDATED [facility] PAPERWORK     Who is requesting this form or medical record from you: SELF    How would you like to receive the form or medical records (pick-up, mail, fax):   If fax, what is the fax number: [phone]    Timeframe paperwork needed: ASAP    Additional notes: SENT OVER TO HER JOB FAX NUMBER ABOVE.

## 2021-02-25 NOTE — TELEPHONE ENCOUNTER
Caller: Gaby Rasheed    Relationship: Self    Best call back number:[phone]    What form or medical record are you requesting: SELF    Who is requesting this form or medical record from you:     How would you like to receive the form or medical records (pick-up, mail, fax): [employer]   If fax, what is the fax number:   If mail, what is the address:   If pick-up, provide patient with address and location details    Timeframe paperwork needed: asap    Additional notes: Patient asked that her doctor's note extending her l;michael of absence to 3/29/21 be put in [employer].  Patient asked for a call once this is done.
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
## 2021-02-26 NOTE — TELEPHONE ENCOUNTER
Spoke with pt and advised we have not received any paperwork from her work. Pt states usually the office just send the paperwork to her job. Advised we do not have [facility] paperwork in our office just to send to a pt's job. Her work will need to fax us what is needed to be filled out. Pt seems to understand.

## 2021-02-26 NOTE — TELEPHONE ENCOUNTER
Spironolactone is potassium sparing, so its not the combo. Lasix can lower potassium. Fluid from wound has been addressed by [facility], we received note from her follow up. It's related to her ascites from cirrhosis, end stage liver disease. If she wants to have labs to check potassium we can do that.

## 2021-02-26 NOTE — TELEPHONE ENCOUNTER
Spoke with pt and advised. She states she has several doctors appt coming up, and so she will wait and see if they order any labs.

## 2021-03-05 NOTE — TELEPHONE ENCOUNTER
Negro from [employer] called and states the pt was disappointed that she was not getting enough PT at home to help her move forward. The patient would like to have out patient therapy so she can work towards getting better. Advised that is ok.        Roverto from [employer] called asking for a verbal order to change therapy from this week to next week. Advised of conversation with Negro. Gave verbal order.

## 2021-03-09 NOTE — TELEPHONE ENCOUNTER
Spoke with pt and advised I spoke with Negro about this on 3/5/2021 and it was my understanding that she was going to set up outpatient PT. Pt will call Negro and discuss this.

## 2021-03-09 NOTE — TELEPHONE ENCOUNTER
THE PATIENT STATES THAT SHE SPOKE TO THE OFFICE A WEEK AGO TO REQUEST A REFERRAL FOR PHYSICAL THERAPY AT A [facility]. THE PATIENT STATES THAT HAS BEEN DOING IN HOME PHYSICAL THERAPY BUT SHE DID NOT FEEL THAT IT HELPED HER AS MUCH AS THE IN OFFICE PHYSICAL THERAPY. PLEASE CALL PATIENT TO LET HER KNOW IF A REFERRAL  CAN BE DONE.     CALL [phone]
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
## 2021-03-26 NOTE — PROGRESS NOTES
"Subjective    Gaby Rasheed is a 55 y.o. female here for:  Chief Complaint   Patient presents with   • Hepatic Disease     Still weak and having diarrhea all the time. She had 3 accidents yesterday from diarrhea. She has still not been able to get PT outpatient set up.        History per MA reviewed.    Patient feels better than last visit. She was supposed to do PT out of her home but she's not heard anything about it. She was having home health come in, she reports they were typing on their computer for 45 min then watched her walk briefly. She was not making gains. She is still very weak. She cannot get out of a car on her own. Partner continues to help her with ambulation and daily needs. She is not able to return to work especially at her previous role which required physical demands. She is looking into disability. She has followed up with  regarding abdominal wound, it is slowly healing. She reports zero alcohol intake since admission, \"not a drop.\" Her partner, with her today, states he's very proud of her. She would like to do labs today. She is having issues with diarrhea and has had some accidents. She was prescribed lactulose bid but she's been taking three tablespoons at night and having 4-5 bowel movements a day. She also has cramping with lactulose. Followed by orthopedics, still having issues with back pain and shoulder. She'd like to have pain medicine but they refuse to prescribe, she was suggested to use tylenol and she'd like to make sure that's okay.         The following portions of the patient's history were reviewed and updated as appropriate: allergies, current medications, past family history, past medical history, past social history, past surgical history and problem list.    Review of Systems   Constitutional: Positive for fatigue. Negative for fever.   Musculoskeletal: Positive for arthralgias and back pain.   Neurological: Positive for weakness.   Psychiatric/Behavioral: " "Positive for stress.       Visit Vitals  /60   Pulse 99   Temp 97.3 °F (36.3 °C) (Temporal)   Resp 18   Ht 160 cm (62.99\")   Wt 51.5 kg (113 lb 8 oz)   SpO2 99%   BMI 20.11 kg/m²         Objective   Physical Exam  Vitals and nursing note reviewed.   Constitutional:       General: She is not in acute distress.     Appearance: Normal appearance. She is well-developed and well-groomed. She is ill-appearing. She is not toxic-appearing or diaphoretic.      Interventions: Face mask in place.   HENT:      Head: Normocephalic and atraumatic.      Right Ear: Hearing normal.      Left Ear: Hearing normal.   Eyes:      General: Lids are normal. Scleral icterus present.      Extraocular Movements: Extraocular movements intact.   Neck:      Trachea: Phonation normal.   Pulmonary:      Effort: Pulmonary effort is normal.   Abdominal:      General: A surgical scar is present. There is distension.   Skin:     Capillary Refill: Capillary refill takes less than 2 seconds.      Coloration: Skin is jaundiced. Skin is not ashen, cyanotic, mottled or pale.   Neurological:      General: No focal deficit present.      Mental Status: She is alert and oriented to person, place, and time.      Comments: In wheelchair   Psychiatric:         Attention and Perception: Attention and perception normal.         Mood and Affect: Mood and affect normal.         Speech: Speech normal.         Behavior: Behavior normal. Behavior is cooperative.         Thought Content: Thought content normal.         Cognition and Memory: Cognition and memory normal.         Judgment: Judgment normal.      Comments: No evidence of encephalopathy at this time         For medical decision making review of the following was required:    GENERAL SURGERY - SCAN - ABD WALL HEMATOMA, [facility], 03/09/2021 (03/09/2021)    Office Visit with Stephanie [facility] (03/18/2021)    Assessment/Plan     Problem List Items Addressed This Visit        Coag and Thromboembolic    " Thrombocytopenia (CMS/HCC)    Relevant Orders    CBC (No Diff)    Vitamin B12    Folate       Endocrine and Metabolic    History of Stan-en-Y gastric bypass    Relevant Orders    Vitamin B1, Whole Blood    Vitamin B6       Gastrointestinal Abdominal     Cirrhosis of liver (CMS/HCC) - Primary    Relevant Orders    Ambulatory Referral to Physical Therapy (Completed)    Gamma GT    PT-INR    PTT    Ammonia    Hepatic encephalopathy (CMS/HCC)    Relevant Orders    Ambulatory Referral to Physical Therapy (Completed)    Ammonia       Mental Health    Alcoholism (CMS/HCC)    Relevant Orders    Ambulatory Referral to Physical Therapy (Completed)      Other Visit Diagnoses     Malnutrition, unspecified type (CMS/HCC)        Relevant Orders    Ambulatory Referral to Physical Therapy (Completed)    Cachexia (CMS/HCC)        Relevant Orders    Ambulatory Referral to Physical Therapy (Completed)    Vitamin B1, Whole Blood    Vitamin B6    Prealbumin    Impaired mobility and ADLs        Relevant Orders    Ambulatory Referral to Physical Therapy (Completed)    Hypomagnesemia        Relevant Orders    Magnesium    Comprehensive Metabolic Panel    Subclinical hypothyroidism        Relevant Orders    TSH+Free T4          · Continue to abstain from alcohol intake, vital. Discouraged narcotic use, can lead to constipation which could lead to encephalopathy. For lactulose she can try cutting down to two tablespoons at night or one tablespoon bid, needs to have 3-4 bowel movements a day. Cutting back may help with cramping therefore with compliance. Follow up with specialists as scheduled. Encouraged PT, discouraged driving car. Patient is perfect candidate for disability in my opinion, she's going to file. If needed I can write a letter to support, but if forms are needed to be completed for disability (with objective measurements on what she's capable of doing) I cannot complete those.     Return in about 3 months (around 6/26/2021)  for follow up.     Arabella Loving MD

## 2021-04-01 NOTE — TELEPHONE ENCOUNTER
Caller: Gaby Rasheed    Relationship: Self    Best call back number: [phone]    Medication needed:   Requested Prescriptions     Pending Prescriptions Disp Refills   • zolpidem (Ambien) 5 MG tablet 30 tablet 3     Sig: Take 1 tablet by mouth At Night As Needed for Sleep. Lower dose required due to liver disease.     What additional details did the patient provide when requesting the medication:     Does the patient have less than a 3 day supply:  [x] Yes  [] No    What is the patient's preferred pharmacy: [id number] [place] [address] [address] AT [facility] [address] - [phone]  - [phone] FX         .”

## 2021-04-01 NOTE — TELEPHONE ENCOUNTER
Caller: Gaby Rasheed    Relationship: Self    Best call back number:[phone]     What test was performed: BLOOD WORK     When was the test performed: 3-20-21      Additional notes: PLEASE CALL WITH RESULTS   IF PATIENT NOT THERE PLEASE LEAVE MESSAGE

## 2021-04-05 PROBLEM — K65.9 PERITONITIS (HCC): Status: ACTIVE | Noted: 2021-01-01

## 2021-04-05 PROBLEM — J90 PLEURAL EFFUSION, LEFT: Status: ACTIVE | Noted: 2021-01-01

## 2021-04-05 PROBLEM — K70.31 ASCITES DUE TO ALCOHOLIC CIRRHOSIS (HCC): Status: ACTIVE | Noted: 2021-01-01

## 2021-04-05 NOTE — ED PROVIDER NOTES
"Subjective   55-year-old female presents to the emergency department after having an abnormal x-ray in an outpatient clinic.  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  She is also found to have a AAA at that time that she states was 3.2 cm, she has a fracture in her spine, and she has ascites and varices, she was told at that time if she follows on her abdomen that she could \"die\" she felt 2 days ago, landing left on her body, she has bruising to her chin, she has pain in her shoulder, and she did land on her chest and belly.  She is concerned due to her recent history, she is complaining of some abdominal discomfort, and she is got some shoulder and rib pain, she denies any shortness of breath.      History provided by:  Patient   used: No        Review of Systems   Gastrointestinal: Positive for abdominal distention and abdominal pain.   Musculoskeletal:        Right shoulder pain, bilateral knee pain   All other systems reviewed and are negative.      Past Medical History:   Diagnosis Date   • Acid reflux    • Alcohol liver damage (CMS/HCC) 2/12/2021   • Alcoholism (CMS/HCC) 2/12/2021   • Anxiety    • Back pain    • Chronic liver disease and cirrhosis (CMS/HCC) 2/12/2021   • Cirrhosis of liver (CMS/HCC) 7/17/2020   • Depression    • Dissection of thoracoabdominal aorta (CMS/HCC) 2/12/2021   • Fractures    • Gastrojejunal ulcer with hemorrhage and perforation (CMS/HCC) 2/12/2021   • Hepatic encephalopathy (CMS/HCC) 2/12/2021   • Hernia of abdominal cavity    • High cholesterol    • High triglycerides    • Hypertension    • Hypothyroidism    • Irritable bowel " syndrome    • Nocturia 10/5/2016   • Positive TB test    • Pseudoaneurysm of right femoral artery (CMS/HCC) 2021   • Secondary esophageal varices without bleeding (CMS/HCC) 2021   • Sinus problem    • ELINA (stress urinary incontinence, female) 10/5/2016   • Thrombocytopenia (CMS/HCC) 2021       Allergies   Allergen Reactions   • Prednisone Anxiety       Past Surgical History:   Procedure Laterality Date   •  SECTION     • COLONOSCOPY     • GASTRIC BYPASS     • HERNIA REPAIR      umbilical hernia   • HERNIA REPAIR  2020    ventral   • HYSTERECTOMY     • LAPAROSCOPIC TUBAL LIGATION     • TOTAL ABDOMINAL HYSTERECTOMY WITH SALPINGO OOPHORECTOMY      uterine prolapse       Family History   Problem Relation Age of Onset   • Hyperlipidemia Mother    • Arthritis Mother    • Inflammatory bowel disease Mother    • Diabetes Father    • Hypertension Father    • Arthritis Paternal Grandmother    • Breast cancer Neg Hx    • Ovarian cancer Neg Hx    • Colon cancer Neg Hx    • Cirrhosis Neg Hx    • Liver disease Neg Hx    • Liver cancer Neg Hx        Social History     Socioeconomic History   • Marital status:      Spouse name: Not on file   • Number of children: Not on file   • Years of education: Not on file   • Highest education level: Not on file   Tobacco Use   • Smoking status: Never Smoker   • Smokeless tobacco: Never Used   Substance and Sexual Activity   • Alcohol use: Yes     Comment: whiskey-4-5 glasses on days not working   • Drug use: No   • Sexual activity: Defer           Objective   Physical Exam  Vitals and nursing note reviewed.   Constitutional:       Appearance: She is well-developed. She is ill-appearing.   HENT:      Head:      Comments: Chin bruising  Cardiovascular:      Rate and Rhythm: Normal rate and regular rhythm.   Pulmonary:      Effort: Pulmonary effort is normal.      Breath sounds: Normal breath sounds.   Abdominal:      General: There is distension.       Tenderness: There is abdominal tenderness.   Musculoskeletal:         General: Normal range of motion.      Cervical back: Normal range of motion and neck supple.   Skin:     General: Skin is warm and dry.   Neurological:      Mental Status: She is oriented to person, place, and time.      Deep Tendon Reflexes: Reflexes are normal and symmetric.         Procedures           ED Course  ED Course as of Apr 08 1006   Mon Apr 05, 2021   1843 Distended abdomen, decreased breath sounds on the left, with rhonchi.  Likely left pleural effusion, due to ascites from the liver. will get a CT scan of the chest and abdomen.    [CS]   2038 CTA chest and abdomen from March 29, 2021 from  records show that the patient had an existing stable appearing aneurysm dilatation and dissection within the descending thoracic aorta similar to scans today.  Patient had bilateral pleural effusions as well large on the right and left with near complete atelectasis of the left lower lobe and atelectasis of the lingula as well as partial atelectasis of the right lower lobe.  She also had persistent ascites in the abdomen and pelvis.    [CS]   2103 Discussed with Dr. Ruano, he recommended Rocephin, n.p.o. after midnight, IR paracentesis.    [CS]   2112 Discussed with Dr. Hernandez he excepted patient for admission, he wants to attempt to perform a paracentesis if possible at the bedside in the ED.    [CS]      ED Course User Index  [CS] Bryan Winchester Jr., LORI                                           MDM  Number of Diagnoses or Management Options  Ascites due to alcoholic cirrhosis (CMS/HCC): new and requires workup  Peritonitis (CMS/HCC): new and requires workup     Amount and/or Complexity of Data Reviewed  Decide to obtain previous medical records or to obtain history from someone other than the patient: yes    Risk of Complications, Morbidity, and/or Mortality  Presenting problems: low  Diagnostic procedures: minimal  Management options:  low    Patient Progress  Patient progress: stable      Final diagnoses:   Peritonitis (CMS/HCC)   Ascites due to alcoholic cirrhosis (CMS/HCC)       ED Disposition  ED Disposition     ED Disposition Condition Comment    Decision to Admit  Level of Care: Telemetry [5]   Diagnosis: Peritonitis (CMS/HCC) [820079]   Admitting Physician: SOLEDAD MANSFIELD [[id number]]   Attending Physician: SOLEDAD MANSFIELD [[id number]]   Certification: I Certify That Inpatient Hospital Services Are Medically Necessary For Greater Than 2 Midnights            Roberta Qureshi, JEN  [address]  [place] [facility] [address]  [phone]    Follow up on 4/13/2021  @ 1:00 PM    Arabella Loving MD  [address]  [place] [facility] [address]  [phone]               Medication List      Changed    levothyroxine 50 MCG tablet  Commonly known as: SYNTHROID, LEVOTHROID  Take 1 tablet by mouth Daily.  What changed: additional instructions             Bryan Winchester Jr., PA-C  04/08/21 100

## 2021-04-06 NOTE — ED NOTES
Contacted House Supervisor regarding admission.  She assigned room 424.  RN notified.     Sagar, April Dianne 04/05/21 9355

## 2021-04-06 NOTE — CONSULTS
In Patient Consult      Date of Consultation: 2021  Patient Name: Gaby Rasheed  MRN: [id number]  : 1965     Referring provider: Shane Vasquez MD    Primary care provider:  Arabella Loving MD    Reason for consultation: cirrhosis    History of Present Illness:   This is a 55-year-old female with significant past medical history including alcoholic cirrhosis (last drink in Dec 2020), history of Stan-en-Y gastric bypass, recent diagnosis of aortic dissection, recent history of abdominal surgery due to bowel perforation. She presented to our facility  due to abnormal finding on chest imaging, effusion. GI was consulted for management of cirrhosis.     She was admitted to  in 2021 and had abdominal surgery at that time.  She has plans to see hepatologist in [place] soon.  She had been following with  gastroenterology recently, last saw Dr. Schmidt.  She has seen Dr. Ruano as an outpatient in the past. She recently saw Dr. Francis Bucio at  for follow up regarding her aortic dissection.     Reports feeling better after US paracentesis and thoracentesis (1 L removed) early this morning. Had beside paracentesis with approx 2.5 liters removed.  On presentation states that she had severe abdominal distension and SOA. She has already been told recently she is not a candidate for liver transplant at this time due to other co- morbidities as well as relatively recent alcohol use. She does take lactulose as directed, has diarrhea with it. Has approx 3 stools per day. Has history of confusion related to hepatic encephalopathy. Last EGD was 2021 and had GDA bleeding related to an ulcer. At home, she is taking Lasix 40 mg once daily plus aldactone 50 mg once daily.     Previous GI office visits  2020  Gaby Rasheed is a 55 y.o. female who is here today for follow up for cirrhosis. She states that she stopped drinking alcohol since last visit in 2020.   She  continued to have abdominal bloating, diarrhea has improved after she was started on cholestyramine.  Denies any change in mental status.  She continued to have a significant fatigue.  No melena.     8/19/2020  Gaby Rasheed is a 55 y.o. female who is here today to establish care with Gastroenterology for follow-up of her elevated liver enzymes.   Patient was followed at Dr. Claudio  office at [place] until recently.   She has a known history of alcoholic liver disease.  She has cut down alcohol but she continues to drink until July 2020.  She has ongoing issues with the diarrhea, she is on lomotil and she also take imodium.  Her recent lab work revealed significantly elevated total bilirubin suspicious for cirrhosis. She developed recent abdominal distension and getting worse. She is on lasix 20mg po daily. Spironolactone 50mg   Her last EGD was in June 2020 which revealed normal pouch and jejunum, biopsies negative for any abnormalities  Colonoscopy done in December 2019 by Dr. Cotton mentioned that she does did have some colitis involving the right side colon however biopsies done were all normal without any signs of microscopic colitis.  Terminal ileum was not examined and no biopsies obtained.      5/11/20120  Mrs. Rasheed returns to the office.  I last evaluated this patient in January 2018 for alcohol-related liver disease.  She has recently been followed by Dr. Abreu who performed surgery for incarcerated hernia in January.  The patient also had a colonoscopy in December with biopsies negative for colitis.  Her main complaint is issues with urgency to have a bowel movement for meals. She has experienced this problem for about 8-9 months.  The patient denies any diarrhea that will wake her up from a deep sleep.  She is working long hour shifts at the [employer].  The job is very stressful at this time.  Mrs. Rahseed denies any gerri blood in the stool.  She did have weight loss of about  10 pounds but has gained some of the weight back.  She denies any nausea or emesis.  Mrs. Rasheed denies any localized abdominal pain except before going to the restroom with the discomfort is usually in the lower abdomen.  The patient denies any breakthrough heartburn.  She does admit to still consuming some alcohol usually 2 glasses of wine with meals when she is not working.  The patient does not drink any alcohol during the course of her work week.    Subjective     Past Medical History:   Diagnosis Date    Acid reflux     Alcohol liver damage (CMS/HCC) 2021    Alcoholism (CMS/HCC) 2021    Anxiety     Back pain     Chronic liver disease and cirrhosis (CMS/HCC) 2021    Cirrhosis of liver (CMS/HCC) 2020    Depression     Dissection of thoracoabdominal aorta (CMS/HCC) 2021    Fractures     Gastrojejunal ulcer with hemorrhage and perforation (CMS/HCC) 2021    Hepatic encephalopathy (CMS/HCC) 2021    Hernia of abdominal cavity     High cholesterol     High triglycerides     Hypertension     Hypothyroidism     Irritable bowel syndrome     Nocturia 10/5/2016    Positive TB test     Pseudoaneurysm of right femoral artery (CMS/HCC) 2021    Secondary esophageal varices without bleeding (CMS/HCC) 2021    Sinus problem     ELINA (stress urinary incontinence, female) 10/5/2016    Thrombocytopenia (CMS/HCC) 2021       Past Surgical History:   Procedure Laterality Date     SECTION      COLONOSCOPY      GASTRIC BYPASS      HERNIA REPAIR      umbilical hernia    HERNIA REPAIR  2020    ventral    HYSTERECTOMY      LAPAROSCOPIC TUBAL LIGATION      TOTAL ABDOMINAL HYSTERECTOMY WITH SALPINGO OOPHORECTOMY      uterine prolapse       Family History   Problem Relation Age of Onset    Hyperlipidemia Mother     Arthritis Mother     Inflammatory bowel disease Mother     Diabetes Father     Hypertension Father     Arthritis Paternal Grandmother     Breast cancer Neg Hx      Ovarian cancer Neg Hx     Colon cancer Neg Hx     Cirrhosis Neg Hx     Liver disease Neg Hx     Liver cancer Neg Hx        Social History     Socioeconomic History    Marital status:      Spouse name: Not on file    Number of children: Not on file    Years of education: Not on file    Highest education level: Not on file   Tobacco Use    Smoking status: Never Smoker    Smokeless tobacco: Never Used   Substance and Sexual Activity    Alcohol use: Yes     Comment: whiskey-4-5 glasses on days not working    Drug use: No    Sexual activity: Defer         Current Facility-Administered Medications:     acetaminophen (TYLENOL) tablet 650 mg, 650 mg, Oral, Q4H PRN **OR** acetaminophen (TYLENOL) 160 MG/5ML solution 650 mg, 650 mg, Oral, Q4H PRN **OR** acetaminophen (TYLENOL) suppository 650 mg, 650 mg, Rectal, Q4H PRN, Juwan Hernandez DO    cefTRIAXone (ROCEPHIN) IVPB 1 g/50ml dextrose (premix), 1 g, Intravenous, Q24H, Juwan Hernandez DO    enoxaparin (LOVENOX) syringe 40 mg, 40 mg, Subcutaneous, Nightly, Juwan Hernandez DO    furosemide (LASIX) tablet 40 mg, 40 mg, Oral, Daily, Juwan Hernandez, , 40 mg at 04/06/21 0829    Hold medication, 1 each, Does not apply, Continuous PRN, Shane Vasquez MD    lactulose (CHRONULAC) 10 GM/15ML solution 20 g, 20 g, Oral, TID PRN, Juwan Hernandez DO    levothyroxine (SYNTHROID, LEVOTHROID) tablet 50 mcg, 50 mcg, Oral, Daily, Juwan Hernandez, , 50 mcg at 04/06/21 0829    ondansetron (ZOFRAN) injection 4 mg, 4 mg, Intravenous, Q6H PRN, Juwan Hernandez DO    pantoprazole (PROTONIX) EC tablet 40 mg, 40 mg, Oral, Daily, Juwan Hernandez, , 40 mg at 04/06/21 0829    sodium chloride 0.9 % flush 10 mL, 10 mL, Intravenous, PRN, Juwan Hernandez,     sodium chloride 0.9 % flush 10 mL, 10 mL, Intravenous, Q12H, Juwan Hernandez DO, 10 mL at 04/06/21 0829    sodium chloride 0.9 % flush 10 mL, 10 mL, Intravenous, PRN, Juwan Hernandez,     spironolactone (ALDACTONE) tablet 50 mg, 50 mg, Oral, Daily, Juwan Hernandez,  DO, 50 mg at 04/06/21 0829    zolpidem (AMBIEN) tablet 2.5 mg, 2.5 mg, Oral, Nightly PRN, Mary, Umar, DO    Allergies   Allergen Reactions    Prednisone Anxiety       Review of Systems   Constitutional: Positive for fatigue and unexpected weight loss.   HENT: Negative for trouble swallowing.    Eyes: Negative.    Respiratory: Negative for shortness of breath.    Gastrointestinal: Positive for abdominal distention, abdominal pain and diarrhea. Negative for anal bleeding and vomiting.   Endocrine: Negative.    Genitourinary: Negative.    Musculoskeletal: Positive for arthralgias and back pain.   Skin: Positive for color change.   Allergic/Immunologic: Negative.    Neurological: Positive for weakness and memory problem.   Psychiatric/Behavioral: Positive for depressed mood.     The following portions of the patient's history were reviewed and updated as appropriate: allergies, current medications, past family history, past medical history, past social history, past surgical history and problem list.    Objective     Vitals:    04/06/21 0353 04/06/21 0354 04/06/21 0700 04/06/21 1100   BP: 136/72 130/65 129/65 141/71   BP Location: Right arm Right arm Right arm Right arm   Patient Position: Lying Sitting Lying Lying   Pulse:    97   Resp:   20 20   Temp:   97.9 °F (36.6 °C) 98 °F (36.7 °C)   TempSrc:   Oral Oral   SpO2:   98% 99%   Weight:       Height:           Physical Exam  Constitutional:       General: She is not in acute distress.     Appearance: She is ill-appearing.   HENT:      Head: Normocephalic.      Right Ear: External ear normal.      Left Ear: External ear normal.      Nose: Nose normal.      Mouth/Throat:      Comments: Wearing a mask over her mouth  Eyes:      General: Scleral icterus present.         Right eye: No discharge.         Left eye: No discharge.      Conjunctiva/sclera: Conjunctivae normal.   Cardiovascular:      Rate and Rhythm: Tachycardia present.      Heart sounds: Murmur (diastolic)  heard.        Comments: Abnormal heart sounds  Pulmonary:      Effort: Pulmonary effort is normal. No respiratory distress.      Breath sounds: Normal breath sounds. No wheezing.   Abdominal:      General: There is distension (mild-moderate).      Palpations: Abdomen is soft. There is no mass.      Tenderness: There is abdominal tenderness (generalized, mild). There is no guarding or rebound.      Hernia: No hernia is present.      Comments: Ecchymosis noted to right of umbilicus. Healed incisional scar midline. Mildly hyperactive bowel sounds.   Musculoskeletal:         General: No deformity.      Cervical back: Normal range of motion.      Right lower leg: No edema.      Left lower leg: No edema.   Skin:     General: Skin is warm and dry.      Coloration: Skin is jaundiced. Skin is not pale.      Findings: Bruising present.   Neurological:      Mental Status: She is alert and oriented to person, place, and time.   Psychiatric:      Comments: Depressed affect         Results from last 7 days   Lab Units 04/06/21  0601 04/05/21  1826   SODIUM mmol/L 134* 134*   POTASSIUM mmol/L 4.4 4.7   CHLORIDE mmol/L 96* 95*   CO2 mmol/L 25.9 28.4   BUN mg/dL 20 21*   CREATININE mg/dL 1.05* 1.19*   CALCIUM mg/dL 8.9 9.0   ALBUMIN g/dL  --  2.90*   BILIRUBIN mg/dL  --  5.2*   ALK PHOS U/L  --  187*   ALT (SGPT) U/L  --  17   AST (SGOT) U/L  --  46*   GLUCOSE mg/dL 70 78   WBC 10*3/mm3 7.23 10.03   HEMOGLOBIN g/dL 8.1* 9.2*   PLATELETS 10*3/mm3 129* 152       Imaging Results (Last 24 Hours)       Procedure Component Value Units Date/Time    CT Abdomen Pelvis With Contrast [[id number]] Collected: 04/05/21 2059     Updated: 04/05/21 2100    Narrative:      FINAL REPORT    TECHNIQUE:  Axial CT images were performed from the lung bases through the  symphysis pubis after the administration of intravenous  contrast.  This study was performed with techniques to keep  radiation doses as low as reasonably achievable (ALARA).  Individualized  dose reduction techniques using automated  exposure control or adjustment of mA and/or kV according to the  patient's size were employed.    CLINICAL HISTORY:  swelling recent fall    FINDINGS:  ABDOMEN/PELVIS:    Liver, gallbladder and bile ducts: There is cirrhosis of the liver.  The gallbladder is unremarkable.  There is no definite biliary duct dilatation.    Adrenal glands: The adrenal glands are morphologically unremarkable without suspicious lesion.    Kidneys, ureter and urinary bladder: No suspicious renal lesion.  No hydronephrosis.  Urinary bladder is unremarkable.    Spleen: The spleen is enlarged.    Pancreas: The pancreas is grossly unremarkable.    GI systems and mesentery: There has been prior gastric surgery.  No evidence of bowel obstruction.  The appendix is visualized and unremarkable in appearance.  No significant mesenteric inflammation.    Lymph nodes: No definite pathologically enlarged abdominal or pelvic lymph nodes present.    Vessels: Chronic thoracic aortic dissection extends into the abdominal aorta.  The mesenteric arteries are patent.    Peritoneum: There is a large amount of ascites with a mild amount of peritoneal enhancement.    Pelvic viscera: No acute findings.    Body wall: No body wall contusion. No significant body wall hernias.    Bones: No acute fracture.      Impression:      1.  Cirrhosis and splenomegaly with evidence of portal hypertension.    2.  Significant ascites with mild peritoneal enhancement consistent with peritonitis.    3.  Chronic dissection of descending thoracic aorta extends into the abdominal aorta.    Authenticated by Marty Herrmann MD on 04/05/2021 08:59:37 PM    CT Chest Pulmonary Embolism [[id number]] Collected: 04/05/21 2058     Updated: 04/05/21 2059    Narrative:      FINAL REPORT    TECHNIQUE:  Multiple axial CT images were obtained through the chest  following IV contrast using a CTA/PE protocol.  3D/MIP  reconstruction images were also performed.  This study was  performed with techniques to keep radiation doses as low as  reasonably achievable (ALARA). Individualized dose reduction  techniques using automated exposure control or adjustment of mA  and/or kV according to the patient's size were employed.    CLINICAL HISTORY:  left pleural effusion  pe protocol    FINDINGS:  PAs and aorta: No pulmonary embolus.  There is a  chronic-appearing dissection involving the descending thoracic  aorta.  There is coronary artery disease.  Heart/mediastinum: No  evidence for right heart strain.  No pericardial effusion.  The  heart is normal in size.  Lungs: There is a large left pleural  effusion with atelectasis of the majority of the left lower lobe  and lingula.  The right lung appears clear.  Lymph nodes: No  pathologically enlarged thoracic lymph nodes.  Pleura: No  pneumothorax or pleural effusion.  Chest Wall: No chest wall  contusion.  Bones: No acute fracture.      Impression:      1.  No pulmonary embolus.  2.  Likely chronic dissection of the  descending thoracic aorta.    Authenticated by Marty Herrmann MD on 04/05/2021 08:58:17 PM            Assessment / Plan      Assessment/Recommendations:   1. Alcoholic cirrhosis with ascites   MELD score 25 (calculated from labs 3/26/2021)  She is no longer drinking, last drink of liquor was in December 2020. Had sudden worsening of her illness after a surgery last January 2020 and then later had other problems develop after a fall. She was in  hospital recently with peritonitis related to a bowel perforation. Gradually, she has lost over 40 lbs during the past 1 year. She has developed ascites and effusion recently. She is following with [facility] and has plans to see a hepatologist soon. Offered referral to liver transplant team due to elevated MELD but she declined because she has already been told recently that she is not yet a candidate.     Paracentesis at bedside, no SBP.  Needs low sodium, high protein diet.    Lactulose titrated to 3 stools daily.  EGD needs repeated as outpatient due to ulcer noted in Jan 2021 and also needs screening for esophageal varices.   HCC monitoring due again in 10/2021, last imaging with CTAP this admission, no liver lesions.   Continue current diuretics, lasix 40, aldactone 50 but may need change in these as outpatient to help decrease frequency of need for paracentesis. Current Cr increased at 1.05.   Discussed if continued paracentesis is needed as outpatient, may be able to assist with this locally.   Keep scheduled appointment with hepatology.    2. History of anastomotic ulcer  Needs repeat EGD soon as outpatient due to previous ulcer and bleeding. Has been told recently that she is likely not safe to have sedation due to her cardiac issues at this time. No melena reported this admission. Hgb currently at 8.1, was 9.8 3/26. Should be monitored closely.     3. Aortic dissection  Reviewed most recent note from Dr. Francis Bucio, needs repeat imaging in 1 year with great control of BP.         Thank you very much for letting me participate in the care of this patient.  Please do not hesitate to call me if you have any questions.    Misty Mac PA-C  [facility]  4/6/2021  13:49 EDT    Please note that portions of this note may have been completed with a voice recognition program.

## 2021-04-06 NOTE — DISCHARGE INSTRUCTIONS
Keep follow up appointment with Hepatologist in May.  Follow up with Dr. Loving next week for a recheck.  Return to the hospital with any shortness of breath, increasing abdominal size or pain, or any other problems.

## 2021-04-06 NOTE — OUTREACH NOTE
Prep Survey      Responses   [facility] [place] patient discharged from?  [facility]   Is LACE score < 7 ?  No   Emergency Room discharge w/ pulse ox?  No   Eligibility  [facility]   Date of Admission  04/05/21   Date of Discharge  04/06/21   Discharge Disposition  Home or Self Care   Discharge diagnosis  Ascites due to alcoholic cirrhosis BEDSIDE PARACENTESIS   Does the patient have one of the following disease processes/diagnoses(primary or secondary)?  Other   Does the patient have Home health ordered?  No   Is there a DME ordered?  No   Prep survey completed?  Yes          Tanna Deras RN

## 2021-04-06 NOTE — PROCEDURES
Procedure: Ultrasound-guided paracentesis    Reason for study: Ascites, possible peritonitis on CT imaging    Patient condition: Stable    Estimated Blood Loss: Less than 5 ml    Procedure: Risks/benefits were explained to the patient prior to procedure including bleeding, infection, bowel rupture.  She communicated understanding and expressed consent.  Ultrasound was used, pocket was visualized on the right lower quadrant, 1% lidocaine was used anesthetize 10 cc.  Scalpel small incision made, catheter was then directed over the needle, and serous fluid was drained.  Approximately 2.3 L of fluid were removed from the abdomen.  Patient tolerated the procedure well, was thankful.  Deferring thoracentesis for now given she is on room air, and nonlabored.    Complications: None    Fluid studies ruling out SBP. Stop Rocephin. May discharge later today if feeling OK with outpatient pulmonology f/u for thoracentesis vs consideration of pleural drain.

## 2021-04-06 NOTE — ED NOTES
Attempting to contact Dr. Mary Adams. Left voicemail on cell.     Sagar April Sowmya  04/05/21 2102

## 2021-04-06 NOTE — PROGRESS NOTES
Discharge Planning Assessment   [facility]     Patient Name: Gaby Rasheed  MRN: [id number]  Today's Date: 4/6/2021    Admit Date: 4/5/2021    Discharge Needs Assessment     Row Name 04/06/21 4807       Living Environment    Lives With  significant other    Name(s) of Who Lives With Patient  Anton Jaquez    Current Living Arrangements  home/apartment/condo    Primary Care Provided by  self;spouse/significant other    Provides Primary Care For  no one, unable/limited ability to care for self    Family Caregiver if Needed  child(calin), adult;parent(s);significant other    Quality of Family Relationships  supportive    Able to Return to Prior Arrangements  yes       Resource/Environmental Concerns    Resource/Environmental Concerns  none    Transportation Concerns  car, none       Transition Planning    Patient/Family Anticipates Transition to  home with family She recently started outpatient physical therapy at [address]    Patient/Family Anticipated Services at Transition  complementary therapies    Transportation Anticipated  family or friend will provide       Discharge Needs Assessment    Readmission Within the Last 30 Days  no previous admission in last 30 days    Equipment Currently Used at Home  rollator;other (see comments) elevated toilet seat with handrails    Concerns to be Addressed  adjustment to diagnosis/illness    Anticipated Changes Related to Illness  inability to care for self;inability to work    Equipment Needed After Discharge  none    Outpatient/Agency/Support Group Needs  outpatient therapy        Discharge Plan     Row Name 04/06/21 1631       Plan    Plan Patient has a DC order in currently.  I saw the patient.  Demographics verified. No POA/AD. She did have home health- [facility], but she quit them.  She just started outpatient  PT. NO Discharge needs identified.  Her S/O is her ride home.        Continued Care and Services - Admitted Since 4/5/2021    Coordination has not been started  for this encounter.       Expected Discharge Date and Time     Expected Discharge Date Expected Discharge Time    Apr 6, 2021         Demographic Summary     Row Name 04/06/21 1536       General Information    Admission Type  observation    Arrived From  emergency department    Required Notices Provided  Observation Status Notice    Referral Source  admission list    Reason for Consult  discharge planning    Preferred Language  English     Used During This Interaction  no        Functional Status     Row Name 04/06/21 1536       Functional Status    Usual Activity Tolerance  moderate    Current Activity Tolerance  moderate       Functional Status, IADL    Medications  independent    Meal Preparation  assistive person    Housekeeping  assistive person    Laundry  assistive person    Shopping  assistive person    IADL Comments  Mother, daughter, and S/O help patient with adl's.       Employment/    Employment Status  employed full-time;other (see comments) Has not worked since January.  Looking into disability.    Shift Worked  swing shift        Psychosocial     Row Name 04/06/21 1539       Behavior WDL    Behavior WDL  WDL       Perceptual State WDL    Perceptual State WDL  WDL       Thought Process WDL    Thought Process WDL  WDL       Intellectual Performance WDL    Intellectual Performance WDL  WDL       Coping/Stress    Major Change/Loss/Stressor  loss of independence;role changes/responsibilities;illness;job change/loss    Patient Personal Strengths  expressive of emotions    Sources of Support  adult child(calin);parent(s);significant other    Reaction to Health Status  adjusting;guilt    Understanding of Condition and Treatment  adequate understanding of medical condition;adequate understanding of treatment        Abuse/Neglect     Row Name 04/06/21 154       Personal Safety    Feels Unsafe at Home or Work/School  no    Feels Threatened by Someone  no    Does Anyone Try to Keep You From  Having Contact with Others or Doing Things Outside Your Home?  no    Physical Signs of Abuse Present  no        Legal    No documentation.       Substance Abuse     Row Name 04/06/21 8498       Family Member Substance Use (#4)    Previous Substance Use Treatment  detox unit    Substance Use Comments  Patient was an alcoholic. Has not had alcohol since December 2020.  Her health deteriorated in January after a fall and she was hospitalized at  for 33 days, being released feb 8th-  during this time she went thru DT's.        Patient Forms    No documentation.           Tona Shafer RN

## 2021-04-06 NOTE — PROGRESS NOTES
Case Management Discharge Note                Selected Continued Care - Admitted Since 4/5/2021      Transportation Services  Private: Car    Final Discharge Disposition Code: 01 - home or self-care

## 2021-04-06 NOTE — NURSING NOTE
Patient discharged at this time per MD order. Patient given discharge teaching and follow up appointment. No questions/concerns voiced. Patient left facility via wheelchair with no acute distress noted.

## 2021-04-06 NOTE — PLAN OF CARE
Goal Outcome Evaluation:        Outcome Summary: Pt awake most of this shift.  Tolerating room air.  Telemetry monitoring continued.  VSS.  Dr Hernandez did a paracentesis this shift and pt tolerated well.  Labs monitored daily and prn.

## 2021-04-06 NOTE — H&P
[facility]   HISTORY AND PHYSICAL      Name:  Gaby Rasheed   Age:  55 y.o.  Sex:  female  :  1965  MRN:  [id number]   Visit Number:  [id number]  Admission Date:  2021  Date Of Service:  21  Primary Care Physician:  Arabella Loving MD    Chief Complaint:  Fall, x-ray showed effusion    History Of Presenting Illness:  55 F H/O alcoholic cirrhosis, thoracic aortic dissection chronic, bowel perforation recent with admission to  for the same in January who presented to the ED with concerns of effusion seen on x-ray imaging recently.  Patient reportedly fell 3 days ago, hurt her chin and knee.  She spoke with her PCP and was told to get an x-ray to ensure there is no rib fractures.  This revealed a large left-sided effusion and she presented to the ED for the same.  Given her history of aortic dissection, CT PE and abdomen/pelvis were obtained which revealed findings of large L.-sided effusion, ascites, and possible peritonitis.  GI was consulted, paracentesis recommended, given Rocephin in the ER.  Patient's significant other is at bedside.  She has an appointment with a hepatologist in May.    Review Of Systems: A full 10 point review of systems was performed and all pertinent positives and negatives are noted in the HPI.  Past Medical History:  Past Medical History:   Diagnosis Date   • Acid reflux    • Alcohol liver damage (CMS/HCC) 2021   • Alcoholism (CMS/HCC) 2021   • Anxiety    • Back pain    • Chronic liver disease and cirrhosis (CMS/HCC) 2021   • Cirrhosis of liver (CMS/HCC) 2020   • Depression    • Dissection of thoracoabdominal aorta (CMS/HCC) 2021   • Fractures    • Gastrojejunal ulcer with hemorrhage and perforation (CMS/HCC) 2021   • Hepatic encephalopathy (CMS/HCC) 2021   • Hernia of abdominal cavity    • High cholesterol    • High triglycerides    • Hypertension    • Hypothyroidism    • Irritable bowel  syndrome    • Nocturia 10/5/2016   • Positive TB test    • Pseudoaneurysm of right femoral artery (CMS/HCC) 2021   • Secondary esophageal varices without bleeding (CMS/HCC) 2021   • Sinus problem    • ELINA (stress urinary incontinence, female) 10/5/2016   • Thrombocytopenia (CMS/HCC) 2021     Past Surgical history:  Past Surgical History:   Procedure Laterality Date   •  SECTION     • COLONOSCOPY     • GASTRIC BYPASS     • HERNIA REPAIR      umbilical hernia   • HERNIA REPAIR  2020    ventral   • HYSTERECTOMY     • LAPAROSCOPIC TUBAL LIGATION     • TOTAL ABDOMINAL HYSTERECTOMY WITH SALPINGO OOPHORECTOMY      uterine prolapse     Social History:  Social History     Socioeconomic History   • Marital status:      Spouse name: Not on file   • Number of children: Not on file   • Years of education: Not on file   • Highest education level: Not on file   Tobacco Use   • Smoking status: Never Smoker   • Smokeless tobacco: Never Used   Substance and Sexual Activity   • Alcohol use: Yes     Comment: whiskey-4-5 glasses on days not working   • Drug use: No   • Sexual activity: Defer     Family History:  Family History   Problem Relation Age of Onset   • Hyperlipidemia Mother    • Arthritis Mother    • Inflammatory bowel disease Mother    • Diabetes Father    • Hypertension Father    • Arthritis Paternal Grandmother    • Breast cancer Neg Hx    • Ovarian cancer Neg Hx    • Colon cancer Neg Hx    • Cirrhosis Neg Hx    • Liver disease Neg Hx    • Liver cancer Neg Hx      Allergies:  Prednisone    Home Medications:  Prior to Admission Medications     Prescriptions Last Dose Informant Patient Reported? Taking?    Calcium Carbonate Antacid (TUMS PO)   Yes No    Take  by mouth.    desvenlafaxine (PRISTIQ) 100 MG 24 hr tablet   No No    TAKE 1 TABLET BY MOUTH EVERY DAY    furosemide (LASIX) 40 MG tablet   No No    TAKE 1 TABLET BY MOUTH DAILY    lactulose (CHRONULAC) 10 GM/15ML solution   No No     Take 30 mL by mouth 3 (Three) Times a Day As Needed (hepatic encephalopathy; need to have at least 4 bowel movements a day).    levothyroxine (SYNTHROID, LEVOTHROID) 50 MCG tablet  Self No No    Take 1 tablet by mouth Daily.    Patient taking differently:  Take 50 mcg by mouth Daily. Per pharmacy, last filled 6/9/2020 for a 90 day supply.    loperamide (IMODIUM) 2 MG capsule   Yes No    Take 2 mg by mouth Every 8 (Eight) Hours.    magnesium oxide (HM Magnesium) 400 MG tablet   No No    Take 1 tablet by mouth Daily.    Multiple Vitamins-Minerals (MULTIVITAMIN ADULT PO)  Self Yes No    Take  by mouth.    naproxen (NAPROSYN) 250 MG tablet   Yes No    Take 250 mg by mouth 2 (Two) Times a Day As Needed.    Nutritional Supplements (ENSURE PO)   Yes No    Take  by mouth.    pantoprazole (PROTONIX) 40 MG EC tablet   Yes No    Take 40 mg by mouth Daily.    Prenatal Vit-Fe Fumarate-FA (PRENATAL VITAMIN PO)   Yes No    Take 1 tablet by mouth Daily.    Simethicone (GAS-X PO)   Yes No    Take  by mouth.    spironolactone (ALDACTONE) 50 MG tablet   No No    TAKE 1 TABLET BY MOUTH EVERY DAY    zolpidem (Ambien) 5 MG tablet   No No    Take 1 tablet by mouth At Night As Needed for Sleep. Lower dose required due to liver disease.           ED Medications:  Medications   sodium chloride 0.9 % flush 10 mL (has no administration in time range)   cefTRIAXone (ROCEPHIN) IVPB 1 g/50ml dextrose (premix) (1 g Intravenous New Bag 4/5/21 2131)   iopamidol (ISOVUE-300) 61 % injection 100 mL (100 mL Intravenous Given 4/5/21 1950)     Vital Signs:  Temp:  [98.1 °F (36.7 °C)-98.4 °F (36.9 °C)] 98.1 °F (36.7 °C)  Heart Rate:  [101-103] 101  Resp:  [16] 16  BP: ()/(62-70) 129/70        04/05/21  1746   Weight: 52.6 kg (116 lb)     Body mass index is 20.55 kg/m².    Physical Exam:  General: NAD, resting in bed  HEENT: EOMI, NC/AT  Heart: regular  Lungs: Mildly labored  Abdomen: Soft, distended, nontender  Extremities: Mild to moderate  edema  Neurological: A&O x3, moves all extremities  Psychological: Mood and affect appropriate, speech is coherent  Skin: warm, dry    Laboratory data: I have reviewed the labs done in the emergency room.    Results from last 7 days   Lab Units 04/05/21  1826   SODIUM mmol/L 134*   POTASSIUM mmol/L 4.7   CHLORIDE mmol/L 95*   CO2 mmol/L 28.4   BUN mg/dL 21*   CREATININE mg/dL 1.19*   CALCIUM mg/dL 9.0   BILIRUBIN mg/dL 5.2*   ALK PHOS U/L 187*   ALT (SGPT) U/L 17   AST (SGOT) U/L 46*   GLUCOSE mg/dL 78     Results from last 7 days   Lab Units 04/05/21  1826   WBC 10*3/mm3 10.03   HEMOGLOBIN g/dL 9.2*   HEMATOCRIT % 27.8*   PLATELETS 10*3/mm3 152                     Results from last 7 days   Lab Units 04/05/21  1826   LIPASE U/L 39         Results from last 7 days   Lab Units 04/05/21  1907   COLOR UA  Dark Yellow*   GLUCOSE UA  Negative   KETONES UA  Negative   LEUKOCYTES UA  Negative   PH, URINE  5.5   BILIRUBIN UA  Negative   UROBILINOGEN UA  1.0 E.U./dL     Pain Management Panel     Pain Management Panel Latest Ref Rng & Units 7/15/2020    AMPHETAMINES SCREEN, URINE Negative Negative    BARBITURATES SCREEN Negative Negative    BENZODIAZEPINE SCREEN, URINE Negative Positive(A)    BUPRENORPHINEUR Negative Negative    COCAINE SCREEN, URINE Negative Negative    METHADONE SCREEN, URINE Negative Negative    METHAMPHETAMINEUR Negative Negative        Radiology:  Imaging Results (Last 72 Hours)     Procedure Component Value Units Date/Time    CT Abdomen Pelvis With Contrast [[id number]] Collected: 04/05/21 2059     Updated: 04/05/21 2100    Narrative:      FINAL REPORT    TECHNIQUE:  Axial CT images were performed from the lung bases through the  symphysis pubis after the administration of intravenous  contrast.  This study was performed with techniques to keep  radiation doses as low as reasonably achievable (ALARA).  Individualized dose reduction techniques using automated  exposure control or adjustment of mA and/or  kV according to the  patient's size were employed.    CLINICAL HISTORY:  swelling recent fall    FINDINGS:  ABDOMEN/PELVIS:    Liver, gallbladder and bile ducts: There is cirrhosis of the liver.  The gallbladder is unremarkable.  There is no definite biliary duct dilatation.    Adrenal glands: The adrenal glands are morphologically unremarkable without suspicious lesion.    Kidneys, ureter and urinary bladder: No suspicious renal lesion.  No hydronephrosis.  Urinary bladder is unremarkable.    Spleen: The spleen is enlarged.    Pancreas: The pancreas is grossly unremarkable.    GI systems and mesentery: There has been prior gastric surgery.  No evidence of bowel obstruction.  The appendix is visualized and unremarkable in appearance.  No significant mesenteric inflammation.    Lymph nodes: No definite pathologically enlarged abdominal or pelvic lymph nodes present.    Vessels: Chronic thoracic aortic dissection extends into the abdominal aorta.  The mesenteric arteries are patent.    Peritoneum: There is a large amount of ascites with a mild amount of peritoneal enhancement.    Pelvic viscera: No acute findings.    Body wall: No body wall contusion. No significant body wall hernias.    Bones: No acute fracture.      Impression:      1.  Cirrhosis and splenomegaly with evidence of portal hypertension.    2.  Significant ascites with mild peritoneal enhancement consistent with peritonitis.    3.  Chronic dissection of descending thoracic aorta extends into the abdominal aorta.    Authenticated by Marty Herrmann MD on 04/05/2021 08:59:37 PM    CT Chest Pulmonary Embolism [[id number]] Collected: 04/05/21 2058     Updated: 04/05/21 2059    Narrative:      FINAL REPORT    TECHNIQUE:  Multiple axial CT images were obtained through the chest  following IV contrast using a CTA/PE protocol.  3D/MIP  reconstruction images were also performed. This study was  performed with techniques to keep radiation doses as low as  reasonably  achievable (ALARA). Individualized dose reduction  techniques using automated exposure control or adjustment of mA  and/or kV according to the patient's size were employed.    CLINICAL HISTORY:  left pleural effusion  pe protocol    FINDINGS:  PAs and aorta: No pulmonary embolus.  There is a  chronic-appearing dissection involving the descending thoracic  aorta.  There is coronary artery disease.  Heart/mediastinum: No  evidence for right heart strain.  No pericardial effusion.  The  heart is normal in size.  Lungs: There is a large left pleural  effusion with atelectasis of the majority of the left lower lobe  and lingula.  The right lung appears clear.  Lymph nodes: No  pathologically enlarged thoracic lymph nodes.  Pleura: No  pneumothorax or pleural effusion.  Chest Wall: No chest wall  contusion.  Bones: No acute fracture.      Impression:      1.  No pulmonary embolus.  2.  Likely chronic dissection of the  descending thoracic aorta.    Authenticated by Marty Herrmann MD on 04/05/2021 08:58:17 PM      I personally reviewed the CT chest/abdomen/pelvis which reveals large left-sided effusion, ascites    Assessment:    Ascites due to alcoholic cirrhosis (CMS/HCC)    Dissection of thoracoabdominal aorta (CMS/HCC)    Pleural effusion, left  Possible peritonitis    Plan:  -Proceed with diagnostic/therapeutic paracentesis and possibly thoracentesis followed by albumin infusion when she is brought to the floor  -inpt, DNR, high risk 2/2 multiple comorbidities, possible peritonitis    Juwan Hernandez DO  04/05/21  21:49 EDT    Dictated utilizing Dragon dictation.

## 2021-04-06 NOTE — DISCHARGE SUMMARY
[facility]   DISCHARGE SUMMARY      Name:  Gaby Rasheed   Age:  55 y.o.  Sex:  female  :  1965  MRN:  [id number]   Visit Number:  [id number]    Admission Date:  2021  Date of Discharge:  2021  Primary Care Physician:  Arabella Loving MD    Important issues to note:    1.  Patient admitted to the hospital large left sided pleural effusion and ascites.  Bedside ultrasound guided paracentesis performed after admission with >2L removed and sent for analysis.  SBP ruled out with fluid studies.    2.  Interventional Radiology able to take the patient today for therapeutic thoracentesis today.  Post intervention CXR without any pneumothorax noted and decreased size of pleural effusion.    Discharge Diagnoses:       Ascites due to alcoholic cirrhosis (CMS/HCC)    Dissection of thoracoabdominal aorta (CMS/HCC)    Peritonitis (CMS/HCC)    Pleural effusion, left      Presenting Problem:    Peritonitis (CMS/HCC) [K65.9]     Consults:     Consults     Date and Time Order Name Status Description    2021  9:18 AM Inpatient Gastroenterology Consult          Consulting Physician(s)  Chat With All Active Members    Provider Relationship Specialty    Joshua Ruano MD  Consulting Physician Gastroenterology          Procedures Performed:       0243 Diagnostic, Therapeutic Bedside Paracentesis Without  Radiology, BEDSIDE PARACENTESIS    History of presenting illness/ Hospital Course:    Patient is a 55 year old female who presented to the Emergency Department 2021 after being evaluated at [facility] for injuries sustained from a fall at home 3 days prior.  Patient with health history significant for alcoholic cirrhosis, thoracic aortic dissection chronic, and bowel perforation.  Patient sent to the ED when XR imaging at [facility] was concerning for large effusion.  Patient did speak with PCP (Fabienne) after her fall and was told to get an XR to check for rib fractures.   She did strike her chin and knee.  CT PE and abdomen/ pelvis were done in the ED which revealed large left sided effusion, ascites, and possible peritonitis.  Patient received Rocephin in the ED.  She has a hepatologist appointment scheduled in May.  GI was consulted at admission, however, Dr. Hernandez graciously performed a paracentesis early this morning removing around 2.5 liters of fluid which was sent for analysis.  Patient's labwork was significant for elevated creatinine which has come down with recheck today and is noted to be 1.05.  Elevated alkaline phosphatase-187, AST-46, and bilirubin-5.2 on admission.  Lactate was mildly elevated at 2.9 and was within normal limits on recheck at 2.0.  Procalcitonin was 0.11.     Spontaneous bacterial peritonitis was ruled out with paracentesis fluid analysis.  Patient is afebrile as well.  Patient was given infusion of Albumin on admission as well.  Had planned to have patient follow up with pulmonology outpatient for thoracentesis vs pleural drain placement, however, interventional radiology was consulted and advised they could do a thoracentesis this afternoon.  Patient's symptomatology likely all due to chronic alcoholic cirrhosis.  Patient is resting on room air and has no complaints of shortness of breath today.  Will perform therapeutic thoracentesis this afternoon and discharge patient home to follow up with PCP early next week for a recheck.  Patient agreeable with plan, and is feeling good today after getting thoracentesis.  Patient stable for discharge home after thoracentesis and is agreeable to follow up plan after discharge.      Vital Signs:    Temp:  [97.9 °F (36.6 °C)-98.4 °F (36.9 °C)] 97.9 °F (36.6 °C)  Heart Rate:  [] 103  Resp:  [16-20] 20  BP: ()/(60-72) 122/60    Physical Exam:    General Appearance:  Alert and cooperative, not in any acute distress, resting in bed this morning on examination.  Ill-appearing but pleasant and thankful in  conversation   Head:  Atraumatic and normocephalic, without obvious abnormality.  Chin bruising from fall sustained at home    Eyes:          PERRLA, conjunctivae and sclerae normal, no icterus. No pallor. Extraocular movements are within normal limits.   Ears:  Ears appear intact with no abnormalities noted.   Throat: No oral lesions, no thrush, oral mucosa moist.   Neck: Supple, trachea midline   Back:   No kyphoscoliosis present. No tenderness to palpation,   range of motion normal.   Lungs:   Chest shape is normal. Breath sounds heard bilaterally but diminished in left base.  No crackles or wheezing. No Pleural rub or bronchial breathing.   Heart:  Normal S1 and S2, no murmur, no gallop, no rub. No JVD.   Abdomen:   Normal bowel sounds, no masses, no organomegaly. Soft, distended, nontender, no guarding, no rebound tenderness.   Extremities: Moves all extremities, no edema, no cyanosis, no clubbing.   Pulses: Pulses palpable and equal bilaterally.   Skin: No bleeding, bruising or rash.   Neurologic: Alert and oriented x 3. Moves all four limbs equally. No tremors. No facial asymmetry.     Pertinent Lab Results:     Results from last 7 days   Lab Units 04/06/21  0601 04/05/21  1826   SODIUM mmol/L 134* 134*   POTASSIUM mmol/L 4.4 4.7   CHLORIDE mmol/L 96* 95*   CO2 mmol/L 25.9 28.4   BUN mg/dL 20 21*   CREATININE mg/dL 1.05* 1.19*   CALCIUM mg/dL 8.9 9.0   BILIRUBIN mg/dL  --  5.2*   ALK PHOS U/L  --  187*   ALT (SGPT) U/L  --  17   AST (SGOT) U/L  --  46*   GLUCOSE mg/dL 70 78     Results from last 7 days   Lab Units 04/06/21  0601 04/05/21  1826   WBC 10*3/mm3 7.23 10.03   HEMOGLOBIN g/dL 8.1* 9.2*   HEMATOCRIT % 23.5* 27.8*   PLATELETS 10*3/mm3 129* 152                     Results from last 7 days   Lab Units 04/05/21  1826   LIPASE U/L 39         Results from last 7 days   Lab Units 04/05/21  1907   COLOR UA  Dark Yellow*   GLUCOSE UA  Negative   KETONES UA  Negative   LEUKOCYTES UA  Negative   PH, URINE  5.5    BILIRUBIN UA  Negative   UROBILINOGEN UA  1.0 E.U./dL     Pain Management Panel     Pain Management Panel Latest Ref Rng & Units 7/15/2020    AMPHETAMINES SCREEN, URINE Negative Negative    BARBITURATES SCREEN Negative Negative    BENZODIAZEPINE SCREEN, URINE Negative Positive(A)    BUPRENORPHINEUR Negative Negative    COCAINE SCREEN, URINE Negative Negative    METHADONE SCREEN, URINE Negative Negative    METHAMPHETAMINEUR Negative Negative              Pertinent Radiology Results:    Imaging Results (All)     Procedure Component Value Units Date/Time    CT Abdomen Pelvis With Contrast [[id number]] Collected: 04/05/21 2059     Updated: 04/05/21 2100    Narrative:      FINAL REPORT    TECHNIQUE:  Axial CT images were performed from the lung bases through the  symphysis pubis after the administration of intravenous  contrast.  This study was performed with techniques to keep  radiation doses as low as reasonably achievable (ALARA).  Individualized dose reduction techniques using automated  exposure control or adjustment of mA and/or kV according to the  patient's size were employed.    CLINICAL HISTORY:  swelling recent fall    FINDINGS:  ABDOMEN/PELVIS:    Liver, gallbladder and bile ducts: There is cirrhosis of the liver.  The gallbladder is unremarkable.  There is no definite biliary duct dilatation.    Adrenal glands: The adrenal glands are morphologically unremarkable without suspicious lesion.    Kidneys, ureter and urinary bladder: No suspicious renal lesion.  No hydronephrosis.  Urinary bladder is unremarkable.    Spleen: The spleen is enlarged.    Pancreas: The pancreas is grossly unremarkable.    GI systems and mesentery: There has been prior gastric surgery.  No evidence of bowel obstruction.  The appendix is visualized and unremarkable in appearance.  No significant mesenteric inflammation.    Lymph nodes: No definite pathologically enlarged abdominal or pelvic lymph nodes present.    Vessels: Chronic  thoracic aortic dissection extends into the abdominal aorta.  The mesenteric arteries are patent.    Peritoneum: There is a large amount of ascites with a mild amount of peritoneal enhancement.    Pelvic viscera: No acute findings.    Body wall: No body wall contusion. No significant body wall hernias.    Bones: No acute fracture.      Impression:      1.  Cirrhosis and splenomegaly with evidence of portal hypertension.    2.  Significant ascites with mild peritoneal enhancement consistent with peritonitis.    3.  Chronic dissection of descending thoracic aorta extends into the abdominal aorta.    Authenticated by Marty Herrmann MD on 04/05/2021 08:59:37 PM    CT Chest Pulmonary Embolism [[id number]] Collected: 04/05/21 2058     Updated: 04/05/21 2059    Narrative:      FINAL REPORT    TECHNIQUE:  Multiple axial CT images were obtained through the chest  following IV contrast using a CTA/PE protocol.  3D/MIP  reconstruction images were also performed. This study was  performed with techniques to keep radiation doses as low as  reasonably achievable (ALARA). Individualized dose reduction  techniques using automated exposure control or adjustment of mA  and/or kV according to the patient's size were employed.    CLINICAL HISTORY:  left pleural effusion  pe protocol    FINDINGS:  PAs and aorta: No pulmonary embolus.  There is a  chronic-appearing dissection involving the descending thoracic  aorta.  There is coronary artery disease.  Heart/mediastinum: No  evidence for right heart strain.  No pericardial effusion.  The  heart is normal in size.  Lungs: There is a large left pleural  effusion with atelectasis of the majority of the left lower lobe  and lingula.  The right lung appears clear.  Lymph nodes: No  pathologically enlarged thoracic lymph nodes.  Pleura: No  pneumothorax or pleural effusion.  Chest Wall: No chest wall  contusion.  Bones: No acute fracture.      Impression:      1.  No pulmonary embolus.  2.   Likely chronic dissection of the  descending thoracic aorta.    Authenticated by Marty Herrmann MD on 04/05/2021 08:58:17 PM          Condition on Discharge:      Stable.    Code status during the hospital stay:    Code Status and Medical Interventions:   Ordered at: 04/05/21 2137     Limited Support to NOT Include:    Intubation     Code Status:    No CPR     Medical Interventions (Level of Support Prior to Arrest):    Limited       Discharge Disposition:    Home or Self Care    Discharge Medications:       Discharge Medications      Changes to Medications      Instructions Start Date   levothyroxine 50 MCG tablet  Commonly known as: SYNTHROID, LEVOTHROID  What changed: additional instructions   50 mcg, Oral, Daily         Continue These Medications      Instructions Start Date   desvenlafaxine 100 MG 24 hr tablet  Commonly known as: PRISTIQ   TAKE 1 TABLET BY MOUTH EVERY DAY      ENSURE PO   Oral      furosemide 40 MG tablet  Commonly known as: LASIX   40 mg, Oral, Daily      GAS-X PO   1 tablet, Oral, Daily PRN      lactulose 10 GM/15ML solution  Commonly known as: CHRONULAC   20 g, Oral, 3 Times Daily PRN      magnesium oxide 400 MG tablet  Commonly known as: HM Magnesium   400 mg, Oral, Daily      multivitamin with minerals tablet tablet   Oral      naproxen 250 MG tablet  Commonly known as: NAPROSYN   250 mg, Oral, 2 Times Daily PRN      pantoprazole 40 MG EC tablet  Commonly known as: PROTONIX   40 mg, Daily      PRENATAL VITAMIN PO   1 tablet, Oral, Daily      spironolactone 50 MG tablet  Commonly known as: ALDACTONE   TAKE 1 TABLET BY MOUTH EVERY DAY      TUMS PO   1 tablet, Oral, 2 Times Daily PRN      Vitamin B Complex tablet   1 tablet, Oral, Daily      zolpidem 5 MG tablet  Commonly known as: Ambien   5 mg, Oral, Nightly PRN, Lower dose required due to liver disease.             Discharge Diet:     Diet Instructions     Diet: Regular      Discharge Diet: Regular          Activity at Discharge:      Activity Instructions     Activity as Tolerated            Follow-up Appointments:    Follow-up Information     Arabella Loving MD Follow up in 1 week(s).    Specialty: Family Medicine  Why: for a recheck--please schedule this  Contact information:  Joanna Duran [facility]  [facility] [address] [phone]                   Future Appointments   Date Time Provider Department Center   4/15/2021  1:00 PM Hannah Otero APRN [facility]   4/20/2021 11:30 AM Stephanie Kumar [facility]   6/28/2021  1:00 PM Arabella Loving MD [facility]           Test Results Pending at Discharge:    Pending Labs     Order Current Status    Anaerobic Culture - Body Fluid, Peritoneum In process    Bilirubin, Body Fluid - Body Fluid, Peritoneum In process    Blood Culture - Blood, Arm, Left In process    Blood Culture - Blood, Arm, Left In process    Green Top (No Gel) In process    Tarzan Draw In process    Body Fluid Culture - Body Fluid, Peritoneum Preliminary result             JEN Holden  04/06/21  16:05 EDT    Time: I spent 28 minutes on this discharge activity which included: face-to-face encounter with the patient, reviewing the data in the system, coordination of the care with the nursing staff as well as consultants, documentation, and entering orders.     Dictated utilizing Dragon dictation.

## 2021-04-06 NOTE — TELEPHONE ENCOUNTER
PATIENT WAS SCHEDULED WITH JOHNNY DEL CID ON 4/13/2021 @1PM. SHE IS ONE OF  PATIENTS BUT SHE DIDN'T HAVE ANYTHING UNTIL 4/202/2021.

## 2021-04-07 NOTE — OUTREACH NOTE
Call Center TCM Note      Responses   [facility] patient discharged from?  [facility]   Does the patient have one of the following disease processes/diagnoses(primary or secondary)?  Other   TCM attempt successful?  No   Unsuccessful attempts  Attempt 1          Lawanda Floyd RN    4/7/2021, 15:57 EDT

## 2021-04-07 NOTE — OUTREACH NOTE
Call Center TCM Note      Responses   [facility] [place] patient discharged from?  [facility]   Does the patient have one of the following disease processes/diagnoses(primary or secondary)?  Other   TCM attempt successful?  No   Unsuccessful attempts  Attempt 2          Lawanda Floyd RN    4/7/2021, 17:37 EDT

## 2021-04-08 NOTE — OUTREACH NOTE
Call Center TCM Note      Responses   [facility] patient discharged from?  [facility]   Does the patient have one of the following disease processes/diagnoses(primary or secondary)?  Other   TCM attempt successful?  No   Unsuccessful attempts  Attempt 3          Greta Louie RN    4/8/2021, 12:36 EDT

## 2021-04-12 PROBLEM — K65.9 PERITONITIS (HCC): Status: RESOLVED | Noted: 2021-01-01 | Resolved: 2021-01-01

## 2021-04-12 NOTE — PATIENT INSTRUCTIONS
Ascites    Ascites is a collection of too much fluid in the abdomen. Ascites can range from mild to severe. If ascites is not treated, it can get worse.  What are the causes?  This condition may be caused by:  · A liver condition called cirrhosis. This is the most common cause of ascites.  · Long-term (chronic) or alcoholic hepatitis.  · Infection or inflammation in the abdomen.  · Cancer in the abdomen.  · Heart failure.  · Kidney disease.  · Inflammation of the pancreas.  · Clots in the veins of the liver.  What are the signs or symptoms?  Symptoms of this condition include:  · A feeling of fullness in the abdomen. This is common.  · An increase in the size of the abdomen or waist.  · Swelling in the legs.  · Swelling of the scrotum (in men).  · Difficulty breathing.  · Pain in the abdomen.  · Sudden weight gain.  If the condition is mild, you may not have symptoms.  How is this diagnosed?  This condition is diagnosed based on your medical history and a physical exam. Your health care provider may order imaging tests, such as an ultrasound or CT scan of your abdomen.  How is this treated?  Treatment for this condition depends on the cause of the ascites. It may include:  · Taking a pill to make you urinate. This is called a water pill (diuretic pill).  · Strictly reducing your salt (sodium) intake. Salt can cause extra fluid to be kept (retained) in the body, and this makes ascites worse.  · Having a procedure to remove fluid from your abdomen (paracentesis).  · Having a procedure that connects two of the major veins within your liver and relieves pressure on your liver. This is called a TIPS procedure (transjugular intrahepatic portosystemic shunt procedure).  · Placement of a drainage catheter (peritoneovenous shunt) to manage the extra fluid in the abdomen.  Ascites may go away or improve when the condition that caused it is treated.  Follow these instructions at home:  · Keep track of your weight. To do this,  weigh yourself at the same time every day and write down your weight.  · Keep track of how much you drink and any changes in how much or how often you urinate.  · Follow any instructions that your health care provider gives you about how much to drink.  · Try not to eat salty (high-sodium) foods.  · Take over-the-counter and prescription medicines only as told by your health care provider.  · Keep all follow-up visits as told by your health care provider. This is important.  · Report any changes in your health to your health care provider, especially if you develop new symptoms or your symptoms get worse.  Contact a health care provider if:  · You gain more than 3 lb (1.36 kg) in 3 days.  · Your waist size increases.  · You have new swelling in your legs.  · The swelling in your legs gets worse.  Get help right away if:  · You have a fever.  · You are confused.  · You have new or worsening breathing trouble.  · You have new or worsening pain in your abdomen.  · You have new or worsening swelling in the scrotum (in men).  Summary  · Ascites is a collection of too much fluid in the abdomen.  · Ascites may be caused by various conditions, such as cirrhosis, hepatitis, cancer, or congestive heart failure.  · Symptoms may include swelling of the abdomen and other areas due to extra fluid in the body.  · Treatments may involve dietary changes, medicines, or procedures.  This information is not intended to replace advice given to you by your health care provider. Make sure you discuss any questions you have with your health care provider.  Document Revised: 11/19/2019 Document Reviewed: 08/30/2018  Elsevier Patient Education © 2021 [employer]

## 2021-04-12 NOTE — PROGRESS NOTES
Transitional Care Follow Up Visit  Subjective     Gaby Rasheed is a 55 y.o. female who presents for a transitional care management visit.    Within 48 business hours after discharge our office contacted her via telephone to coordinate her care and needs.      I reviewed and discussed the details of that call along with the discharge summary, hospital problems, inpatient lab results, inpatient diagnostic studies, and consultation reports with Gaby.     Current outpatient and discharge medications have been reconciled for the patient.  Reviewed by: Arabella Loving MD      Date of TCM Phone Call 4/6/2021   [facility]   Date of Admission 4/5/2021   Date of Discharge 4/6/2021   Discharge Disposition Home or Self Care     Risk for Readmission (LACE) Score: 11 (4/6/2021  6:00 AM)      History of Present Illness   Course During Hospital Stay:  Per discharge summary:  Important issues to note:   1.  Patient admitted to the hospital large left sided pleural effusion and ascites.  Bedside ultrasound guided paracentesis performed after admission with >2L removed and sent for analysis.  SBP ruled out with fluid studies.     2.  Interventional Radiology able to take the patient today for therapeutic thoracentesis today.  Post intervention CXR without any pneumothorax noted and decreased size of pleural effusion.     Discharge Diagnoses:      Ascites due to alcoholic cirrhosis (CMS/HCC)    Dissection of thoracoabdominal aorta (CMS/HCC)    Peritonitis (CMS/HCC)    Pleural effusion, left        Presenting Problem:   Peritonitis (CMS/HCC) [K65.9]                Procedures Performed:   04/06 0243 Diagnostic, Therapeutic Bedside Paracentesis Without  Radiology, BEDSIDE PARACENTESIS     History of presenting illness/ Hospital Course:   Patient is a 55 year old female who presented to the Emergency Department 4/5/2021 after being evaluated at [facility] for injuries sustained from a fall at home 3 days  prior.  Patient with health history significant for alcoholic cirrhosis, thoracic aortic dissection chronic, and bowel perforation.  Patient sent to the ED when XR imaging at [facility] was concerning for large effusion.  Patient did speak with PCP (Fabienne) after her fall and was told to get an XR to check for rib fractures.  She did strike her chin and knee.  CT PE and abdomen/ pelvis were done in the ED which revealed large left sided effusion, ascites, and possible peritonitis.  Patient received Rocephin in the ED.  She has a hepatologist appointment scheduled in May.  GI was consulted at admission, however, Dr. Hernandez graciously performed a paracentesis early this morning removing around 2.5 liters of fluid which was sent for analysis.  Patient's labwork was significant for elevated creatinine which has come down with recheck today and is noted to be 1.05.  Elevated alkaline phosphatase-187, AST-46, and bilirubin-5.2 on admission.  Lactate was mildly elevated at 2.9 and was within normal limits on recheck at 2.0.  Procalcitonin was 0.11.      Spontaneous bacterial peritonitis was ruled out with paracentesis fluid analysis.  Patient is afebrile as well.  Patient was given infusion of Albumin on admission as well.  Had planned to have patient follow up with pulmonology outpatient for thoracentesis vs pleural drain placement, however, interventional radiology was consulted and advised they could do a thoracentesis this afternoon.  Patient's symptomatology likely all due to chronic alcoholic cirrhosis.  Patient is resting on room air and has no complaints of shortness of breath today.  Will perform therapeutic thoracentesis this afternoon and discharge patient home to follow up with PCP early next week for a recheck.  Patient agreeable with plan, and is feeling good today after getting thoracentesis.  Patient stable for discharge home after thoracentesis and is agreeable to follow up plan after discharge.        She had  fallen at a restaurant. Fell and busted lip, chin, knee. Was taken to [facility] due to possible breaks. Was told at [facility] care she may have pneumonia, but she hadn't had any trouble breathing nor shortness of breath. Had 6 L fluid taken off abdomen and over 2 L off lung. Feels better. Working on disability case. She continues to have gait instablity but feels PT is helping, goes twice a week. Partner has suggested she use a cane that can stand on its own. She has two walkers at home, has been hesitant to use. She wants to be able to go out and do things on her own. She has gained some weight and she's working to get in protein, more nutrtients. On b complex supplement as well, vitamin 6 was low.     The following portions of the patient's history were reviewed and updated as appropriate: allergies, current medications, past family history, past medical history, past social history, past surgical history and problem list.    Review of Systems   Constitutional: Positive for fatigue. Negative for fever.   Respiratory: Negative for shortness of breath.    Neurological: Positive for weakness.   Hematological: Bruises/bleeds easily.       Objective   Physical Exam  Vitals and nursing note reviewed.   Constitutional:       General: She is not in acute distress.     Appearance: Normal appearance. She is well-developed and well-groomed. She is cachectic. She is ill-appearing. She is not toxic-appearing or diaphoretic.      Interventions: Face mask in place.   HENT:      Head: Normocephalic and atraumatic.      Right Ear: Hearing normal.      Left Ear: Hearing normal.   Eyes:      General: Lids are normal. Scleral icterus present.      Extraocular Movements: Extraocular movements intact.   Neck:      Trachea: Phonation normal.   Pulmonary:      Effort: Pulmonary effort is normal.      Breath sounds: No stridor or transmitted upper airway sounds. Examination of the left-lower field reveals decreased breath sounds. Decreased breath  sounds present. No wheezing, rhonchi or rales.       Skin:     Coloration: Skin is jaundiced. Skin is not cyanotic.      Findings: Abrasion (right knee) present.   Neurological:      General: No focal deficit present.      Mental Status: She is alert and oriented to person, place, and time.      Motor: Motor function is intact.      Gait: Gait abnormal.   Psychiatric:         Attention and Perception: Attention and perception normal.         Mood and Affect: Mood and affect normal.         Speech: Speech normal.         Behavior: Behavior normal. Behavior is cooperative.         Thought Content: Thought content normal.         Cognition and Memory: Cognition and memory normal.         Judgment: Judgment normal.         Assessment/Plan   Diagnoses and all orders for this visit:    1. Alcoholic cirrhosis of liver with ascites (CMS/HCC) (Primary)  -     Ambulatory Referral to Hepatology    2. Pleural effusion, left  -     Ambulatory Referral to Hepatology    3. Ascites due to alcoholic cirrhosis (CMS/HCC)  -     Ambulatory Referral to Hepatology    4. Hepatic encephalopathy (CMS/HCC)  -     Ambulatory Referral to Hepatology    5. Cachexia (CMS/HCC)  -     Ambulatory Referral to Hepatology    6. Thrombocytopenia (CMS/HCC)  -     Ambulatory Referral to Hepatology    7. Secondary esophageal varices without bleeding (CMS/HCC)  -     Ambulatory Referral to Hepatology    8. Malnutrition, unspecified type (CMS/HCC)  -     Ambulatory Referral to Hepatology    9. Hospital discharge follow-up      Patient wants to be more independent. Encouraged use of cane with legs to help with stability. Once she's able to walk more on her own she can try driving again. At this time I worry she will fall once she gets out of car on her own. Reviewed last [employer] message regarding labs, meld score. If patient needs a letter supporting her case for disability I'm more than happy to provide for her. I cannot, though, fill out disability forms  that ask about how long she can stand, crouch, sit, etc.

## 2021-04-13 NOTE — OUTREACH NOTE
Medical Week 2 Survey      Responses   [facility] [place] patient discharged from?  [facility]   Does the patient have one of the following disease processes/diagnoses(primary or secondary)?  Other   Week 2 attempt successful?  Yes   Call start time  1516   Call end time  1518   Meds reviewed with patient/caregiver?  Yes   Is the patient taking all medications as directed (includes completed medication regime)?  Yes   Medication comments  Vit B complex added   Has the patient kept scheduled appointments due by today?  Yes   Comments  uses cane and walker at times,OP PT now 2 times week   What is the patient's perception of their health status since discharge?  Improving   Week 2 Call Completed?  Yes   Graduated  Yes   Did the patient feel the follow up calls were helpful during their recovery period?  Yes   Was the number of calls appropriate?  Yes   Graduated/Revoked comments  She states she is doing well, going 2 days ag week to OP PT, no new issues          Subha Minor, RN

## 2021-04-26 NOTE — TELEPHONE ENCOUNTER
Rachel called to see if we received a fax that was sent on April 20th. She also states she needs to verify clinical information with a nurse. Call rachel at [phone]

## 2021-04-26 NOTE — PROGRESS NOTES
Reviewed labs from [facility] portal.    4/23/21. Pertinent findings:  Sodium 132, bilirubin 4.4, calcium 8.3, phosphate 2.3, magnesium 2.5, protein 5.7, albumin 2.9, hemoglobin 7.9, hematocrit 23.0, , platelets 186. See scanned labs.    4/16/21.  IgA 1298  IgG 1334  IgM 183  CB 1:80 speckled  HIV negative  TIBC 138  Transferrin sat 88%  Iron 121  Transferrin 110  Ferritin 1692  INR 2.1  Alpha-1-antitrypsin normal phenotype m2m2  Anti smooth muscle antibody 9 (negative)  Liver kidney microsomal antibody negative <1:20  Ceruloplasmin 19   Mitochondria M2 IgG antibody 3.0 (negative)  Cytokeratin IgG antibody 1.2 (negative)   HAV IgM antibody neg  HBV core IgM antibody neg  HBV surface Ag neg  HBV surface antibody positive  HCV antibody neg  HAV IgG + IgM antibody presence positive    H&P and D/c summary 4-22 through 4-23 reviewed.  Presented with Afib at paracentesis appointment. Likely secondary to severe abdominal distention causing cardiac stress. Decompensated alcoholic cirrhosis, meld 25 on admision. Left sided pleural effusion likely hepatic hydrothorax, was asymptomatic per H&P. Per d/c summary she remained asymptomatic during admission on tele. Heart rate remained in 90s so metoprolol started.    Pt is scheduled for follow up on 6/28/21.    Arabella Loving MD

## 2021-04-29 NOTE — TELEPHONE ENCOUNTER
Pt called and said her pristiq and abilify are not working. Said all she does is cry and cry. She is requesting something stronger. Please advise?

## 2021-05-06 NOTE — PROGRESS NOTES
Patient Name: Gaby Rasheed  MRN: [id number]   :  1965     Chief Complaint:      ICD-10-CM ICD-9-CM   1. Mild episode of recurrent major depressive disorder (CMS/HCC)  F33.0 296.31   2. Generalized anxiety disorder  F41.1 300.02   3. Psychophysiological insomnia  F51.04 307.42       History of Present Illness: Gaby Rasheed is a 55 y.o. female is here today for medication management follow up.  Patient states she is not doing well at all.  Patient is going to an appointment almost every day related to cirrhosis.  Has been having multiple scans and her abdomen was drained yesterday over 4 hours.  Patient states she worries about everything and is crying nonstop.  Patient states she cannot sleep at night.  States she is able to sleep some during the day when no one is at the house.  Is having tremendous anxiety.    The following portions of the patient's history were reviewed and updated as appropriate: allergies, current medications, past family history, past medical history, past social history, past surgical history and problem list.    Review of Systems;;  Review of Systems   Constitutional: Negative for activity change, appetite change, fatigue, unexpected weight gain and unexpected weight loss.   Respiratory: Negative for shortness of breath and wheezing.    Gastrointestinal: Negative for constipation, diarrhea, nausea and vomiting.   Musculoskeletal: Negative for gait problem.   Skin: Negative for dry skin and rash.   Neurological: Negative for dizziness, speech difficulty, weakness, light-headedness, headache, memory problem and confusion.   Psychiatric/Behavioral: Positive for dysphoric mood, sleep disturbance, depressed mood and stress. Negative for agitation, behavioral problems, decreased concentration, hallucinations, self-injury, suicidal ideas and negative for hyperactivity. The patient is nervous/anxious.        Physical Exam;;  Physical Exam  Vitals and nursing note reviewed.  "  Constitutional:       General: She is not in acute distress.     Appearance: She is well-developed. She is not diaphoretic.   HENT:      Head: Normocephalic and atraumatic.   Eyes:      Conjunctiva/sclera: Conjunctivae normal.   Cardiovascular:      Rate and Rhythm: Normal rate.   Pulmonary:      Effort: Pulmonary effort is normal. No respiratory distress.   Musculoskeletal:         General: Normal range of motion.      Cervical back: Full passive range of motion without pain and normal range of motion.   Skin:     General: Skin is warm and dry.   Neurological:      Mental Status: She is alert and oriented to person, place, and time.   Psychiatric:         Mood and Affect: Mood is anxious and depressed. Affect is tearful. Affect is not labile, blunt, angry or inappropriate.         Speech: Speech is not rapid and pressured or tangential.         Behavior: Behavior normal. Behavior is not agitated, slowed, aggressive, withdrawn, hyperactive or combative. Behavior is cooperative.         Thought Content: Thought content normal. Thought content is not paranoid or delusional. Thought content does not include homicidal or suicidal ideation. Thought content does not include homicidal or suicidal plan.         Judgment: Judgment normal.       Blood pressure 120/82, height 160 cm (63\"), weight 56 kg (123 lb 6.4 oz), not currently breastfeeding.  Body mass index is 21.86 kg/m².    Current Medications;;    Current Outpatient Medications:   •  B Complex Vitamins (Vitamin B Complex) tablet, Take 1 tablet by mouth Daily., Disp: , Rfl:   •  Calcium Carbonate Antacid (TUMS PO), Take 1 tablet by mouth 2 (Two) Times a Day As Needed (for acid reflux)., Disp: , Rfl:   •  desvenlafaxine (PRISTIQ) 100 MG 24 hr tablet, Take 1 tablet by mouth Daily., Disp: 90 tablet, Rfl: 2  •  lactulose (CHRONULAC) 10 GM/15ML solution, Take 30 mL by mouth 3 (Three) Times a Day As Needed (hepatic encephalopathy; need to have at least 4 bowel movements a " day)., Disp: 1892 mL, Rfl: 11  •  levothyroxine (SYNTHROID, LEVOTHROID) 50 MCG tablet, Take 1 tablet by mouth Daily. (Patient taking differently: Take 50 mcg by mouth Daily. Per pharmacy, last filled 6/9/2020 for a 90 day supply. Claims she is still taking.), Disp: 90 tablet, Rfl: 3  •  magnesium oxide (HM Magnesium) 400 MG tablet, Take 1 tablet by mouth Daily., Disp: 30 tablet, Rfl: 1  •  metoprolol tartrate (LOPRESSOR) 25 MG tablet, , Disp: , Rfl:   •  Multiple Vitamins-Minerals (MULTIVITAMIN ADULT PO), Take  by mouth., Disp: , Rfl:   •  naproxen (NAPROSYN) 250 MG tablet, Take 250 mg by mouth 2 (Two) Times a Day As Needed., Disp: , Rfl:   •  Nutritional Supplements (ENSURE PO), Take  by mouth., Disp: , Rfl:   •  pantoprazole (PROTONIX) 40 MG EC tablet, Take 40 mg by mouth Daily., Disp: , Rfl:   •  Prenatal Vit-Fe Fumarate-FA (PRENATAL VITAMIN PO), Take 1 tablet by mouth Daily., Disp: , Rfl:   •  Simethicone (GAS-X PO), Take 1 tablet by mouth Daily As Needed., Disp: , Rfl:   •  zolpidem (AMBIEN) 10 MG tablet, Take 1 tablet by mouth At Night As Needed for Sleep., Disp: 30 tablet, Rfl: 2  •  ARIPiprazole (ABILIFY) 5 MG tablet, Take 1 tablet by mouth Daily., Disp: 30 tablet, Rfl: 3  •  doxepin (SINEquan) 10 MG capsule, Take 1 capsule by mouth Every Night., Disp: 30 capsule, Rfl: 2  •  LORazepam (Ativan) 0.5 MG tablet, Take 1 tablet by mouth 2 (Two) Times a Day As Needed for Anxiety., Disp: 60 tablet, Rfl: 1    Lab Results:   Admission on 04/05/2021, Discharged on 04/06/2021   Component Date Value Ref Range Status   • Glucose 04/05/2021 78  65 - 99 mg/dL Final   • BUN 04/05/2021 21* 6 - 20 mg/dL Final   • Creatinine 04/05/2021 1.19* 0.57 - 1.00 mg/dL Final   • Sodium 04/05/2021 134* 136 - 145 mmol/L Final   • Potassium 04/05/2021 4.7  3.5 - 5.2 mmol/L Final   • Chloride 04/05/2021 95* 98 - 107 mmol/L Final   • CO2 04/05/2021 28.4  22.0 - 29.0 mmol/L Final   • Calcium 04/05/2021 9.0  8.6 - 10.5 mg/dL Final   • Total  Protein 04/05/2021 6.7  6.0 - 8.5 g/dL Final   • Albumin 04/05/2021 2.90* 3.50 - 5.20 g/dL Final   • ALT (SGPT) 04/05/2021 17  1 - 33 U/L Final   • AST (SGOT) 04/05/2021 46* 1 - 32 U/L Final   • Alkaline Phosphatase 04/05/2021 187* 39 - 117 U/L Final   • Total Bilirubin 04/05/2021 5.2* 0.0 - 1.2 mg/dL Final   • eGFR Non African Amer 04/05/2021 47* >60 mL/min/1.73 Final   • Globulin 04/05/2021 3.8  gm/dL Final   • A/G Ratio 04/05/2021 0.8  g/dL Final   • BUN/Creatinine Ratio 04/05/2021 17.6  7.0 - 25.0 Final   • Anion Gap 04/05/2021 10.6  5.0 - 15.0 mmol/L Final   • Lipase 04/05/2021 39  13 - 60 U/L Final   • Color, UA 04/05/2021 Dark Yellow* Yellow, Straw Final   • Appearance, UA 04/05/2021 Turbid* Clear Final   • pH, UA 04/05/2021 5.5  5.0 - 8.0 Final   • Specific Gravity, UA 04/05/2021 1.016  1.005 - 1.030 Final   • Glucose, UA 04/05/2021 Negative  Negative Final   • Ketones, UA 04/05/2021 Negative  Negative Final   • Bilirubin, UA 04/05/2021 Negative  Negative Final   • Blood, UA 04/05/2021 Negative  Negative Final   • Protein, UA 04/05/2021 Negative  Negative Final   • Leuk Esterase, UA 04/05/2021 Negative  Negative Final   • Nitrite, UA 04/05/2021 Negative  Negative Final   • Urobilinogen, UA 04/05/2021 1.0 E.U./dL  0.2 - 1.0 E.U./dL Final   • Lactate 04/05/2021 2.9* 0.5 - 2.0 mmol/L Final   • Extra Tube 04/05/2021 hold for add-on   Final    Auto resulted   • Extra Tube 04/05/2021 Hold for add-ons.   Final    Auto resulted.   • Extra Tube 04/05/2021 hold for add-on   Final    Auto resulted   • Extra Tube 04/05/2021 Hold for add-ons.   Final    Auto resulted.   • WBC 04/05/2021 10.03  3.40 - 10.80 10*3/mm3 Final   • RBC 04/05/2021 2.64* 3.77 - 5.28 10*6/mm3 Final   • Hemoglobin 04/05/2021 9.2* 12.0 - 15.9 g/dL Final   • Hematocrit 04/05/2021 27.8* 34.0 - 46.6 % Final   • MCV 04/05/2021 105.3* 79.0 - 97.0 fL Final   • MCH 04/05/2021 34.8* 26.6 - 33.0 pg Final   • MCHC 04/05/2021 33.1  31.5 - 35.7 g/dL Final   •  RDW 04/05/2021 13.5  12.3 - 15.4 % Final   • RDW-SD 04/05/2021 51.9  37.0 - 54.0 fl Final   • MPV 04/05/2021 10.4  6.0 - 12.0 fL Final   • Platelets 04/05/2021 152  140 - 450 10*3/mm3 Final   • Neutrophil % 04/05/2021 73.0  42.7 - 76.0 % Final   • Lymphocyte % 04/05/2021 16.3* 19.6 - 45.3 % Final   • Monocyte % 04/05/2021 8.7  5.0 - 12.0 % Final   • Eosinophil % 04/05/2021 0.7  0.3 - 6.2 % Final   • Basophil % 04/05/2021 0.7  0.0 - 1.5 % Final   • Immature Grans % 04/05/2021 0.6* 0.0 - 0.5 % Final   • Neutrophils, Absolute 04/05/2021 7.33* 1.70 - 7.00 10*3/mm3 Final   • Lymphocytes, Absolute 04/05/2021 1.63  0.70 - 3.10 10*3/mm3 Final   • Monocytes, Absolute 04/05/2021 0.87  0.10 - 0.90 10*3/mm3 Final   • Eosinophils, Absolute 04/05/2021 0.07  0.00 - 0.40 10*3/mm3 Final   • Basophils, Absolute 04/05/2021 0.07  0.00 - 0.20 10*3/mm3 Final   • Immature Grans, Absolute 04/05/2021 0.06* 0.00 - 0.05 10*3/mm3 Final   • nRBC 04/05/2021 0.0  0.0 - 0.2 /100 WBC Final   • Macrocytes 04/05/2021 Slight/1+  None Seen Final   • WBC Morphology 04/05/2021 Normal  Normal Final   • Platelet Estimate 04/05/2021 Adequate  Normal Final   • Lactate 04/05/2021 2.0  0.5 - 2.0 mmol/L Final   • Blood Culture 04/05/2021 No growth at 5 days   Final   • Blood Culture 04/05/2021 No growth at 5 days   Final   • COVID19 04/05/2021 Not Detected  Not Detected - Ref. Range Final   • Procalcitonin 04/05/2021 0.11  0.00 - 0.25 ng/mL Final   • Glucose 04/06/2021 70  65 - 99 mg/dL Final   • BUN 04/06/2021 20  6 - 20 mg/dL Final   • Creatinine 04/06/2021 1.05* 0.57 - 1.00 mg/dL Final   • Sodium 04/06/2021 134* 136 - 145 mmol/L Final   • Potassium 04/06/2021 4.4  3.5 - 5.2 mmol/L Final   • Chloride 04/06/2021 96* 98 - 107 mmol/L Final   • CO2 04/06/2021 25.9  22.0 - 29.0 mmol/L Final   • Calcium 04/06/2021 8.9  8.6 - 10.5 mg/dL Final   • eGFR Non African Amer 04/06/2021 54* >60 mL/min/1.73 Final   • BUN/Creatinine Ratio 04/06/2021 19.0  7.0 - 25.0 Final   •  Anion Gap 04/06/2021 12.1  5.0 - 15.0 mmol/L Final   • WBC 04/06/2021 7.23  3.40 - 10.80 10*3/mm3 Final   • RBC 04/06/2021 2.30* 3.77 - 5.28 10*6/mm3 Final   • Hemoglobin 04/06/2021 8.1* 12.0 - 15.9 g/dL Final   • Hematocrit 04/06/2021 23.5* 34.0 - 46.6 % Final   • MCV 04/06/2021 102.2* 79.0 - 97.0 fL Final   • MCH 04/06/2021 35.2* 26.6 - 33.0 pg Final   • MCHC 04/06/2021 34.5  31.5 - 35.7 g/dL Final   • RDW 04/06/2021 13.6  12.3 - 15.4 % Final   • RDW-SD 04/06/2021 50.5  37.0 - 54.0 fl Final   • MPV 04/06/2021 11.1  6.0 - 12.0 fL Final   • Platelets 04/06/2021 129* 140 - 450 10*3/mm3 Final   • Neutrophil % 04/06/2021 66.6  42.7 - 76.0 % Final   • Lymphocyte % 04/06/2021 20.2  19.6 - 45.3 % Final   • Monocyte % 04/06/2021 10.7  5.0 - 12.0 % Final   • Eosinophil % 04/06/2021 1.0  0.3 - 6.2 % Final   • Basophil % 04/06/2021 0.8  0.0 - 1.5 % Final   • Immature Grans % 04/06/2021 0.7* 0.0 - 0.5 % Final   • Neutrophils, Absolute 04/06/2021 4.82  1.70 - 7.00 10*3/mm3 Final   • Lymphocytes, Absolute 04/06/2021 1.46  0.70 - 3.10 10*3/mm3 Final   • Monocytes, Absolute 04/06/2021 0.77  0.10 - 0.90 10*3/mm3 Final   • Eosinophils, Absolute 04/06/2021 0.07  0.00 - 0.40 10*3/mm3 Final   • Basophils, Absolute 04/06/2021 0.06  0.00 - 0.20 10*3/mm3 Final   • Immature Grans, Absolute 04/06/2021 0.05  0.00 - 0.05 10*3/mm3 Final   • nRBC 04/06/2021 0.0  0.0 - 0.2 /100 WBC Final   • Albumin, Fluid 04/06/2021 <0.40  g/dL Final   • Total Bilirubin 04/06/2021 0.4  mg/dL Final    INTERPRETIVE INFORMATION: Bilirubin, Total, Body Fluid  For information on body fluid reference ranges and/or interpretive  guidance visit [email or web address]  This test was developed and its performance characteristics  determined by Department of Health and Human Services. It has not been cleared or  approved by the US Food and Drug Administration. This test was  performed in a CLIA certified laboratory and is intended for  clinical purposes.   • Lactate Dehydrogenase (LD),  Fluid 04/06/2021 51  U/L Final   • Body Fluid Culture 04/06/2021 No growth at 3 days   Final   • Gram Stain 04/06/2021 No WBCs or organisms seen   Final   • Anaerobic Culture 04/06/2021 No anaerobes isolated at 5 days   Final   • Color, Fluid 04/06/2021 Yellow   Final   • Appearance, Fluid 04/06/2021 Slightly Hazy* Clear Final   • WBC, Fluid 04/06/2021 32  0-1,000 /mm3 Final   • RBC, Fluid 04/06/2021 1,000  0-200,000 /mm3 Final   • Neutrophils, Fluid 04/06/2021 12  % Final   • Lymphocytes, Fluid 04/06/2021 86  % Final   • Monocytes, Fluid 04/06/2021 2  % Final   • Protein, Total, Fluid 04/06/2021 1.5  g/dL Final   • Lactate Dehydrogenase (LD), Fluid 04/06/2021 78  U/L Final   • Glucose, Fluid 04/06/2021 112  mg/dL Final   • Color, Fluid 04/06/2021 Yellow   Final   • Appearance, Fluid 04/06/2021 Slightly Cloudy* Clear Final   • WBC, Fluid 04/06/2021 246  0-1,000 /mm3 Final   • RBC, Fluid 04/06/2021 12,000  0-200,000 /mm3 Final   • Neutrophils, Fluid 04/06/2021 33  % Final   • Lymphocytes, Fluid 04/06/2021 63  % Final   • Monocytes, Fluid 04/06/2021 4  % Final   Office Visit on 03/26/2021   Component Date Value Ref Range Status   • Magnesium 03/26/2021 1.8  1.6 - 2.6 mg/dL Final   • WBC 03/26/2021 9.00  3.40 - 10.80 10*3/mm3 Final   • RBC 03/26/2021 2.82* 3.77 - 5.28 10*6/mm3 Final   • Hemoglobin 03/26/2021 9.8* 12.0 - 15.9 g/dL Final   • Hematocrit 03/26/2021 27.1* 34.0 - 46.6 % Final   • MCV 03/26/2021 96.1  79.0 - 97.0 fL Final   • MCH 03/26/2021 34.8* 26.6 - 33.0 pg Final   • MCHC 03/26/2021 36.2* 31.5 - 35.7 g/dL Final   • RDW 03/26/2021 11.8* 12.3 - 15.4 % Final   • Platelets 03/26/2021 173  140 - 450 10*3/mm3 Final   • Glucose 03/26/2021 77  65 - 99 mg/dL Final   • BUN 03/26/2021 8  6 - 20 mg/dL Final   • Creatinine 03/26/2021 0.68  0.57 - 1.00 mg/dL Final   • eGFR Non African Am 03/26/2021 90  >60 mL/min/1.73 Final    Comment: GFR Normal >60  Chronic Kidney Disease <60  Kidney Failure <15     • eGFR  Am  03/26/2021 108  >60 mL/min/1.73 Final   • BUN/Creatinine Ratio 03/26/2021 11.8  7.0 - 25.0 Final   • Sodium 03/26/2021 131* 136 - 145 mmol/L Final   • Potassium 03/26/2021 3.2* 3.5 - 5.2 mmol/L Final   • Chloride 03/26/2021 95* 98 - 107 mmol/L Final   • Total CO2 03/26/2021 28.1  22.0 - 29.0 mmol/L Final   • Calcium 03/26/2021 8.6  8.6 - 10.5 mg/dL Final   • Total Protein 03/26/2021 6.3  6.0 - 8.5 g/dL Final   • Albumin 03/26/2021 2.70* 3.50 - 5.20 g/dL Final   • Globulin 03/26/2021 3.6  gm/dL Final   • A/G Ratio 03/26/2021 0.8  g/dL Final   • Total Bilirubin 03/26/2021 7.0* 0.0 - 1.2 mg/dL Final   • Alkaline Phosphatase 03/26/2021 137* 39 - 117 U/L Final   • AST (SGOT) 03/26/2021 36* 1 - 32 U/L Final   • ALT (SGPT) 03/26/2021 13  1 - 33 U/L Final   • TSH 03/26/2021 1.270  0.270 - 4.200 uIU/mL Final   • Free T4 03/26/2021 1.53  0.93 - 1.70 ng/dL Final    Results may be falsely increased if patient taking Biotin.   • GGT 03/26/2021 36  5 - 36 U/L Final   • INR 03/26/2021 1.83* 0.90 - 1.10 Final    Comment: Suggested INR therapeutic range for stable oral  anticoagulant therapy:  Low Intensity therapy:   1.5-2.0  Moderate Intensity therapy:   2.0-3.0  High Intensity therapy:   2.5-4.0     • Protime 03/26/2021 22.2* 12.0 - 15.1 Seconds Final   • aPTT 03/26/2021 41.3* 24.5 - 37.2 seconds Final   • Ammonia 03/26/2021 30* 34 - 178 ug/dL Final   • Vitamin B-12 03/26/2021 1,242* 211 - 946 pg/mL Final    Results may be falsely increased if patient taking Biotin.   • Folate 03/26/2021 7.49  4.78 - 24.20 ng/mL Final    Results may be falsely increased if patient taking Biotin.   • Vitamin B1, Whole Blood 03/26/2021 78.0  66.5 - 200.0 nmol/L Final   • Vitamin B6 03/26/2021 1.8* 2.0 - 32.8 ug/L Final   • Prealbumin 03/26/2021 6* 10 - 36 mg/dL Final       Mental Status Exam:   Hygiene:   fair  Cooperation:  Cooperative  Eye Contact:  Downcast  Psychomotor Behavior:  Restless  Mood:anxious, depressed and sad  Affect:   Appropriate  Hopelessness: 5  Speech:  Normal  Thought Process:  Goal directed  Thought Content:  Normal  Suicidal:  None  Homicidal:  None  Hallucinations:  None  Delusion:  None  Memory:  Intact  Orientation:  Person, Place, Time and Situation  Reliability:  good  Insight:  Good  Judgement:  Good  Impulse Control:  Good  Physical/Medical Issues:  No     PHQ-9 Depression Screening  Little interest or pleasure in doing things? 3   Feeling down, depressed, or hopeless? 0   Trouble falling or staying asleep, or sleeping too much? 3   Feeling tired or having little energy? 3   Poor appetite or overeating? 0   Feeling bad about yourself - or that you are a failure or have let yourself or your family down? 3   Trouble concentrating on things, such as reading the newspaper or watching television? 0   Moving or speaking so slowly that other people could have noticed? Or the opposite - being so fidgety or restless that you have been moving around a lot more than usual? 0   Thoughts that you would be better off dead, or of hurting yourself in some way? 0   PHQ-9 Total Score 12   If you checked off any problems, how difficult have these problems made it for you to do your work, take care of things at home, or get along with other people?          Assessment/Plan:  Diagnoses and all orders for this visit:    1. Mild episode of recurrent major depressive disorder (CMS/HCC) (Primary)  -     ARIPiprazole (ABILIFY) 5 MG tablet; Take 1 tablet by mouth Daily.  Dispense: 30 tablet; Refill: 3    2. Generalized anxiety disorder  -     LORazepam (Ativan) 0.5 MG tablet; Take 1 tablet by mouth 2 (Two) Times a Day As Needed for Anxiety.  Dispense: 60 tablet; Refill: 1    3. Psychophysiological insomnia  -     doxepin (SINEquan) 10 MG capsule; Take 1 capsule by mouth Every Night.  Dispense: 30 capsule; Refill: 2      Add doxepin 10 mg p.o. nightly.  Add Ativan 1.5 twice a day as needed for anxiety.  Increase Abilify to 5 mg p.o. daily.    A  psychological evaluation was conducted in order to assess past and current level of functioning. Areas assessed included, but were not limited to: perception of social support, perception of ability to face and deal with challenges in life (positive functioning), anxiety symptoms, depressive symptoms, perspective on beliefs/belief system, coping skills for stress, intelligence level,  Therapeutic rapport was established. Interventions conducted today were geared towards incorporating medication management along with support for continued therapy. Education was also provided as to the med management with this provider and what to expect in subsequent sessions.    We discussed risks, benefits,goals and side effects of the above medication and the patient was agreeable with the plan.Patient was educated on the importance of compliance with treatment and follow-up appointments. Patient is aware to contact the [facility] with any worsening of symptoms. To call for questions or concerns and return early if necessary. Patent is agreeable to go to the Emergency Department or call 911 should they begin SI/HI.     Treatment Plan:   Discussed risks, benefits, and alternatives of medication. Encouraged healthy habits (eating, exercise and sleep). Call if any questions or problems arise. Medication reconciled. Controlled substance monitoring report reviewed. Provided psychoeducation.. Discussed coping strategies and current stressors. Set appropriate boundaries and limits for patient's well-being. Use distraction techniques to improve symptoms. Access support networks.      Return in about 4 weeks (around 6/3/2021) for Follow Up 15 min.    JEN Mcgee

## 2021-05-18 NOTE — PROGRESS NOTES
Patient Name: Gaby Rasheed  MRN: [id number]   :  1965     Chief Complaint:      ICD-10-CM ICD-9-CM   1. Mild episode of recurrent major depressive disorder (CMS/HCC)  F33.0 296.31   2. Generalized anxiety disorder  F41.1 300.02   3. Psychophysiological insomnia  F51.04 307.42       History of Present Illness: Gaby Rasheed is a 56 y.o. female is here today for medication management follow up.  Patient states doxepin is not helping with sleep.  Patient states she is still anxious.  Has not taken Ativan very often.  Wants to take it sparingly.  Still feels very anxious.  Paracentesis twice a week cirrhosis.    The following portions of the patient's history were reviewed and updated as appropriate: allergies, current medications, past family history, past medical history, past social history, past surgical history and problem list.    Review of Systems;;  Review of Systems   Constitutional: Negative for activity change, appetite change, fatigue, unexpected weight gain and unexpected weight loss.   Respiratory: Negative for shortness of breath and wheezing.    Gastrointestinal: Negative for constipation, diarrhea, nausea and vomiting.   Musculoskeletal: Negative for gait problem.   Skin: Negative for dry skin and rash.   Neurological: Negative for dizziness, speech difficulty, weakness, light-headedness, headache, memory problem and confusion.   Psychiatric/Behavioral: Positive for dysphoric mood, sleep disturbance, depressed mood and stress. Negative for agitation, behavioral problems, decreased concentration, hallucinations, self-injury, suicidal ideas and negative for hyperactivity. The patient is nervous/anxious.        Physical Exam;;  Physical Exam  Vitals and nursing note reviewed.   Constitutional:       General: She is not in acute distress.     Appearance: She is well-developed. She is not diaphoretic.   HENT:      Head: Normocephalic and atraumatic.   Eyes:      Conjunctiva/sclera:  "Conjunctivae normal.   Cardiovascular:      Rate and Rhythm: Normal rate.   Pulmonary:      Effort: Pulmonary effort is normal. No respiratory distress.   Musculoskeletal:         General: Normal range of motion.      Cervical back: Full passive range of motion without pain and normal range of motion.   Skin:     General: Skin is warm and dry.   Neurological:      Mental Status: She is alert and oriented to person, place, and time.   Psychiatric:         Mood and Affect: Mood is anxious and depressed. Affect is not labile, blunt, angry or inappropriate.         Speech: Speech is not rapid and pressured or tangential.         Behavior: Behavior normal. Behavior is not agitated, slowed, aggressive, withdrawn, hyperactive or combative. Behavior is cooperative.         Thought Content: Thought content normal. Thought content is not paranoid or delusional. Thought content does not include homicidal or suicidal ideation. Thought content does not include homicidal or suicidal plan.         Judgment: Judgment normal.       Blood pressure 110/68, height 160 cm (63\"), weight 61.1 kg (134 lb 12.8 oz), not currently breastfeeding.  Body mass index is 23.88 kg/m².    Current Medications;;    Current Outpatient Medications:   •  ARIPiprazole (ABILIFY) 5 MG tablet, Take 1 tablet by mouth Daily., Disp: 30 tablet, Rfl: 3  •  B Complex Vitamins (Vitamin B Complex) tablet, Take 1 tablet by mouth Daily., Disp: , Rfl:   •  Calcium Carbonate Antacid (TUMS PO), Take 1 tablet by mouth 2 (Two) Times a Day As Needed (for acid reflux)., Disp: , Rfl:   •  desvenlafaxine (PRISTIQ) 100 MG 24 hr tablet, Take 1 tablet by mouth Daily., Disp: 90 tablet, Rfl: 2  •  lactulose (CHRONULAC) 10 GM/15ML solution, Take 30 mL by mouth 3 (Three) Times a Day As Needed (hepatic encephalopathy; need to have at least 4 bowel movements a day)., Disp: 1892 mL, Rfl: 11  •  levothyroxine (SYNTHROID, LEVOTHROID) 50 MCG tablet, Take 1 tablet by mouth Daily. (Patient " taking differently: Take 50 mcg by mouth Daily. Per pharmacy, last filled 6/9/2020 for a 90 day supply. Claims she is still taking.), Disp: 90 tablet, Rfl: 3  •  LORazepam (Ativan) 0.5 MG tablet, Take 1 tablet by mouth 2 (Two) Times a Day As Needed for Anxiety., Disp: 60 tablet, Rfl: 1  •  magnesium oxide (HM Magnesium) 400 MG tablet, Take 1 tablet by mouth Daily., Disp: 30 tablet, Rfl: 1  •  metoprolol tartrate (LOPRESSOR) 25 MG tablet, , Disp: , Rfl:   •  Multiple Vitamins-Minerals (MULTIVITAMIN ADULT PO), Take  by mouth., Disp: , Rfl:   •  naproxen (NAPROSYN) 250 MG tablet, Take 250 mg by mouth 2 (Two) Times a Day As Needed., Disp: , Rfl:   •  Nutritional Supplements (ENSURE PO), Take  by mouth., Disp: , Rfl:   •  pantoprazole (PROTONIX) 40 MG EC tablet, Take 40 mg by mouth Daily., Disp: , Rfl:   •  Prenatal Vit-Fe Fumarate-FA (PRENATAL VITAMIN PO), Take 1 tablet by mouth Daily., Disp: , Rfl:   •  Simethicone (GAS-X PO), Take 1 tablet by mouth Daily As Needed., Disp: , Rfl:   •  temazepam (Restoril) 15 MG capsule, Take 1 capsule by mouth At Night As Needed for Sleep., Disp: 7 capsule, Rfl: 0    Lab Results:   Admission on 04/05/2021, Discharged on 04/06/2021   Component Date Value Ref Range Status   • Glucose 04/05/2021 78  65 - 99 mg/dL Final   • BUN 04/05/2021 21* 6 - 20 mg/dL Final   • Creatinine 04/05/2021 1.19* 0.57 - 1.00 mg/dL Final   • Sodium 04/05/2021 134* 136 - 145 mmol/L Final   • Potassium 04/05/2021 4.7  3.5 - 5.2 mmol/L Final   • Chloride 04/05/2021 95* 98 - 107 mmol/L Final   • CO2 04/05/2021 28.4  22.0 - 29.0 mmol/L Final   • Calcium 04/05/2021 9.0  8.6 - 10.5 mg/dL Final   • Total Protein 04/05/2021 6.7  6.0 - 8.5 g/dL Final   • Albumin 04/05/2021 2.90* 3.50 - 5.20 g/dL Final   • ALT (SGPT) 04/05/2021 17  1 - 33 U/L Final   • AST (SGOT) 04/05/2021 46* 1 - 32 U/L Final   • Alkaline Phosphatase 04/05/2021 187* 39 - 117 U/L Final   • Total Bilirubin 04/05/2021 5.2* 0.0 - 1.2 mg/dL Final   • eGFR  Non  Amer 04/05/2021 47* >60 mL/min/1.73 Final   • Globulin 04/05/2021 3.8  gm/dL Final   • A/G Ratio 04/05/2021 0.8  g/dL Final   • BUN/Creatinine Ratio 04/05/2021 17.6  7.0 - 25.0 Final   • Anion Gap 04/05/2021 10.6  5.0 - 15.0 mmol/L Final   • Lipase 04/05/2021 39  13 - 60 U/L Final   • Color, UA 04/05/2021 Dark Yellow* Yellow, Straw Final   • Appearance, UA 04/05/2021 Turbid* Clear Final   • pH, UA 04/05/2021 5.5  5.0 - 8.0 Final   • Specific Gravity, UA 04/05/2021 1.016  1.005 - 1.030 Final   • Glucose, UA 04/05/2021 Negative  Negative Final   • Ketones, UA 04/05/2021 Negative  Negative Final   • Bilirubin, UA 04/05/2021 Negative  Negative Final   • Blood, UA 04/05/2021 Negative  Negative Final   • Protein, UA 04/05/2021 Negative  Negative Final   • Leuk Esterase, UA 04/05/2021 Negative  Negative Final   • Nitrite, UA 04/05/2021 Negative  Negative Final   • Urobilinogen, UA 04/05/2021 1.0 E.U./dL  0.2 - 1.0 E.U./dL Final   • Lactate 04/05/2021 2.9* 0.5 - 2.0 mmol/L Final   • Extra Tube 04/05/2021 hold for add-on   Final    Auto resulted   • Extra Tube 04/05/2021 Hold for add-ons.   Final    Auto resulted.   • Extra Tube 04/05/2021 hold for add-on   Final    Auto resulted   • Extra Tube 04/05/2021 Hold for add-ons.   Final    Auto resulted.   • WBC 04/05/2021 10.03  3.40 - 10.80 10*3/mm3 Final   • RBC 04/05/2021 2.64* 3.77 - 5.28 10*6/mm3 Final   • Hemoglobin 04/05/2021 9.2* 12.0 - 15.9 g/dL Final   • Hematocrit 04/05/2021 27.8* 34.0 - 46.6 % Final   • MCV 04/05/2021 105.3* 79.0 - 97.0 fL Final   • MCH 04/05/2021 34.8* 26.6 - 33.0 pg Final   • MCHC 04/05/2021 33.1  31.5 - 35.7 g/dL Final   • RDW 04/05/2021 13.5  12.3 - 15.4 % Final   • RDW-SD 04/05/2021 51.9  37.0 - 54.0 fl Final   • MPV 04/05/2021 10.4  6.0 - 12.0 fL Final   • Platelets 04/05/2021 152  140 - 450 10*3/mm3 Final   • Neutrophil % 04/05/2021 73.0  42.7 - 76.0 % Final   • Lymphocyte % 04/05/2021 16.3* 19.6 - 45.3 % Final   • Monocyte %  04/05/2021 8.7  5.0 - 12.0 % Final   • Eosinophil % 04/05/2021 0.7  0.3 - 6.2 % Final   • Basophil % 04/05/2021 0.7  0.0 - 1.5 % Final   • Immature Grans % 04/05/2021 0.6* 0.0 - 0.5 % Final   • Neutrophils, Absolute 04/05/2021 7.33* 1.70 - 7.00 10*3/mm3 Final   • Lymphocytes, Absolute 04/05/2021 1.63  0.70 - 3.10 10*3/mm3 Final   • Monocytes, Absolute 04/05/2021 0.87  0.10 - 0.90 10*3/mm3 Final   • Eosinophils, Absolute 04/05/2021 0.07  0.00 - 0.40 10*3/mm3 Final   • Basophils, Absolute 04/05/2021 0.07  0.00 - 0.20 10*3/mm3 Final   • Immature Grans, Absolute 04/05/2021 0.06* 0.00 - 0.05 10*3/mm3 Final   • nRBC 04/05/2021 0.0  0.0 - 0.2 /100 WBC Final   • Macrocytes 04/05/2021 Slight/1+  None Seen Final   • WBC Morphology 04/05/2021 Normal  Normal Final   • Platelet Estimate 04/05/2021 Adequate  Normal Final   • Lactate 04/05/2021 2.0  0.5 - 2.0 mmol/L Final   • Blood Culture 04/05/2021 No growth at 5 days   Final   • Blood Culture 04/05/2021 No growth at 5 days   Final   • COVID19 04/05/2021 Not Detected  Not Detected - Ref. Range Final   • Procalcitonin 04/05/2021 0.11  0.00 - 0.25 ng/mL Final   • Glucose 04/06/2021 70  65 - 99 mg/dL Final   • BUN 04/06/2021 20  6 - 20 mg/dL Final   • Creatinine 04/06/2021 1.05* 0.57 - 1.00 mg/dL Final   • Sodium 04/06/2021 134* 136 - 145 mmol/L Final   • Potassium 04/06/2021 4.4  3.5 - 5.2 mmol/L Final   • Chloride 04/06/2021 96* 98 - 107 mmol/L Final   • CO2 04/06/2021 25.9  22.0 - 29.0 mmol/L Final   • Calcium 04/06/2021 8.9  8.6 - 10.5 mg/dL Final   • eGFR Non African Amer 04/06/2021 54* >60 mL/min/1.73 Final   • BUN/Creatinine Ratio 04/06/2021 19.0  7.0 - 25.0 Final   • Anion Gap 04/06/2021 12.1  5.0 - 15.0 mmol/L Final   • WBC 04/06/2021 7.23  3.40 - 10.80 10*3/mm3 Final   • RBC 04/06/2021 2.30* 3.77 - 5.28 10*6/mm3 Final   • Hemoglobin 04/06/2021 8.1* 12.0 - 15.9 g/dL Final   • Hematocrit 04/06/2021 23.5* 34.0 - 46.6 % Final   • MCV 04/06/2021 102.2* 79.0 - 97.0 fL Final   •  MCH 04/06/2021 35.2* 26.6 - 33.0 pg Final   • MCHC 04/06/2021 34.5  31.5 - 35.7 g/dL Final   • RDW 04/06/2021 13.6  12.3 - 15.4 % Final   • RDW-SD 04/06/2021 50.5  37.0 - 54.0 fl Final   • MPV 04/06/2021 11.1  6.0 - 12.0 fL Final   • Platelets 04/06/2021 129* 140 - 450 10*3/mm3 Final   • Neutrophil % 04/06/2021 66.6  42.7 - 76.0 % Final   • Lymphocyte % 04/06/2021 20.2  19.6 - 45.3 % Final   • Monocyte % 04/06/2021 10.7  5.0 - 12.0 % Final   • Eosinophil % 04/06/2021 1.0  0.3 - 6.2 % Final   • Basophil % 04/06/2021 0.8  0.0 - 1.5 % Final   • Immature Grans % 04/06/2021 0.7* 0.0 - 0.5 % Final   • Neutrophils, Absolute 04/06/2021 4.82  1.70 - 7.00 10*3/mm3 Final   • Lymphocytes, Absolute 04/06/2021 1.46  0.70 - 3.10 10*3/mm3 Final   • Monocytes, Absolute 04/06/2021 0.77  0.10 - 0.90 10*3/mm3 Final   • Eosinophils, Absolute 04/06/2021 0.07  0.00 - 0.40 10*3/mm3 Final   • Basophils, Absolute 04/06/2021 0.06  0.00 - 0.20 10*3/mm3 Final   • Immature Grans, Absolute 04/06/2021 0.05  0.00 - 0.05 10*3/mm3 Final   • nRBC 04/06/2021 0.0  0.0 - 0.2 /100 WBC Final   • Albumin, Fluid 04/06/2021 <0.40  g/dL Final   • Total Bilirubin 04/06/2021 0.4  mg/dL Final    INTERPRETIVE INFORMATION: Bilirubin, Total, Body Fluid  For information on body fluid reference ranges and/or interpretive  guidance visit [email or web address]  This test was developed and its performance characteristics  determined by [employer]. It has not been cleared or  approved by the US Food and Drug Administration. This test was  performed in a CLIA certified laboratory and is intended for  clinical purposes.   • Lactate Dehydrogenase (LD), Fluid 04/06/2021 51  U/L Final   • Body Fluid Culture 04/06/2021 No growth at 3 days   Final   • Gram Stain 04/06/2021 No WBCs or organisms seen   Final   • Anaerobic Culture 04/06/2021 No anaerobes isolated at 5 days   Final   • Color, Fluid 04/06/2021 Yellow   Final   • Appearance, Fluid 04/06/2021 Slightly  Hazy* Clear Final   • WBC, Fluid 04/06/2021 32  0-1,000 /mm3 Final   • RBC, Fluid 04/06/2021 1,000  0-200,000 /mm3 Final   • Neutrophils, Fluid 04/06/2021 12  % Final   • Lymphocytes, Fluid 04/06/2021 86  % Final   • Monocytes, Fluid 04/06/2021 2  % Final   • Protein, Total, Fluid 04/06/2021 1.5  g/dL Final   • Lactate Dehydrogenase (LD), Fluid 04/06/2021 78  U/L Final   • Glucose, Fluid 04/06/2021 112  mg/dL Final   • Color, Fluid 04/06/2021 Yellow   Final   • Appearance, Fluid 04/06/2021 Slightly Cloudy* Clear Final   • WBC, Fluid 04/06/2021 246  0-1,000 /mm3 Final   • RBC, Fluid 04/06/2021 12,000  0-200,000 /mm3 Final   • Neutrophils, Fluid 04/06/2021 33  % Final   • Lymphocytes, Fluid 04/06/2021 63  % Final   • Monocytes, Fluid 04/06/2021 4  % Final   Office Visit on 03/26/2021   Component Date Value Ref Range Status   • Magnesium 03/26/2021 1.8  1.6 - 2.6 mg/dL Final   • WBC 03/26/2021 9.00  3.40 - 10.80 10*3/mm3 Final   • RBC 03/26/2021 2.82* 3.77 - 5.28 10*6/mm3 Final   • Hemoglobin 03/26/2021 9.8* 12.0 - 15.9 g/dL Final   • Hematocrit 03/26/2021 27.1* 34.0 - 46.6 % Final   • MCV 03/26/2021 96.1  79.0 - 97.0 fL Final   • MCH 03/26/2021 34.8* 26.6 - 33.0 pg Final   • MCHC 03/26/2021 36.2* 31.5 - 35.7 g/dL Final   • RDW 03/26/2021 11.8* 12.3 - 15.4 % Final   • Platelets 03/26/2021 173  140 - 450 10*3/mm3 Final   • Glucose 03/26/2021 77  65 - 99 mg/dL Final   • BUN 03/26/2021 8  6 - 20 mg/dL Final   • Creatinine 03/26/2021 0.68  0.57 - 1.00 mg/dL Final   • eGFR Non African Am 03/26/2021 90  >60 mL/min/1.73 Final    Comment: GFR Normal >60  Chronic Kidney Disease <60  Kidney Failure <15     • eGFR  Am 03/26/2021 108  >60 mL/min/1.73 Final   • BUN/Creatinine Ratio 03/26/2021 11.8  7.0 - 25.0 Final   • Sodium 03/26/2021 131* 136 - 145 mmol/L Final   • Potassium 03/26/2021 3.2* 3.5 - 5.2 mmol/L Final   • Chloride 03/26/2021 95* 98 - 107 mmol/L Final   • Total CO2 03/26/2021 28.1  22.0 - 29.0 mmol/L Final   •  Calcium 03/26/2021 8.6  8.6 - 10.5 mg/dL Final   • Total Protein 03/26/2021 6.3  6.0 - 8.5 g/dL Final   • Albumin 03/26/2021 2.70* 3.50 - 5.20 g/dL Final   • Globulin 03/26/2021 3.6  gm/dL Final   • A/G Ratio 03/26/2021 0.8  g/dL Final   • Total Bilirubin 03/26/2021 7.0* 0.0 - 1.2 mg/dL Final   • Alkaline Phosphatase 03/26/2021 137* 39 - 117 U/L Final   • AST (SGOT) 03/26/2021 36* 1 - 32 U/L Final   • ALT (SGPT) 03/26/2021 13  1 - 33 U/L Final   • TSH 03/26/2021 1.270  0.270 - 4.200 uIU/mL Final   • Free T4 03/26/2021 1.53  0.93 - 1.70 ng/dL Final    Results may be falsely increased if patient taking Biotin.   • GGT 03/26/2021 36  5 - 36 U/L Final   • INR 03/26/2021 1.83* 0.90 - 1.10 Final    Comment: Suggested INR therapeutic range for stable oral  anticoagulant therapy:  Low Intensity therapy:   1.5-2.0  Moderate Intensity therapy:   2.0-3.0  High Intensity therapy:   2.5-4.0     • Protime 03/26/2021 22.2* 12.0 - 15.1 Seconds Final   • aPTT 03/26/2021 41.3* 24.5 - 37.2 seconds Final   • Ammonia 03/26/2021 30* 34 - 178 ug/dL Final   • Vitamin B-12 03/26/2021 1,242* 211 - 946 pg/mL Final    Results may be falsely increased if patient taking Biotin.   • Folate 03/26/2021 7.49  4.78 - 24.20 ng/mL Final    Results may be falsely increased if patient taking Biotin.   • Vitamin B1, Whole Blood 03/26/2021 78.0  66.5 - 200.0 nmol/L Final   • Vitamin B6 03/26/2021 1.8* 2.0 - 32.8 ug/L Final   • Prealbumin 03/26/2021 6* 10 - 36 mg/dL Final       Mental Status Exam:   Hygiene:   good  Cooperation:  Cooperative  Eye Contact:  Good  Psychomotor Behavior:  Restless  Mood:anxious, depressed, dysthymic and sad  Affect:  Appropriate  Hopelessness: 3  Speech:  Normal  Thought Process:  Goal directed  Thought Content:  Normal  Suicidal:  None  Homicidal:  None  Hallucinations:  None  Delusion:  None  Memory:  Intact  Orientation:  Person, Place, Time and Situation  Reliability:  good  Insight:  Good  Judgement:  Good  Impulse  Control:  Good  Physical/Medical Issues:  No     PHQ-9 Depression Screening  Little interest or pleasure in doing things? 1   Feeling down, depressed, or hopeless? 0   Trouble falling or staying asleep, or sleeping too much? 3   Feeling tired or having little energy? 3   Poor appetite or overeating? 0   Feeling bad about yourself - or that you are a failure or have let yourself or your family down? 0   Trouble concentrating on things, such as reading the newspaper or watching television? 0   Moving or speaking so slowly that other people could have noticed? Or the opposite - being so fidgety or restless that you have been moving around a lot more than usual? 0   Thoughts that you would be better off dead, or of hurting yourself in some way? 0   PHQ-9 Total Score 7   If you checked off any problems, how difficult have these problems made it for you to do your work, take care of things at home, or get along with other people?          Assessment/Plan:  Diagnoses and all orders for this visit:    1. Mild episode of recurrent major depressive disorder (CMS/HCC) (Primary)    2. Generalized anxiety disorder    3. Psychophysiological insomnia  -     temazepam (Restoril) 15 MG capsule; Take 1 capsule by mouth At Night As Needed for Sleep.  Dispense: 7 capsule; Refill: 0      Discontinue doxepin.  Trial Restoril 15 mg p.o. nightly.  Patient to call back on Monday to let us know its effectiveness.    A psychological evaluation was conducted in order to assess past and current level of functioning. Areas assessed included, but were not limited to: perception of social support, perception of ability to face and deal with challenges in life (positive functioning), anxiety symptoms, depressive symptoms, perspective on beliefs/belief system, coping skills for stress, intelligence level,  Therapeutic rapport was established. Interventions conducted today were geared towards incorporating medication management along with support for  continued therapy. Education was also provided as to the med management with this provider and what to expect in subsequent sessions.    We discussed risks, benefits,goals and side effects of the above medication and the patient was agreeable with the plan.Patient was educated on the importance of compliance with treatment and follow-up appointments. Patient is aware to contact the [facility] with any worsening of symptoms. To call for questions or concerns and return early if necessary. Patent is agreeable to go to the Emergency Department or call 911 should they begin SI/HI.     Treatment Plan:   Discussed risks, benefits, and alternatives of medication. Encouraged healthy habits (eating, exercise and sleep). Call if any questions or problems arise. Medication reconciled. Controlled substance monitoring report reviewed. Provided psychoeducation.. Discussed coping strategies and current stressors. Set appropriate boundaries and limits for patient's well-being. Use distraction techniques to improve symptoms. Access support networks.      Return in about 4 weeks (around 6/15/2021) for Follow Up 15 min.    JEN Mcgee

## 2021-05-27 NOTE — TELEPHONE ENCOUNTER
PT CALLED STATES THAT THE SLEEPING PRESCRIPTIN THAT CHRIS PRESCIBED IS WORKING , SHE WOULD LIKE TO HAVE A REFILL.   PLEASE ADVISE.

## 2021-05-27 NOTE — TELEPHONE ENCOUNTER
Camila with [address] physical therapy left vm on referral line that they had seen patient for pt and have not seen her since April. Patient would like to come back for more physical therapy and they need an updated order faxed to them. Fax is [phone] or call [phone]

## 2021-06-08 NOTE — PROGRESS NOTES
Subjective   Chief Complaint   Patient presents with   • Gynecologic Exam     Gaby Rasheed is a 56 y.o. year old  presenting to be seen for her annual exam.  ON liver transplant list--needs pap.     SEXUAL Hx:  She is not currently sexually active.  In the past year there has not been new sexual partners.    Condoms are not typically used.  She would not like to be screened for STD's at today's exam.  Current birth control method: not using any form of contraception and does not wish to get pregnant.  MENSTRUAL Hx:  No LMP recorded. Patient has had a hysterectomy.  In the past 6 months her cycles have been absent.   Her menstrual flow has been absent.   Each month on average there are roughly 0 days of very heavy flow.    Intermenstrual bleeding is absent.    Post-coital bleeding is absent.  Dysmenorrhea: is not affecting her activities of daily living  PMS: is not affecting her activities of daily living  Her cycles are not a source of concern for her that she wishes to discuss today.  HEALTH Hx:  She exercises regularly: no (and has no plans to become more active).  She wears her seat belt:yes.  She has concerns about domestic violence: no.  OTHER COMPLAINTS:  Nothing else    The following portions of the patient's history were reviewed and updated as appropriate:  She  has a past medical history of Acid reflux, Alcohol liver damage (CMS/HCC) (2021), Alcoholism (CMS/HCC) (2021), Anxiety, Back pain, Chronic liver disease and cirrhosis (CMS/HCC) (2021), Cirrhosis of liver (CMS/HCC) (2020), Depression, Dissection of thoracoabdominal aorta (CMS/HCC) (2021), Fractures, Gastrojejunal ulcer with hemorrhage and perforation (CMS/HCC) (2021), Hepatic encephalopathy (CMS/HCC) (2021), Hernia of abdominal cavity, High cholesterol, High triglycerides, Hypertension, Hypothyroidism, Irritable bowel syndrome, Nocturia (10/5/2016), Positive TB test, 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  She does not have any pertinent problems on file.  She  has a past surgical history that includes  section; Laparoscopic tubal ligation; Gastric bypass; Hysterectomy; Colonoscopy (); Total abdominal hysterectomy w/ bilateral salpingoophorectomy; Hernia repair; and Hernia repair (2020).  Her family history includes Arthritis in her mother and paternal grandmother; Diabetes in her father; Hyperlipidemia in her mother; Hypertension in her father; Inflammatory bowel disease in her mother.  She  reports that she has never smoked. She has never used smokeless tobacco. She reports current alcohol use. She reports that she does not use drugs.  Current Outpatient Medications   Medication Sig Dispense Refill   • ARIPiprazole (ABILIFY) 5 MG tablet Take 1 tablet by mouth Daily. 30 tablet 3   • B Complex Vitamins (Vitamin B Complex) tablet Take 1 tablet by mouth Daily.     • Calcium Carbonate Antacid (TUMS PO) Take 1 tablet by mouth 2 (Two) Times a Day As Needed (for acid reflux).     • desvenlafaxine (PRISTIQ) 100 MG 24 hr tablet Take 1 tablet by mouth Daily. 90 tablet 2   • lactulose (CHRONULAC) 10 GM/15ML solution Take 30 mL by mouth 3 (Three) Times a Day As Needed (hepatic encephalopathy; need to have at least 4 bowel movements a day). 1892 mL 11   • levothyroxine (SYNTHROID, LEVOTHROID) 50 MCG tablet Take 1 tablet by mouth Daily. (Patient taking differently: Take 50 mcg by mouth Daily. Per pharmacy, last filled 2020 for a 90 day supply. Claims she is still taking.) 90 tablet 3   • LORazepam (Ativan) 0.5 MG tablet Take 1 tablet by mouth 2 (Two) Times a Day As Needed for Anxiety. 60 tablet 1   • magnesium oxide (HM Magnesium) 400 MG tablet Take 1 tablet by mouth Daily. 30 tablet 1   •  metoprolol tartrate (LOPRESSOR) 25 MG tablet      • Multiple Vitamins-Minerals (MULTIVITAMIN ADULT PO) Take  by mouth.     • naproxen (NAPROSYN) 250 MG tablet Take 250 mg by mouth 2 (Two) Times a Day As Needed.     • Nutritional Supplements (ENSURE PO) Take  by mouth.     • pantoprazole (PROTONIX) 40 MG EC tablet Take 40 mg by mouth Daily.     • Prenatal Vit-Fe Fumarate-FA (PRENATAL VITAMIN PO) Take 1 tablet by mouth Daily.     • Simethicone (GAS-X PO) Take 1 tablet by mouth Daily As Needed.     • temazepam (Restoril) 15 MG capsule Take 1 capsule by mouth At Night As Needed for Sleep. 30 capsule 2     No current facility-administered medications for this visit.     Current Outpatient Medications on File Prior to Visit   Medication Sig   • ARIPiprazole (ABILIFY) 5 MG tablet Take 1 tablet by mouth Daily.   • B Complex Vitamins (Vitamin B Complex) tablet Take 1 tablet by mouth Daily.   • Calcium Carbonate Antacid (TUMS PO) Take 1 tablet by mouth 2 (Two) Times a Day As Needed (for acid reflux).   • desvenlafaxine (PRISTIQ) 100 MG 24 hr tablet Take 1 tablet by mouth Daily.   • lactulose (CHRONULAC) 10 GM/15ML solution Take 30 mL by mouth 3 (Three) Times a Day As Needed (hepatic encephalopathy; need to have at least 4 bowel movements a day).   • levothyroxine (SYNTHROID, LEVOTHROID) 50 MCG tablet Take 1 tablet by mouth Daily. (Patient taking differently: Take 50 mcg by mouth Daily. Per pharmacy, last filled 6/9/2020 for a 90 day supply. Claims she is still taking.)   • LORazepam (Ativan) 0.5 MG tablet Take 1 tablet by mouth 2 (Two) Times a Day As Needed for Anxiety.   • magnesium oxide (HM Magnesium) 400 MG tablet Take 1 tablet by mouth Daily.   • metoprolol tartrate (LOPRESSOR) 25 MG tablet    • Multiple Vitamins-Minerals (MULTIVITAMIN ADULT PO) Take  by mouth.   • naproxen (NAPROSYN) 250 MG tablet Take 250 mg by mouth 2 (Two) Times a Day As Needed.   • Nutritional Supplements (ENSURE PO) Take  by mouth.   • pantoprazole  "(PROTONIX) 40 MG EC tablet Take 40 mg by mouth Daily.   • Prenatal Vit-Fe Fumarate-FA (PRENATAL VITAMIN PO) Take 1 tablet by mouth Daily.   • Simethicone (GAS-X PO) Take 1 tablet by mouth Daily As Needed.   • temazepam (Restoril) 15 MG capsule Take 1 capsule by mouth At Night As Needed for Sleep.     No current facility-administered medications on file prior to visit.     She is allergic to prednisone..    Social History    Tobacco Use      Smoking status: Never Smoker      Smokeless tobacco: Never Used    Review of Systems  Consitutional NEG: anorexia or night sweats    POS: nothing reported   Gastointestinal NEG: bloating, change in bowel habits, melena or reflux symptoms    POS: nothing reported   Genitourinary NEG: dysuria or hematuria    POS: nothing reported   Integument NEG: moles that are changing in size, shape, color or rashes    POS: nothing reported   Breast NEG: persistent breast lump, skin dimpling or nipple discharge    POS: nothing reported          Objective   /74   Ht 160 cm (63\")   Wt 54.4 kg (120 lb)   BMI 21.26 kg/m²     General:  well developed; well nourished  no acute distress  appears older than stated age  cachectic   Skin:  No suspicious lesions seen   Thyroid: normal to inspection and palpation   Breasts:  Examined in supine position  Symmetric without masses or skin dimpling  Nipples normal without inversion, lesions or discharge  There are no palpable axillary nodes   Abdomen: soft, non-tender; no masses  no umbilical or inguinal hernias are present  no hepato-splenomegaly   Pelvis: Clinical staff was present for exam  External genitalia:  normal appearance of the external genitalia including Bartholin's and Wyboo's glands.  :  urethral meatus normal;  Vaginal:  atrophic mucosal changes are present;  Cervix:  absent.  Uterus:  absent.  Adnexa:  absent, bilateral.  Rectal:  digital rectal exam not performed; anus visually normal appearing.        Assessment   1. Normal " PE  2. Liver failure     Plan   1. Pap done  2. MMG set up @   3. Follow up     No orders of the defined types were placed in this encounter.         This note was electronically signed.      June 8, 2021

## 2021-06-24 NOTE — PROGRESS NOTES
Patient Name: Gaby Rasheed  MRN: [id number]   :  1965     Chief Complaint:      ICD-10-CM ICD-9-CM   1. Generalized anxiety disorder  F41.1 300.02   2. Psychophysiological insomnia  F51.04 307.42   3. Severe episode of recurrent major depressive disorder, without psychotic features (CMS/HCC)  F33.2 296.33       History of Present Illness: Gaby Rasheed is a 56 y.o. female is here today for medication management follow up.  Patient very worried today.  Having all day testing tomorrow to see if she can get on the liver transplant list.  Very sad as she used to be very independent person and now she relies heavily on everyone else.    The following portions of the patient's history were reviewed and updated as appropriate: allergies, current medications, past family history, past medical history, past social history, past surgical history and problem list.    Review of Systems;;  Review of Systems   Constitutional: Negative for activity change, appetite change, fatigue, unexpected weight gain and unexpected weight loss.   Respiratory: Negative for shortness of breath and wheezing.    Gastrointestinal: Negative for constipation, diarrhea, nausea and vomiting.   Musculoskeletal: Negative for gait problem.   Skin: Negative for dry skin and rash.   Neurological: Negative for dizziness, speech difficulty, weakness, light-headedness, headache, memory problem and confusion.   Psychiatric/Behavioral: Positive for decreased concentration, dysphoric mood, sleep disturbance, depressed mood and stress. Negative for agitation, behavioral problems, hallucinations, self-injury, suicidal ideas and negative for hyperactivity. The patient is nervous/anxious.        Physical Exam;;  Physical Exam  Vitals and nursing note reviewed.   Constitutional:       General: She is not in acute distress.     Appearance: She is well-developed. She is not diaphoretic.   HENT:      Head: Normocephalic and atraumatic.   Eyes:       Conjunctiva/sclera: Conjunctivae normal.   Cardiovascular:      Rate and Rhythm: Normal rate.   Pulmonary:      Effort: Pulmonary effort is normal. No respiratory distress.   Musculoskeletal:         General: Normal range of motion.      Cervical back: Full passive range of motion without pain and normal range of motion.   Skin:     General: Skin is warm and dry.   Neurological:      Mental Status: She is alert and oriented to person, place, and time.   Psychiatric:         Mood and Affect: Mood is anxious and depressed. Affect is not labile, blunt, angry or inappropriate.         Speech: Speech is not rapid and pressured or tangential.         Behavior: Behavior normal. Behavior is not agitated, slowed, aggressive, withdrawn, hyperactive or combative. Behavior is cooperative.         Thought Content: Thought content normal. Thought content is not paranoid or delusional. Thought content does not include homicidal or suicidal ideation. Thought content does not include homicidal or suicidal plan.         Judgment: Judgment normal.       not currently breastfeeding.  There is no height or weight on file to calculate BMI.    Current Medications;;    Current Outpatient Medications:   •  B Complex Vitamins (Vitamin B Complex) tablet, Take 1 tablet by mouth Daily., Disp: , Rfl:   •  Calcium Carbonate Antacid (TUMS PO), Take 1 tablet by mouth 2 (Two) Times a Day As Needed (for acid reflux)., Disp: , Rfl:   •  desvenlafaxine (PRISTIQ) 100 MG 24 hr tablet, Take 1 tablet by mouth Daily., Disp: 90 tablet, Rfl: 2  •  lactulose (CHRONULAC) 10 GM/15ML solution, Take 30 mL by mouth 3 (Three) Times a Day As Needed (hepatic encephalopathy; need to have at least 4 bowel movements a day)., Disp: 1892 mL, Rfl: 11  •  levothyroxine (SYNTHROID, LEVOTHROID) 50 MCG tablet, TAKE 1 TABLET BY MOUTH DAILY, Disp: 90 tablet, Rfl: 3  •  LORazepam (Ativan) 0.5 MG tablet, Take 1 tablet by mouth 2 (Two) Times a Day As Needed for Anxiety., Disp: 60  tablet, Rfl: 2  •  magnesium oxide (HM Magnesium) 400 MG tablet, Take 1 tablet by mouth Daily., Disp: 30 tablet, Rfl: 1  •  metoprolol tartrate (LOPRESSOR) 25 MG tablet, , Disp: , Rfl:   •  Multiple Vitamins-Minerals (MULTIVITAMIN ADULT PO), Take  by mouth., Disp: , Rfl:   •  naproxen (NAPROSYN) 250 MG tablet, Take 250 mg by mouth 2 (Two) Times a Day As Needed., Disp: , Rfl:   •  Nutritional Supplements (ENSURE PO), Take  by mouth., Disp: , Rfl:   •  pantoprazole (PROTONIX) 40 MG EC tablet, Take 40 mg by mouth Daily., Disp: , Rfl:   •  Prenatal Vit-Fe Fumarate-FA (PRENATAL VITAMIN PO), Take 1 tablet by mouth Daily., Disp: , Rfl:   •  Simethicone (GAS-X PO), Take 1 tablet by mouth Daily As Needed., Disp: , Rfl:   •  zolpidem (AMBIEN) 10 MG tablet, Take 1 tablet by mouth At Night As Needed for Sleep., Disp: 30 tablet, Rfl: 2  •  ARIPiprazole (Abilify) 10 MG tablet, Take 1 tablet by mouth Daily., Disp: 30 tablet, Rfl: 1    Lab Results:   Admission on 04/05/2021, Discharged on 04/06/2021   Component Date Value Ref Range Status   • Glucose 04/05/2021 78  65 - 99 mg/dL Final   • BUN 04/05/2021 21* 6 - 20 mg/dL Final   • Creatinine 04/05/2021 1.19* 0.57 - 1.00 mg/dL Final   • Sodium 04/05/2021 134* 136 - 145 mmol/L Final   • Potassium 04/05/2021 4.7  3.5 - 5.2 mmol/L Final   • Chloride 04/05/2021 95* 98 - 107 mmol/L Final   • CO2 04/05/2021 28.4  22.0 - 29.0 mmol/L Final   • Calcium 04/05/2021 9.0  8.6 - 10.5 mg/dL Final   • Total Protein 04/05/2021 6.7  6.0 - 8.5 g/dL Final   • Albumin 04/05/2021 2.90* 3.50 - 5.20 g/dL Final   • ALT (SGPT) 04/05/2021 17  1 - 33 U/L Final   • AST (SGOT) 04/05/2021 46* 1 - 32 U/L Final   • Alkaline Phosphatase 04/05/2021 187* 39 - 117 U/L Final   • Total Bilirubin 04/05/2021 5.2* 0.0 - 1.2 mg/dL Final   • eGFR Non African Amer 04/05/2021 47* >60 mL/min/1.73 Final   • Globulin 04/05/2021 3.8  gm/dL Final   • A/G Ratio 04/05/2021 0.8  g/dL Final   • BUN/Creatinine Ratio 04/05/2021 17.6  7.0 -  25.0 Final   • Anion Gap 04/05/2021 10.6  5.0 - 15.0 mmol/L Final   • Lipase 04/05/2021 39  13 - 60 U/L Final   • Color, UA 04/05/2021 Dark Yellow* Yellow, Straw Final   • Appearance, UA 04/05/2021 Turbid* Clear Final   • pH, UA 04/05/2021 5.5  5.0 - 8.0 Final   • Specific Gravity, UA 04/05/2021 1.016  1.005 - 1.030 Final   • Glucose, UA 04/05/2021 Negative  Negative Final   • Ketones, UA 04/05/2021 Negative  Negative Final   • Bilirubin, UA 04/05/2021 Negative  Negative Final   • Blood, UA 04/05/2021 Negative  Negative Final   • Protein, UA 04/05/2021 Negative  Negative Final   • Leuk Esterase, UA 04/05/2021 Negative  Negative Final   • Nitrite, UA 04/05/2021 Negative  Negative Final   • Urobilinogen, UA 04/05/2021 1.0 E.U./dL  0.2 - 1.0 E.U./dL Final   • Lactate 04/05/2021 2.9* 0.5 - 2.0 mmol/L Final   • Extra Tube 04/05/2021 hold for add-on   Final    Auto resulted   • Extra Tube 04/05/2021 Hold for add-ons.   Final    Auto resulted.   • Extra Tube 04/05/2021 hold for add-on   Final    Auto resulted   • Extra Tube 04/05/2021 Hold for add-ons.   Final    Auto resulted.   • WBC 04/05/2021 10.03  3.40 - 10.80 10*3/mm3 Final   • RBC 04/05/2021 2.64* 3.77 - 5.28 10*6/mm3 Final   • Hemoglobin 04/05/2021 9.2* 12.0 - 15.9 g/dL Final   • Hematocrit 04/05/2021 27.8* 34.0 - 46.6 % Final   • MCV 04/05/2021 105.3* 79.0 - 97.0 fL Final   • MCH 04/05/2021 34.8* 26.6 - 33.0 pg Final   • MCHC 04/05/2021 33.1  31.5 - 35.7 g/dL Final   • RDW 04/05/2021 13.5  12.3 - 15.4 % Final   • RDW-SD 04/05/2021 51.9  37.0 - 54.0 fl Final   • MPV 04/05/2021 10.4  6.0 - 12.0 fL Final   • Platelets 04/05/2021 152  140 - 450 10*3/mm3 Final   • Neutrophil % 04/05/2021 73.0  42.7 - 76.0 % Final   • Lymphocyte % 04/05/2021 16.3* 19.6 - 45.3 % Final   • Monocyte % 04/05/2021 8.7  5.0 - 12.0 % Final   • Eosinophil % 04/05/2021 0.7  0.3 - 6.2 % Final   • Basophil % 04/05/2021 0.7  0.0 - 1.5 % Final   • Immature Grans % 04/05/2021 0.6* 0.0 - 0.5 % Final    • Neutrophils, Absolute 04/05/2021 7.33* 1.70 - 7.00 10*3/mm3 Final   • Lymphocytes, Absolute 04/05/2021 1.63  0.70 - 3.10 10*3/mm3 Final   • Monocytes, Absolute 04/05/2021 0.87  0.10 - 0.90 10*3/mm3 Final   • Eosinophils, Absolute 04/05/2021 0.07  0.00 - 0.40 10*3/mm3 Final   • Basophils, Absolute 04/05/2021 0.07  0.00 - 0.20 10*3/mm3 Final   • Immature Grans, Absolute 04/05/2021 0.06* 0.00 - 0.05 10*3/mm3 Final   • nRBC 04/05/2021 0.0  0.0 - 0.2 /100 WBC Final   • Macrocytes 04/05/2021 Slight/1+  None Seen Final   • WBC Morphology 04/05/2021 Normal  Normal Final   • Platelet Estimate 04/05/2021 Adequate  Normal Final   • Lactate 04/05/2021 2.0  0.5 - 2.0 mmol/L Final   • Blood Culture 04/05/2021 No growth at 5 days   Final   • Blood Culture 04/05/2021 No growth at 5 days   Final   • COVID19 04/05/2021 Not Detected  Not Detected - Ref. Range Final   • Procalcitonin 04/05/2021 0.11  0.00 - 0.25 ng/mL Final   • Glucose 04/06/2021 70  65 - 99 mg/dL Final   • BUN 04/06/2021 20  6 - 20 mg/dL Final   • Creatinine 04/06/2021 1.05* 0.57 - 1.00 mg/dL Final   • Sodium 04/06/2021 134* 136 - 145 mmol/L Final   • Potassium 04/06/2021 4.4  3.5 - 5.2 mmol/L Final   • Chloride 04/06/2021 96* 98 - 107 mmol/L Final   • CO2 04/06/2021 25.9  22.0 - 29.0 mmol/L Final   • Calcium 04/06/2021 8.9  8.6 - 10.5 mg/dL Final   • eGFR Non African Amer 04/06/2021 54* >60 mL/min/1.73 Final   • BUN/Creatinine Ratio 04/06/2021 19.0  7.0 - 25.0 Final   • Anion Gap 04/06/2021 12.1  5.0 - 15.0 mmol/L Final   • WBC 04/06/2021 7.23  3.40 - 10.80 10*3/mm3 Final   • RBC 04/06/2021 2.30* 3.77 - 5.28 10*6/mm3 Final   • Hemoglobin 04/06/2021 8.1* 12.0 - 15.9 g/dL Final   • Hematocrit 04/06/2021 23.5* 34.0 - 46.6 % Final   • MCV 04/06/2021 102.2* 79.0 - 97.0 fL Final   • MCH 04/06/2021 35.2* 26.6 - 33.0 pg Final   • MCHC 04/06/2021 34.5  31.5 - 35.7 g/dL Final   • RDW 04/06/2021 13.6  12.3 - 15.4 % Final   • RDW-SD 04/06/2021 50.5  37.0 - 54.0 fl Final   •  MPV 04/06/2021 11.1  6.0 - 12.0 fL Final   • Platelets 04/06/2021 129* 140 - 450 10*3/mm3 Final   • Neutrophil % 04/06/2021 66.6  42.7 - 76.0 % Final   • Lymphocyte % 04/06/2021 20.2  19.6 - 45.3 % Final   • Monocyte % 04/06/2021 10.7  5.0 - 12.0 % Final   • Eosinophil % 04/06/2021 1.0  0.3 - 6.2 % Final   • Basophil % 04/06/2021 0.8  0.0 - 1.5 % Final   • Immature Grans % 04/06/2021 0.7* 0.0 - 0.5 % Final   • Neutrophils, Absolute 04/06/2021 4.82  1.70 - 7.00 10*3/mm3 Final   • Lymphocytes, Absolute 04/06/2021 1.46  0.70 - 3.10 10*3/mm3 Final   • Monocytes, Absolute 04/06/2021 0.77  0.10 - 0.90 10*3/mm3 Final   • Eosinophils, Absolute 04/06/2021 0.07  0.00 - 0.40 10*3/mm3 Final   • Basophils, Absolute 04/06/2021 0.06  0.00 - 0.20 10*3/mm3 Final   • Immature Grans, Absolute 04/06/2021 0.05  0.00 - 0.05 10*3/mm3 Final   • nRBC 04/06/2021 0.0  0.0 - 0.2 /100 WBC Final   • Albumin, Fluid 04/06/2021 <0.40  g/dL Final   • Total Bilirubin 04/06/2021 0.4  mg/dL Final    INTERPRETIVE INFORMATION: Bilirubin, Total, Body Fluid  For information on body fluid reference ranges and/or interpretive  guidance visit [email or web address]  This test was developed and its performance characteristics  determined by [facility]. It has not been cleared or  approved by the US Food and Drug Administration. This test was  performed in a CLIA certified laboratory and is intended for  clinical purposes.   • Lactate Dehydrogenase (LD), Fluid 04/06/2021 51  U/L Final   • Body Fluid Culture 04/06/2021 No growth at 3 days   Final   • Gram Stain 04/06/2021 No WBCs or organisms seen   Final   • Anaerobic Culture 04/06/2021 No anaerobes isolated at 5 days   Final   • Color, Fluid 04/06/2021 Yellow   Final   • Appearance, Fluid 04/06/2021 Slightly Hazy* Clear Final   • WBC, Fluid 04/06/2021 32  0-1,000 /mm3 Final   • RBC, Fluid 04/06/2021 1,000  0-200,000 /mm3 Final   • Neutrophils, Fluid 04/06/2021 12  % Final   • Lymphocytes, Fluid  04/06/2021 86  % Final   • Monocytes, Fluid 04/06/2021 2  % Final   • Protein, Total, Fluid 04/06/2021 1.5  g/dL Final   • Lactate Dehydrogenase (LD), Fluid 04/06/2021 78  U/L Final   • Glucose, Fluid 04/06/2021 112  mg/dL Final   • Color, Fluid 04/06/2021 Yellow   Final   • Appearance, Fluid 04/06/2021 Slightly Cloudy* Clear Final   • WBC, Fluid 04/06/2021 246  0-1,000 /mm3 Final   • RBC, Fluid 04/06/2021 12,000  0-200,000 /mm3 Final   • Neutrophils, Fluid 04/06/2021 33  % Final   • Lymphocytes, Fluid 04/06/2021 63  % Final   • Monocytes, Fluid 04/06/2021 4  % Final   Office Visit on 03/26/2021   Component Date Value Ref Range Status   • Magnesium 03/26/2021 1.8  1.6 - 2.6 mg/dL Final   • WBC 03/26/2021 9.00  3.40 - 10.80 10*3/mm3 Final   • RBC 03/26/2021 2.82* 3.77 - 5.28 10*6/mm3 Final   • Hemoglobin 03/26/2021 9.8* 12.0 - 15.9 g/dL Final   • Hematocrit 03/26/2021 27.1* 34.0 - 46.6 % Final   • MCV 03/26/2021 96.1  79.0 - 97.0 fL Final   • MCH 03/26/2021 34.8* 26.6 - 33.0 pg Final   • MCHC 03/26/2021 36.2* 31.5 - 35.7 g/dL Final   • RDW 03/26/2021 11.8* 12.3 - 15.4 % Final   • Platelets 03/26/2021 173  140 - 450 10*3/mm3 Final   • Glucose 03/26/2021 77  65 - 99 mg/dL Final   • BUN 03/26/2021 8  6 - 20 mg/dL Final   • Creatinine 03/26/2021 0.68  0.57 - 1.00 mg/dL Final   • eGFR Non African Am 03/26/2021 90  >60 mL/min/1.73 Final    Comment: GFR Normal >60  Chronic Kidney Disease <60  Kidney Failure <15     • eGFR  Am 03/26/2021 108  >60 mL/min/1.73 Final   • BUN/Creatinine Ratio 03/26/2021 11.8  7.0 - 25.0 Final   • Sodium 03/26/2021 131* 136 - 145 mmol/L Final   • Potassium 03/26/2021 3.2* 3.5 - 5.2 mmol/L Final   • Chloride 03/26/2021 95* 98 - 107 mmol/L Final   • Total CO2 03/26/2021 28.1  22.0 - 29.0 mmol/L Final   • Calcium 03/26/2021 8.6  8.6 - 10.5 mg/dL Final   • Total Protein 03/26/2021 6.3  6.0 - 8.5 g/dL Final   • Albumin 03/26/2021 2.70* 3.50 - 5.20 g/dL Final   • Globulin 03/26/2021 3.6  gm/dL  Final   • A/G Ratio 03/26/2021 0.8  g/dL Final   • Total Bilirubin 03/26/2021 7.0* 0.0 - 1.2 mg/dL Final   • Alkaline Phosphatase 03/26/2021 137* 39 - 117 U/L Final   • AST (SGOT) 03/26/2021 36* 1 - 32 U/L Final   • ALT (SGPT) 03/26/2021 13  1 - 33 U/L Final   • TSH 03/26/2021 1.270  0.270 - 4.200 uIU/mL Final   • Free T4 03/26/2021 1.53  0.93 - 1.70 ng/dL Final    Results may be falsely increased if patient taking Biotin.   • GGT 03/26/2021 36  5 - 36 U/L Final   • INR 03/26/2021 1.83* 0.90 - 1.10 Final    Comment: Suggested INR therapeutic range for stable oral  anticoagulant therapy:  Low Intensity therapy:   1.5-2.0  Moderate Intensity therapy:   2.0-3.0  High Intensity therapy:   2.5-4.0     • Protime 03/26/2021 22.2* 12.0 - 15.1 Seconds Final   • aPTT 03/26/2021 41.3* 24.5 - 37.2 seconds Final   • Ammonia 03/26/2021 30* 34 - 178 ug/dL Final   • Vitamin B-12 03/26/2021 1,242* 211 - 946 pg/mL Final    Results may be falsely increased if patient taking Biotin.   • Folate 03/26/2021 7.49  4.78 - 24.20 ng/mL Final    Results may be falsely increased if patient taking Biotin.   • Vitamin B1, Whole Blood 03/26/2021 78.0  66.5 - 200.0 nmol/L Final   • Vitamin B6 03/26/2021 1.8* 2.0 - 32.8 ug/L Final   • Prealbumin 03/26/2021 6* 10 - 36 mg/dL Final       Mental Status Exam:   Hygiene:   good  Cooperation:  Cooperative  Eye Contact:  Good  Psychomotor Behavior:  Restless  Mood:anxious and depressed  Affect:  Appropriate  Hopelessness: 5  Speech:  Normal  Thought Process:  Goal directed  Thought Content:  Normal  Suicidal:  None  Homicidal:  None  Hallucinations:  None  Delusion:  None  Memory:  Intact  Orientation:  Person, Place, Time and Situation  Reliability:  good  Insight:  Good  Judgement:  Good  Impulse Control:  Good  Physical/Medical Issues:  No     PHQ-9 Depression Screening  Little interest or pleasure in doing things? 1   Feeling down, depressed, or hopeless? 0   Trouble falling or staying asleep, or  sleeping too much? 3 (falling asleep)   Feeling tired or having little energy? 3   Poor appetite or overeating? 0   Feeling bad about yourself - or that you are a failure or have let yourself or your family down? 0   Trouble concentrating on things, such as reading the newspaper or watching television? 1   Moving or speaking so slowly that other people could have noticed? Or the opposite - being so fidgety or restless that you have been moving around a lot more than usual? 0   Thoughts that you would be better off dead, or of hurting yourself in some way? 0   PHQ-9 Total Score 8   If you checked off any problems, how difficult have these problems made it for you to do your work, take care of things at home, or get along with other people? Somewhat difficult        Assessment/Plan:  Diagnoses and all orders for this visit:    1. Generalized anxiety disorder (Primary)  -     LORazepam (Ativan) 0.5 MG tablet; Take 1 tablet by mouth 2 (Two) Times a Day As Needed for Anxiety.  Dispense: 60 tablet; Refill: 2    2. Psychophysiological insomnia  -     zolpidem (AMBIEN) 10 MG tablet; Take 1 tablet by mouth At Night As Needed for Sleep.  Dispense: 30 tablet; Refill: 2    3. Severe episode of recurrent major depressive disorder, without psychotic features (CMS/HCC)  -     ARIPiprazole (Abilify) 10 MG tablet; Take 1 tablet by mouth Daily.  Dispense: 30 tablet; Refill: 1      Increase Abilify to 10 mg daily.    A psychological evaluation was conducted in order to assess past and current level of functioning. Areas assessed included, but were not limited to: perception of social support, perception of ability to face and deal with challenges in life (positive functioning), anxiety symptoms, depressive symptoms, perspective on beliefs/belief system, coping skills for stress, intelligence level,  Therapeutic rapport was established. Interventions conducted today were geared towards incorporating medication management along with  support for continued therapy. Education was also provided as to the med management with this provider and what to expect in subsequent sessions.    We discussed risks, benefits,goals and side effects of the above medication and the patient was agreeable with the plan.Patient was educated on the importance of compliance with treatment and follow-up appointments. Patient is aware to contact the [facility] with any worsening of symptoms. To call for questions or concerns and return early if necessary. Patent is agreeable to go to the Emergency Department or call 911 should they begin SI/HI.     Treatment Plan:   Discussed risks, benefits, and alternatives of medication. Encouraged healthy habits (eating, exercise and sleep). Call if any questions or problems arise. Medication reconciled. Controlled substance monitoring report reviewed. Provided psychoeducation.. Discussed coping strategies and current stressors. Set appropriate boundaries and limits for patient's well-being. Use distraction techniques to improve symptoms. Access support networks.      Return in about 4 weeks (around 7/22/2021) for Follow Up 30 min.    Hannah Otero, APRN

## 2021-06-28 PROBLEM — E43 PROTEIN-CALORIE MALNUTRITION, SEVERE (HCC): Status: ACTIVE | Noted: 2021-01-01

## 2021-06-28 NOTE — PROGRESS NOTES
"Subjective    Gaby Rasheed is a 56 y.o. female here for:  Chief Complaint   Patient presents with   • Liver Follow-up       History per MA reviewed.    Recent testing for liver transplant work up  Exhausted from 6 am to 5 pm work up that day  They put her on spironolactone  Continues to have shortness of breath with exertion, aware of fluid on lung  Has bedside toilet to use if needed  Has paracentesis 1-2x a week usually  Sleeping better back on Ambien  She has zero desire to drink alcohol.  Has not had any.         The following portions of the patient's history were reviewed and updated as appropriate: allergies, current medications, past family history, past medical history, past social history, past surgical history and problem list.    Review of Systems   Constitutional: Positive for fatigue.   Respiratory: Positive for shortness of breath.    Gastrointestinal: Positive for abdominal distention.   Neurological: Positive for weakness.         Objective   Visit Vitals  /66   Pulse 75   Temp 98 °F (36.7 °C) (Temporal)   Resp 18   Ht 160 cm (62.99\")   Wt 57.8 kg (127 lb 6 oz)   SpO2 100%   BMI 22.57 kg/m²       Physical Exam  Vitals and nursing note reviewed.   Constitutional:       General: She is not in acute distress.     Appearance: She is cachectic. She is ill-appearing. She is not toxic-appearing or diaphoretic.      Interventions: Face mask in place.   HENT:      Head: Normocephalic and atraumatic.      Right Ear: Hearing normal.      Left Ear: Hearing normal.   Eyes:      General: Lids are normal. No scleral icterus.     Extraocular Movements: Extraocular movements intact.   Neck:      Trachea: Phonation normal.   Cardiovascular:      Rate and Rhythm: Normal rate and regular rhythm.   Pulmonary:      Effort: Pulmonary effort is normal.      Breath sounds: Examination of the left-middle field reveals decreased breath sounds. Examination of the left-lower field reveals decreased breath sounds. " Decreased breath sounds present. No wheezing, rhonchi or rales.   Abdominal:      General: There is distension.   Musculoskeletal:      Cervical back: Neck supple.   Skin:     Coloration: Skin is not jaundiced or pale.      Findings: Ecchymosis (arms bilaterally) present.      Comments: Very tanned skin. Surgical scar abdomen.    Neurological:      General: No focal deficit present.      Mental Status: She is alert and oriented to person, place, and time.      Motor: Motor function is intact.   Psychiatric:         Attention and Perception: Attention and perception normal.         Mood and Affect: Mood and affect normal.         Speech: Speech normal.         Behavior: Behavior normal. Behavior is cooperative.         Thought Content: Thought content normal.         Cognition and Memory: Cognition and memory normal.         Judgment: Judgment normal.       General Appearance:  awake, alert, oriented, in no acute distress  Eyes:  No gross abnormalities. and Sclera nonicteric  Lungs:  Normal expansion.  Clear to auscultation.  No rales, rhonchi, or wheezing.  Heart:  Heart sounds are normal.  Regular rate and rhythm without murmur, gallop or rub.  Neurologic:  negative findings: speech normal, mental status intact, A&O x 3, no gross abnormalities  Psych exam:alert,oriented, in NAD with a full range of affect, normal behavior and no psychotic features    For medical decision making review of the following was required:  Lab Results   Component Value Date    WBC 7.23 04/06/2021    HGB 8.1 (L) 04/06/2021    HCT 23.5 (L) 04/06/2021    .2 (H) 04/06/2021     (L) 04/06/2021     Lab Results   Component Value Date    GLUCOSE 70 04/06/2021    BUN 20 04/06/2021    CREATININE 1.05 (H) 04/06/2021    EGFRIFNONA 54 (L) 04/06/2021    EGFRIFAFRI 108 03/26/2021    BCR 19.0 04/06/2021    K 4.4 04/06/2021    CO2 25.9 04/06/2021    CALCIUM 8.9 04/06/2021    PROTENTOTREF 6.3 03/26/2021    ALBUMIN 2.90 (L) 04/05/2021    LABIL2  0.8 03/26/2021    AST 46 (H) 04/05/2021    ALT 17 04/05/2021     Reviewed care everywhere. Has large pleural effusion on left per ct chest.      Assessment/Plan     Problem List Items Addressed This Visit        Endocrine and Metabolic    Protein-calorie malnutrition, severe (CMS/HCC)       Gastrointestinal Abdominal     Chronic liver disease and cirrhosis (CMS/HCC)    Ascites due to alcoholic cirrhosis (CMS/HCC)       Pulmonary and Pneumonias    Pleural effusion, left - Primary      Other Visit Diagnoses     Psychophysiological insomnia              · Discussed protein intake. Follow up with  as scheduled. Continue Ambien per behavioral health.     Return in about 3 months (around 9/28/2021) for Annual physical.     Arabella Loving MD

## 2021-07-13 NOTE — TELEPHONE ENCOUNTER
Patient cancelled her appointment with Dr. Abreu 07/13/2021. I left the patient a VM to reschedule at her convenience.

## 2021-07-17 PROBLEM — K70.30 ESOPHAGEAL VARICES IN ALCOHOLIC CIRRHOSIS (HCC): Status: ACTIVE | Noted: 2021-01-01

## 2021-07-17 PROBLEM — I85.10 ESOPHAGEAL VARICES IN ALCOHOLIC CIRRHOSIS (HCC): Status: ACTIVE | Noted: 2021-01-01

## 2021-07-17 PROBLEM — K76.9 PLEURAL EFFUSION ASSOCIATED WITH HEPATIC DISORDER: Status: ACTIVE | Noted: 2021-01-01

## 2021-07-17 PROBLEM — J95.811 POSTPROCEDURAL PNEUMOTHORAX: Status: ACTIVE | Noted: 2021-01-01

## 2021-07-17 PROBLEM — J91.8 PLEURAL EFFUSION ASSOCIATED WITH HEPATIC DISORDER: Status: ACTIVE | Noted: 2021-01-01

## 2021-07-17 NOTE — ED NOTES
[facility] just called and stated they still do not have a bed available. They stated it would most likely be tomorrow before the patient is ready to be transferred.     Corina Tamayo  07/17/21 [id number]

## 2021-07-17 NOTE — ED PROVIDER NOTES
Subjective   History of Present Illness    Chief Complaint: Shortness of breath  History of Present Illness: 56-year-old female with a history of end-stage liver disease alcohol cirrhosis, followed by , here with shortness of breath worse overnight.  History of thoracentesis, states when she was admitted last week at  for GI bleed they had just monitored her pleural effusion and deferred thoracentesis.  Significant other states last night she had worsening shortness of breath and orthopnea, had difficulty speaking in complete sentences secondary to shortness of breath.  Requesting thoracentesis.  Has weekly paracentesis.  Onset: Ongoing issue, worse since last night  Duration: Persist  Exacerbating / Alleviating factors: Worse with lying flat and exertion  Associated symptoms: None      Nurses Notes reviewed and agree, including vitals, allergies, social history and prior medical history.     REVIEW OF SYSTEMS: All systems reviewed and not pertinent unless noted.    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   • Fractures    • Gastrojejunal ulcer with hemorrhage and perforation (CMS/HCC) 2/12/2021   • Hepatic encephalopathy (CMS/HCC) 2/12/2021   • Hernia of abdominal cavity    • High cholesterol    • High triglycerides    • Hypertension    • Hypothyroidism    • Irritable bowel syndrome    • Nocturia 10/5/2016   • Positive TB test    • Pseudoaneurysm of right femoral artery (CMS/HCC) 2/12/2021   • Secondary esophageal varices without bleeding (CMS/HCC) 2/12/2021   • Sinus problem     • ELINA (stress urinary incontinence, female) 10/5/2016   • Thrombocytopenia (CMS/HCC) 2021       Allergies   Allergen Reactions   • Prednisone Anxiety       Past Surgical History:   Procedure Laterality Date   •  SECTION     • COLONOSCOPY     • GASTRIC BYPASS     • HERNIA REPAIR      umbilical hernia   • HERNIA REPAIR  2020    ventral   • HYSTERECTOMY     • LAPAROSCOPIC TUBAL LIGATION     • TOTAL ABDOMINAL HYSTERECTOMY WITH SALPINGO OOPHORECTOMY      uterine prolapse       Family History   Problem Relation Age of Onset   • Hyperlipidemia Mother    • Arthritis Mother    • Inflammatory bowel disease Mother    • Diabetes Father    • Hypertension Father    • Arthritis Paternal Grandmother    • Breast cancer Neg Hx    • Ovarian cancer Neg Hx    • Colon cancer Neg Hx    • Cirrhosis Neg Hx    • Liver disease Neg Hx    • Liver cancer Neg Hx        Social History     Socioeconomic History   • Marital status:      Spouse name: Not on file   • Number of children: Not on file   • Years of education: Not on file   • Highest education level: Not on file   Tobacco Use   • Smoking status: Never Smoker   • Smokeless tobacco: Never Used   Substance and Sexual Activity   • Alcohol use: Yes     Comment: whiskey-4-5 glasses on days not working   • Drug use: No   • Sexual activity: Defer           Objective   Physical Exam    CONSTITUTIONAL: Well developed, chronically ill-appearing 56-year-old  female,  in no acute distress.  VITAL SIGNS: per nursing, reviewed and noted  SKIN: exposed skin with no rashes, ulcerations or petechiae.  EYES: perrla. EOMI. scleral icterus present  ENT: Normal voice.  Patient maintained wearing a mask throughout patient encounter due to coronavirus pandemic  RESPIRATORY:  No increased work of breathing. No retractions.  Decreased breath sounds on the left midlung and base.  CARDIOVASCULAR:  regular rate and rhythm, no murmurs.  Good Peripheral pulses. Good cap refill  to extremities.   GI: No abdominal tenderness.  Presence of ascites.  MUSCULOSKELETAL:  No tenderness. Full ROM. Strength and tone grossly normal.  no spasms. no neck or back tenderness or spasm.   NEUROLOGIC: Alert, oriented x 3. No gross deficits. GCS 15.   PSYCH: appropriate affect.  : no bladder tenderness or distention, no CVA tenderness             ED Course  ED Course as of Jul 17 1912   Sat Jul 17, 2021   1038 PORTABLE CHEST    7/17/2021 10:03 AM      HISTORY: Shortness of air, pleural effusion     COMPARISON: 04/06/2021.     FINDINGS: Large left pleural effusion, significantly increased in size  from April. Compressive atelectasis. Lungs otherwise grossly clear.  Normal heart size. No pneumothorax. Upper abdomen surgical clips.     IMPRESSION:  Large left pleural effusion.     This report was finalized on 7/17/2021 10:22 AM by Dr Vasyl Carvajal DO.    [PF]   1233 Status post thoracentesis chest x-ray single view interpreted by me reveals a small left-sided pneumothorax.  Discussed patient's case with Dr. Pitt, pulmonologist, recommended repeat chest x-ray in 6 hours.  Supplemental oxygen.    [PF]   1247 HISTORY:  thoracentesis.      COMPARISON: Earlier today.     FINDINGS: Normal heart size. Small left pneumothorax following  thoracentesis. Approximately 15% involvement of the pneumothorax. Small  residual less pleural effusion. Right lung is clear.     IMPRESSION:  Small left pneumothorax.     Dr. Geller notified by telephone at time of dictation.     This report was finalized on 7/17/2021 12:37 PM by Dr Vasyl Carvajal DO.    [PF]   1536 PORTABLE CHEST    7/17/2021 2:30 PM      HISTORY: Pneumothorax     COMPARISON: Earlier today.     FINDINGS: Left hydropneumothorax has increased in the interval,  pneumothorax now at least 20% mainly at the lateral base. There are new  airspace opacities in the left mid and lower lung zone. Reexpansion  edema could give this appearance. Right lung remains clear.         IMPRESSION:     1. Increased left hydropneumothorax.  2. New left mid and lower lung zone dense opacities could be reexpansion  edema. Continued follow-up needed.    [PF]   1553 Dr. Linda, [facility], will place on wait list.     [PF]   1559 , on divert.     [PF]   1853 Dr. Pitt, advised if stabilizes, may not need chest tube, advised he and pulmonology partner are out of town this week.     [PF]   1907 I conferred with oncoming ER physician as well as surgeon, also discussed with Dr. Tyler covering hospitalist service.  Patient is stable 9+ hours into her ER evaluation no further expanding pneumothorax.  Patient is on waiting list at multiple facilities but can be stabilized and observed here.  No indications for chest tube at this time.  Will admit for further evaluation.    [PF]      ED Course User Index  [PF] Orlando Geller, DO      Thoracentesis procedure  Indications large symptomatic left pleural effusion  Consent obtained, risks and benefits discussed including incomplete drainage, pneumothorax, bleeding, infection.  Patient elected to continue  Patient placed in sitting position, ultrasound guidance successful removal of 1500 milliliters of clear thin serous fluid.  Lidocaine 1% infiltrated on the superior aspect of the ninth rib To confirm position and placed a catheter over needle with withdrawal of needle.  Complications: Small left-sided pneumothorax post procedure.                                     MDM  56-year-old female presented with symptomatic pleural effusion in setting of end-stage liver disease/ascites secondary to alcohol cirrhosis.  Patient unfortunately sustained a small pneumothorax.  Consulted with pulmonologist on-call Dr. Pitt, advised he is unavailable off call tomorrow for management.  Recommended transfer.  Consulted with  and they are on diversion.  Will discuss with Navdeep Lamb.  Dr. Pitt reviewed chest x-rays at this time he does not recommend chest tube.  Patient  has normal air sats 100%, did have episode of coughing afterwards.  Serial x-rays does reveal a pneumonitis versus expansion edema per radiology..  We will add Rocephin.  Labs sent.  Continue nasal cannula oxygen.  Final diagnoses:   None       ED Disposition  ED Disposition     None          No follow-up provider specified.       Medication List      No changes were made to your prescriptions during this visit.          Orlando Geller,   07/17/21 1912

## 2021-07-17 NOTE — ED NOTES
at [facility] paged per . Awaiting call back.     [facility]  07/17/21 4771

## 2021-07-18 NOTE — H&P
[facility]   HISTORY AND PHYSICAL      Name:  Gaby Rasheed   Age:  56 y.o.  Sex:  female  :  1965  MRN:  [id number]   Visit Number:  [id number]  Admission Date:  2021  Date Of Service:  21  Primary Care Physician:  Arabella Loving MD    Chief Complaint:     Shortness of air    History Of Presenting Illness:      56-year-old white female end-stage alcoholic cirrhosis.  Undergoes paracentesis every week for her ascites.  Is hospitalized at  with a GI bleed requiring transfusion I sizable left pleural effusion was noted that time but the thoracentesis was deferred.  He had thoracentesis in the past.  He came here to the emergency department with complaints of orthopnea and shortness of breath.  She is unable to complete sentences because of her shortness of breath she was requesting thoracentesis.  Thoracentesis was performed emergency department procedure she was noted to have a left posterior pneumothorax of 15 to 20% her dyspnea improved with supplemental oxygen.      Review Of Systems:  She had a recent fall resulting in hematoma in the right flank  All systems were reviewed and negative except for: Shortness of breath and abdominal distention  She denies fever sweats chills headache earache or sore throat been no chest pain current rectal bleeding melena hematochezia nausea or vomiting dysuria or hematuria  Past Medical History: Patient  has a past medical history of Acid reflux, Alcohol liver damage (CMS/HCC) (2021), Alcoholism (CMS/HCC) (2021), Anxiety, Back pain, Chronic liver disease and cirrhosis (CMS/HCC) (2021), Cirrhosis of liver (CMS/HCC) (2020), Depression, Dissection of thoracoabdominal aorta (CMS/HCC) (2021), Fractures, Gastrojejunal ulcer with hemorrhage and perforation (CMS/HCC) (2021), Hepatic encephalopathy (CMS/HCC) (2021), Hernia of abdominal cavity, High cholesterol, High triglycerides,  Hypertension, Hypothyroidism, Irritable bowel syndrome, Nocturia (10/5/2016), Positive TB test, Pseudoaneurysm of right femoral artery (CMS/HCC) (2021), Secondary esophageal varices without bleeding (CMS/HCC) (2021), Sinus problem, ELINA (stress urinary incontinence, female) (10/5/2016), and Thrombocytopenia (CMS/HCC) (2021).  Ventral hernia there have been previous admissions for hyponatremia and peritonitis    Past Surgical History: Patient  has a past surgical history that includes  section; Laparoscopic tubal ligation; Gastric bypass; Hysterectomy; Colonoscopy (); Total abdominal hysterectomy w/ bilateral salpingoophorectomy; Hernia repair; and Hernia repair (2020).    Social History: Patient  reports that she has never smoked. She has never used smokeless tobacco. She reports current alcohol use. She reports that she does not use drugs.    Family History: Patient's family history includes Arthritis in her mother and paternal grandmother; Diabetes in her father; Hyperlipidemia in her mother; Hypertension in her father; Inflammatory bowel disease in her mother.    Allergies:      Prednisone    Home Medications:    Prior to Admission Medications     Prescriptions Last Dose Informant Patient Reported? Taking?    ARIPiprazole (Abilify) 10 MG tablet 2021  No Yes    Take 1 tablet by mouth Daily.    B Complex Vitamins (Vitamin B Complex) tablet 2021 Self Yes Yes    Take 1 tablet by mouth Daily.    Calcium Carbonate Antacid (TUMS PO) 2021  Yes Yes    Take 1 tablet by mouth 2 (Two) Times a Day As Needed (for acid reflux).    desvenlafaxine (PRISTIQ) 100 MG 24 hr tablet 2021  No Yes    Take 1 tablet by mouth Daily.    lactulose (CHRONULAC) 10 GM/15ML solution 2021  No Yes    Take 30 mL by mouth 3 (Three) Times a Day As Needed (hepatic encephalopathy; need to have at least 4 bowel movements a day).    levothyroxine (SYNTHROID, LEVOTHROID) 50 MCG tablet 2021  No  Yes    TAKE 1 TABLET BY MOUTH DAILY    LORazepam (Ativan) 0.5 MG tablet 7/16/2021  No Yes    Take 1 tablet by mouth 2 (Two) Times a Day As Needed for Anxiety.    magnesium oxide (HM Magnesium) 400 MG tablet   No No    Take 1 tablet by mouth Daily.    metoprolol tartrate (LOPRESSOR) 25 MG tablet 7/16/2021  Yes Yes    Multiple Vitamins-Minerals (MULTIVITAMIN ADULT PO)  Self Yes No    Take  by mouth.    naproxen (NAPROSYN) 250 MG tablet   Yes No    Take 250 mg by mouth 2 (Two) Times a Day As Needed.    Nutritional Supplements (ENSURE PO) 7/17/2021  Yes Yes    Take  by mouth.    pantoprazole (PROTONIX) 40 MG EC tablet 7/16/2021  Yes Yes    Take 40 mg by mouth Daily.    potassium chloride ER (K-TAB) 20 MEQ tablet controlled-release ER tablet 7/16/2021  Yes Yes    Prenatal Vit-Fe Fumarate-FA (PRENATAL VITAMIN PO)   Yes No    Take 1 tablet by mouth Daily.    Simethicone (GAS-X PO) Past Month  Yes Yes    Take 1 tablet by mouth Daily As Needed.    spironolactone (ALDACTONE) 50 MG tablet 7/17/2021  Yes Yes    zolpidem (AMBIEN) 10 MG tablet 7/16/2021  No Yes    Take 1 tablet by mouth At Night As Needed for Sleep.        ED Medications:    Medications   sodium chloride 0.9 % flush 10 mL (has no administration in time range)   bacitracin ointment ( Topical Given 7/17/21 1554)   cefTRIAXone (ROCEPHIN) IVPB 1 g/50ml dextrose (premix) (0 g Intravenous Stopped 7/17/21 1652)     Vital Signs:  Temp:  [97.6 °F (36.4 °C)-98.3 °F (36.8 °C)] 97.6 °F (36.4 °C)  Heart Rate:  [62-82] 82  Resp:  [17-18] 18  BP: (103-142)/(52-75) 142/75        07/17/21  0928 07/17/21 2004   Weight: 57.6 kg (127 lb) 58.5 kg (128 lb 15.5 oz)     Body mass index is 22.85 kg/m².    Physical Exam:     General Appearance:  Alert and cooperative.    Head:  Atraumatic and normocephalic.   Eyes:  Scleral icterus notedNo pallor.   Ears:  Ears with no abnormalities noted.   Throat:    Neck: trachea midline, no thyromegaly.   Back:      Lungs:    Sounds are distant  throughout slightly more distant in the left chest where there was mild expiratory wheezing noted as well   Heart:  Normal S1 and S2, no murmur, no gallop, no rub. No JVD.   Abdomen:   Normal bowel sounds, no masses. Soft, nontender,no rebound tenderness.  Was abdominal and flank distention consistent with ascites the linear surgical scar with a ventral hernia at the inferior portion of the scar there was bruising of the right flank area   Extremities: no edema, no cyanosis, no clubbing.  Muscle wasting noted   Pulses: Pulses foot pulses were diminished   Skin:  Bruising and areas of purpura noted, 3 cm wound with overlying scab noted right lateral calf   Neurologic: Alert and oriented x 3. No facial asymmetry. Moves all four limbs. No tremors.      Laboratory data:    I have reviewed the labs done in the emergency room.    Results from last 7 days   Lab Units 07/17/21  1514   SODIUM mmol/L 134*   POTASSIUM mmol/L 5.1   CHLORIDE mmol/L 102   CO2 mmol/L 21.2*   BUN mg/dL 21*   CREATININE mg/dL 0.61   CALCIUM mg/dL 8.2*   BILIRUBIN mg/dL 7.9*   ALK PHOS U/L 112   ALT (SGPT) U/L 15   AST (SGOT) U/L 37*   GLUCOSE mg/dL 74     Results from last 7 days   Lab Units 07/17/21  1514   WBC 10*3/mm3 12.39*   HEMOGLOBIN g/dL 9.6*   HEMATOCRIT % 29.3*   PLATELETS 10*3/mm3 151     Results from last 7 days   Lab Units 07/17/21  1514   INR  2.18*                               Invalid input(s): USDES,  BLOODU, NITRITITE, BACT, EP    Pain Management Panel     Pain Management Panel Latest Ref Rng & Units 7/15/2020    AMPHETAMINES SCREEN, URINE Negative Negative    BARBITURATES SCREEN Negative Negative    BENZODIAZEPINE SCREEN, URINE Negative Positive(A)    BUPRENORPHINEUR Negative Negative    COCAINE SCREEN, URINE Negative Negative    METHADONE SCREEN, URINE Negative Negative    METHAMPHETAMINEUR Negative Negative          EKG:          Radiology:    XR Chest 1 View    Result Date: 7/17/2021  PORTABLE CHEST    7/17/2021 2:30 PM   HISTORY: Pneumothorax  COMPARISON: Earlier today.  FINDINGS: Left hydropneumothorax has increased in the interval, pneumothorax now at least 20% mainly at the lateral base. There are new airspace opacities in the left mid and lower lung zone. Reexpansion edema could give this appearance. Right lung remains clear.        1. Increased left hydropneumothorax. 2. New left mid and lower lung zone dense opacities could be reexpansion edema. Continued follow-up needed.  This report was finalized on 7/17/2021 2:59 PM by Dr Vasyl Carvajal DO.    XR Chest 1 View    Result Date: 7/17/2021  PORTABLE CHEST    7/17/2021 12:04 PM  HISTORY:  thoracentesis.  COMPARISON: Earlier today.  FINDINGS: Normal heart size. Small left pneumothorax following thoracentesis. Approximately 15% involvement of the pneumothorax. Small residual less pleural effusion. Right lung is clear.      Small left pneumothorax.  Dr. Geller notified by telephone at time of dictation.  This report was finalized on 7/17/2021 12:37 PM by Dr Vasyl Carvajal DO.    XR Chest 1 View    Result Date: 7/17/2021  PORTABLE CHEST    7/17/2021 10:03 AM  HISTORY: Shortness of air, pleural effusion  COMPARISON: 04/06/2021.  FINDINGS: Large left pleural effusion, significantly increased in size from April. Compressive atelectasis. Lungs otherwise grossly clear. Normal heart size. No pneumothorax. Upper abdomen surgical clips.      Large left pleural effusion.  This report was finalized on 7/17/2021 10:22 AM by Dr Vasyl Carvajal DO.      Assessment:    Dyspnea due to  pleural effusion and iatrogenic pneumothorax    Plan:    Supplemental oxygen and follow-up chest x-ray surgery for chest tube placement on call          Addi Peres MD  07/17/21  21:28 EDT    Dictated utilizing Dragon dictation.

## 2021-07-18 NOTE — PROCEDURES
Thoracentesis Procedure Note  INDICATION: Therapeutic and diagnostic  PROCEDURE : Dr. Ma  ATTENDING PHYSICIAN: Dr. Ma      CONSENT:   Consent was obtained from the patient prior to the procedure. Indications, risks, and benefits were explained at length.      PROCEDURE SUMMARY:   A time out was performed and the chest x-ray was reviewed, the appropriate side was confirmed and marked under ultrasound guidance. My hands were washed immediately prior to the procedure. I wore sterile gloves throughout the procedure. The patient was prepped and draped in a sterile manner using chlorhexidine scrub after the appropriate level was percussed and confirmed by ultrasound. 1% lidocaine was used to anesthesize the skin, subcutaneous tissue, superior aspect of the rib periosteum and parietal pleura. A finder needle was then introduced over the superior aspect of the rib to locate the pleural fluid; 5 colored fluid was aspirated at a depth of approximately 3 cm. A 10-blade scalpel was used to nick the skin at the insertion site. The Safe-t-Centesis needle was then introduced through the skin incision into the pleural space using negative aspiration pressure. The thoracentesis catheter was then threaded without difficulty.  1600 ml of .  Yellow clear colored fluid was removed without difficulty. The catheter was then removed. No immediate complications were noted during the procedure. A post-procedure chest x-ray is pending at the time of this note. The fluid will be sent for studies.  Estimated blood loss is 1 ml.

## 2021-07-18 NOTE — PLAN OF CARE
Goal Outcome Evaluation:              Outcome Summary: Pt states she is breathing better since her procedure in ER earlier yesterday.  Does exhibit PANG.  Requires assist up/down.

## 2021-07-18 NOTE — PLAN OF CARE
Goal Outcome Evaluation:   Pt remains in the ICU setting but as a telemetry overflow. Bedside paracentesis performed this afternoon with Dr. Ma and 1625ml of clear yellow fluid removed, pt tolerated well. Plans to stay another night and repeat CXR in AM for further plan of care.

## 2021-07-18 NOTE — PROGRESS NOTES
[facility]    [facility]    Name:  Gaby Rasheed   Age:  56 y.o.  Sex:  female  :  1965  MRN:  [id number]   Visit Number:  [id number]  Admission Date:  2021  Date Of Service:  21  Primary Care Physician:  Arabella Loving MD     LOS: 1 day :    Chief Complaint:    Shortness of breath    Subjective:  I have seen and evaluated the patient this morning.  Chart reviewed and events noted.  She reported that she is much improved in terms of her shortness of breath after thoracentesis performed yesterday.  Denied any chest pain.  Hemodynamics remained stable.  Told me that she gets usually paracentesis on Tuesday at [facility] and agreed to proceed with a paracentesis today    Hospital Course:  56 years old female patient with past medical history of alcoholic liver cirrhosis liver, who is paracentesis dependent at [facility] every week presented to the hospital with worsening shortness of breath.  She was in severe respiratory distress and underwent left thoracentesis for significant left pleural effusion which resulted in partial improvement of her respiratory symptoms and repeat chest x-ray showed left posterior pneumothorax of 15 to 20% was admitted for close observation.    Review of Systems:   All systems were reviewed and negative except as mentioned in subjective, assessment and plan.    Vital Signs:  Temp:  [97.3 °F (36.3 °C)-99 °F (37.2 °C)] 99 °F (37.2 °C)  Heart Rate:  [63-82] 69  Resp:  [18-30] 28  BP: (108-142)/(52-75) 132/69    Intake and output:    I/O last 3 completed shifts:  In: 165 [P.O.:115; IV Piggyback:50]  Out: 201 [Urine:200; Stool:1]  I/O this shift:  In: 465 [P.O.:465]  Out: 200 [Urine:200]    Physical Examination:  General Appearance:   Chronically ill looking, cachectic and in distress   Head:  Atraumatic and normocephalic.   Eyes: Conjunctivae and sclerae normal, no icterus. No pallor.   Throat: No oral lesions, no thrush, oral  mucosa moist.   Neck: Supple, trachea midline, no thyromegaly.   Lungs:    Markedly diminished air entry on the left lung base.  No crackles or wheezing appreciated   Heart:  Normal S1 and S2, no murmur, no gallop, no rub. No JVD.   Abdomen:   Normal bowel sounds, no masses, no organomegaly.  Distended with ascites, nontender, nondistended, no rebound tenderness.   Extremities: Supple, no edema, no cyanosis, no clubbing.   Skin: No bleeding or rash.   Neurologic: Alert and oriented x 3. No facial asymmetry. Moves all four limbs. No tremors.      Laboratory results:    Results from last 7 days   Lab Units 07/17/21  1514   SODIUM mmol/L 134*   POTASSIUM mmol/L 5.1   CHLORIDE mmol/L 102   CO2 mmol/L 21.2*   BUN mg/dL 21*   CREATININE mg/dL 0.61   CALCIUM mg/dL 8.2*   BILIRUBIN mg/dL 7.9*   ALK PHOS U/L 112   ALT (SGPT) U/L 15   AST (SGOT) U/L 37*   GLUCOSE mg/dL 74     Results from last 7 days   Lab Units 07/18/21  0544 07/17/21  1514   WBC 10*3/mm3 16.66* 12.39*   HEMOGLOBIN g/dL 9.3* 9.6*   HEMATOCRIT % 28.8* 29.3*   PLATELETS 10*3/mm3 154 151     Results from last 7 days   Lab Units 07/17/21  1514   INR  2.18*         Results from last 7 days   Lab Units 07/17/21  1440   WOUNDCX  Growth present, too young to evaluate         I have reviewed the patient's laboratory results.    Radiology results:    XR Chest 1 View    Result Date: 7/18/2021  PORTABLE CHEST    7/17/2021 6:46 PM  HISTORY: Pneumothorax  COMPARISON: Earlier today.  FINDINGS: Moderate to large left pleural effusion has rapidly reaccumulated. There is a small to moderate pneumothorax component, probably 15-20%. Right lung is clear.      Impression: Left hydropneumothorax. Pneumothorax component is grossly stable, the fluid component is increasing.  This report was finalized on 7/18/2021 6:46 AM by Dr Vasyl Carvajal DO.    XR Chest 1 View    Result Date: 7/18/2021  PORTABLE CHEST    7/18/2021 5:34 AM  HISTORY: Pneumothorax  COMPARISON: One day prior.  FINDINGS:  Large left pleural effusion has rapidly reaccumulated. Pneumothorax component remains small to moderate, probably 20%. Right lung is grossly clear. Upper abdomen surgical clips.      Impression: Left hydropneumothorax. There has been rapid reaccumulation of fluid since yesterday's thoracentesis.  This report was finalized on 7/18/2021 6:45 AM by Dr Vasyl Carvajal DO.    XR Chest 1 View    Result Date: 7/17/2021  PORTABLE CHEST    7/17/2021 2:30 PM  HISTORY: Pneumothorax  COMPARISON: Earlier today.  FINDINGS: Left hydropneumothorax has increased in the interval, pneumothorax now at least 20% mainly at the lateral base. There are new airspace opacities in the left mid and lower lung zone. Reexpansion edema could give this appearance. Right lung remains clear.       Impression:  1. Increased left hydropneumothorax. 2. New left mid and lower lung zone dense opacities could be reexpansion edema. Continued follow-up needed.  This report was finalized on 7/17/2021 2:59 PM by Dr Vasyl Carvajal DO.    XR Chest 1 View    Result Date: 7/17/2021  PORTABLE CHEST    7/17/2021 12:04 PM  HISTORY:  thoracentesis.  COMPARISON: Earlier today.  FINDINGS: Normal heart size. Small left pneumothorax following thoracentesis. Approximately 15% involvement of the pneumothorax. Small residual less pleural effusion. Right lung is clear.      Impression: Small left pneumothorax.  Dr. Geller notified by telephone at time of dictation.  This report was finalized on 7/17/2021 12:37 PM by Dr Vasyl Carvajal DO.    XR Chest 1 View    Result Date: 7/17/2021  PORTABLE CHEST    7/17/2021 10:03 AM  HISTORY: Shortness of air, pleural effusion  COMPARISON: 04/06/2021.  FINDINGS: Large left pleural effusion, significantly increased in size from April. Compressive atelectasis. Lungs otherwise grossly clear. Normal heart size. No pneumothorax. Upper abdomen surgical clips.      Impression: Large left pleural effusion.  This report was finalized on 7/17/2021 10:22 AM by  Dr Vasyl Carvajal, DO.    I have reviewed the patient's radiology reports.    Medication Review:     I have reviewed the patient's active and prn medications.     Problem List:      Postprocedural pneumothorax    Cirrhosis of liver (CMS/HCC)    Ascites due to alcoholic cirrhosis (CMS/HCC)    Esophageal varices in alcoholic cirrhosis (CMS/HCC)    Pleural effusion associated with hepatic disorder      Assessment:  · Respiratory distress secondary to significant left pleural effusion status post thoracentesis   · Iatrogenic pneumothorax   · End-stage liver disease  · Alcohol induced liver cirrhosis  · Recurrent ascites, paracentesis dependent essential hypertension  · Severe protein energy malnutrition with cachexia  · GERD  · Depression  · Hypothyroidism  · Essential hypertension  · Dyslipidemia  · Irritable bowel syndrome    Plan:  · The patient presented to the hospital with severe respiratory distress that was felt to be secondary to significant left pleural effusion and underwent thoracentesis in the ER that resulted in what seems to be post thoracentesis reexpansion pulmonary edema that resolved but repeat chest x-ray showed some pneumothorax of 15 to 20% with rebuild/reaccumulation of the hydrothorax the patient was admitted to the hospital to be observed closely.  She did not have any further respiratory symptoms.  Reported that her rody shortness of breath improved markedly.  Currently on oxygen supplementation to help resolving her pneumothorax.  The plan for now is to repeat her chest x-ray in the morning.  If she has residual pneumothorax, she would benefit from surgery consultation and chest tube placement.  Based on the repeat chest x-ray, and of the left pleural effusion is worsening, she might benefit from another imaging guiding thoracentesis at bedside versus in the radiology department.  Will defer to the primary provider tomorrow  · The patient is used to get weekly paracentesis on Tuesdays.  Bedside  ultrasound revealed moderate ascites but since she has respiratory symptoms, I discussed with the patient and she agreed and consented for bedside paracentesis.  · Continue the rest of her home meds  · DVT Prophylaxis: SCD  · Code Status: Full code  · Diet:   · Discharge Plan: To be determined I have seen and evaluated the patient this morning.  Chart reviewed and events noted.  She reported that her shortness of breath improved markedly after the thoracentesis.  Denied any chest pain    Tammi Ma MD  07/18/21  17:09 EDT    Dictated utilizing Dragon dictation.

## 2021-07-19 NOTE — PAYOR COMM NOTE
"TO:BC  FROM:CARLYLE ENNIS -[phone]  IN CLINICALS REF#[id number]    Popeye Rasheed (56 y.o. Female)     Date of Birth Social Security Number Address Home Phone MRN    1965  [address] [facility] [facility] [phone] [id number]    Catholic Marital Status          Jainism        Admission Date Admission Type Admitting Provider Attending Provider Department, Room/Bed    21 Emergency Xiomara Presley, Xiomara Roberts,  [facility] INTENSIVE CARE, I08/    Discharge Date Discharge Disposition Discharge Destination                       Attending Provider: Xiomara Presley DO    Allergies: Prednisone    Isolation: None   Infection: None   Code Status: CPR    Ht: 160 cm (63\")   Wt: 60 kg (132 lb 3.2 oz)    Admission Cmt: None   Principal Problem: Postprocedural pneumothorax [J95.811]                 Active Insurance as of 2021     Primary Coverage     Payor Plan Insurance Group Employer/Plan Group    ANTHEM BLUE CROSS ANTHEM BLUE CROSS BLUE SHIELD PPO [id number]     Payor Plan Address Payor Plan Phone Number Payor Plan Fax Number Effective Dates    [address] [phone]  2021 - None Entered    [place] [address]       Subscriber Name Subscriber Birth Date Member ID       POPEYE RASHEED 1965 [id number]                 Emergency Contacts      (Rel.) Home Phone Work Phone Mobile Phone    Anton Jaquez (Significant Other) [phone] -- --    Ángela Rasheed (Daughter) [phone] -- --               History & Physical      Addi Peres MD at 21 2128              [facility]      Name:  Popeye Rasheed   Age:  56 y.o.  Sex:  female  :  1965  MRN:  [id number]   Visit Number:  [id number]  Admission Date:  2021  Date Of Service:  21  Primary Care Physician:  Araeblla Loving MD    Chief " Complaint:     Shortness of air    History Of Presenting Illness:      56-year-old white female end-stage alcoholic cirrhosis.  Undergoes paracentesis every week for her ascites.  Is hospitalized at  with a GI bleed requiring transfusion I sizable left pleural effusion was noted that time but the thoracentesis was deferred.  He had thoracentesis in the past.  He came here to the emergency department with complaints of orthopnea and shortness of breath.  She is unable to complete sentences because of her shortness of breath she was requesting thoracentesis.  Thoracentesis was performed emergency department procedure she was noted to have a left posterior pneumothorax of 15 to 20% her dyspnea improved with supplemental oxygen.      Review Of Systems:  She had a recent fall resulting in hematoma in the right flank  All systems were reviewed and negative except for: Shortness of breath and abdominal distention  She denies fever sweats chills headache earache or sore throat been no chest pain current rectal bleeding melena hematochezia nausea or vomiting dysuria or hematuria  Past Medical History: Patient  has a past medical history of Acid reflux, Alcohol liver damage (CMS/HCC) (2/12/2021), Alcoholism (CMS/HCC) (2/12/2021), Anxiety, Back pain, Chronic liver disease and cirrhosis (CMS/HCC) (2/12/2021), Cirrhosis of liver (CMS/HCC) (7/17/2020), Depression, 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  Ventral hernia there have been previous admissions for hyponatremia and peritonitis    Past Surgical History: Patient  has a past surgical history that includes  section; Laparoscopic tubal ligation; Gastric bypass; Hysterectomy; Colonoscopy (); Total abdominal hysterectomy w/ bilateral salpingoophorectomy; Hernia repair; and Hernia repair (2020).    Social History: Patient  reports that she has never smoked. She has never used smokeless tobacco. She reports current alcohol use. She reports that she does not use drugs.    Family History: Patient's family history includes Arthritis in her mother and paternal grandmother; Diabetes in her father; Hyperlipidemia in her mother; Hypertension in her father; Inflammatory bowel disease in her mother.    Allergies:      Prednisone    Home Medications:    Prior to Admission Medications     Prescriptions Last Dose Informant Patient Reported? Taking?    ARIPiprazole (Abilify) 10 MG tablet 2021  No Yes    Take 1 tablet by mouth Daily.    B Complex Vitamins (Vitamin B Complex) tablet 2021 Self Yes Yes    Take 1 tablet by mouth Daily.    Calcium Carbonate Antacid (TUMS PO) 2021  Yes Yes    Take 1 tablet by mouth 2 (Two) Times a Day As Needed (for acid reflux).    desvenlafaxine (PRISTIQ) 100 MG 24 hr tablet 2021  No Yes    Take 1 tablet by mouth Daily.    lactulose (CHRONULAC) 10 GM/15ML solution 2021  No Yes    Take 30 mL by mouth 3 (Three) Times a Day As Needed (hepatic encephalopathy; need to have at least 4 bowel movements a day).    levothyroxine (SYNTHROID, LEVOTHROID) 50 MCG tablet 2021  No Yes    TAKE 1 TABLET BY MOUTH DAILY    LORazepam (Ativan) 0.5 MG tablet 2021  No Yes    Take 1 tablet by mouth 2 (Two) Times a Day As Needed for Anxiety.    magnesium oxide (HM Magnesium) 400 MG tablet   No No    Take 1 tablet by mouth Daily.    metoprolol tartrate (LOPRESSOR) 25 MG tablet  7/16/2021  Yes Yes    Multiple Vitamins-Minerals (MULTIVITAMIN ADULT PO)  Self Yes No    Take  by mouth.    naproxen (NAPROSYN) 250 MG tablet   Yes No    Take 250 mg by mouth 2 (Two) Times a Day As Needed.    Nutritional Supplements (ENSURE PO) 7/17/2021  Yes Yes    Take  by mouth.    pantoprazole (PROTONIX) 40 MG EC tablet 7/16/2021  Yes Yes    Take 40 mg by mouth Daily.    potassium chloride ER (K-TAB) 20 MEQ tablet controlled-release ER tablet 7/16/2021  Yes Yes    Prenatal Vit-Fe Fumarate-FA (PRENATAL VITAMIN PO)   Yes No    Take 1 tablet by mouth Daily.    Simethicone (GAS-X PO) Past Month  Yes Yes    Take 1 tablet by mouth Daily As Needed.    spironolactone (ALDACTONE) 50 MG tablet 7/17/2021  Yes Yes    zolpidem (AMBIEN) 10 MG tablet 7/16/2021  No Yes    Take 1 tablet by mouth At Night As Needed for Sleep.        ED Medications:    Medications   sodium chloride 0.9 % flush 10 mL (has no administration in time range)   bacitracin ointment ( Topical Given 7/17/21 1554)   cefTRIAXone (ROCEPHIN) IVPB 1 g/50ml dextrose (premix) (0 g Intravenous Stopped 7/17/21 1652)     Vital Signs:  Temp:  [97.6 °F (36.4 °C)-98.3 °F (36.8 °C)] 97.6 °F (36.4 °C)  Heart Rate:  [62-82] 82  Resp:  [17-18] 18  BP: (103-142)/(52-75) 142/75        07/17/21  0928 07/17/21 2004   Weight: 57.6 kg (127 lb) 58.5 kg (128 lb 15.5 oz)     Body mass index is 22.85 kg/m².    Physical Exam:     General Appearance:  Alert and cooperative.    Head:  Atraumatic and normocephalic.   Eyes:  Scleral icterus notedNo pallor.   Ears:  Ears with no abnormalities noted.   Throat:    Neck: trachea midline, no thyromegaly.   Back:      Lungs:    Sounds are distant throughout slightly more distant in the left chest where there was mild expiratory wheezing noted as well   Heart:  Normal S1 and S2, no murmur, no gallop, no rub. No JVD.   Abdomen:   Normal bowel sounds, no masses. Soft, nontender,no rebound tenderness.  Was abdominal and flank distention  consistent with ascites the linear surgical scar with a ventral hernia at the inferior portion of the scar there was bruising of the right flank area   Extremities: no edema, no cyanosis, no clubbing.  Muscle wasting noted   Pulses: Pulses foot pulses were diminished   Skin:  Bruising and areas of purpura noted, 3 cm wound with overlying scab noted right lateral calf   Neurologic: Alert and oriented x 3. No facial asymmetry. Moves all four limbs. No tremors.      Laboratory data:    I have reviewed the labs done in the emergency room.    Results from last 7 days   Lab Units 07/17/21  1514   SODIUM mmol/L 134*   POTASSIUM mmol/L 5.1   CHLORIDE mmol/L 102   CO2 mmol/L 21.2*   BUN mg/dL 21*   CREATININE mg/dL 0.61   CALCIUM mg/dL 8.2*   BILIRUBIN mg/dL 7.9*   ALK PHOS U/L 112   ALT (SGPT) U/L 15   AST (SGOT) U/L 37*   GLUCOSE mg/dL 74     Results from last 7 days   Lab Units 07/17/21  1514   WBC 10*3/mm3 12.39*   HEMOGLOBIN g/dL 9.6*   HEMATOCRIT % 29.3*   PLATELETS 10*3/mm3 151     Results from last 7 days   Lab Units 07/17/21  1514   INR  2.18*                               Invalid input(s): USDES,  BLOODU, NITRITITE, BACT, EP    Pain Management Panel     Pain Management Panel Latest Ref Rng & Units 7/15/2020    AMPHETAMINES SCREEN, URINE Negative Negative    BARBITURATES SCREEN Negative Negative    BENZODIAZEPINE SCREEN, URINE Negative Positive(A)    BUPRENORPHINEUR Negative Negative    COCAINE SCREEN, URINE Negative Negative    METHADONE SCREEN, URINE Negative Negative    METHAMPHETAMINEUR Negative Negative          EKG:          Radiology:    XR Chest 1 View    Result Date: 7/17/2021  PORTABLE CHEST    7/17/2021 2:30 PM  HISTORY: Pneumothorax  COMPARISON: Earlier today.  FINDINGS: Left hydropneumothorax has increased in the interval, pneumothorax now at least 20% mainly at the lateral base. There are new airspace opacities in the left mid and lower lung zone. Reexpansion edema could give this appearance. Right  lung remains clear.        1. Increased left hydropneumothorax. 2. New left mid and lower lung zone dense opacities could be reexpansion edema. Continued follow-up needed.  This report was finalized on 7/17/2021 2:59 PM by Dr Vasyl Carvajal DO.    XR Chest 1 View    Result Date: 7/17/2021  PORTABLE CHEST    7/17/2021 12:04 PM  HISTORY:  thoracentesis.  COMPARISON: Earlier today.  FINDINGS: Normal heart size. Small left pneumothorax following thoracentesis. Approximately 15% involvement of the pneumothorax. Small residual less pleural effusion. Right lung is clear.      Small left pneumothorax.  Dr. Geller notified by telephone at time of dictation.  This report was finalized on 7/17/2021 12:37 PM by Dr Vasyl Carvajal DO.    XR Chest 1 View    Result Date: 7/17/2021  PORTABLE CHEST    7/17/2021 10:03 AM  HISTORY: Shortness of air, pleural effusion  COMPARISON: 04/06/2021.  FINDINGS: Large left pleural effusion, significantly increased in size from April. Compressive atelectasis. Lungs otherwise grossly clear. Normal heart size. No pneumothorax. Upper abdomen surgical clips.      Large left pleural effusion.  This report was finalized on 7/17/2021 10:22 AM by Dr Vasyl Carvajal DO.      Assessment:    Dyspnea due to  pleural effusion and iatrogenic pneumothorax    Plan:    Supplemental oxygen and follow-up chest x-ray surgery for chest tube placement on call          Addi Peres MD  07/17/21  21:28 EDT    Dictated utilizing Dragon dictation.    Electronically signed by Addi Peres MD at 07/17/21 2150          Emergency Department Notes      Orlando Geller DO at 07/17/21 1301          Subjective   History of Present Illness    Chief Complaint: Shortness of breath  History of Present Illness: 56-year-old female with a history of end-stage liver disease alcohol cirrhosis, followed by , here with shortness of breath worse overnight.  History of thoracentesis, states when she was admitted last week at  for GI  bleed they had just monitored her pleural effusion and deferred thoracentesis.  Significant other states last night she had worsening shortness of breath and orthopnea, had difficulty speaking in complete sentences secondary to shortness of breath.  Requesting thoracentesis.  Has weekly paracentesis.  Onset: Ongoing issue, worse since last night  Duration: Persist  Exacerbating / Alleviating factors: Worse with lying flat and exertion  Associated symptoms: None      Nurses Notes reviewed and agree, including vitals, allergies, social history and prior medical history.     REVIEW OF SYSTEMS: All systems reviewed and not pertinent unless noted.    Positive for: Shortness of breath, chronic liver disease    Negative for: Fever cough hemoptysis syncope chest pain  Review of Systems    Past Medical History:   Diagnosis Date   • Acid reflux    • Alcohol liver damage (CMS/HCC) 2021   • Alcoholism (CMS/HCC) 2021   • Anxiety    • Back pain    • Chronic liver disease and cirrhosis (CMS/HCC) 2021   • Cirrhosis of liver (CMS/HCC) 2020   • Depression    • Dissection of thoracoabdominal aorta (CMS/HCC) 2021   • Fractures    • Gastrojejunal ulcer with hemorrhage and perforation (CMS/HCC) 2021   • Hepatic encephalopathy (CMS/HCC) 2021   • Hernia of abdominal cavity    • High cholesterol    • High triglycerides    • Hypertension    • Hypothyroidism    • Irritable bowel syndrome    • Nocturia 10/5/2016   • Positive TB test    • Pseudoaneurysm of right femoral artery (CMS/HCC) 2021   • Secondary esophageal varices without bleeding (CMS/HCC) 2021   • Sinus problem    • ELINA (stress urinary incontinence, female) 10/5/2016   • Thrombocytopenia (CMS/HCC) 2021       Allergies   Allergen Reactions   • Prednisone Anxiety       Past Surgical History:   Procedure Laterality Date   •  SECTION     • COLONOSCOPY     • GASTRIC BYPASS     • HERNIA REPAIR      umbilical hernia   •  HERNIA REPAIR  01/2020    ventral   • HYSTERECTOMY     • LAPAROSCOPIC TUBAL LIGATION     • TOTAL ABDOMINAL HYSTERECTOMY WITH SALPINGO OOPHORECTOMY      uterine prolapse       Family History   Problem Relation Age of Onset   • Hyperlipidemia Mother    • Arthritis Mother    • Inflammatory bowel disease Mother    • Diabetes Father    • Hypertension Father    • Arthritis Paternal Grandmother    • Breast cancer Neg Hx    • Ovarian cancer Neg Hx    • Colon cancer Neg Hx    • Cirrhosis Neg Hx    • Liver disease Neg Hx    • Liver cancer Neg Hx        Social History     Socioeconomic History   • Marital status:      Spouse name: Not on file   • Number of children: Not on file   • Years of education: Not on file   • Highest education level: Not on file   Tobacco Use   • Smoking status: Never Smoker   • Smokeless tobacco: Never Used   Substance and Sexual Activity   • Alcohol use: Yes     Comment: whiskey-4-5 glasses on days not working   • Drug use: No   • Sexual activity: Defer           Objective   Physical Exam    CONSTITUTIONAL: Well developed, chronically ill-appearing 56-year-old  female,  in no acute distress.  VITAL SIGNS: per nursing, reviewed and noted  SKIN: exposed skin with no rashes, ulcerations or petechiae.  EYES: perrla. EOMI. scleral icterus present  ENT: Normal voice.  Patient maintained wearing a mask throughout patient encounter due to coronavirus pandemic  RESPIRATORY:  No increased work of breathing. No retractions.  Decreased breath sounds on the left midlung and base.  CARDIOVASCULAR:  regular rate and rhythm, no murmurs.  Good Peripheral pulses. Good cap refill to extremities.   GI: No abdominal tenderness.  Presence of ascites.  MUSCULOSKELETAL:  No tenderness. Full ROM. Strength and tone grossly normal.  no spasms. no neck or back tenderness or spasm.   NEUROLOGIC: Alert, oriented x 3. No gross deficits. GCS 15.   PSYCH: appropriate affect.  : no bladder tenderness or distention,  no CVA tenderness            ED Course  ED Course as of Jul 17 1912   Sat Jul 17, 2021   1038 PORTABLE CHEST    7/17/2021 10:03 AM      HISTORY: Shortness of air, pleural effusion     COMPARISON: 04/06/2021.     FINDINGS: Large left pleural effusion, significantly increased in size  from April. Compressive atelectasis. Lungs otherwise grossly clear.  Normal heart size. No pneumothorax. Upper abdomen surgical clips.     IMPRESSION:  Large left pleural effusion.     This report was finalized on 7/17/2021 10:22 AM by Dr Vasyl Carvajal DO.    [PF]   1233 Status post thoracentesis chest x-ray single view interpreted by me reveals a small left-sided pneumothorax.  Discussed patient's case with Dr. Pitt, pulmonologist, recommended repeat chest x-ray in 6 hours.  Supplemental oxygen.    [PF]   1247 HISTORY:  thoracentesis.      COMPARISON: Earlier today.     FINDINGS: Normal heart size. Small left pneumothorax following  thoracentesis. Approximately 15% involvement of the pneumothorax. Small  residual less pleural effusion. Right lung is clear.     IMPRESSION:  Small left pneumothorax.     Dr. Geller notified by telephone at time of dictation.     This report was finalized on 7/17/2021 12:37 PM by Dr Vasyl Carvajal DO.    [PF]   1536 PORTABLE CHEST    7/17/2021 2:30 PM      HISTORY: Pneumothorax     COMPARISON: Earlier today.     FINDINGS: Left hydropneumothorax has increased in the interval,  pneumothorax now at least 20% mainly at the lateral base. There are new  airspace opacities in the left mid and lower lung zone. Reexpansion  edema could give this appearance. Right lung remains clear.        IMPRESSION:     1. Increased left hydropneumothorax.  2. New left mid and lower lung zone dense opacities could be reexpansion  edema. Continued follow-up needed.    [PF]   1553 Dr. Linda, [facility], will place on wait list.     [PF]   1559 , on divert.     [PF]   1853 Dr. Pitt, advised if stabilizes, may not need chest tube, advised he  and pulmonology partner are out of town this week.     [PF]   1907 I conferred with oncoming ER physician as well as surgeon, also discussed with Dr. Tyler covering hospitalist service.  Patient is stable 9+ hours into her ER evaluation no further expanding pneumothorax.  Patient is on waiting list at multiple facilities but can be stabilized and observed here.  No indications for chest tube at this time.  Will admit for further evaluation.    [PF]      ED Course User Index  [PF] Orlando Geller, DO      Thoracentesis procedure  Indications large symptomatic left pleural effusion  Consent obtained, risks and benefits discussed including incomplete drainage, pneumothorax, bleeding, infection.  Patient elected to continue  Patient placed in sitting position, ultrasound guidance successful removal of 1500 milliliters of clear thin serous fluid.  Lidocaine 1% infiltrated on the superior aspect of the ninth rib To confirm position and placed a catheter over needle with withdrawal of needle.  Complications: Small left-sided pneumothorax post procedure.                                     MDM  56-year-old female presented with symptomatic pleural effusion in setting of end-stage liver disease/ascites secondary to alcohol cirrhosis.  Patient unfortunately sustained a small pneumothorax.  Consulted with pulmonologist on-call Dr. Pitt, advised he is unavailable off call tomorrow for management.  Recommended transfer.  Consulted with  and they are on diversion.  Will discuss with Navdeep Lamb.  Dr. Pitt reviewed chest x-rays at this time he does not recommend chest tube.  Patient has normal air sats 100%, did have episode of coughing afterwards.  Serial x-rays does reveal a pneumonitis versus expansion edema per radiology..  We will add Rocephin.  Labs sent.  Continue nasal cannula oxygen.  Final diagnoses:   None       ED Disposition  ED Disposition     None          No follow-up provider specified.       Medication  List      No changes were made to your prescriptions during this visit.          Orlando Geller DO  07/17/21 1912      Electronically signed by Orlando Geller DO at 07/17/21 1912     [facility] at 07/17/21 1509         at [facility] paged per . Awaiting call back.     [facility]  07/17/21 1509      Electronically signed by [facility] at 07/17/21 1509     [facility] at 07/17/21 1547         transferred to .     [facility]  07/17/21 1547      Electronically signed by [facility] at 07/17/21 1547     [facility] at 07/17/21 1856        [facility] just called and stated they still do not have a bed available. They stated it would most likely be tomorrow before the patient is ready to be transferred.     [facility]  07/17/21 1857      Electronically signed by [facility] at 07/17/21 1857     Harlan Copeland at 07/17/21 1916        Per Eric Wilson Supervisor the patient will be going to [facility]     Harlan Copeland  07/17/21 1917      Electronically signed by Harlan Copeland at 07/17/21 1917         Current Facility-Administered Medications   Medication Dose Route Frequency Provider Last Rate Last Admin   • ARIPiprazole (ABILIFY) tablet 10 mg  10 mg Oral Daily Addi Peres MD   10 mg at 07/19/21 0823   • lactulose (CHRONULAC) 10 GM/15ML solution 20 g  20 g Oral TID PRN Addi Peres MD   20 g at 07/19/21 0828   • levothyroxine (SYNTHROID, LEVOTHROID) tablet 50 mcg  50 mcg Oral Daily Addi Peres MD   50 mcg at 07/19/21 0824   • LORazepam (ATIVAN) tablet 0.5 mg  0.5 mg Oral BID PRN Addi Peres MD       • magnesium oxide (MAG-OX) tablet 400 mg  400 mg Oral Daily Addi Peres MD   400 mg at 07/19/21 0823   • metoprolol tartrate (LOPRESSOR) tablet 25 mg  25 mg Oral Q12H Addi Peres MD   25 mg at 07/19/21 0824   • multivitamin with minerals 1 tablet  1 tablet Oral Daily Addi Peres MD   1 tablet at 07/19/21  0823   • pantoprazole (PROTONIX) EC tablet 40 mg  40 mg Oral Daily Addi Peres MD   40 mg at 07/19/21 0823   • potassium chloride (K-DUR,KLOR-CON) CR tablet 20 mEq  20 mEq Oral Daily Addi Peres MD   20 mEq at 07/19/21 0824   • sodium chloride 0.9 % flush 10 mL  10 mL Intravenous PRN Orlando Geller, DO       • sodium chloride 0.9 % flush 10 mL  10 mL Intravenous Q12H Addi Peres MD   10 mL at 07/19/21 0824   • sodium chloride 0.9 % flush 10 mL  10 mL Intravenous PRN Addi Peres MD       • spironolactone (ALDACTONE) tablet 50 mg  50 mg Oral Daily Addi Peres MD   50 mg at 07/19/21 0824   • venlafaxine XR (EFFEXOR-XR) 24 hr capsule 75 mg  75 mg Oral Daily With Breakfast Addi Peres MD   75 mg at 07/19/21 0824   • zolpidem (AMBIEN) tablet 5 mg  5 mg Oral Nightly PRN Addi Peres MD   5 mg at 07/18/21 2155       Lab Results (last 24 hours)     Procedure Component Value Units Date/Time    Comprehensive Metabolic Panel [[id number]]  (Abnormal) Collected: 07/19/21 0405    Specimen: Blood Updated: 07/19/21 0511     Glucose 104 mg/dL      BUN 24 mg/dL      Creatinine 0.68 mg/dL      Sodium 131 mmol/L      Potassium 5.3 mmol/L      Chloride 102 mmol/L      CO2 22.8 mmol/L      Calcium 8.0 mg/dL      Total Protein 5.6 g/dL      Albumin 2.70 g/dL      ALT (SGPT) 14 U/L      AST (SGOT) 36 U/L      Alkaline Phosphatase 116 U/L      Total Bilirubin 5.1 mg/dL      eGFR Non African Amer 89 mL/min/1.73      Globulin 2.9 gm/dL      A/G Ratio 0.9 g/dL      BUN/Creatinine Ratio 35.3     Anion Gap 6.2 mmol/L     Narrative:      GFR Normal >60  Chronic Kidney Disease <60  Kidney Failure <15      CBC & Differential [[id number]]  (Abnormal) Collected: 07/19/21 0405    Specimen: Blood Updated: 07/19/21 0459    Narrative:      The following orders were created for panel order CBC & Differential.  Procedure                               Abnormality         Status                      ---------                               -----------         ------                     CBC Auto Differential[[id number]]        Abnormal            Final result                 Please view results for these tests on the individual orders.    CBC Auto Differential [[id number]]  (Abnormal) Collected: 21    Specimen: Blood Updated: 21     WBC 13.82 10*3/mm3      RBC 2.71 10*6/mm3      Hemoglobin 8.7 g/dL      Hematocrit 27.0 %      MCV 99.6 fL      MCH 32.1 pg      MCHC 32.2 g/dL      RDW 22.2 %      RDW-SD 79.6 fl      MPV 11.8 fL      Platelets 161 10*3/mm3      Neutrophil % 75.8 %      Lymphocyte % 10.9 %      Monocyte % 10.1 %      Eosinophil % 1.8 %      Basophil % 0.7 %      Immature Grans % 0.7 %      Neutrophils, Absolute 10.47 10*3/mm3      Lymphocytes, Absolute 1.50 10*3/mm3      Monocytes, Absolute 1.40 10*3/mm3      Eosinophils, Absolute 0.25 10*3/mm3      Basophils, Absolute 0.10 10*3/mm3      Immature Grans, Absolute 0.10 10*3/mm3      nRBC 0.0 /100 WBC            Physician Progress Notes (last 72 hours) (Notes from 21 0952 through 21 0952)      Tammi Ma MD at [facility]              [facility]    [facility]    Name:  Gaby Rasheed   Age:  56 y.o.  Sex:  female  :  1965  MRN:  [id number]   Visit Number:  [id number]  Admission Date:  2021  Date Of Service:  21  Primary Care Physician:  Arabella Loving MD     LOS: 1 day :    Chief Complaint:    Shortness of breath    Subjective:  I have seen and evaluated the patient this morning.  Chart reviewed and events noted.  She reported that she is much improved in terms of her shortness of breath after thoracentesis performed yesterday.  Denied any chest pain.  Hemodynamics remained stable.  Told me that she gets usually paracentesis on Tuesday at [facility] and agreed to proceed with a paracentesis today    Hospital Course:  56 years old female patient  with past medical history of alcoholic liver cirrhosis liver, who is paracentesis dependent at [facility] every week presented to the hospital with worsening shortness of breath.  She was in severe respiratory distress and underwent left thoracentesis for significant left pleural effusion which resulted in partial improvement of her respiratory symptoms and repeat chest x-ray showed left posterior pneumothorax of 15 to 20% was admitted for close observation.    Review of Systems:   All systems were reviewed and negative except as mentioned in subjective, assessment and plan.    Vital Signs:  Temp:  [97.3 °F (36.3 °C)-99 °F (37.2 °C)] 99 °F (37.2 °C)  Heart Rate:  [63-82] 69  Resp:  [18-30] 28  BP: (108-142)/(52-75) 132/69    Intake and output:    I/O last 3 completed shifts:  In: 165 [P.O.:115; IV Piggyback:50]  Out: 201 [Urine:200; Stool:1]  I/O this shift:  In: 465 [P.O.:465]  Out: 200 [Urine:200]    Physical Examination:  General Appearance:   Chronically ill looking, cachectic and in distress   Head:  Atraumatic and normocephalic.   Eyes: Conjunctivae and sclerae normal, no icterus. No pallor.   Throat: No oral lesions, no thrush, oral mucosa moist.   Neck: Supple, trachea midline, no thyromegaly.   Lungs:    Markedly diminished air entry on the left lung base.  No crackles or wheezing appreciated   Heart:  Normal S1 and S2, no murmur, no gallop, no rub. No JVD.   Abdomen:   Normal bowel sounds, no masses, no organomegaly.  Distended with ascites, nontender, nondistended, no rebound tenderness.   Extremities: Supple, no edema, no cyanosis, no clubbing.   Skin: No bleeding or rash.   Neurologic: Alert and oriented x 3. No facial asymmetry. Moves all four limbs. No tremors.      Laboratory results:    Results from last 7 days   Lab Units 07/17/21  1514   SODIUM mmol/L 134*   POTASSIUM mmol/L 5.1   CHLORIDE mmol/L 102   CO2 mmol/L 21.2*   BUN mg/dL 21*   CREATININE mg/dL 0.61   CALCIUM mg/dL 8.2*   BILIRUBIN mg/dL  7.9*   ALK PHOS U/L 112   ALT (SGPT) U/L 15   AST (SGOT) U/L 37*   GLUCOSE mg/dL 74     Results from last 7 days   Lab Units 07/18/21  0544 07/17/21  1514   WBC 10*3/mm3 16.66* 12.39*   HEMOGLOBIN g/dL 9.3* 9.6*   HEMATOCRIT % 28.8* 29.3*   PLATELETS 10*3/mm3 154 151     Results from last 7 days   Lab Units 07/17/21  1514   INR  2.18*         Results from last 7 days   Lab Units 07/17/21  1440   WOUNDCX  Growth present, too young to evaluate         I have reviewed the patient's laboratory results.    Radiology results:    XR Chest 1 View    Result Date: 7/18/2021  PORTABLE CHEST    7/17/2021 6:46 PM  HISTORY: Pneumothorax  COMPARISON: Earlier today.  FINDINGS: Moderate to large left pleural effusion has rapidly reaccumulated. There is a small to moderate pneumothorax component, probably 15-20%. Right lung is clear.      Impression: Left hydropneumothorax. Pneumothorax component is grossly stable, the fluid component is increasing.  This report was finalized on 7/18/2021 6:46 AM by Dr Vasyl Carvajal DO.    XR Chest 1 View    Result Date: 7/18/2021  PORTABLE CHEST    7/18/2021 5:34 AM  HISTORY: Pneumothorax  COMPARISON: One day prior.  FINDINGS: Large left pleural effusion has rapidly reaccumulated. Pneumothorax component remains small to moderate, probably 20%. Right lung is grossly clear. Upper abdomen surgical clips.      Impression: Left hydropneumothorax. There has been rapid reaccumulation of fluid since yesterday's thoracentesis.  This report was finalized on 7/18/2021 6:45 AM by Dr Vasyl Carvajal DO.    XR Chest 1 View    Result Date: 7/17/2021  PORTABLE CHEST    7/17/2021 2:30 PM  HISTORY: Pneumothorax  COMPARISON: Earlier today.  FINDINGS: Left hydropneumothorax has increased in the interval, pneumothorax now at least 20% mainly at the lateral base. There are new airspace opacities in the left mid and lower lung zone. Reexpansion edema could give this appearance. Right lung remains clear.       Impression:  1.  Increased left hydropneumothorax. 2. New left mid and lower lung zone dense opacities could be reexpansion edema. Continued follow-up needed.  This report was finalized on 7/17/2021 2:59 PM by Dr Vasyl Carvajal DO.    XR Chest 1 View    Result Date: 7/17/2021  PORTABLE CHEST    7/17/2021 12:04 PM  HISTORY:  thoracentesis.  COMPARISON: Earlier today.  FINDINGS: Normal heart size. Small left pneumothorax following thoracentesis. Approximately 15% involvement of the pneumothorax. Small residual less pleural effusion. Right lung is clear.      Impression: Small left pneumothorax.  Dr. Geller notified by telephone at time of dictation.  This report was finalized on 7/17/2021 12:37 PM by Dr Vasyl Carvajal DO.    XR Chest 1 View    Result Date: 7/17/2021  PORTABLE CHEST    7/17/2021 10:03 AM  HISTORY: Shortness of air, pleural effusion  COMPARISON: 04/06/2021.  FINDINGS: Large left pleural effusion, significantly increased in size from April. Compressive atelectasis. Lungs otherwise grossly clear. Normal heart size. No pneumothorax. Upper abdomen surgical clips.      Impression: Large left pleural effusion.  This report was finalized on 7/17/2021 10:22 AM by Dr Vasyl Carvajal DO.    I have reviewed the patient's radiology reports.    Medication Review:     I have reviewed the patient's active and prn medications.     Problem List:      Postprocedural pneumothorax    Cirrhosis of liver (CMS/HCC)    Ascites due to alcoholic cirrhosis (CMS/HCC)    Esophageal varices in alcoholic cirrhosis (CMS/HCC)    Pleural effusion associated with hepatic disorder      Assessment:  · Respiratory distress secondary to significant left pleural effusion status post thoracentesis   · Iatrogenic pneumothorax   · End-stage liver disease  · Alcohol induced liver cirrhosis  · Recurrent ascites, paracentesis dependent essential hypertension  · Severe protein energy malnutrition with cachexia  · GERD  · Depression  · Hypothyroidism  · Essential  hypertension  · Dyslipidemia  · Irritable bowel syndrome    Plan:  · The patient presented to the hospital with severe respiratory distress that was felt to be secondary to significant left pleural effusion and underwent thoracentesis in the ER that resulted in what seems to be post thoracentesis reexpansion pulmonary edema that resolved but repeat chest x-ray showed some pneumothorax of 15 to 20% with rebuild/reaccumulation of the hydrothorax the patient was admitted to the hospital to be observed closely.  She did not have any further respiratory symptoms.  Reported that her rody shortness of breath improved markedly.  Currently on oxygen supplementation to help resolving her pneumothorax.  The plan for now is to repeat her chest x-ray in the morning.  If she has residual pneumothorax, she would benefit from surgery consultation and chest tube placement.  Based on the repeat chest x-ray, and of the left pleural effusion is worsening, she might benefit from another imaging guiding thoracentesis at bedside versus in the radiology department.  Will defer to the primary provider tomorrow  · The patient is used to get weekly paracentesis on Tuesdays.  Bedside ultrasound revealed moderate ascites but since she has respiratory symptoms, I discussed with the patient and she agreed and consented for bedside paracentesis.  · Continue the rest of her home meds  · DVT Prophylaxis: SCD  · Code Status: Full code  · Diet:   · Discharge Plan: To be determined I have seen and evaluated the patient this morning.  Chart reviewed and events noted.  She reported that her shortness of breath improved markedly after the thoracentesis.  Denied any chest pain    Tammi Ma MD  07/18/21  17:09 EDT    Dictated utilizing Dragon dictation.      Electronically signed by Tammi Ma MD at 07/18/21 1725       Consult Notes (last 72 hours) (Notes from 07/16/21 0952 through 07/19/21 0952)    No notes of this type exist for  this encounter.

## 2021-07-19 NOTE — CASE MANAGEMENT/SOCIAL WORK
Discharge Planning Assessment  Psychiatric     Patient Name: Gaby Rasheed  MRN: [id number]  Today's Date: 7/19/2021    Admit Date: 7/17/2021    Discharge Needs Assessment     Row Name 07/19/21 1607       Living Environment    Lives With  significant other    Name(s) of Who Lives With Patient  Anton Jaquez (Fiance)    Current Living Arrangements  home/apartment/condo    Primary Care Provided by  spouse/significant other;parent(s);child(calin)    Provides Primary Care For  no one, unable/limited ability to care for self    Family Caregiver if Needed  child(calin), adult;significant other;parent(s)    Family Caregiver Names  Ángela Rasheed (daughter) & Anton Jaquez (fiance)    Quality of Family Relationships  supportive;helpful;involved       Resource/Environmental Concerns    Resource/Environmental Concerns  none       Transition Planning    Patient/Family Anticipates Transition to  home with family    Patient/Family Anticipated Services at Transition  none    Transportation Anticipated  family or friend will provide       Discharge Needs Assessment    Readmission Within the Last 30 Days  no previous admission in last 30 days    Equipment Currently Used at Home  cane, straight;rollator;commode Elevated toilet seat. Pt has PT through [facility].    Concerns to be Addressed  no discharge needs identified    Equipment Needed After Discharge  none        Discharge Plan     Row Name 07/19/21 1610       Plan    Plan Comments SW met with pt at bedside to complete discharge planning. Pt confirmed address and PCP. Pt states that she lives with her fianceAnton. Pt states that she can walk around the house but uses her rollator for longer distances. Pt has an elevated toilet seat with handrails and a cane. Pt reports no need for equipment or HH. Pt did state that she goes to outpatient PT [facility]. Pt states that she anticipates discharging home and her fimariaelena will transport. No other CM needs have been identified  a this time. SW will continue to follow and assist as needed.        Continued Care and Services - Admitted Since 7/17/2021    Coordination has not been started for this encounter.       Expected Discharge Date and Time     Expected Discharge Date Expected Discharge Time    Jul 20, 2021         Demographic Summary     Row Name 07/19/21 1604       General Information    Admission Type  inpatient    Arrived From  emergency department    Referral Source  admission list    Reason for Consult  discharge planning    Preferred Language  English        Functional Status     Row Name 07/19/21 1605       Functional Status    Usual Activity Tolerance  moderate       Functional Status, IADL    Medications  assistive person    Meal Preparation  assistive person    Housekeeping  assistive person    Laundry  assistive person    Shopping  assistive person        Psychosocial     Row Name 07/19/21 1606       Coping/Stress    Patient Personal Strengths  positive attitude;strong support system    Sources of Support  adult child(calin);significant other;parent(s)    Understanding of Condition and Treatment  adequate understanding of medical condition       Developmental Stage (Eriksson's)    Developmental Stage  Stage 7 (35-65 years/Middle Adulthood) Generativity vs. Stagnation        Abuse/Neglect    No documentation.       Legal    No documentation.       Substance Abuse    No documentation.       Patient Forms    No documentation.           LIZA Sanchez

## 2021-07-19 NOTE — PROGRESS NOTES
[facility]    Name:  Gaby Rasheed   Age:  56 y.o.  Sex:  female  :  1965  MRN:  [id number]   Visit Number:  [id number]  Admission Date:  2021  Date Of Service:  21  Primary Care Physician:  Arabella Loving MD     LOS: 2 days :    Chief Complaint:      Follow-up on shortness of breath    Subjective:    Patient seen evaluated at bedside.  Daughter at bedside.  I discussed her x-ray findings with her.  She notes she is not significantly short of breath at this point.    Hospital Course:    Patient is a 56-year-old female with alcoholic liver cirrhosis, paracentesis dependent, who presented to the hospital with shortness of breath and respiratory distress.  She had evidence of a left-sided pleural effusion and underwent thoracentesis in the emergency room upon admission.  She subsequent developed evidence of a posterior pneumothorax and recurrence of effusion fairly rapidly.  Was admitted to the hospital.    Review of Systems:     All systems were reviewed and negative except as mentioned in subjective, assessment and plan.    Vital Signs:    Temp:  [98 °F (36.7 °C)-98.6 °F (37 °C)] 98.4 °F (36.9 °C)  Heart Rate:  [65-73] 71  Resp:  [21-31] 21  BP: (124-129)/(68-82) 124/80    Intake and output:    I/O last 3 completed shifts:  In: 1060 [P.O.:1060]  Out: 2751 [Urine:1125; Other:1625; Stool:1]  I/O this shift:  In: 240 [P.O.:240]  Out: 500 [Urine:500]    Physical Examination:    General Appearance:  Alert and cooperative.  No acute distress.  Chronically ill-appearing   Head:  Atraumatic and normocephalic.   Eyes: Conjunctivae and sclerae normal, no icterus. No pallor.   Throat: No oral lesions, no thrush, oral mucosa moist.   Neck: Supple, trachea midline, no thyromegaly.   Lungs:    Diminished breath sounds at the left mid to low base.  No crackles.  No wheezes.   Heart:  Normal S1 and S2, no murmur, no gallop, no rub. No JVD.   Abdomen:    Normal bowel sounds, no masses, no organomegaly. Soft, nontender, moderately distended, no rebound tenderness.   Extremities: Supple, trace to 1+ edema, no cyanosis, no clubbing.   Skin: No bleeding or rash.   Neurologic: Alert and oriented x 3. No facial asymmetry. Moves all four limbs. No tremors.      Laboratory results:    Results from last 7 days   Lab Units 07/19/21  0405 07/17/21  1514   SODIUM mmol/L 131* 134*   POTASSIUM mmol/L 5.3* 5.1   CHLORIDE mmol/L 102 102   CO2 mmol/L 22.8 21.2*   BUN mg/dL 24* 21*   CREATININE mg/dL 0.68 0.61   CALCIUM mg/dL 8.0* 8.2*   BILIRUBIN mg/dL 5.1* 7.9*   ALK PHOS U/L 116 112   ALT (SGPT) U/L 14 15   AST (SGOT) U/L 36* 37*   GLUCOSE mg/dL 104* 74     Results from last 7 days   Lab Units 07/19/21  0405 07/18/21  0544 07/17/21  1514   WBC 10*3/mm3 13.82* 16.66* 12.39*   HEMOGLOBIN g/dL 8.7* 9.3* 9.6*   HEMATOCRIT % 27.0* 28.8* 29.3*   PLATELETS 10*3/mm3 161 154 151     Results from last 7 days   Lab Units 07/17/21  1514   INR  2.18*         Results from last 7 days   Lab Units 07/17/21  1440   WOUNDCX  Rare Candida albicans*  Rare Normal Skin [place]         I have reviewed the patient's laboratory results.    Radiology results:    XR Chest 1 View    Result Date: 7/19/2021  PROCEDURE: XR CHEST 1 VW-  HISTORY: f/ u pneumothorax; J95.811-Postprocedural pneumothorax; K70.31-Alcoholic cirrhosis of liver with ascites; K30-Bbivcst effusion, not elsewhere classified  COMPARISON: 7/18/2021.  FINDINGS: The heart is normal in size. The mediastinum is unremarkable. There has been no significant change in the patients left sided hydropneumothorax. There is compressive atelectasis in the left lung. The right lung is clear. There is no pneumothorax.  There are no acute osseous abnormalities.      Impression: No significant change in the patients left hydropneumothorax.  Continued followup is recommended.  This report was finalized on 7/19/2021 7:33 AM by Tania Strong M.D..    XR Chest  1 View    Result Date: 7/18/2021  PORTABLE CHEST    7/17/2021 6:46 PM  HISTORY: Pneumothorax  COMPARISON: Earlier today.  FINDINGS: Moderate to large left pleural effusion has rapidly reaccumulated. There is a small to moderate pneumothorax component, probably 15-20%. Right lung is clear.      Impression: Left hydropneumothorax. Pneumothorax component is grossly stable, the fluid component is increasing.  This report was finalized on 7/18/2021 6:46 AM by Dr Vasyl Carvajal DO.    XR Chest 1 View    Result Date: 7/18/2021  PORTABLE CHEST    7/18/2021 5:34 AM  HISTORY: Pneumothorax  COMPARISON: One day prior.  FINDINGS: Large left pleural effusion has rapidly reaccumulated. Pneumothorax component remains small to moderate, probably 20%. Right lung is grossly clear. Upper abdomen surgical clips.      Impression: Left hydropneumothorax. There has been rapid reaccumulation of fluid since yesterday's thoracentesis.  This report was finalized on 7/18/2021 6:45 AM by Dr Vasyl Carvajal DO.    I have reviewed the patient's radiology reports.    Medication Review:     I have reviewed the patient's active and prn medications.     Problem List:      Postprocedural pneumothorax    Cirrhosis of liver (CMS/HCC)    Ascites due to alcoholic cirrhosis (CMS/HCC)    Esophageal varices in alcoholic cirrhosis (CMS/HCC)    Pleural effusion associated with hepatic disorder      Assessment:    · Respiratory distress secondary to significant left pleural effusion status post thoracentesis   · Iatrogenic pneumothorax   · End-stage liver disease  · Alcohol induced liver cirrhosis  · Recurrent ascites, paracentesis dependent essential hypertension  · Severe protein energy malnutrition with cachexia  · GERD  · Depression  · Hypothyroidism  · Essential hypertension  · Dyslipidemia  · Irritable bowel syndrome    Plan:    Patient with overall stable pneumothorax with no significant changes noted today per radiology.  Still with a left-sided pleural effusion.   Oxygen saturations of 99% on 2 L.  Blood pressure stable.  CMP largely stable other than mild hyperkalemia.  White count stable.  Hemoglobin and platelets stable.    I did discuss the case with general surgery here, no urgent need and will hold on chest tube placement for now.  Also discussed the patient with [facility], who did accept patient on wait list.  She is also on waiting list at [facility] in [place].  We will continue on some diuretic therapy and spironolactone.  I suspect her pleural effusion will be difficult to manage secondary to her cirrhosis.    DVT Prophylaxis: SCDs  Code Status: Full  Diet: As tolerated  Discharge Plan: To be determined    Xiomara Presley DO  07/19/21  17:14 EDT    Dictated utilizing Dragon dictation.

## 2021-07-20 NOTE — PLAN OF CARE
Goal Outcome Evaluation:      Pt remains in ICU as a telemetry overflow, reports she feel much better today despite pneumothorax not improving. Dr. Saucedo plans to speak with pt this evening regarding plan of care and placement of chest tube. Will continue to assess and monitor, plan to possibly move to telemetry this evening if bed availability.

## 2021-07-20 NOTE — PROGRESS NOTES
"Adult Nutrition  Assessment/PES    Patient Name:  Gaby Rasheed  YOB: 1965  MRN: [id number]  Admit Date:  7/17/2021    Assessment Date:  7/20/2021    Comments:    Recommend:  1. Continue current diet order as medically appropriate and tolerated.  2. Encourage PO intake. PO intake average ~53% x 7 meals.  3. RD ordered Boost BID blended with fruit per pt preference.  4. Consider a multivitamin with minerals daily.  5. Continue to monitor and replace electrolytes PRN.  6. RD completed MSA with [facility] and pt qualifies for severe malnutrition. Her current BMI is 22.41, however pt has ascites, paracentesis dependent with significant swelling noted.    RD to follow pt and available PRN.      Reason for Assessment     Row Name 07/20/21 1517          Reason for Assessment    Reason For Assessment  diagnosis/disease state;identified at risk by screening criteria     Diagnosis  liver disease;pulmonary disease;cardiac disease Popstprocedural pneumothorax, Cirrhosis, Ascites, Fluid in pleural cavity associated with liver disease     Identified At Risk by Screening Criteria  unintentional loss of 10 lbs or more in the past 2 mos;MST SCORE 2+           Anthropometrics     Row Name 07/20/21 1518          Anthropometrics    Height  160 cm (63\")        Ideal Body Weight (IBW)    Ideal Body Weight (IBW) (kg)  52.72         Labs/Tests/Procedures/Meds     Row Name 07/20/21 1517          Labs/Procedures/Meds    Lab Results Reviewed  reviewed, pertinent     Lab Results Comments  Low: Na+, Alb High: Gluc, BUN, Total Bilirubin        Medications    Pertinent Medications Reviewed  reviewed, pertinent     Pertinent Medications Comments  Synthroid, Multivitamin with minerals, Protonix, Lopressor, Mag-OX           Estimated/Assessed Needs     Row Name 07/20/21 1510          Calculation Measurements    Weight Used For Calculations  57.4 kg (126 lb 8.7 oz) Actual BW     Height  160 cm (63\")        Estimated/Assessed Needs    " Additional Documentation  Calorie Requirements (Group);Protein Requirements (Group);KCAL/KG (Group);Fluid Requirements (Group)        Calorie Requirements    Estimated Calorie Requirement Comment  1450 - 2010        KCAL/KG    KCAL/KG  25 Kcal/Kg (kcal);35 Kcal/Kg (kcal);30 Kcal/Kg (kcal)     25 Kcal/Kg (kcal)  1435     30 Kcal/Kg (kcal)  1722     35 Kcal/Kg (kcal)  2009        Protein Requirements    Weight Used For Protein Calculations  57.4 kg (126 lb 8.7 oz) Actual BW     Est Protein Requirement Amount (gms/kg)  1.5 gm protein 69 - 86 gm     Estimated Protein Requirements (gms/day)  86.1        Fluid Requirements    Estimated Fluid Requirement Method  other (see comments) 1 mL/kcal         Nutrition Prescription Ordered     Row Name 07/20/21 1519          Nutrition Prescription PO    Current PO Diet  Regular     Other Modifiers  High Protein/High Calorie         Evaluation of Received Nutrient/Fluid Intake     Row Name 07/20/21 1520          PO Evaluation    Number of Days PO Intake Evaluated  2 days     Number of Meals  7     % PO Intake  53           Malnutrition Severity Assessment     Row Name 07/20/21 1551          Malnutrition Severity Assessment    Malnutrition Type  Chronic Disease - Related Malnutrition        Muscle Loss    Loss of Muscle Mass Findings  Severe     Anglican Region  Severe - deep hollowing/scooping, lack of muscle to touch, facial bones well defined     Clavicle Bone Region  Severe - protruding prominent bone     Acromion Bone Region  Severe - squared shoulders, bones, and acromion process protrusion prominent     Scapular Bone Region  Severe - prominent bones, depressions easily visible between ribs, scapula, spine, shoulders     Dorsal Hand Region  Severe - prominent depression     Patellar Region  Moderate - patella more prominent, less muscle definition around patella     Anterior Thigh Region  Moderate - mild depression on inner thigh     Posterior Calf Region  Moderate - some  roundness, slight firmness        Fat Loss    Subcutaneous Fat Loss Findings  Severe     Orbital Region   Severe - pronounced hollowness/depression, dark circles, loose saggy skin     Upper Arm Region  Severe - mostly skin, very little space between folds, fingers touch        Fluid Accumulation (Edema)    Fluid Acumulation Findings  Severe r/t Pneumothorax and fluid in pleural cavity associated with liver disease        Criteria Met (Must meet criteria for severity in at least 2 of these categories: M Wasting, Fat Loss, Fluid, Secondary Signs, Wt. Status, Intake)    Patient meets criteria for   Severe Malnutrition           Problem/Interventions:  Problem 1     Row Name 07/20/21 1553          Nutrition Diagnoses Problem 1    Problem 1  Malnutrition     Etiology (related to)  Medical Diagnosis     Hepatic  Cirrhosis     Pulmonary/Critical Care  Other (comment) Pneumothorax, fluid in pleural cavity associated with liver disease     Signs/Symptoms (evidenced by)  Report/Observation     Reported/Observed By  RD/Tech [facility]               Intervention Goal     Row Name 07/20/21 1601          Intervention Goal    General  Meet nutritional needs for age/condition;Improved nutrition related lab(s)     PO  Meet estimated needs;Increase intake;PO intake (%)     PO Intake %  75 %     Weight  Appropriate weight gain         Nutrition Intervention     Row Name 07/20/21 1601          Nutrition Intervention    RD/Tech Action  Follow Tx progress;Encourage intake;Recommend/ordered     Recommended/Ordered  Supplement         Nutrition Prescription     Row Name 07/20/21 1601          Nutrition Prescription PO    PO Prescription  Other (comment);Begin/change supplement Continue current diet order     Supplement  Boost Plus With fruit blended     New PO Prescription Ordered?  No, recommended        Other Orders    Obtain Weight  Daily     Obtain Weight Ordered?  No, recommended     Supplement  Vitamin mineral supplement     Supplement  Ordered?  No, recommended     Other  Continue to monitor and replace electrolytes PRN         Education/Evaluation     Row Name 07/20/21 0890          Education    Education  Education not appropriate at this time     Please explain  Defer until post ICU        Monitor/Evaluation    Monitor  Per protocol;I&O;PO intake;Supplement intake;Weight;Skin status;Pertinent labs           Electronically signed by:  Renee Fung RD  07/20/21 16:02 EDT

## 2021-07-20 NOTE — PLAN OF CARE
Goal Outcome Evaluation:  Plan of Care Reviewed With: patient           Outcome Summary: Pt rested this shift with no c/o pain or discomfort. No signs of SOA, will continue to monitor.

## 2021-07-20 NOTE — CONSULTS
General Surgery Consult     Name:Gaby Rasheed  Age: 56 y.o.  Gender: female  : 1965  MRN: [id number]  Visit Number: [id number]  Admit Date: 2021  Date of Service: 21    Patient Care Team:  Arabella Loving MD as PCP - General (Family Medicine)  Moose Abreu MD as Consulting Physician (General Surgery)    Reason for Consultation: pneumothorax    Chief complaint : shortness of breath, postprocedural pneumothorax      History of Present Illness:     Gaby Rasheed is a 56 y.o. female patient with a complex past medical history that includes alcoholic cirrhosis with large volume ascites requiring weekly paracentesis, chronic left pleural effusion, esophageal varices, hypertension, protein calorie malnutrition, muscle wasting, acholic stools, jaundice, hepatic encephalopathy, edema, hepatorenal renal syndrome, and overall physical debilitation who presented to our emergency department on 2021 complaining of shortness of breath.  The patient is followed at [facility] for her underlying liver disease, and is being evaluated for liver transplant.  She reports that she undergoes large-volume paracentesis on a weekly basis at  with typical volumes of 6 to 8 L being removed.  She also has a history of a thoracentesis for a chronic left pleural effusion.  She presented to the emergency room requesting thoracentesis due to increasing shortness of breath, and orthopnea.  She was noted at the time to have difficulty speaking in complete sentences.  Initial chest x-ray showed a large left pleural effusion with compressive atelectasis.  The patient subsequently underwent a thoracentesis by the emergency department physician, with removal of 1.5 L of thin, straw-colored fluid.  The postprocedural chest x-ray demonstrated a small, left-sided pneumothorax.  The patient was placed on supplemental oxygen, and the case was discussed with the on-call pulmonologist who  recommended a repeat chest x-ray in 6 hours.  The follow-up chest x-ray revealed an increase in the left hydropneumothorax approximately 20%.  Multiple conversations then ensued regarding the most appropriate place for the patient to receive ongoing care given her worsening pneumothorax and her underlying liver disease.  Ultimately, the patient was admitted to our facility under the care of the hospitalist service.  Her pneumothorax was monitored with serial chest x-rays over the last several days, and her respiratory status has remained stable despite rapid reaccumulation of fluid in the left pleural space.  It was not felt that she required urgent chest tube insertion but there was discussion that she may require evaluation by thoracic surgery which is not available at this facility.   Attempts at transfer were unsuccessful due to lack of bed availability.  Unfortunately, she was noted to have progressive worsening of the left hydropneumothorax of CXR.  Today's CXR demonstrates at least a 50-60% pneumothorax.  I was consulted to evaluate the patient for chest tube placement.        Patient Active Problem List   Diagnosis   • Moderate episode of recurrent major depressive disorder (CMS/HCC)   • Nocturia   • Essential hypertension   • Menopause   • Sebaceous cyst of labia   • Chronic fatigue   • Hx of hysterectomy 2013   • History of Stan-en-Y gastric bypass   • H/O umbilical hernia repair 2012   • Stress incontinence   • Vulvar cyst   • Anxiety   • Shift work sleep disorder   • Hyponatremia   • Cirrhosis of liver (CMS/HCC)   • Dissection of thoracoabdominal aorta (CMS/HCC)   • Pseudoaneurysm of right femoral artery (CMS/HCC)   • Secondary esophageal varices without bleeding (CMS/HCC)   • Thrombocytopenia (CMS/HCC)   • Chronic liver disease and cirrhosis (CMS/HCC)   • Alcoholism (CMS/HCC)   • Hepatic encephalopathy (CMS/HCC)   • Alcohol liver damage (CMS/HCC)   • Gastrojejunal ulcer with hemorrhage and perforation  (CMS/HCC)   • Ascites due to alcoholic cirrhosis (CMS/HCC)   • Pleural effusion, left   • Protein-calorie malnutrition, severe (CMS/HCC)   • Postprocedural pneumothorax   • Esophageal varices in alcoholic cirrhosis (CMS/HCC)   • Pleural effusion associated with hepatic disorder   • A-fib (CMS/HCC)   • Compression fracture of L2 (CMS/HCC)   • Dissection of descending aorta (CMS/HCC)   • Decompensated hepatic cirrhosis (CMS/HCC)       Past Medical History:   Diagnosis Date   • Acid reflux    • Alcohol liver damage (CMS/HCC) 2021   • Alcoholism (CMS/HCC) 2021   • Anxiety    • Back pain    • Chronic liver disease and cirrhosis (CMS/HCC) 2021   • Cirrhosis of liver (CMS/HCC) 2020   • Depression    • Dissection of thoracoabdominal aorta (CMS/HCC) 2021   • Fractures    • Gastrojejunal ulcer with hemorrhage and perforation (CMS/HCC) 2021   • Hepatic encephalopathy (CMS/HCC) 2021   • Hernia of abdominal cavity    • High cholesterol    • High triglycerides    • Hypertension    • Hypothyroidism    • Irritable bowel syndrome    • Nocturia 10/5/2016   • Positive TB test    • Pseudoaneurysm of right femoral artery (CMS/HCC) 2021   • Secondary esophageal varices without bleeding (CMS/HCC) 2021   • Sinus problem    • ELINA (stress urinary incontinence, female) 10/5/2016   • Thrombocytopenia (CMS/HCC) 2021       Past Surgical History:   Procedure Laterality Date   •  SECTION     • COLONOSCOPY     • GASTRIC BYPASS     • HERNIA REPAIR      umbilical hernia   • HERNIA REPAIR  2020    ventral   • HYSTERECTOMY     • LAPAROSCOPIC TUBAL LIGATION     • TOTAL ABDOMINAL HYSTERECTOMY WITH SALPINGO OOPHORECTOMY      uterine prolapse       Family History   Problem Relation Age of Onset   • Hyperlipidemia Mother    • Arthritis Mother    • Inflammatory bowel disease Mother    • Diabetes Father    • Hypertension Father    • Arthritis Paternal Grandmother    • Breast cancer Neg Hx     • Ovarian cancer Neg Hx    • Colon cancer Neg Hx    • Cirrhosis Neg Hx    • Liver disease Neg Hx    • Liver cancer Neg Hx        Social History     Socioeconomic History   • Marital status:      Spouse name: Not on file   • Number of children: Not on file   • Years of education: Not on file   • Highest education level: Not on file   Tobacco Use   • Smoking status: Never Smoker   • Smokeless tobacco: Never Used   Substance and Sexual Activity   • Alcohol use: Yes     Comment: whiskey-4-5 glasses on days not working   • Drug use: No   • Sexual activity: Defer         Current Facility-Administered Medications:   •  ARIPiprazole (ABILIFY) tablet 10 mg, 10 mg, Oral, Daily, Addi Peres MD, 10 mg at 07/20/21 0945  •  lactulose (CHRONULAC) 10 GM/15ML solution 20 g, 20 g, Oral, TID PRN, Addi Peres MD, 20 g at 07/20/21 0946  •  levothyroxine (SYNTHROID, LEVOTHROID) tablet 50 mcg, 50 mcg, Oral, Daily, Addi Peres MD, 50 mcg at 07/20/21 0946  •  LORazepam (ATIVAN) tablet 0.5 mg, 0.5 mg, Oral, BID PRN, Addi Peres MD  •  magnesium oxide (MAG-OX) tablet 400 mg, 400 mg, Oral, Daily, Addi Peres MD, 400 mg at 07/20/21 0946  •  metoprolol tartrate (LOPRESSOR) tablet 25 mg, 25 mg, Oral, Q12H, Addi Peres MD, 25 mg at 07/20/21 0946  •  multivitamin with minerals 1 tablet, 1 tablet, Oral, Daily, Addi Peres MD, 1 tablet at 07/20/21 0946  •  pantoprazole (PROTONIX) EC tablet 40 mg, 40 mg, Oral, Daily, Addi Peres MD, 40 mg at 07/20/21 0946  •  [COMPLETED] Insert peripheral IV, , , Once **AND** sodium chloride 0.9 % flush 10 mL, 10 mL, Intravenous, PRN, Orlando Geller W, DO  •  sodium chloride 0.9 % flush 10 mL, 10 mL, Intravenous, Q12H, Addi Peres MD, 10 mL at 07/20/21 0948  •  sodium chloride 0.9 % flush 10 mL, 10 mL, Intravenous, PRN, Addi Peres MD  •  spironolactone (ALDACTONE) tablet 50 mg, 50 mg, Oral, Daily, Ja  Addi Perry MD, 50 mg at 07/20/21 0946  •  venlafaxine XR (EFFEXOR-XR) 24 hr capsule 75 mg, 75 mg, Oral, Daily With Breakfast, Addi Peres MD, 75 mg at 07/20/21 0946  •  zolpidem (AMBIEN) tablet 5 mg, 5 mg, Oral, Nightly PRN, Addi Peres MD, 5 mg at 07/19/21 2005    Medications Prior to Admission   Medication Sig Dispense Refill Last Dose   • ARIPiprazole (Abilify) 10 MG tablet Take 1 tablet by mouth Daily. 30 tablet 1 7/16/2021 at Unknown time   • B Complex Vitamins (Vitamin B Complex) tablet Take 1 tablet by mouth Daily.   7/16/2021 at Unknown time   • Calcium Carbonate Antacid (TUMS PO) Take 1 tablet by mouth 2 (Two) Times a Day As Needed (for acid reflux).   7/16/2021 at Unknown time   • desvenlafaxine (PRISTIQ) 100 MG 24 hr tablet Take 1 tablet by mouth Daily. 90 tablet 2 7/16/2021 at Unknown time   • lactulose (CHRONULAC) 10 GM/15ML solution Take 30 mL by mouth 3 (Three) Times a Day As Needed (hepatic encephalopathy; need to have at least 4 bowel movements a day). 1892 mL 11 7/16/2021 at Unknown time   • levothyroxine (SYNTHROID, LEVOTHROID) 50 MCG tablet TAKE 1 TABLET BY MOUTH DAILY 90 tablet 3 7/16/2021 at Unknown time   • LORazepam (Ativan) 0.5 MG tablet Take 1 tablet by mouth 2 (Two) Times a Day As Needed for Anxiety. 60 tablet 2 7/16/2021 at Unknown time   • magnesium oxide (HM Magnesium) 400 MG tablet Take 1 tablet by mouth Daily. (Patient taking differently: Take 400 mg by mouth. One to two times per week per patient) 30 tablet 1 Past Week at Unknown time   • Nutritional Supplements (ENSURE PO) Take  by mouth Daily.   7/17/2021 at Unknown time   • pantoprazole (PROTONIX) 40 MG EC tablet Take 40 mg by mouth Daily.   7/16/2021 at Unknown time   • potassium chloride ER (K-TAB) 20 MEQ tablet controlled-release ER tablet Take 20 mEq by mouth Daily.   7/16/2021 at Unknown time   • Simethicone (GAS-X PO) Take 1 tablet by mouth Daily As Needed.   Past Month at Unknown time   • spironolactone  (ALDACTONE) 50 MG tablet Take 50 mg by mouth Daily.   7/17/2021 at Unknown time   • zolpidem (AMBIEN) 10 MG tablet Take 1 tablet by mouth At Night As Needed for Sleep. 30 tablet 2 7/16/2021 at Unknown time   • metoprolol tartrate (LOPRESSOR) 25 MG tablet  (Patient not taking: Reported on 7/19/2021)   Not Taking at Unknown time   • Multiple Vitamins-Minerals (MULTIVITAMIN ADULT PO) Take  by mouth. (Patient not taking: Reported on 7/19/2021)   Not Taking at Unknown time   • naproxen (NAPROSYN) 250 MG tablet Take 250 mg by mouth 2 (Two) Times a Day As Needed. (Patient not taking: Reported on 7/19/2021)   Not Taking at Unknown time   • Prenatal Vit-Fe Fumarate-FA (PRENATAL VITAMIN PO) Take 1 tablet by mouth Daily. (Patient not taking: Reported on 7/19/2021)   Not Taking at Unknown time       Allergies   Allergen Reactions   • Prednisone Anxiety       Review of Systems   Constitutional: Negative.    HENT: Negative.    Eyes: Negative.    Respiratory: Negative.    Cardiovascular: Negative.    Gastrointestinal: Negative.    Endocrine: Negative.    Genitourinary: Negative.    Musculoskeletal: Negative.    Skin: Negative.    Allergic/Immunologic: Negative.    Neurological: Negative.    Hematological: Negative.    Psychiatric/Behavioral: Negative.        OBJECTIVE:     Vital Signs  Temp:  [98.2 °F (36.8 °C)-98.8 °F (37.1 °C)] 98.3 °F (36.8 °C)  Heart Rate:  [68-86] 72  Resp:  [23-29] 23  BP: (123-138)/(68-82) 138/69    I/O this shift:  In: 120 [P.O.:120]  Out: -   I/O last 3 completed shifts:  In: 1170 [P.O.:1170]  Out: 2425 [Urine:2425]      Physical Exam:   General Appearance: alert, cooperative, cachectic and appears chronically ill  Head: normocephalic, without obvious abnormality and atraumatic  Eyes: lids and lashes normal, conjunctivae and sclerae normal, no pallor, corneas clear, PERRLA and noted scleral icterus  Lungs: decreased breath sounds on the left.   Heart: regular rhythm & normal rate and normal S1,  S2  Abdomen: protuberant, distended and positive fluid wave  Extremities: wasted extremities, 1+ edema  Skin: noted jaundice, multiple ecchymoses  Neurologic: Mental Status orientated to person, place, time and situation  Psych: normal    Results Review:  I have reviewed the entirety of the patient's clinical lab results.  I have also personally reviewed the patient's imaging    PROCEDURE: XR CHEST 1 VW-        HISTORY: f/u pneumothorax; J95.811-Postprocedural pneumothorax;  K70.31-Alcoholic cirrhosis of liver with ascites; L52-Llhfpdt effusion,  not elsewhere classified     COMPARISON: One day prior.     FINDINGS: The heart is normal in size. The mediastinum is unremarkable.  . There has been slight interval increase in the left hydropneumothorax.  The right lung is clear. There are no acute osseous abnormalities.     IMPRESSION:  Slight interval increase in size of the left  hydropneumothorax.           Images were reviewed, interpreted, and dictated by Dr. Tania Strong M.D.  Transcribed by Mariana East PA-C.     This report was finalized on 7/20/2021 3:12 PM by Tania Strong M.D..      Lab Results (last 72 hours)     Procedure Component Value Units Date/Time    Wound Culture - Wound, Leg, Right [[id number]]  (Abnormal) Collected: 07/17/21 1440    Specimen: Wound from Leg, Right Updated: 07/20/21 0950     Wound Culture Rare Candida albicans      Rare Normal Skin Haven     Gram Stain No WBCs or organisms seen    Basic Metabolic Panel [[id number]]  (Abnormal) Collected: 07/20/21 0434    Specimen: Blood Updated: 07/20/21 0541     Glucose 129 mg/dL      BUN 24 mg/dL      Creatinine 0.81 mg/dL      Sodium 131 mmol/L      Potassium 4.4 mmol/L      Chloride 99 mmol/L      CO2 23.4 mmol/L      Calcium 8.4 mg/dL      eGFR Non African Amer 73 mL/min/1.73      BUN/Creatinine Ratio 29.6     Anion Gap 8.6 mmol/L     Narrative:      GFR Normal >60  Chronic Kidney Disease <60  Kidney Failure <15      Comprehensive  Metabolic Panel [[id number]]  (Abnormal) Collected: 07/19/21 0405    Specimen: Blood Updated: 07/19/21 0511     Glucose 104 mg/dL      BUN 24 mg/dL      Creatinine 0.68 mg/dL      Sodium 131 mmol/L      Potassium 5.3 mmol/L      Chloride 102 mmol/L      CO2 22.8 mmol/L      Calcium 8.0 mg/dL      Total Protein 5.6 g/dL      Albumin 2.70 g/dL      ALT (SGPT) 14 U/L      AST (SGOT) 36 U/L      Alkaline Phosphatase 116 U/L      Total Bilirubin 5.1 mg/dL      eGFR Non African Amer 89 mL/min/1.73      Globulin 2.9 gm/dL      A/G Ratio 0.9 g/dL      BUN/Creatinine Ratio 35.3     Anion Gap 6.2 mmol/L     Narrative:      GFR Normal >60  Chronic Kidney Disease <60  Kidney Failure <15      CBC & Differential [[id number]]  (Abnormal) Collected: 07/19/21 0405    Specimen: Blood Updated: 07/19/21 0459    Narrative:      The following orders were created for panel order CBC & Differential.  Procedure                               Abnormality         Status                     ---------                               -----------         ------                     CBC Auto Differential[[id number]]        Abnormal            Final result                 Please view results for these tests on the individual orders.    CBC Auto Differential [[id number]]  (Abnormal) Collected: 07/19/21 0405    Specimen: Blood Updated: 07/19/21 0459     WBC 13.82 10*3/mm3      RBC 2.71 10*6/mm3      Hemoglobin 8.7 g/dL      Hematocrit 27.0 %      MCV 99.6 fL      MCH 32.1 pg      MCHC 32.2 g/dL      RDW 22.2 %      RDW-SD 79.6 fl      MPV 11.8 fL      Platelets 161 10*3/mm3      Neutrophil % 75.8 %      Lymphocyte % 10.9 %      Monocyte % 10.1 %      Eosinophil % 1.8 %      Basophil % 0.7 %      Immature Grans % 0.7 %      Neutrophils, Absolute 10.47 10*3/mm3      Lymphocytes, Absolute 1.50 10*3/mm3      Monocytes, Absolute 1.40 10*3/mm3      Eosinophils, Absolute 0.25 10*3/mm3      Basophils, Absolute 0.10 10*3/mm3      Immature Grans, Absolute 0.10  10*3/mm3      nRBC 0.0 /100 WBC     CBC & Differential [[id number]]  (Abnormal) Collected: 07/18/21 0544    Specimen: Blood Updated: 07/18/21 0607    Narrative:      The following orders were created for panel order CBC & Differential.  Procedure                               Abnormality         Status                     ---------                               -----------         ------                     CBC Auto Differential[[id number]]        Abnormal            Final result                 Please view results for these tests on the individual orders.    CBC Auto Differential [[id number]]  (Abnormal) Collected: 07/18/21 0544    Specimen: Blood Updated: 07/18/21 0607     WBC 16.66 10*3/mm3      RBC 2.86 10*6/mm3      Hemoglobin 9.3 g/dL      Hematocrit 28.8 %      .7 fL      MCH 32.5 pg      MCHC 32.3 g/dL      RDW 22.2 %      RDW-SD 81.5 fl      MPV 11.1 fL      Platelets 154 10*3/mm3      Neutrophil % 72.9 %      Lymphocyte % 13.9 %      Monocyte % 9.7 %      Eosinophil % 2.0 %      Basophil % 0.8 %      Immature Grans % 0.7 %      Neutrophils, Absolute 12.15 10*3/mm3      Lymphocytes, Absolute 2.32 10*3/mm3      Monocytes, Absolute 1.62 10*3/mm3      Eosinophils, Absolute 0.33 10*3/mm3      Basophils, Absolute 0.13 10*3/mm3      Immature Grans, Absolute 0.11 10*3/mm3      nRBC 0.0 /100 WBC     COVID PRE-OP / PRE-PROCEDURE SCREENING ORDER (NO ISOLATION) - Swab, Nasal Cavity [[id number]]  (Normal) Collected: 07/17/21 2223    Specimen: Swab from Nasal Cavity Updated: 07/17/21 2352    Narrative:      The following orders were created for panel order COVID PRE-OP / PRE-PROCEDURE SCREENING ORDER (NO ISOLATION) - Swab, Nasal Cavity.  Procedure                               Abnormality         Status                     ---------                               -----------         ------                     COVID-19,[facility] IN-[facility]...[[id number]]  Normal              Final result                 Please view results  for these tests on the individual orders.    COVID-19,[facility] IN-HOUSE,Nasal Swab No Transport Media 3-4 HR TAT - Swab, Nasal Cavity [[id number]]  (Normal) Collected: 07/17/21 2223    Specimen: Swab from Nasal Cavity Updated: 07/17/21 2352     COVID19 Not Detected    Narrative:      Fact sheet for providers: https://www.fda.gov/media/778145/download     Fact sheet for patients: https://www.fda.gov/media/472767/download    Test performed by PCR.    Consider negative results in combination with clinical observations, patient history, and epidemiological information.                          ASSESSMENT/PLAN:      Postprocedural pneumothorax    Cirrhosis of liver (CMS/HCC)    Ascites due to alcoholic cirrhosis (CMS/HCC)    Esophageal varices in alcoholic cirrhosis (CMS/HCC)    Pleural effusion associated with hepatic disorder    Ms. Rasheed is a 56-year-old female patient with underlying alcoholic cirrhosis complicated by hepatic encephalopathy, jaundice, acholic stools, edema, large volume ascites requiring weekly paracentesis, hepatorenal syndrome, malnutrition, muscle wasting, overall physical debility, who developed a postprocedural pneumothorax after thoracentesis in the emergency department.  This has worsened over the last several days, and now is a 50 to 60% pneumothorax.  There is a significant associated effusion.  The patient fortunately has no evidence of respiratory distress or significant hypoxia.  However, reexpansion of the lung is unlikely at this point without placement of a tube thoracostomy.  This will obviously be problematic due to the patient's underlying liver disease and resultant pleural effusion associated with her hepatic disorder.  I have discussed this at length with the patient.  We discussed the options for management, and ultimately I have proposed the use of a small bore tube that can later potentially be connected to a Heimlich valve or drainage bag if necessary.  If this fails, we may  need to upsize the tube.  We discussed the problematic nature of managing this chest tube given her longstanding pleural effusion which has rapidly reaccumulated, and is a chronic issue.  She understands, and is willing to proceed.  A small bore chest tube was subsequently placed without incident with immediate drainage of 2 L of straw-colored pleural fluid.  The chest tube was placed to suction via a dry suction chest drain.  A post procedural chest x-ray revealed pleat resolution of the pneumothorax.    We will leave the chest tube to suction for now with routine chest tube care.  Repeat chest x-ray will be obtained in the morning.  I will continue to follow along.  She may require intervention from the thoracic surgery service if we are unable to successfully remove her chest tube over the next 24 to 48 hours.      Nyasia Saucedo MD  07/20/21  15:54 EDT

## 2021-07-20 NOTE — PROGRESS NOTES
[facility]    Name:  Gaby Rasheed   Age:  56 y.o.  Sex:  female  :  1965  MRN:  [id number]   Visit Number:  [id number]  Admission Date:  2021  Date Of Service:  21  Primary Care Physician:  Arabella Loving MD     LOS: 3 days :    Chief Complaint:      Follow-up on shortness of breath    Subjective:    Patient seen today.  Fiancé at bedside.  Patient states she is feeling better, denies any shortness of breath currently.  She is wanting to go home.  I discussed she still has evidence of pneumothorax and is likely worsened compared to yesterday.  We will await general surgery evaluation.    Hospital Course:    Patient is a 56-year-old female with alcoholic liver cirrhosis, paracentesis dependent, who presented to the hospital with shortness of breath and respiratory distress.  She had evidence of a left-sided pleural effusion and underwent thoracentesis in the emergency room upon admission.  She subsequent developed evidence of a posterior pneumothorax and recurrence of effusion fairly rapidly.  Was admitted to the hospital.    Review of Systems:     All systems were reviewed and negative except as mentioned in subjective, assessment and plan.    Vital Signs:    Temp:  [98.2 °F (36.8 °C)-98.8 °F (37.1 °C)] 98.3 °F (36.8 °C)  Heart Rate:  [68-86] 75  Resp:  [23-29] 25  BP: (123-138)/(67-82) 123/67    Intake and output:    I/O last 3 completed shifts:  In: 1170 [P.O.:1170]  Out: 2425 [Urine:2425]  I/O this shift:  In: 740 [P.O.:740]  Out: -     Physical Examination:    General Appearance:  Alert and cooperative.  No acute distress.  Chronically ill-appearing   Head:  Atraumatic and normocephalic.   Eyes: Conjunctivae and sclerae normal, no icterus. No pallor.   Throat: No oral lesions, no thrush, oral mucosa moist.   Neck: Supple, trachea midline, no thyromegaly.   Lungs:    Diminished breath sounds at the left base to mid lung noted.    Heart:  Normal S1 and S2, no murmur, no gallop, no rub. No JVD.   Abdomen:   Normal bowel sounds, no masses, no organomegaly. Soft, nontender, moderately distended, no rebound tenderness.   Extremities: Supple, trace to 1+ edema, no cyanosis, no clubbing.   Skin: No bleeding or rash.   Neurologic: Alert and oriented x 3. No facial asymmetry. Moves all four limbs. No tremors.      Laboratory results:    Results from last 7 days   Lab Units 07/20/21  0434 07/19/21  0405 07/17/21  1514   SODIUM mmol/L 131* 131* 134*   POTASSIUM mmol/L 4.4 5.3* 5.1   CHLORIDE mmol/L 99 102 102   CO2 mmol/L 23.4 22.8 21.2*   BUN mg/dL 24* 24* 21*   CREATININE mg/dL 0.81 0.68 0.61   CALCIUM mg/dL 8.4* 8.0* 8.2*   BILIRUBIN mg/dL  --  5.1* 7.9*   ALK PHOS U/L  --  116 112   ALT (SGPT) U/L  --  14 15   AST (SGOT) U/L  --  36* 37*   GLUCOSE mg/dL 129* 104* 74     Results from last 7 days   Lab Units 07/19/21  0405 07/18/21  0544 07/17/21  1514   WBC 10*3/mm3 13.82* 16.66* 12.39*   HEMOGLOBIN g/dL 8.7* 9.3* 9.6*   HEMATOCRIT % 27.0* 28.8* 29.3*   PLATELETS 10*3/mm3 161 154 151     Results from last 7 days   Lab Units 07/17/21  1514   INR  2.18*         Results from last 7 days   Lab Units 07/17/21  1440   WOUNDCX  Rare Candida albicans*  Rare Normal Skin Haven         I have reviewed the patient's laboratory results.    Radiology results:    XR Chest 1 View    Result Date: 7/20/2021  PROCEDURE: XR CHEST 1 VW-    HISTORY: f/u pneumothorax; J95.811-Postprocedural pneumothorax; K70.31-Alcoholic cirrhosis of liver with ascites; C33-Okrqobz effusion, not elsewhere classified  COMPARISON: One day prior.  FINDINGS: The heart is normal in size. The mediastinum is unremarkable. . There has been slight interval increase in the left hydropneumothorax. The right lung is clear. There are no acute osseous abnormalities.      Impression: Slight interval increase in size of the left hydropneumothorax.    Images were reviewed, interpreted, and dictated by  Dr. Tania Strong M.D. Transcribed by Mariana East PA-C.  This report was finalized on 7/20/2021 3:12 PM by Tania Strong M.D..    XR Chest 1 View    Result Date: 7/19/2021  PROCEDURE: XR CHEST 1 VW-  HISTORY: f/ u pneumothorax; J95.811-Postprocedural pneumothorax; K70.31-Alcoholic cirrhosis of liver with ascites; V42-Ktovwcx effusion, not elsewhere classified  COMPARISON: 7/18/2021.  FINDINGS: The heart is normal in size. The mediastinum is unremarkable. There has been no significant change in the patients left sided hydropneumothorax. There is compressive atelectasis in the left lung. The right lung is clear. There is no pneumothorax.  There are no acute osseous abnormalities.      Impression: No significant change in the patients left hydropneumothorax.  Continued followup is recommended.  This report was finalized on 7/19/2021 7:33 AM by Tania Strong M.D..    I have reviewed the patient's radiology reports.    Medication Review:     I have reviewed the patient's active and prn medications.     Problem List:      Postprocedural pneumothorax    Cirrhosis of liver (CMS/HCC)    Ascites due to alcoholic cirrhosis (CMS/HCC)    Protein-calorie malnutrition, severe (CMS/HCC)    Esophageal varices in alcoholic cirrhosis (CMS/HCC)    Pleural effusion associated with hepatic disorder      Assessment:    · Respiratory distress secondary to significant left pleural effusion status post thoracentesis   · Iatrogenic pneumothorax   · End-stage liver disease  · Alcohol induced liver cirrhosis  · Recurrent ascites, paracentesis dependent essential hypertension  · Severe protein energy malnutrition with cachexia  · GERD  · Depression  · Hypothyroidism  · Essential hypertension  · Dyslipidemia  · Irritable bowel syndrome    Plan:    Patient is hemodynamically stable and currently on room air with oxygen sats greater than 95%.  Her labs are also stable.  Unfortunately her chest x-ray continues to demonstrate  fairly significant left-sided pneumothorax with effusion.  I did ask general surgery to formally see patient in consult today.  Dr. Saucedo to discuss possibility of chest tube placement with patient.  She has been put on a waiting list for transfer to outlying facility.  Our concern would be that this may have a continuous leak, but do think we need to see if reexpansion of the lung as she is at risk of worsening.  If chest tube is placed, recommend monitoring in ICU tonight.    DVT Prophylaxis: SCDs  Code Status: Full  Diet: As tolerated  Discharge Plan: To be determined    Xiomara Presley DO  07/20/21  17:49 EDT    Dictated utilizing Dragon dictation.

## 2021-07-21 NOTE — PROGRESS NOTES
[facility]    Name:  Gaby Rasheed   Age:  56 y.o.  Sex:  female  :  1965  MRN:  [id number]   Visit Number:  [id number]  Admission Date:  2021  Date Of Service:  21  Primary Care Physician:  Arabella Loving MD     LOS: 4 days :    Chief Complaint:      Follow-up on shortness of breath    Subjective:    Patient seen again today.  Her parents are at bedside.  Patient is feeling well.  Denies any issues with chest tube.  Denies any shortness of breath.  I discussed general surgery will see patient today as well.  RN at bedside.    Hospital Course:    Patient is a 56-year-old female with alcoholic liver cirrhosis, paracentesis dependent, who presented to the hospital with shortness of breath and respiratory distress.  She had evidence of a left-sided pleural effusion and underwent thoracentesis in the emergency room upon admission.  She subsequent developed evidence of a posterior pneumothorax and recurrence of effusion fairly rapidly.  Was admitted to the hospital.  Small bore chest tube placed on evening of 2021 with resolution of pneumothorax.    Review of Systems:     All systems were reviewed and negative except as mentioned in subjective, assessment and plan.    Vital Signs:    Temp:  [98 °F (36.7 °C)-100.3 °F (37.9 °C)] 98 °F (36.7 °C)  Heart Rate:  [62-81] 70  Resp:  [16-25] 22  BP: (104-138)/(62-73) 113/65    Intake and output:    I/O last 3 completed shifts:  In: 1190 [P.O.:1190]  Out: 4300 [Urine:1300; Chest Tube:3000]  No intake/output data recorded.    Physical Examination:    General Appearance:  Alert and cooperative.  No acute distress.  Chronically ill-appearing   Head:  Atraumatic and normocephalic.   Eyes: Conjunctivae and sclerae normal, no icterus. No pallor.   Throat: No oral lesions, no thrush, oral mucosa moist.   Neck: Supple, trachea midline, no thyromegaly.   Lungs:    Improved breath sounds at the left base.   No significant crackles.  Nonlabored   Heart:  Normal S1 and S2, no murmur, no gallop, no rub. No JVD.   Abdomen:   Normal bowel sounds, no masses, no organomegaly. Soft, nontender, moderately distended, no rebound tenderness.  There is a hematoma noted of the right abdomen   Extremities: Supple, trace to 1+ edema, no cyanosis, no clubbing.   Skin: No bleeding or rash.   Neurologic: Alert and oriented x 3. No facial asymmetry. Moves all four limbs. No tremors.      Laboratory results:    Results from last 7 days   Lab Units 07/21/21  0838 07/20/21  0434 07/19/21  0405 07/17/21  1514 07/17/21  1514   SODIUM mmol/L 131* 131* 131*   < > 134*   POTASSIUM mmol/L 4.4 4.4 5.3*   < > 5.1   CHLORIDE mmol/L 97* 99 102   < > 102   CO2 mmol/L 22.8 23.4 22.8   < > 21.2*   BUN mg/dL 26* 24* 24*   < > 21*   CREATININE mg/dL 0.84 0.81 0.68   < > 0.61   CALCIUM mg/dL 8.5* 8.4* 8.0*   < > 8.2*   BILIRUBIN mg/dL 4.9*  --  5.1*  --  7.9*   ALK PHOS U/L 139*  --  116  --  112   ALT (SGPT) U/L 18  --  14  --  15   AST (SGOT) U/L 48*  --  36*  --  37*   GLUCOSE mg/dL 169* 129* 104*   < > 74    < > = values in this interval not displayed.     Results from last 7 days   Lab Units 07/19/21  0405 07/18/21  0544 07/17/21  1514   WBC 10*3/mm3 13.82* 16.66* 12.39*   HEMOGLOBIN g/dL 8.7* 9.3* 9.6*   HEMATOCRIT % 27.0* 28.8* 29.3*   PLATELETS 10*3/mm3 161 154 151     Results from last 7 days   Lab Units 07/17/21  1514   INR  2.18*         Results from last 7 days   Lab Units 07/17/21  1440   WOUNDCX  Rare Candida albicans*  Rare Normal Skin [place]         I have reviewed the patient's laboratory results.    Radiology results:    XR Chest 1 View    Result Date: 7/21/2021  PROCEDURE: XR CHEST 1 VW-  HISTORY: Chest tube placement, follow-up pneumothorax; J95.811-Postprocedural pneumothorax; K70.31-Alcoholic cirrhosis of liver with ascites; O18-Rumfudo effusion, not elsewhere classified  COMPARISON: 7/20/2021.  FINDINGS: The heart is normal in size.  The mediastinum is unremarkable. A left-sided chest tube is in place. There is no pneumothorax. A small left effusion persists. There are chronic interstitial lung changes There are no acute osseous abnormalities.      Impression: Left-sided chest tube in place with no evidence of a pneumothorax.  Continued followup is recommended.  This report was finalized on 7/21/2021 8:48 AM by Tania Strong M.D..    XR Chest 1 View    Result Date: 7/20/2021  FINAL REPORT TECHNIQUE: An AP portable view of the chest was obtained. CLINICAL HISTORY: chest tube placement FINDINGS: After insertion of a smallbore left chest tube there has been significant decrease in size of a now small left pleural effusion.  There is no pneumothorax.  The lungs are clear. There is no osseous abnormality.     Impression: Diminished pleural effusion after left chest tube insertion.  No pneumothorax. Authenticated by Ramu Hudson M.D. on 07/20/2021 11:18:11 PM    XR Chest 1 View    Result Date: 7/20/2021  PROCEDURE: XR CHEST 1 VW-    HISTORY: f/u pneumothorax; J95.811-Postprocedural pneumothorax; K70.31-Alcoholic cirrhosis of liver with ascites; R82-Wrsgdrj effusion, not elsewhere classified  COMPARISON: One day prior.  FINDINGS: The heart is normal in size. The mediastinum is unremarkable. . There has been slight interval increase in the left hydropneumothorax. The right lung is clear. There are no acute osseous abnormalities.      Impression: Slight interval increase in size of the left hydropneumothorax.    Images were reviewed, interpreted, and dictated by Dr. Tania Strong M.D. Transcribed by Mariana East PA-C.  This report was finalized on 7/20/2021 3:12 PM by Tania Strong M.D..    I have reviewed the patient's radiology reports.    Medication Review:     I have reviewed the patient's active and prn medications.     Problem List:      Postprocedural pneumothorax    Cirrhosis of liver (CMS/HCC)    Ascites due to alcoholic  cirrhosis (CMS/HCC)    Protein-calorie malnutrition, severe (CMS/HCC)    Esophageal varices in alcoholic cirrhosis (CMS/HCC)    Pleural effusion associated with hepatic disorder      Assessment:    · Respiratory distress secondary to significant left pleural effusion status post thoracentesis   · Iatrogenic pneumothorax   · End-stage liver disease  · Alcohol induced liver cirrhosis  · Recurrent ascites, paracentesis dependent essential hypertension  · Severe protein energy malnutrition with cachexia  · GERD  · Depression  · Hypothyroidism  · Essential hypertension  · Dyslipidemia  · Irritable bowel syndrome    Plan:    Patient overall hemodynamically stable.  Repeat chest x-ray this morning does show resolved pneumothorax.  Chest tube management per general surgery team.  Her electrolytes are stable.  Effusion is small.  We will continue on current medication regimen.  Will discuss plan of care with Dr. Saucedo after she evaluates patient today.    DVT Prophylaxis: SCDs  Code Status: Full  Diet: As tolerated  Discharge Plan: To be determined    Xiomara Presley DO  07/21/21  10:54 EDT    Dictated utilizing Dragon dictation.

## 2021-07-21 NOTE — PROGRESS NOTES
Adult Nutrition  Assessment/PES    Patient Name:  Gaby Rasheed  YOB: 1965  MRN: [id number]  Admit Date:  7/17/2021    Assessment Date:  7/21/2021    Comments:    Recommend:  1. Continue current diet order as medically appropriate and tolerated.  2. Encourage PO intake. PO intake average ~53% x 9 meals  3. RD ordered Prostat BID. Continue Boost BID blended with fruit.   4. Continue a multivitamin with minerals daily.  5. Continue to monitor and replace electrolytes PRN.      RD to follow pt and available PRN.      Reason for Assessment     Row Name 07/21/21 0900          Reason for Assessment    Reason For Assessment  follow-up protocol     Diagnosis  liver disease;pulmonary disease;cardiac disease Popstprocedural pneumothorax, Cirrhosis, Ascites, Fluid in pleural cavity associated with liver disease     Identified At Risk by Screening Criteria  unintentional loss of 10 lbs or more in the past 2 mos;MST SCORE 2+             Labs/Tests/Procedures/Meds     Row Name 07/21/21 0900          Labs/Procedures/Meds    Lab Results Reviewed  reviewed, pertinent     Lab Results Comments  Low: Alb, Na; High: Gluc, BUN, bilirubin        Medications    Pertinent Medications Reviewed  reviewed, pertinent     Pertinent Medications Comments  Mag-ox, multivitamin w/ minerals, Protonix,             Nutrition Prescription Ordered     Row Name 07/21/21 0901          Nutrition Prescription PO    Current PO Diet  Regular     Supplement  Boost Plus (Ensure Enlive, Ensure Plus)     Supplement Frequency  2 times a day     Other Modifiers  High Protein/High Calorie         Evaluation of Received Nutrient/Fluid Intake     Row Name 07/21/21 0901          PO Evaluation    Number of Days PO Intake Evaluated  3 days     Number of Meals  9     % PO Intake  53               Problem/Interventions:  Problem 1     Row Name 07/21/21 0903          Nutrition Diagnoses Problem 1    Problem 1  Malnutrition     Etiology (related to)   Medical Diagnosis     Hepatic  Cirrhosis     Pulmonary/Critical Care  Other (comment) Pneumothorax, fluid in pleural cavity associated with liver disease     Signs/Symptoms (evidenced by)  Report/Observation     Reported/Observed By  RD/Tech CHINO NFPE               Intervention Goal     Row Name 07/21/21 0903          Intervention Goal    General  Meet nutritional needs for age/condition;Improved nutrition related lab(s)     PO  Meet estimated needs;Increase intake;PO intake (%)     PO Intake %  75 %     Weight  Appropriate weight gain         Nutrition Intervention     Row Name 07/21/21 0903          Nutrition Intervention    RD/Tech Action  Follow Tx progress;Encourage intake;Recommend/ordered     Recommended/Ordered  Supplement         Nutrition Prescription     Row Name 07/21/21 0903          Nutrition Prescription PO    PO Prescription  Begin/change supplement     Supplement  PRO liquid     Supplement Frequency  2 times a day     New PO Prescription Ordered?  Yes        Other Orders    Obtain Weight  Daily     Obtain Weight Ordered?  No, recommended     Supplement  Vitamin mineral supplement Continue     Supplement Ordered?  No, recommended     Other  Continue to monitor and replace electrolytes PRN         Education/Evaluation     Row Name 07/21/21 0904          Education    Education  Education not appropriate at this time     Please explain  Defer until post ICU        Monitor/Evaluation    Monitor  Per protocol;I&O;PO intake;Supplement intake;Pertinent labs;Weight;Skin status           Electronically signed by:  Soheila Velasquez RD  07/21/21 09:05 EDT

## 2021-07-21 NOTE — PROCEDURES
Chest Tube Insertion Procedure Note      PROCEDURE DATE: 7/20/2021    SURGEON: Nyasia Saucedo MD, FACS    PREOPERATIVE DIAGNOSIS: Postprocedural pneumothorax, left; large left pleural effusion    POSTOPERATIVE DIAGNOSIS: Same    PROCEDURE: Insertion of an 8 Fr chest tube;  Drainage of left pleural effusion    ANESTHESIA: Local    EBL: Minimal    SPECIMENS: None    INDICATIONS FOR THE PROCEDURE: Ms. Rasheed is a 56-year-old female patient with a past medical history significant for alcoholic cirrhosis, complicated by hepatic encephalopathy, acholic stools, jaundice, edema, large volume ascites requiring weekly paracentesis of 6 to 8 L at a time, hepatorenal syndrome, malnutrition, muscle wasting, and overall physical debilitation, who was admitted to our facility on 7/17/2021 after presenting to the emergency department with shortness of breath and respiratory distress.  She was subsequently found to have a large left-sided pleural effusion, which was subsequently drained by the emergency department physician.  Postprocedural chest films revealed evidence of a small posterior pneumothorax.  She was subsequently admitted to our facility, and initially was managed with observation, and serial chest x-rays.  An attempt was made to transfer the patient to outside facilities for a higher level of care given her multiple comorbid conditions, but no beds were available.  Over the last several days, she has been observed in the intensive care unit with serial chest x-rays.  She has not had significant shortness of breath, and has not had significant hypoxia despite having rapid reaccumulation of pleural fluid.  She remains on the waiting list for transfer to [facility], and [facility].  Today, repeat chest x-ray demonstrated an interval increase in the patient's left hydropneumothorax, and I was asked to evaluate the patient.  On my review the patient was noted to have 50 to 60%  pneumothorax with an associated significant left effusion.  Despite the fact that she had no respiratory distress, it was felt that she would benefit from tube thoracostomy to facilitate reexpansion of the lung.  The patient was counseled at the bedside regarding these recommendations.  We discussed the risk, benefits, and alternatives to the proposed procedure.  We discussed the use of both small bore tubes, such as those that can be eventually connected to a Heimlich valve, as well as more standard size chest tubes.  I explained to the patient that the challenge would be managing her fluid losses due to ongoing reaccumulation of pleural fluid in light of her underlying liver disease.  This will also present a challenge for her successfully removing the tube and achieving closure of the tube thoracostomy site.  We also discussed the potential need for surgical intervention in the event of a persistent air leak.  She understood these, and ultimately was agreeable to my recommendation of insertion of a small bore chest tube, with drainage of the associated large pleural effusion, and a trial of suction therapy.  She understood that if this failed to reexpand the lung, she may require upsizing to a more standard sized chest tube.  Informed consent for the procedure was obtained from the patient.      DESCRIPTION OF THE PROCEDURE:  Informed consent for the procedure was obtained from the patient.  We discussed the risks as detailed above as well as the standard risks of further injury to the lung, hemorrhage, arrhythmia, adverse drug reaction related to the use of benzodiazepines or opioids.    The patient was then turned slightly to the right, and the left arm was placed across the patient's chest.  A timeout was performed to verify the correct patient, 2 patient identifiers, the planned procedure, and the laterality of said procedure.  The patient, this MD, and the patient's nurse were all in agreement regarding  these facts, and participated in the timeout.  The patient's chest x-ray from today was used to verify the correct side for chest tube placement as well.  Once this was completed, the patient's left chest was prepped and draped in usual sterile fashion.  The anterior axillary line was identified along with the  fourth and fifth interspaces.  The patient received an IV dose of Ativan, and an IV dose of morphine for comfort purposes at the beginning of the procedure.     1% lidocaine was infiltrated in the skin and subcutaneous tissues overlying the fourth and fifth interspaces.  Once this was done, additional 1% lidocaine was infiltrated at the level of the periosteum of the rib.  An 11 blade knife was then used to make a small incision in the fifth interspace, just above the rib.  An 8 Arabic chest tube over a sharp trocar was inserted through the fifth interspace into the pleural cavity using gentle pressure.  The sharp trocar was then removed from the chest tube, and the tube was advanced further into the pleural cavity.  There was return of straw-colored fluid and air upon advancing the tube.  Initially, a Luer-Dania syringe was attached to the end of the small bore chest tube, and additional straw-colored fluid was aspirated.  Based on the large size of the patient's effusion, I elected to connect the provided stopcock to the Luer-Dania end of the chest tube, and then connected this further to the provided extension tubing.  The tube was then connected to wall suction, and over the next several minutes 2 L of straw-colored pleural fluid was drained from the pleural cavity.  Once this was completed, the tube was then connected in the standard fashion to a dry suction chest drain, which was then placed to wall suction at 20 cm of water.  Upon observation of the atrium dissection chest drain with coughing, the patient was noted to have a small air leak.  The small bore chest tube was then secured to the skin using silk  sutures x3.  2 additional silk sutures were placed in the skin incision surrounding the tube to prevent leakage of fluid around the small tube.  A Vaseline impregnated gauze was used over the chest tube insertion site as an occlusive dressing.  The tube site was then further covered with sterile gauze, and the stopcock was wrapped in gauze to prevent injury or breakdown of the adjacent skin.  Foam tape was used to secure the dressing.  The procedure was then deemed complete, and was overall well-tolerated by the patient.  A post procedure chest film demonstrated a minimal residual left-sided effusion, with complete reexpansion of the left lung.  A repeat chest x-ray was ordered for the morning.  There were no complications.  The patient will remain in the ICU overnight for ongoing observation.    RECOMMENDATIONS: Maintain chest tube to wall suction at 20 cm of water.  Repeat chest x-ray tomorrow morning.

## 2021-07-22 NOTE — PLAN OF CARE
Goal Outcome Evaluation:  Plan of Care Reviewed With: patient        Progress: improving   Vitals are stable.  Doing well with chest tube off suction.

## 2021-07-22 NOTE — PROGRESS NOTES
"     LOS: 5 days   Patient Care Team:  Arabella Loving MD as PCP - General (Family Medicine)  Moose Abreu MD as Consulting Physician (General Surgery)    Chief Complaint: Follow-up pneumothorax, pleural effusion    Subjective     Interval History:     No acute events reported overnight.  Patient reports that she is feeling \"great\" today.  Anxious to go home.  No evidence of recurrent pneumothorax on this morning's chest x-ray after being to [facility] for 24 hours.  Pain around the tube is significantly improved.    Review of Systems:   All systems were reviewed and negative except for:  Respiratory: positive for  See HPI  Gastrointestinal: positive for  Abdominal distention, ascites    Objective     Vital Signs  Temp:  [97.9 °F (36.6 °C)-99.3 °F (37.4 °C)] 98.1 °F (36.7 °C)  Heart Rate:  [70-84] 76  Resp:  [19-24] 20  BP: (103-120)/(55-86) 109/66    Physical Exam:  General Appearance: alert, cooperative and cachectic  Head: normocephalic, without obvious abnormality and atraumatic  Eyes: lids and lashes normal, conjunctivae and sclerae normal, no pallor, corneas clear and noted scleral icterus  Lungs: respirations regular, respirations even, respirations unlabored and Chest tube in place with drainage of straw-colored fluid.  No air leak.  Abdomen: protuberant and distended  Neurologic: Mental Status orientated to person, place, time and situation  Psych: normal     Results Review:    I reviewed the patient's new clinical results.    Results from last 7 days   Lab Units 07/22/21  0732 07/21/21  0838 07/20/21  0434 07/19/21  0405 07/19/21  0405 07/17/21  1514 07/17/21  1514   SODIUM mmol/L 132* 131* 131*   < > 131*   < > 134*   POTASSIUM mmol/L 4.7 4.4 4.4   < > 5.3*   < > 5.1   CHLORIDE mmol/L 100 97* 99   < > 102   < > 102   CO2 mmol/L 24.3 22.8 23.4   < > 22.8   < > 21.2*   BUN mg/dL 26* 26* 24*   < > 24*   < > 21*   CREATININE mg/dL 0.63 0.84 0.81   < > 0.68   < > 0.61   CALCIUM mg/dL 8.3* 8.5* 8.4*   " < > 8.0*   < > 8.2*   BILIRUBIN mg/dL  --  4.9*  --   --  5.1*  --  7.9*   ALK PHOS U/L  --  139*  --   --  116  --  112   ALT (SGPT) U/L  --  18  --   --  14  --  15   AST (SGOT) U/L  --  48*  --   --  36*  --  37*   GLUCOSE mg/dL 80 169* 129*   < > 104*   < > 74    < > = values in this interval not displayed.       Results from last 7 days   Lab Units 07/22/21  0732 07/19/21  0405 07/18/21  0544   WBC 10*3/mm3 14.46* 13.82* 16.66*   HEMOGLOBIN g/dL 8.9* 8.7* 9.3*   HEMATOCRIT % 26.7* 27.0* 28.8*   PLATELETS 10*3/mm3 173 161 154     PROCEDURE: XR CHEST 1 VW-     HISTORY: Chest tube to water seal; J95.811-Postprocedural pneumothorax;  K70.31-Alcoholic cirrhosis of liver with ascites; W01-Pswvjgc effusion,  not elsewhere classified     COMPARISON: 7/21/2021.     FINDINGS: The heart is normal in size. The mediastinum is unremarkable.  A left-sided chest tube is in place. There is no pneumothorax.  There is  a small left effusion which is unchanged. The right lung is clear.     IMPRESSION:  Left-sided chest tube in place with no evidence of a  pneumothorax.     Continued followup is recommended.     This report was finalized on 7/22/2021 8:20 AM by Tania Strong M.D..        Medication Review:   Scheduled Meds:ARIPiprazole, 10 mg, Oral, Daily  levothyroxine, 50 mcg, Oral, Daily  lidocaine, , ,   magnesium oxide, 400 mg, Oral, Daily  metoprolol tartrate, 25 mg, Oral, Q12H  multivitamin with minerals, 1 tablet, Oral, Daily  pantoprazole, 40 mg, Oral, Daily  PRO-STAT, 30 mL, Oral, BID  sodium chloride, 10 mL, Intravenous, Q12H  spironolactone, 50 mg, Oral, Daily  venlafaxine XR, 75 mg, Oral, Daily With Breakfast      Continuous Infusions:   PRN Meds:.lactulose  •  LORazepam  •  [COMPLETED] Insert peripheral IV **AND** sodium chloride  •  sodium chloride  •  zolpidem    Assessment/Plan       Postprocedural pneumothorax    Cirrhosis of liver (CMS/HCC)    Ascites due to alcoholic cirrhosis (CMS/HCC)    Protein-calorie  malnutrition, severe (CMS/HCC)    Esophageal varices in alcoholic cirrhosis (CMS/HCC)    Pleural effusion associated with hepatic disorder      I had a long discussion with Ms. Rasheed and her family at the bedside this afternoon regarding ongoing management of her small bore chest tube.  Her lung has remained fully inflated without recurrent pneumothorax after being on waterseal for 24 hours.  We again discussed the fact that this is not a tube that will ever stop draining fluid from the pleural space given her underlying liver disease.  As such, we talked through the options for the next steps in management.  She was given the option of maintaining the current tube and being sent home with a drainage collection bag attached, and being instructed regarding management of this at home.  This would require close follow-up with thoracic surgery, likely at  given that she is an established patient at that institution.  As an alternative, we discussed removing the tube at the bedside this evening, with an additional day of monitoring in our facility for recurrent pneumothorax or other issues related to tube removal such as rapid reaccumulation of her pleural effusion, and respiratory distress, or leakage from the tube removal site.  We discussed the wide range of risks and benefits to each of these approaches.  I am a little concerned about sending her home with the chest tube in place given that it is not a tunneled tube, and has already been noted to be retracting somewhat on her daily chest x-rays.  This is despite it being secured with sutures in multiple locations.  Certainly, it would be less than ideal for the patient to be at home with this tube and have it inadvertently become dislodged, in which case she could experience a complication at home without rapid access to medical intervention.  Ultimately, after this discussion was had with the patient and her family, she wished to attempt a trial of observation  after having the chest tube removed in the facility this afternoon.  I explained to the patient that I would like to remove the tube, and close the site primarily with a pursestring type suture so that we could avoid inadvertent introduction of air into the pleural space given her lack of subcutaneous soft tissue.  This would also help avoid drainage of the pleural fluid through the old chest tube site.  We discussed the process for removing the chest tube in detail, and she was agreeable.    The patient was subsequently turned onto her right side, and the occlusive dressing was removed from the left chest wall.  All the sutures securing the small bore chest tube were removed, and the tube was noted to have retracted about 6 to 7 cm from its original location despite the suture still being in place.  Once this was done, the skin around the chest tube was prepped with chlorhexidine, and the skin was anesthetized with lidocaine to facilitate insertion of a pursestring type suture.  A 3-0 nylon was then used to place a U stitch completely around the exit site of the tube, and was placed under tension.  The the tube was then removed upon maximal expiration, with an occlusive dressing covering the chest wall, while simultaneously pulling to the U stitch taut.  The U stitch was tied down, and the occlusive dressing was secured with foam tape.  The patient tolerated removal of the chest tube very well, and no complications were noted.  Specifically, there was no evidence of immediate leakage of pleural fluid, or respiratory changes.  A chest x-ray was ordered for 1 hour post chest tube removal, and the nursing staff was asked to follow-up with me regarding its results.  They were also asked to notify me of any changes in the patient's respiratory status.  I did update the overnight hospitalist as to these changes as well given that she will be in-house overnight.  I am hopeful that if the patient does not have any  difficulties following chest tube removal, and has a stable appearing chest x-ray tomorrow, that we can get her discharged home.        Nyasia Saucedo MD  07/22/21  17:16 EDT

## 2021-07-22 NOTE — PROGRESS NOTES
Adult Nutrition  Assessment/PES    Patient Name:  Gaby Rasheed  YOB: 1965  MRN: [id number]  Admit Date:  7/17/2021    Assessment Date:  7/22/2021    Comments:    Recommend:  1. Continue current diet order as medically appropriate and tolerated.  2. Encourage PO intake. PO intake average ~40% x 7 meals.  3. Continue with Prostat BID and Boost Plus (blended with fruit) BID.  4. Continue with a multivitamin with minerals daily.  5. Continue to monitor and replace electrolytes PRN.    RD to follow pt and available PRN.      Reason for Assessment     Row Name 07/22/21 1153          Reason for Assessment    Reason For Assessment  follow-up protocol     Diagnosis  liver disease;pulmonary disease;cardiac disease Popstprocedural pneumothorax, Cirrhosis, Ascites, Fluid in pleural cavity associated with liver disease     Identified At Risk by Screening Criteria  unintentional loss of 10 lbs or more in the past 2 mos;MST SCORE 2+           Anthropometrics     Row Name 07/22/21 0400          Anthropometrics    Weight  53.4 kg (117 lb 11.2 oz)         Labs/Tests/Procedures/Meds     Row Name 07/22/21 1154          Labs/Procedures/Meds    Lab Results Reviewed  reviewed, pertinent     Lab Results Comments  Low: Na+, Alb High: BUN, Total Bilirubin        Medications    Pertinent Medications Reviewed  reviewed, pertinent     Pertinent Medications Comments  Prostat, Synthroid, Mag-OX, Lopressor, Multivitamin with minerals, Protonix, NaCl             Nutrition Prescription Ordered     Row Name 07/22/21 1155          Nutrition Prescription PO    Current PO Diet  Regular     Supplement  Boost Plus (Ensure Enlive, Ensure Plus);ProStat Blended with fruit     Supplement Frequency  2 times a day     Other Modifiers  High Protein/High Calorie         Evaluation of Received Nutrient/Fluid Intake     Row Name 07/22/21 1156          PO Evaluation    Number of Days PO Intake Evaluated  3 days     Number of Meals  7     % PO  Intake  40               Problem/Interventions:  Problem 1     Row Name 07/22/21 1156          Nutrition Diagnoses Problem 1    Problem 1  Malnutrition     Etiology (related to)  Medical Diagnosis     Hepatic  Cirrhosis     Pulmonary/Critical Care  Other (comment) Pneumothorax, fluid in pleural cavity associated with liver disease     Signs/Symptoms (evidenced by)  Report/Observation     Reported/Observed By  RD/Tech CHINO NFPE               Intervention Goal     Row Name 07/22/21 1156          Intervention Goal    General  Meet nutritional needs for age/condition;Improved nutrition related lab(s)     PO  Meet estimated needs;Increase intake;PO intake (%)     PO Intake %  50 %     Weight  Appropriate weight gain         Nutrition Intervention     Row Name 07/22/21 1156          Nutrition Intervention    RD/Tech Action  Follow Tx progress;Encourage intake         Nutrition Prescription     Row Name 07/22/21 1156          Nutrition Prescription PO    PO Prescription  Other (comment) Continue current diet order as medically appropriate and tolerated     New PO Prescription Ordered?  No, recommended        Other Orders    Obtain Weight  Daily     Obtain Weight Ordered?  No, recommended     Supplement  Vitamin mineral supplement Continue     Supplement Ordered?  No, recommended     Other  Continue to monitor and replace electrolytes PRN         Education/Evaluation     Row Name 07/22/21 3757          Education    Education  Education not appropriate at this time     Please explain  Defer until post ICU        Monitor/Evaluation    Monitor  Per protocol;PO intake;I&O;Supplement intake;Pertinent labs;Weight;Skin status           Electronically signed by:  Renee Fung RD  07/22/21 11:57 EDT

## 2021-07-22 NOTE — PROGRESS NOTES
[facility]    Name:  Gaby Rasheed   Age:  56 y.o.  Sex:  female  :  1965  MRN:  [id number]   Visit Number:  [id number]  Admission Date:  2021  Date Of Service:  21  Primary Care Physician:  Arabella Loving MD     LOS: 5 days :    Chief Complaint:      Follow-up on shortness of breath    Subjective:    Patient seen this morning.  Feeling well.  She is wanting to go home.  I discussed general surgery is considering discharge plan with leaving chest tube in place hard to drain at home, will discuss this with her further.  She would need close outpatient follow-up at  in regards to this.    Hospital Course:    Patient is a 56-year-old female with alcoholic liver cirrhosis, paracentesis dependent, who presented to the hospital with shortness of breath and respiratory distress.  She had evidence of a left-sided pleural effusion and underwent thoracentesis in the emergency room upon admission.  She subsequent developed evidence of a posterior pneumothorax and recurrence of effusion fairly rapidly.  Was admitted to the hospital.  Small bore chest tube placed on evening of 2021 with resolution of pneumothorax.    Review of Systems:     All systems were reviewed and negative except as mentioned in subjective, assessment and plan.    Vital Signs:    Temp:  [98.4 °F (36.9 °C)-99.3 °F (37.4 °C)] 98.8 °F (37.1 °C)  Heart Rate:  [69-78] 74  Resp:  [18-23] 23  BP: (104-120)/(55-75) 120/75    Intake and output:    I/O last 3 completed shifts:  In: 440 [P.O.:440]  Out: 5660 [Urine:550; Chest Tube:5110]  No intake/output data recorded.    Physical Examination:    General Appearance:  Alert and cooperative.  No acute distress.  Chronically ill-appearing   Head:  Atraumatic and normocephalic.   Eyes: Conjunctivae and sclerae normal, no icterus. No pallor.   Throat: No oral lesions, no thrush, oral mucosa moist.   Neck: Supple, trachea midline, no  thyromegaly.   Lungs:    Stable breath sounds.  No wheezes.   Heart:  Normal S1 and S2, no murmur, no gallop, no rub. No JVD.   Abdomen:   Normal bowel sounds, no masses, no organomegaly. Soft, nontender, moderately distended, no rebound tenderness.  There is a hematoma noted of the right abdomen   Extremities: Supple, trace to 1+ edema, no cyanosis, no clubbing.   Skin: No bleeding or rash.   Neurologic: Alert and oriented x 3. No facial asymmetry. Moves all four limbs. No tremors.      Laboratory results:    Results from last 7 days   Lab Units 07/22/21  0732 07/21/21  0838 07/20/21  0434 07/19/21  0405 07/19/21  0405 07/17/21  1514 07/17/21  1514   SODIUM mmol/L 132* 131* 131*   < > 131*   < > 134*   POTASSIUM mmol/L 4.7 4.4 4.4   < > 5.3*   < > 5.1   CHLORIDE mmol/L 100 97* 99   < > 102   < > 102   CO2 mmol/L 24.3 22.8 23.4   < > 22.8   < > 21.2*   BUN mg/dL 26* 26* 24*   < > 24*   < > 21*   CREATININE mg/dL 0.63 0.84 0.81   < > 0.68   < > 0.61   CALCIUM mg/dL 8.3* 8.5* 8.4*   < > 8.0*   < > 8.2*   BILIRUBIN mg/dL  --  4.9*  --   --  5.1*  --  7.9*   ALK PHOS U/L  --  139*  --   --  116  --  112   ALT (SGPT) U/L  --  18  --   --  14  --  15   AST (SGOT) U/L  --  48*  --   --  36*  --  37*   GLUCOSE mg/dL 80 169* 129*   < > 104*   < > 74    < > = values in this interval not displayed.     Results from last 7 days   Lab Units 07/22/21  0732 07/19/21  0405 07/18/21  0544   WBC 10*3/mm3 14.46* 13.82* 16.66*   HEMOGLOBIN g/dL 8.9* 8.7* 9.3*   HEMATOCRIT % 26.7* 27.0* 28.8*   PLATELETS 10*3/mm3 173 161 154     Results from last 7 days   Lab Units 07/17/21  1514   INR  2.18*         Results from last 7 days   Lab Units 07/17/21  1440   WOUNDCX  Rare Candida albicans*  Rare Normal Skin [place]         I have reviewed the patient's laboratory results.    Radiology results:    XR Chest 1 View    Result Date: 7/22/2021  PROCEDURE: XR CHEST 1 VW-  HISTORY: Chest tube to water seal; J95.811-Postprocedural pneumothorax;  K70.31-Alcoholic cirrhosis of liver with ascites; M46-Upoeict effusion, not elsewhere classified  COMPARISON: 7/21/2021.  FINDINGS: The heart is normal in size. The mediastinum is unremarkable. A left-sided chest tube is in place. There is no pneumothorax.  There is a small left effusion which is unchanged. The right lung is clear.      Impression: Left-sided chest tube in place with no evidence of a pneumothorax.  Continued followup is recommended.  This report was finalized on 7/22/2021 8:20 AM by Tania Strong M.D..    XR Chest 1 View    Result Date: 7/21/2021  PROCEDURE: XR CHEST 1 VW-  HISTORY: Chest tube placement, follow-up pneumothorax; J95.811-Postprocedural pneumothorax; K70.31-Alcoholic cirrhosis of liver with ascites; K28-Zmdistq effusion, not elsewhere classified  COMPARISON: 7/20/2021.  FINDINGS: The heart is normal in size. The mediastinum is unremarkable. A left-sided chest tube is in place. There is no pneumothorax. A small left effusion persists. There are chronic interstitial lung changes There are no acute osseous abnormalities.      Impression: Left-sided chest tube in place with no evidence of a pneumothorax.  Continued followup is recommended.  This report was finalized on 7/21/2021 8:48 AM by Tania Strong M.D..    XR Chest 1 View    Result Date: 7/20/2021  FINAL REPORT TECHNIQUE: An AP portable view of the chest was obtained. CLINICAL HISTORY: chest tube placement FINDINGS: After insertion of a smallbore left chest tube there has been significant decrease in size of a now small left pleural effusion.  There is no pneumothorax.  The lungs are clear. There is no osseous abnormality.     Impression: Diminished pleural effusion after left chest tube insertion.  No pneumothorax. Authenticated by Ramu Hudson M.D. on 07/20/2021 11:18:11 PM    XR Chest 1 View    Result Date: 7/20/2021  PROCEDURE: XR CHEST 1 VW-    HISTORY: f/u pneumothorax; J95.811-Postprocedural pneumothorax;  K70.31-Alcoholic cirrhosis of liver with ascites; A52-Utzzkjq effusion, not elsewhere classified  COMPARISON: One day prior.  FINDINGS: The heart is normal in size. The mediastinum is unremarkable. . There has been slight interval increase in the left hydropneumothorax. The right lung is clear. There are no acute osseous abnormalities.      Impression: Slight interval increase in size of the left hydropneumothorax.    Images were reviewed, interpreted, and dictated by Dr. Tania Strong M.D. Transcribed by Mariana East PA-C.  This report was finalized on 7/20/2021 3:12 PM by Tania Strong M.D..    I have reviewed the patient's radiology reports.    Medication Review:     I have reviewed the patient's active and prn medications.     Problem List:      Postprocedural pneumothorax    Cirrhosis of liver (CMS/HCC)    Ascites due to alcoholic cirrhosis (CMS/HCC)    Protein-calorie malnutrition, severe (CMS/HCC)    Esophageal varices in alcoholic cirrhosis (CMS/HCC)    Pleural effusion associated with hepatic disorder      Assessment:    · Respiratory distress secondary to significant left pleural effusion status post thoracentesis   · Iatrogenic pneumothorax   · End-stage liver disease  · Alcohol induced liver cirrhosis  · Recurrent ascites, paracentesis dependent essential hypertension  · Severe protein energy malnutrition with cachexia  · GERD  · Depression  · Hypothyroidism  · Essential hypertension  · Dyslipidemia  · Irritable bowel syndrome    Plan:    Patient remains hemodynamically stable.  Approximately 5 L of drainage from chest tube since placement.  Her vital signs and labs have remained stable with this.  Blood counts are stable.  Appreciate general surgery management of chest tube.  Will discuss case further with Dr. Saucedo, she may be appropriate for outpatient management with leaving tube in place.  She can come out of ICU.    DVT Prophylaxis: SCDs  Code Status: Full  Diet: As  tolerated  Discharge Plan: To be determined    Xiomara Presley DO  07/22/21  09:20 EDT    Dictated utilizing Dragon dictation.

## 2021-07-22 NOTE — PLAN OF CARE
Goal Outcome Evaluation:   Pt. Remains in ICU as tele overflow. Chest tube placed last night and tolerating well. V/S stable and will continue to monitor. This evening pt. Has had some complaints of discomfort in L chest area only with left arm movement.

## 2021-07-23 NOTE — DISCHARGE SUMMARY
[facility]   DISCHARGE SUMMARY      Name:  Gaby Rasheed   Age:  56 y.o.  Sex:  female  :  1965  MRN:  [id number]   Visit Number:  [id number]    Admission Date:  2021  Date of Discharge:  2021  Primary Care Physician:  Arabella Loving MD    Important issues to note:    Patient follow-up with [facility] liver transplant team as directed.  Follow-up with Dr. Saucedo as directed.  Follow-up with PCP as directed.    Of note, patient will be given prescription for Lasix to only be used if having any increased shortness of breath over the next week, however is instructed to seek medical care if not improving or worsening.    Discharge Diagnoses:     · Respiratory distress secondary to significant left pleural effusion status post thoracentesis   · Iatrogenic pneumothorax   · End-stage liver disease  · Alcohol induced liver cirrhosis  · Recurrent ascites, paracentesis dependent essential hypertension  · Severe protein energy malnutrition with cachexia  · GERD  · Depression  · Hypothyroidism  · Essential hypertension  · Dyslipidemia  · Irritable bowel syndrome    Problem List:     Active Hospital Problems    Diagnosis  POA   • **Postprocedural pneumothorax [J95.811]  Yes   • Esophageal varices in alcoholic cirrhosis (CMS/HCC) [K70.30, I85.10]  Yes   • Pleural effusion associated with hepatic disorder [K76.9, J91.8]  Yes   • Protein-calorie malnutrition, severe (CMS/HCC) [E43]  Yes   • Ascites due to alcoholic cirrhosis (CMS/HCC) [K70.31]  Yes   • Cirrhosis of liver (CMS/HCC) [K74.60]  Yes      Resolved Hospital Problems   No resolved problems to display.     Presenting Problem:    Chief Complaint   Patient presents with   • Wound Check   • Lung Eval      Consults:     Consulting Physician(s)  Chat With All Active Members    Provider Relationship Specialty    Nyasia Saucedo MD  Consulting Physician General Surgery        Procedures Performed:        History of presenting  illness/Hospital Course:    Patient is a 56-year-old female with alcoholic liver cirrhosis, paracentesis dependent, who presented to the hospital with shortness of breath and respiratory distress on 7/17/2021.  She had evidence of a left-sided pleural effusion and underwent thoracentesis in the emergency room upon admission.  She did have improvement in her shortness of breath, however she subsequent developed evidence of a posterior pneumothorax and recurrence of effusion fairly rapidly thereafter.  Initial plan was for patient be transferred to  liver transplant team and/or [facility] for further evaluation as we will sign pulmonology coverage, however no bed availability and she was admitted to our facility.    Repeat x-rays continue to show worsening pneumothorax despite conservative management.  General surgery was consulted, Dr. Saucedo was able to place a small bore chest tube with significant  Incomplete resolution of pneumothorax.  Patient did have, as expected, fair amount of fluid drainage from chest tube placement.  She has been hemodynamically stable and asymptomatic.  Chest tube was discontinued yesterday evening, repeat x-rays at that time as well as this morning show no further pneumothorax and stable left pleural effusion.    Talk with Dr. Saucedo today, as well as radiology, will plan on paracentesis prior to discharge.  Otherwise, patient cannot have a paracentesis done at  for nearly 2 weeks.  I suspect removal of ascites will assist with management of her pleural effusion going forward.  Return precautions were given with patient.  She will be staying with her fiancé.  The rest of her labs have been stable throughout hospitalization.    Vital Signs:    Temp:  [97.9 °F (36.6 °C)-98.9 °F (37.2 °C)] 97.9 °F (36.6 °C)  Heart Rate:  [72-85] 84  Resp:  [16-20] 16  BP: ()/(51-69) 123/62    Physical Exam:    General Appearance:  Alert and cooperative.  Chronically ill-appearing   Head:   Atraumatic and normocephalic.   Eyes: Conjunctivae and sclerae normal, no icterus. No pallor.   Ears:  Ears with no abnormalities noted.   Throat: No oral lesions, no thrush, oral mucosa moist.   Neck: Supple, trachea midline, no thyromegaly.   Back:   No kyphoscoliosis present. No tenderness to palpation.   Lungs:    Mildly diminished breath sounds at the left base.  No crackles.  Nontachypneic.  Nonlabored.   Heart:  Normal S1 and S2, no murmur, no gallop, no rub. No JVD.   Abdomen:   Normal bowel sounds, no masses, no organomegaly.  Distended abdomen.  No changes hematoma of the right abdominal.   Extremities: Supple, no edema, no cyanosis, no clubbing.   Pulses: Pulses palpable bilaterally.   Skin: No bleeding or rash.   Neurologic: Alert and oriented x 3. No facial asymmetry. Moves all four limbs. No tremors.     Pertinent Lab Results:     Results from last 7 days   Lab Units 07/23/21  0855 07/22/21  0732 07/21/21  0838 07/20/21  0434 07/19/21  0405 07/17/21  1514 07/17/21  1514   SODIUM mmol/L 129* 132* 131*   < > 131*   < > 134*   POTASSIUM mmol/L 4.4 4.7 4.4   < > 5.3*   < > 5.1   CHLORIDE mmol/L 97* 100 97*   < > 102   < > 102   CO2 mmol/L 23.6 24.3 22.8   < > 22.8   < > 21.2*   BUN mg/dL 32* 26* 26*   < > 24*   < > 21*   CREATININE mg/dL 0.83 0.63 0.84   < > 0.68   < > 0.61   CALCIUM mg/dL 8.4* 8.3* 8.5*   < > 8.0*   < > 8.2*   BILIRUBIN mg/dL  --   --  4.9*  --  5.1*  --  7.9*   ALK PHOS U/L  --   --  139*  --  116  --  112   ALT (SGPT) U/L  --   --  18  --  14  --  15   AST (SGOT) U/L  --   --  48*  --  36*  --  37*   GLUCOSE mg/dL 176* 80 169*   < > 104*   < > 74    < > = values in this interval not displayed.     Results from last 7 days   Lab Units 07/23/21  0855 07/22/21  0732 07/19/21  0405 07/18/21  0544 07/18/21  0544   WBC 10*3/mm3  --  14.46* 13.82*  --  16.66*   HEMOGLOBIN g/dL 10.0* 8.9* 8.7*   < > 9.3*   HEMATOCRIT % 30.7* 26.7* 27.0*   < > 28.8*   PLATELETS 10*3/mm3  --  173 161  --  154     < > = values in this interval not displayed.     Results from last 7 days   Lab Units 07/17/21  1514   INR  2.18*                         Results from last 7 days   Lab Units 07/17/21  1440   WOUNDCX  Rare Candida albicans*  Rare Normal Skin [place]       Pertinent Radiology Results:    Imaging Results (All)     Procedure Component Value Units Date/Time    XR Chest 1 View [[id number]] Collected: 07/23/21 0846     Updated: 07/23/21 0859    Narrative:      PROCEDURE: XR CHEST 1 VW-     HISTORY: f/u ptx; J95.811-Postprocedural pneumothorax; K70.31-Alcoholic  cirrhosis of liver with ascites; L86-Jwzephd effusion, not elsewhere  classified     COMPARISON: 7/22/2021.     FINDINGS: The heart is normal in size. The mediastinum is unremarkable.  There has been interval increase in size of patient's left effusion.  There is no pneumothorax.  The right lung is clear.       Impression:      Interval increase in size of patient's left pleural effusion  with no pneumothorax.     Continued followup is recommended.     This report was finalized on 7/23/2021 8:47 AM by Tania Strong M.D..    XR Chest 1 View [[id number]] Collected: 07/22/21 1854     Updated: 07/22/21 1855    Narrative:      FINAL REPORT    CLINICAL HISTORY:  chest tube removal    FINDINGS:  No discrete pneumothorax post chest tube removal.  Patchy left  lower lobe airspace disease and effusion.  Underlying  interstitial prominence.  Heart size within normal limits.      Impression:      No discrete pneumothorax post chest tube removal but recommend  short-term follow-up    Authenticated by Timmy Schmid MD on 07/22/2021 06:54:12 PM    XR Chest 1 View [[id number]] Collected: 07/22/21 0819     Updated: 07/22/21 0822    Narrative:      PROCEDURE: XR CHEST 1 VW-     HISTORY: Chest tube to water seal; J95.811-Postprocedural pneumothorax;  K70.31-Alcoholic cirrhosis of liver with ascites; N12-Qovrhti effusion,  not elsewhere classified     COMPARISON: 7/21/2021.      FINDINGS: The heart is normal in size. The mediastinum is unremarkable.  A left-sided chest tube is in place. There is no pneumothorax.  There is  a small left effusion which is unchanged. The right lung is clear.       Impression:      Left-sided chest tube in place with no evidence of a  pneumothorax.     Continued followup is recommended.     This report was finalized on 7/22/2021 8:20 AM by Tania Strong M.D..    XR Chest 1 View [[id number]] Collected: 07/21/21 0846     Updated: 07/21/21 0850    Narrative:      PROCEDURE: XR CHEST 1 VW-     HISTORY: Chest tube placement, follow-up pneumothorax;  J95.811-Postprocedural pneumothorax; K70.31-Alcoholic cirrhosis of liver  with ascites; T89-Ycektiz effusion, not elsewhere classified     COMPARISON: 7/20/2021.     FINDINGS: The heart is normal in size. The mediastinum is unremarkable.  A left-sided chest tube is in place. There is no pneumothorax. A small  left effusion persists. There are chronic interstitial lung changes  There are no acute osseous abnormalities.       Impression:      Left-sided chest tube in place with no evidence of a  pneumothorax.     Continued followup is recommended.     This report was finalized on 7/21/2021 8:48 AM by Tania Strong M.D..    XR Chest 1 View [[id number]] Collected: 07/20/21 2318     Updated: 07/20/21 2319    Narrative:      FINAL REPORT    TECHNIQUE:  An AP portable view of the chest was obtained.    CLINICAL HISTORY:  chest tube placement    FINDINGS:  After insertion of a smallbore left chest tube there has been  significant decrease in size of a now small left pleural  effusion.  There is no pneumothorax.  The lungs are clear.  There is no osseous abnormality.      Impression:      Diminished pleural effusion after left chest tube insertion.  No  pneumothorax.    Authenticated by Ramu Hudson M.D. on 07/20/2021 11:18:11 PM    XR Chest 1 View [[id number]] Collected: 07/20/21 1155     Updated: 07/20/21 1515     Narrative:      PROCEDURE: XR CHEST 1 VW-        HISTORY: f/u pneumothorax; J95.811-Postprocedural pneumothorax;  K70.31-Alcoholic cirrhosis of liver with ascites; V76-Bcytbjj effusion,  not elsewhere classified     COMPARISON: One day prior.     FINDINGS: The heart is normal in size. The mediastinum is unremarkable.  . There has been slight interval increase in the left hydropneumothorax.  The right lung is clear. There are no acute osseous abnormalities.       Impression:      Slight interval increase in size of the left  hydropneumothorax.           Images were reviewed, interpreted, and dictated by Dr. Tania Strong M.D.  Transcribed by Mariana East PA-C.     This report was finalized on 7/20/2021 3:12 PM by Tania Strong M.D..    XR Chest 1 View [[id number]] Collected: 07/19/21 0731     Updated: 07/19/21 0735    Narrative:      PROCEDURE: XR CHEST 1 VW-     HISTORY: f/ u pneumothorax; J95.811-Postprocedural pneumothorax;  K70.31-Alcoholic cirrhosis of liver with ascites; J65-Uttlcqj effusion,  not elsewhere classified     COMPARISON: 7/18/2021.     FINDINGS: The heart is normal in size. The mediastinum is unremarkable.  There has been no significant change in the patients left sided  hydropneumothorax. There is compressive atelectasis in the left lung.  The right lung is clear. There is no pneumothorax.  There are no acute  osseous abnormalities.       Impression:      No significant change in the patients left  hydropneumothorax.     Continued followup is recommended.     This report was finalized on 7/19/2021 7:33 AM by Tania Strong M.D..    XR Chest 1 View [[id number]] Collected: 07/18/21 0645     Updated: 07/18/21 0648    Narrative:      PORTABLE CHEST    7/17/2021 6:46 PM      HISTORY: Pneumothorax     COMPARISON: Earlier today.     FINDINGS: Moderate to large left pleural effusion has rapidly  reaccumulated. There is a small to moderate pneumothorax component,  probably 15-20%. Right  lung is clear.       Impression:      Left hydropneumothorax. Pneumothorax component is grossly  stable, the fluid component is increasing.     This report was finalized on 7/18/2021 6:46 AM by Dr Vasyl Carvajal DO.    XR Chest 1 View [[id number]] Collected: 07/18/21 0644     Updated: 07/18/21 0647    Narrative:      PORTABLE CHEST    7/18/2021 5:34 AM      HISTORY: Pneumothorax     COMPARISON: One day prior.     FINDINGS: Large left pleural effusion has rapidly reaccumulated.  Pneumothorax component remains small to moderate, probably 20%. Right  lung is grossly clear. Upper abdomen surgical clips.        Impression:      Left hydropneumothorax. There has been rapid reaccumulation  of fluid since yesterday's thoracentesis.     This report was finalized on 7/18/2021 6:45 AM by Dr Vasyl Carvajal DO.    XR Chest 1 View [[id number]] Collected: 07/17/21 1456     Updated: 07/17/21 1501    Narrative:      PORTABLE CHEST    7/17/2021 2:30 PM      HISTORY: Pneumothorax     COMPARISON: Earlier today.     FINDINGS: Left hydropneumothorax has increased in the interval,  pneumothorax now at least 20% mainly at the lateral base. There are new  airspace opacities in the left mid and lower lung zone. Reexpansion  edema could give this appearance. Right lung remains clear.          Impression:         1. Increased left hydropneumothorax.  2. New left mid and lower lung zone dense opacities could be reexpansion  edema. Continued follow-up needed.     This report was finalized on 7/17/2021 2:59 PM by Dr Vasyl Cravajal DO.    XR Chest 1 View [[id number]] Collected: 07/17/21 1234     Updated: 07/17/21 1240    Narrative:      PORTABLE CHEST    7/17/2021 12:04 PM      HISTORY:  thoracentesis.      COMPARISON: Earlier today.     FINDINGS: Normal heart size. Small left pneumothorax following  thoracentesis. Approximately 15% involvement of the pneumothorax. Small  residual less pleural effusion. Right lung is clear.       Impression:      Small left  pneumothorax.     Dr. Geller notified by telephone at time of dictation.     This report was finalized on 7/17/2021 12:37 PM by Dr Vasyl Carvajal DO.    XR Chest 1 View [[id number]] Collected: 07/17/21 1021     Updated: 07/17/21 1024    Narrative:      PORTABLE CHEST    7/17/2021 10:03 AM      HISTORY: Shortness of air, pleural effusion     COMPARISON: 04/06/2021.     FINDINGS: Large left pleural effusion, significantly increased in size  from April. Compressive atelectasis. Lungs otherwise grossly clear.  Normal heart size. No pneumothorax. Upper abdomen surgical clips.       Impression:      Large left pleural effusion.     This report was finalized on 7/17/2021 10:22 AM by Dr Vasyl Carvajal DO.          Echo:      Condition on Discharge:      Stable.    Code status during the hospital stay:    Code Status and Medical Interventions:   Ordered at: 07/17/21 2154     Level Of Support Discussed With:    Patient     Code Status:    CPR     Medical Interventions (Level of Support Prior to Arrest):    Full     Discharge Disposition:    Home    Discharge Medications:       Discharge Medications      New Medications      Instructions Start Date   furosemide 20 MG tablet  Commonly known as: Lasix   20 mg, Oral, Daily PRN         Changes to Medications      Instructions Start Date   magnesium oxide 400 MG tablet  Commonly known as: HM Magnesium  What changed:   · when to take this  · additional instructions   400 mg, Oral, Daily         Continue These Medications      Instructions Start Date   ARIPiprazole 10 MG tablet  Commonly known as: Abilify   10 mg, Oral, Daily      desvenlafaxine 100 MG 24 hr tablet  Commonly known as: PRISTIQ   100 mg, Oral, Daily      ENSURE PO   Oral, Daily      GAS-X PO   1 tablet, Oral, Daily PRN      lactulose 10 GM/15ML solution  Commonly known as: CHRONULAC   20 g, Oral, 3 Times Daily PRN      levothyroxine 50 MCG tablet  Commonly known as: SYNTHROID, LEVOTHROID   50 mcg, Oral, Daily      LORazepam 0.5  MG tablet  Commonly known as: Ativan   0.5 mg, Oral, 2 Times Daily PRN      pantoprazole 40 MG EC tablet  Commonly known as: PROTONIX   40 mg, Oral, Daily      potassium chloride ER 20 MEQ tablet controlled-release ER tablet  Commonly known as: K-TAB   20 mEq, Oral, Daily      spironolactone 50 MG tablet  Commonly known as: ALDACTONE   50 mg, Oral, Daily      TUMS PO   1 tablet, Oral, 2 Times Daily PRN      Vitamin B Complex tablet   1 tablet, Oral, Daily      zolpidem 10 MG tablet  Commonly known as: AMBIEN   10 mg, Oral, Nightly PRN         Stop These Medications    metoprolol tartrate 25 MG tablet  Commonly known as: LOPRESSOR     multivitamin with minerals tablet tablet     naproxen 250 MG tablet  Commonly known as: NAPROSYN     PRENATAL VITAMIN PO          Discharge Diet:   As tolerated    Activity at Discharge:   As tolerated    Follow-up Appointments:    Follow-up Information     Nyasia Saucedo MD. Schedule an appointment as soon as possible for a visit in 1 week(s).    Specialty: General Surgery  Why: For suture removal  Contact information:  [address]  [address]  [facility] [address]  [phone]             Arabella Loving MD Follow up in 2 week(s).    Specialty: Family Medicine  Contact information:  [address]  [address]  [facility] [address]  [phone]                 Future Appointments   Date Time Provider Department Center   8/3/2021 11:15 AM Hannah Otero APRN [facility]   9/29/2021  1:30 PM Arabella Loving MD [facility]     Test Results Pending at Discharge:    Pending Labs     Order Current Status    Protime-INR Collected (07/23/21 1151)             Xiomara Presley DO  07/23/21  11:54 EDT    Time: I spent 50 minutes on this discharge activity which included: face-to-face encounter with the patient, reviewing the data in the system, coordination of the care with the nursing staff as well as consultants, documentation, and entering orders.     Dictated  utilizing Dragon dictation.

## 2021-07-23 NOTE — PROGRESS NOTES
LOS: 6 days   Patient Care Team:  Arabella Loving MD as PCP - General (Family Medicine)  Moose Abreu MD as Consulting Physician (General Surgery)    Chief Complaint: Follow-up pneumothorax    Subjective     Interval History:   Small bore chest tube removed yesterday without incident.  Post pull chest x-ray without evidence of recurrent pneumothorax.  Small recurrent effusion noted.  Repeat chest x-ray this morning again without evidence of recurrent pneumothorax.  Noted recurrent effusion, which is a chronic issue.  Patient remains on room air without any respiratory difficulties or complaints.    Review of Systems:   All systems were reviewed and negative except for:  Respiratory: positive for  See HPI    Objective     Vital Signs  Temp:  [97.9 °F (36.6 °C)-98.9 °F (37.2 °C)] 98.4 °F (36.9 °C)  Heart Rate:  [72-85] 76  Resp:  [16-20] 16  BP: ()/(51-69) 97/64    Physical Exam:  General Appearance: alert, appears stated age and cooperative  Head: normocephalic, without obvious abnormality and atraumatic  Eyes: lids and lashes normal, conjunctivae and sclerae normal, no icterus, no pallor, corneas clear and PERRLA  Lungs: respirations regular, respirations even and respirations unlabored  Abdomen: Markedly distended, positive fluid wave, nontender  Extremities: moves extremities well, no edema, no cyanosis and no redness  Neurologic: Mental Status orientated to person, place, time and situation  Psych: normal     Results Review:    I reviewed the patient's new clinical results.      Results from last 7 days   Lab Units 07/23/21  0855 07/22/21  0732 07/21/21  0838 07/20/21  0434 07/19/21  0405 07/17/21  1514 07/17/21  1514   SODIUM mmol/L 129* 132* 131*   < > 131*   < > 134*   POTASSIUM mmol/L 4.4 4.7 4.4   < > 5.3*   < > 5.1   CHLORIDE mmol/L 97* 100 97*   < > 102   < > 102   CO2 mmol/L 23.6 24.3 22.8   < > 22.8   < > 21.2*   BUN mg/dL 32* 26* 26*   < > 24*   < > 21*   CREATININE mg/dL 0.83 0.63  0.84   < > 0.68   < > 0.61   CALCIUM mg/dL 8.4* 8.3* 8.5*   < > 8.0*   < > 8.2*   BILIRUBIN mg/dL  --   --  4.9*  --  5.1*  --  7.9*   ALK PHOS U/L  --   --  139*  --  116  --  112   ALT (SGPT) U/L  --   --  18  --  14  --  15   AST (SGOT) U/L  --   --  48*  --  36*  --  37*   GLUCOSE mg/dL 176* 80 169*   < > 104*   < > 74    < > = values in this interval not displayed.       Results from last 7 days   Lab Units 07/23/21  0855 07/22/21  0732 07/19/21  0405 07/18/21  0544 07/18/21  0544   WBC 10*3/mm3  --  14.46* 13.82*  --  16.66*   HEMOGLOBIN g/dL 10.0* 8.9* 8.7*   < > 9.3*   HEMATOCRIT % 30.7* 26.7* 27.0*   < > 28.8*   PLATELETS 10*3/mm3  --  173 161  --  154    < > = values in this interval not displayed.       PROCEDURE: XR CHEST 1 VW-     HISTORY: f/u ptx; J95.811-Postprocedural pneumothorax; K70.31-Alcoholic  cirrhosis of liver with ascites; A12-Xdxkeys effusion, not elsewhere  classified     COMPARISON: 7/22/2021.     FINDINGS: The heart is normal in size. The mediastinum is unremarkable.  There has been interval increase in size of patient's left effusion.  There is no pneumothorax.  The right lung is clear.     IMPRESSION:  Interval increase in size of patient's left pleural effusion  with no pneumothorax.     Continued followup is recommended.     This report was finalized on 7/23/2021 8:47 AM by Tania Strong M.D..      Medication Review:     Scheduled Meds:ARIPiprazole, 10 mg, Oral, Daily  levothyroxine, 50 mcg, Oral, Daily  magnesium oxide, 400 mg, Oral, Daily  metoprolol tartrate, 12.5 mg, Oral, Q12H  multivitamin with minerals, 1 tablet, Oral, Daily  pantoprazole, 40 mg, Oral, Daily  PRO-STAT, 30 mL, Oral, BID  sodium chloride, 10 mL, Intravenous, Q12H  spironolactone, 50 mg, Oral, Daily  venlafaxine XR, 75 mg, Oral, Daily With Breakfast      Continuous Infusions:   PRN Meds:.lactulose  •  LORazepam  •  [COMPLETED] Insert peripheral IV **AND** sodium chloride  •  sodium chloride  •   zolpidem      Assessment/Plan       Postprocedural pneumothorax    Cirrhosis of liver (CMS/HCC)    Ascites due to alcoholic cirrhosis (CMS/HCC)    Protein-calorie malnutrition, severe (CMS/HCC)    Esophageal varices in alcoholic cirrhosis (CMS/HCC)    Pleural effusion associated with hepatic disorder      Ms. Rasheed is a 56-year-old female patient with alcoholic cirrhosis, large volume ascites, and a chronic left effusion, who was admitted 6 days ago following thoracentesis performed in the emergency department which was complicated by a pneumothorax.  She ultimately required insertion of a small bore chest tube, which was removed yesterday.  She has had no evidence of recurrent pneumothorax 24 hours after chest tube removal, and from my standpoint is doing well.  Her pleural effusion is a chronic issue, driven by her underlying liver disease.  I discussed with the patient, her , and the hospitalist the fact that the patient has now been in the hospital for 6 days, and is due for paracentesis.  Recall that she is known to undergo large-volume paracentesis weekly at .  I have suggested that we have the radiologist perform a paracentesis at our facility before discharge home later this afternoon, in order to avoid difficulties managing her ascites, and respiratory status following discharge.  Otherwise, she may be discharged home at the discretion of the hospitalist service, and will need to follow-up at  as scheduled for ongoing evaluation for liver transplant.  She may follow-up with me in the office in 1 week to have her sutures removed from her chest tube site.  She should leave her chest dressing in place until Sunday, after which time it can be removed.  It does not require replacement unless there is drainage from the prior chest tube site.    Nyasia Saucedo MD  07/23/21  10:43 EDT

## 2021-07-23 NOTE — OUTREACH NOTE
Prep Survey      Responses   [facility] discharged from?  [place]   Is LACE score < 7 ?  No   Emergency Room discharge w/ pulse ox?  No   Eligibility  [facility]   Date of Admission  07/17/21   Date of Discharge  07/23/21   Discharge Disposition  Home or Self Care   Discharge diagnosis  resp distress d/t significant left pleural effusion d/t thoracentesis, end state liver disease, malnutrition   Does the patient have one of the following disease processes/diagnoses(primary or secondary)?  Other   Does the patient have Home health ordered?  No   Is there a DME ordered?  No   Prep survey completed?  Yes          Marie Lamar RN

## 2021-07-23 NOTE — PLAN OF CARE
Goal Outcome Evaluation:               Paracentesis scheduled for today per MD order, no c/o pain, just abdominal discomfort states patient, declined pain meds when offered, room air, wctm, notify MD for issues or concerns

## 2021-07-24 NOTE — CASE MANAGEMENT/SOCIAL WORK
Case Management Discharge Note                Selected Continued Care - Discharged on 7/23/2021 Admission date: 7/17/2021 - Discharge disposition: Home or Self Care    Destination    No services have been selected for the patient.              Durable Medical Equipment    No services have been selected for the patient.              Dialysis/Infusion    No services have been selected for the patient.              Home Medical Care    No services have been selected for the patient.              Therapy    No services have been selected for the patient.              Community Resources    No services have been selected for the patient.              Community & DME    No services have been selected for the patient.                  Transportation Services  Private: Car    Final Discharge Disposition Code: 01 - home or self-care

## 2021-07-26 NOTE — OUTREACH NOTE
Call Center TCM Note      Responses   [facility] patient discharged from?  [facility]   Does the patient have one of the following disease processes/diagnoses(primary or secondary)?  Other   TCM attempt successful?  Yes   Call start time  1216   Call end time  1224   Discharge diagnosis  resp distress d/t significant left pleural effusion d/t thoracentesis, end state liver disease, malnutrition   Is patient permission given to speak with other caregiver?  No   Meds reviewed with patient/caregiver?  Yes   Does the patient have all medications ordered at discharge?  Yes   Is the patient taking all medications as directed (includes completed medication regime)?  Yes   Comments regarding appointments  Post-Op with Nyasia Saucedo MD Thursday Jul 29, 2021 9:00 AM   Does the patient have a primary care provider?   Yes   Does the patient have an appointment with their PCP within 7 days of discharge?  Yes   Comments regarding PCP  PCP Hospital Follow Up with Arabella Loving MD Wednesday Jul 28, 2021 2:00 PM   Has the patient kept scheduled appointments due by today?  N/A   Has home health visited the patient within 72 hours of discharge?  N/A   Psychosocial issues?  No   Did the patient receive a copy of their discharge instructions?  Yes   Nursing interventions  Reviewed instructions with patient   What is the patient's perception of their health status since discharge?  Improving   Is the patient/caregiver able to teach back signs and symptoms related to disease process for when to call PCP?  Yes   Is the patient/caregiver able to teach back signs and symptoms related to disease process for when to call 911?  Yes   Is the patient/caregiver able to teach back the hierarchy of who to call/visit for symptoms/problems? PCP, Specialist, Home health nurse, Urgent Care, ED, 911  Yes   If the patient is a current smoker, are they able to teach back resources for cessation?  Not a smoker   TCM call completed?  Yes   Wrap  up additional comments  Denies any further questions or needs today.           Clara Esquivel RN    7/26/2021, 12:24 EDT

## 2021-07-27 PROBLEM — K74.60 DECOMPENSATED HEPATIC CIRRHOSIS (HCC): Status: ACTIVE | Noted: 2021-01-01

## 2021-07-27 PROBLEM — K72.90 DECOMPENSATED HEPATIC CIRRHOSIS (HCC): Status: ACTIVE | Noted: 2021-01-01

## 2021-07-27 PROBLEM — S32.020A COMPRESSION FRACTURE OF L2 (HCC): Status: ACTIVE | Noted: 2021-01-01

## 2021-07-27 PROBLEM — I48.91 A-FIB (HCC): Status: ACTIVE | Noted: 2021-01-01

## 2021-07-27 PROBLEM — I71.00 DISSECTION OF DESCENDING AORTA (HCC): Status: ACTIVE | Noted: 2021-01-01

## 2021-07-27 NOTE — PROGRESS NOTES
Transitional Care Follow Up Visit  Subjective     Gaby Nick Rasheed is a 56 y.o. female who presents for a transitional care management visit.    Within 48 business hours after discharge our office contacted her via telephone to coordinate her care and needs.      I reviewed and discussed the details of that call along with the discharge summary, hospital problems, inpatient lab results, inpatient diagnostic studies, and consultation reports with Gaby.     Current outpatient and discharge medications have been reconciled for the patient.  Reviewed by: Arabella Loving MD      Date of TCM Phone Call 7/23/2021   [facility]   Date of Admission 7/17/2021   Date of Discharge 7/23/2021   Discharge Disposition Home or Self Care     Risk for Readmission (LACE) Score: 14 (7/23/2021  6:01 AM)      History of Present Illness   Course During Hospital Stay:  Per discharge summary:    Important issues to note:     Patient follow-up with [facility] liver transplant team as directed.  Follow-up with Dr. Saucedo as directed.  Follow-up with PCP as directed.     Of note, patient will be given prescription for Lasix to only be used if having any increased shortness of breath over the next week, however is instructed to seek medical care if not improving or worsening.     Discharge Diagnoses:      · Respiratory distress secondary to significant left pleural effusion status post thoracentesis   · Iatrogenic pneumothorax   · End-stage liver disease  · Alcohol induced liver cirrhosis  · Recurrent ascites, paracentesis dependent essential hypertension  · Severe protein energy malnutrition with cachexia  · GERD  · Depression  · Hypothyroidism  · Essential hypertension  · Dyslipidemia  · Irritable bowel syndrome    History of presenting illness/Hospital Course:     Patient is a 56-year-old female with alcoholic liver cirrhosis, paracentesis dependent, who presented to the hospital with shortness of breath and respiratory distress on  7/17/2021.  She had evidence of a left-sided pleural effusion and underwent thoracentesis in the emergency room upon admission.  She did have improvement in her shortness of breath, however she subsequent developed evidence of a posterior pneumothorax and recurrence of effusion fairly rapidly thereafter.  Initial plan was for patient be transferred to  liver transplant team and/or [facility] for further evaluation as we will sign pulmonology coverage, however no bed availability and she was admitted to our facility.     Repeat x-rays continue to show worsening pneumothorax despite conservative management.  General surgery was consulted, Dr. Saucedo was able to place a small bore chest tube with significant  Incomplete resolution of pneumothorax.  Patient did have, as expected, fair amount of fluid drainage from chest tube placement.  She has been hemodynamically stable and asymptomatic.  Chest tube was discontinued yesterday evening, repeat x-rays at that time as well as this morning show no further pneumothorax and stable left pleural effusion.     Talk with Dr. Saucedo today, as well as radiology, will plan on paracentesis prior to discharge.  Otherwise, patient cannot have a paracentesis done at  for nearly 2 weeks.  I suspect removal of ascites will assist with management of her pleural effusion going forward.  Return precautions were given with patient.  She will be staying with her fiancé.  The rest of her labs have been stable throughout hospitalization.        The following portions of the patient's history were reviewed and updated as appropriate: allergies, current medications, past family history, past medical history, past social history, past surgical history and problem list.    Review of Systems   Constitutional: Positive for fatigue. Negative for fever.   Respiratory: Negative for shortness of breath.    Gastrointestinal: Positive for abdominal distention.   Hematological: Bruises/bleeds easily.        Objective   Physical Exam  Vitals and nursing note reviewed.   Constitutional:       General: She is not in acute distress.     Appearance: She is cachectic. She is ill-appearing. She is not toxic-appearing or diaphoretic.      Interventions: Face mask in place.   Pulmonary:      Effort: Tachypnea present.      Breath sounds: No stridor. Examination of the left-middle field reveals decreased breath sounds. Examination of the left-lower field reveals decreased breath sounds. Decreased breath sounds present. No wheezing, rhonchi or rales.   Abdominal:      General: Abdomen is protuberant. A surgical scar is present. There is distension.   Skin:     General: Skin is warm.      Findings: Bruising present.   Neurological:      Mental Status: She is alert and oriented to person, place, and time.   Psychiatric:         Attention and Perception: Attention and perception normal.         Mood and Affect: Mood and affect normal.         Speech: Speech normal.         Behavior: Behavior is cooperative.          Lab Results   Component Value Date    TSH 1.270 03/26/2021     Lab Results   Component Value Date    FREET4 1.53 03/26/2021         Assessment/Plan   Diagnoses and all orders for this visit:    1. Pleural effusion, left (Primary)    2. S/P chest tube placement (CMS/HCC)    3. Decompensated hepatic cirrhosis (CMS/HCC)    4. Chronic liver disease and cirrhosis (CMS/HCC)  -     Comprehensive Metabolic Panel  -     Protime-INR    5. Thrombocytopenia (CMS/HCC)  -     CBC & Differential    6. Ascites due to alcoholic cirrhosis (CMS/HCC)    7. Essential hypertension  -     TSH+Free T4    8. Hospital discharge follow-up      Reviewed chest imaging with patient and her mother, who accompanied her to the visit today. Pleural effusion quickly returned after drainage, returned some after chest tube removed. Follow up with hepatology. Discussed MELD score, 3 month risk of mortality estimated from score. Rechecking labs, based  on available info I calculated most recent MELD as 33 which gave approx 52% risk of mortality in next three months. Continue to abstain from alcohol. Patient not able to return to work, forms we received regarding disability will be completed. Follow up as scheduled in September or sooner if needed.

## 2021-08-02 NOTE — OUTREACH NOTE
Medical Week 2 Survey      Responses   [facility] patient discharged from?  [facility]   Does the patient have one of the following disease processes/diagnoses(primary or secondary)?  Other   Week 2 attempt successful?  Yes   Call start time  1717   Discharge diagnosis  resp distress d/t significant left pleural effusion d/t thoracentesis, end state liver disease, malnutrition   Call end time  1724   Is patient permission given to speak with other caregiver?  No   Meds reviewed with patient/caregiver?  Yes   Does the patient have all medications ordered at discharge?  Yes   Is the patient taking all medications as directed (includes completed medication regime)?  Yes   Does the patient have a primary care provider?   Yes   Comments regarding PCP  Patient has seen PCP since discharge.    Has the patient kept scheduled appointments due by today?  Yes   Has home health visited the patient within 72 hours of discharge?  N/A   Psychosocial issues?  No   Did the patient receive a copy of their discharge instructions?  Yes   Nursing interventions  Reviewed instructions with patient   What is the patient's perception of their health status since discharge?  Improving [Patient reports doing OK, seeing [facility] DR's tomorrow. ]   Is the patient/caregiver able to teach back signs and symptoms related to disease process for when to call PCP?  Yes   Is the patient/caregiver able to teach back signs and symptoms related to disease process for when to call 911?  Yes   Is the patient/caregiver able to teach back the hierarchy of who to call/visit for symptoms/problems? PCP, Specialist, Home health nurse, Urgent Care, ED, 911  Yes   If the patient is a current smoker, are they able to teach back resources for cessation?  Not a smoker   Week 2 Call Completed?  Yes          Clara Esquivel RN

## 2021-08-02 NOTE — TELEPHONE ENCOUNTER
Camila with [address] called and left  on referral line that patient was recently in the hospital and dr verma wanted her to have physical therapy. They need a new order since patient has been hospitalized since they last seen her in July. Please fax new physical therapy order to [phone]

## 2021-08-11 NOTE — OUTREACH NOTE
Medical Week 3 Survey      Responses   [facility] [place] patient discharged from?  [facility]   Does the patient have one of the following disease processes/diagnoses(primary or secondary)?  Other   Week 3 attempt successful?  Yes   Call start time  1719   Revoke  Readmitted [Patient readmitted to [facility]. ]   Call end time  1719          Clara Esquivel RN

## 2021-08-20 NOTE — TELEPHONE ENCOUNTER
Rx Refill Note  Requested Prescriptions     Pending Prescriptions Disp Refills   • ARIPiprazole (ABILIFY) 10 MG tablet [Pharmacy Med Name: ARIPIPRAZOLE 10MG TABLETS] 30 tablet 1     Sig: TAKE 1 TABLET BY MOUTH DAILY      Last office visit with prescribing clinician: 6/24/2021      Next office visit with prescribing clinician: Visit date not found            Merle Gallegos MA  08/20/21, 08:30 EDT

## 2021-09-09 NOTE — TELEPHONE ENCOUNTER
Patient called and states she went to  her abilify yesterday and states it would have cost her 1,000 dollars to fill as insurance wont cover it. She would like to discuss cheaper alternative.

## 2021-09-09 NOTE — TELEPHONE ENCOUNTER
If she downloads good rx and lets us send it to [facility] or [facility] it will be around $14 for a month. Why are they all of the sudden not paying for it? Does it just need a pa?

## 2021-09-22 PROBLEM — I71.20 THORACIC AORTIC ANEURYSM WITHOUT RUPTURE (HCC): Status: ACTIVE | Noted: 2021-01-01

## 2021-09-22 NOTE — PROGRESS NOTES
"Subjective    Gaby Rasheed is a 56 y.o. female here for:  Chief Complaint   Patient presents with   • Hepatic Disease     discuss long term disablity        History per MA reviewed.    Pt needing forms for disability. Was told form we were supposed to do in July wasn't received.         The following portions of the patient's history were reviewed and updated as appropriate: allergies, current medications, past family history, past medical history, past social history, past surgical history and problem list.    Review of Systems   Constitutional: Positive for fatigue. Negative for fever.   Gastrointestinal: Positive for abdominal distention.   Neurological: Positive for weakness.         Objective   Visit Vitals  /60   Pulse 80   Temp 98 °F (36.7 °C) (Infrared)   Ht 160 cm (62.99\")   Wt 57.9 kg (127 lb 9.6 oz)   SpO2 100%   BMI 22.61 kg/m²       Physical Exam  Vitals and nursing note reviewed.   Constitutional:       General: She is not in acute distress.     Appearance: She is ill-appearing.      Interventions: Face mask in place.   Abdominal:      General: Abdomen is protuberant.   Neurological:      Mental Status: She is alert.   Psychiatric:         Attention and Perception: Attention and perception normal.         Mood and Affect: Mood and affect normal.         For medical decision making review of the following was required:            transplant note () reviewed via care everywhere. Dr. Gruber.    Assessment/Plan     Problem List Items Addressed This Visit        Gastrointestinal Abdominal     Chronic liver disease and cirrhosis (CMS/HCC)    Decompensated hepatic cirrhosis (CMS/HCC)      Other Visit Diagnoses     Need for influenza vaccination    -  Primary    Relevant Orders    FluLaval/Fluarix >6 Months ([id number]) (Completed)          · Form she needed was scanned into chart, printed, given to patient. Follow up as scheduled for physical.      Arabella Loving MD  "